# Patient Record
Sex: MALE | Race: WHITE | URBAN - METROPOLITAN AREA
[De-identification: names, ages, dates, MRNs, and addresses within clinical notes are randomized per-mention and may not be internally consistent; named-entity substitution may affect disease eponyms.]

---

## 2018-02-03 ENCOUNTER — INPATIENT (INPATIENT)
Facility: HOSPITAL | Age: 73
LOS: 17 days | Discharge: NURSING, REHAB OR LONG-TERM CARE | End: 2018-02-21
Attending: FAMILY MEDICINE | Admitting: FAMILY MEDICINE
Payer: MEDICARE

## 2018-02-03 VITALS
RESPIRATION RATE: 17 BRPM | HEART RATE: 99 BPM | SYSTOLIC BLOOD PRESSURE: 136 MMHG | WEIGHT: 250 LBS | DIASTOLIC BLOOD PRESSURE: 97 MMHG | HEIGHT: 73 IN | OXYGEN SATURATION: 93 % | TEMPERATURE: 98 F

## 2018-02-03 LAB
ALBUMIN SERPL ELPH-MCNC: 3.2 G/DL — LOW (ref 3.3–5)
ALP SERPL-CCNC: 116 U/L — SIGNIFICANT CHANGE UP (ref 40–120)
ALT FLD-CCNC: 35 U/L — SIGNIFICANT CHANGE UP (ref 12–78)
ANION GAP SERPL CALC-SCNC: 10 MMOL/L — SIGNIFICANT CHANGE UP (ref 5–17)
APTT BLD: 22.7 SEC — LOW (ref 27.5–37.4)
AST SERPL-CCNC: 35 U/L — SIGNIFICANT CHANGE UP (ref 15–37)
BASOPHILS # BLD AUTO: 0.1 K/UL — SIGNIFICANT CHANGE UP (ref 0–0.2)
BASOPHILS NFR BLD AUTO: 0.9 % — SIGNIFICANT CHANGE UP (ref 0–2)
BILIRUB SERPL-MCNC: 0.7 MG/DL — SIGNIFICANT CHANGE UP (ref 0.2–1.2)
BUN SERPL-MCNC: 19 MG/DL — SIGNIFICANT CHANGE UP (ref 7–23)
CALCIUM SERPL-MCNC: 9.3 MG/DL — SIGNIFICANT CHANGE UP (ref 8.5–10.1)
CHLORIDE SERPL-SCNC: 94 MMOL/L — LOW (ref 96–108)
CO2 SERPL-SCNC: 28 MMOL/L — SIGNIFICANT CHANGE UP (ref 22–31)
CREAT SERPL-MCNC: 0.8 MG/DL — SIGNIFICANT CHANGE UP (ref 0.5–1.3)
EOSINOPHIL # BLD AUTO: 0.1 K/UL — SIGNIFICANT CHANGE UP (ref 0–0.5)
EOSINOPHIL NFR BLD AUTO: 0.7 % — SIGNIFICANT CHANGE UP (ref 0–6)
GLUCOSE SERPL-MCNC: 98 MG/DL — SIGNIFICANT CHANGE UP (ref 70–99)
HCT VFR BLD CALC: 42.2 % — SIGNIFICANT CHANGE UP (ref 39–50)
HGB BLD-MCNC: 14.1 G/DL — SIGNIFICANT CHANGE UP (ref 13–17)
INR BLD: 1.14 RATIO — SIGNIFICANT CHANGE UP (ref 0.88–1.16)
LYMPHOCYTES # BLD AUTO: 0.6 K/UL — LOW (ref 1–3.3)
LYMPHOCYTES # BLD AUTO: 4.1 % — LOW (ref 13–44)
MAGNESIUM SERPL-MCNC: 1.9 MG/DL — SIGNIFICANT CHANGE UP (ref 1.6–2.6)
MCHC RBC-ENTMCNC: 31.4 PG — SIGNIFICANT CHANGE UP (ref 27–34)
MCHC RBC-ENTMCNC: 33.4 GM/DL — SIGNIFICANT CHANGE UP (ref 32–36)
MCV RBC AUTO: 93.8 FL — SIGNIFICANT CHANGE UP (ref 80–100)
MONOCYTES # BLD AUTO: 1.2 K/UL — HIGH (ref 0–0.9)
MONOCYTES NFR BLD AUTO: 8.7 % — SIGNIFICANT CHANGE UP (ref 2–14)
NEUTROPHILS # BLD AUTO: 11.6 K/UL — HIGH (ref 1.8–7.4)
NEUTROPHILS NFR BLD AUTO: 85.5 % — HIGH (ref 43–77)
PLATELET # BLD AUTO: 423 K/UL — HIGH (ref 150–400)
POTASSIUM SERPL-MCNC: 4 MMOL/L — SIGNIFICANT CHANGE UP (ref 3.5–5.3)
POTASSIUM SERPL-SCNC: 4 MMOL/L — SIGNIFICANT CHANGE UP (ref 3.5–5.3)
PROT SERPL-MCNC: 7.1 GM/DL — SIGNIFICANT CHANGE UP (ref 6–8.3)
PROTHROM AB SERPL-ACNC: 12.4 SEC — SIGNIFICANT CHANGE UP (ref 9.8–12.7)
RBC # BLD: 4.5 M/UL — SIGNIFICANT CHANGE UP (ref 4.2–5.8)
RBC # FLD: 12.4 % — SIGNIFICANT CHANGE UP (ref 10.3–14.5)
SODIUM SERPL-SCNC: 132 MMOL/L — LOW (ref 135–145)
WBC # BLD: 13.6 K/UL — HIGH (ref 3.8–10.5)
WBC # FLD AUTO: 13.6 K/UL — HIGH (ref 3.8–10.5)

## 2018-02-03 PROCEDURE — 73552 X-RAY EXAM OF FEMUR 2/>: CPT | Mod: 26

## 2018-02-03 PROCEDURE — 73562 X-RAY EXAM OF KNEE 3: CPT | Mod: 26,RT

## 2018-02-03 PROCEDURE — 71045 X-RAY EXAM CHEST 1 VIEW: CPT | Mod: 26

## 2018-02-03 PROCEDURE — 99285 EMERGENCY DEPT VISIT HI MDM: CPT

## 2018-02-03 PROCEDURE — 93010 ELECTROCARDIOGRAM REPORT: CPT

## 2018-02-03 PROCEDURE — 73502 X-RAY EXAM HIP UNI 2-3 VIEWS: CPT | Mod: 26

## 2018-02-03 PROCEDURE — 93970 EXTREMITY STUDY: CPT | Mod: 26

## 2018-02-03 RX ORDER — OXYCODONE HYDROCHLORIDE 5 MG/1
10 TABLET ORAL EVERY 4 HOURS
Qty: 0 | Refills: 0 | Status: DISCONTINUED | OUTPATIENT
Start: 2018-02-03 | End: 2018-02-05

## 2018-02-03 RX ORDER — OXYCODONE HYDROCHLORIDE 5 MG/1
5 TABLET ORAL EVERY 4 HOURS
Qty: 0 | Refills: 0 | Status: DISCONTINUED | OUTPATIENT
Start: 2018-02-03 | End: 2018-02-05

## 2018-02-03 RX ORDER — MORPHINE SULFATE 50 MG/1
4 CAPSULE, EXTENDED RELEASE ORAL ONCE
Qty: 0 | Refills: 0 | Status: DISCONTINUED | OUTPATIENT
Start: 2018-02-03 | End: 2018-02-03

## 2018-02-03 RX ORDER — HEPARIN SODIUM 5000 [USP'U]/ML
5000 INJECTION INTRAVENOUS; SUBCUTANEOUS ONCE
Qty: 0 | Refills: 0 | Status: COMPLETED | OUTPATIENT
Start: 2018-02-03 | End: 2018-02-03

## 2018-02-03 RX ORDER — SODIUM CHLORIDE 9 MG/ML
1000 INJECTION, SOLUTION INTRAVENOUS
Qty: 0 | Refills: 0 | Status: DISCONTINUED | OUTPATIENT
Start: 2018-02-03 | End: 2018-02-05

## 2018-02-03 RX ORDER — HYDROMORPHONE HYDROCHLORIDE 2 MG/ML
0.5 INJECTION INTRAMUSCULAR; INTRAVENOUS; SUBCUTANEOUS
Qty: 0 | Refills: 0 | Status: DISCONTINUED | OUTPATIENT
Start: 2018-02-03 | End: 2018-02-05

## 2018-02-03 RX ADMIN — HEPARIN SODIUM 5000 UNIT(S): 5000 INJECTION INTRAVENOUS; SUBCUTANEOUS at 23:32

## 2018-02-03 RX ADMIN — MORPHINE SULFATE 4 MILLIGRAM(S): 50 CAPSULE, EXTENDED RELEASE ORAL at 23:32

## 2018-02-03 RX ADMIN — MORPHINE SULFATE 4 MILLIGRAM(S): 50 CAPSULE, EXTENDED RELEASE ORAL at 17:20

## 2018-02-03 NOTE — ED PROVIDER NOTE - PMH
CHF (congestive heart failure)    COPD (chronic obstructive pulmonary disease)    Emphysema    Spinal stenosis

## 2018-02-03 NOTE — ED PROVIDER NOTE - MEDICAL DECISION MAKING DETAILS
71 y/o M c/o right hip pain, plan for labs, US, X-ray, pain meds. 73 y/o M c/o right hip pain, plan for labs, US, X-ray, pain meds.    ADmit for right hip fracture.  Orthopedics consulted in ED.  Admit to medicine service.  Will need cardiology clearance.

## 2018-02-03 NOTE — ED ADULT NURSE REASSESSMENT NOTE - NS ED NURSE REASSESS COMMENT FT1
Pt repositioned for comfort, pt given pillows under legs bilaterally and behind head for comfort. Pt updated as to plan of care and verbalized understanding of information at this time. Awaiting MD re-eval and reading of radiology results. Pt has no questions at this time. Will continue to monitor.

## 2018-02-03 NOTE — ED PROVIDER NOTE - OBJECTIVE STATEMENT
73 y/o M w/ pmhx of COPD, emphysema, CHF, spinal stenosis, presents to ED c/o right hip pain starting 3 weeks ago. Pt saw MD who said he had arthritis, but states pain has gotten progressively worse, much worse yesterday and today to the point he couldn't work. Reports poor appetite and decreased BM. Denies fever, chills, N/V/D, or any other acute complaints. Pt currently on home O2 at 4L/min.

## 2018-02-03 NOTE — CONSULT NOTE ADULT - SUBJECTIVE AND OBJECTIVE BOX
72y Male minimal community ambulator with cane presents c/o R hip pain and inability to ambulate. States he saw dr calderon (ortho) a month ago c/o right hip pain, told he had arthritis and was supposed to get an MRI outpt which he was never able to. Since then his condition and pain have deteriorated. He remembers a fall ~10 days ago but was able to bear weight since then but with some increased pain. Came in today as he could not ambulate at all. Denies HS/LOC. Denies numbness/tingling. Denies fever/chills. Denies pain/injury elsewhere. States he has history of lumbar SS with bilateral foot drop and peripheral neuropathy. Outpatient providers dr lala (ortho), dr wright (spine), dr bunn (cards), dr koch (pulm), dr espinal (pcp). He is currently on 4L home O2 for copd.     HEALTH ISSUES - PROBLEM Dx:        MEDICATIONS  (STANDING):  heparin  Injectable 5000 Unit(s) SubCutaneous once  lactated ringers. 1000 milliLiter(s) IV Continuous <Continuous>    Allergies    No Known Allergies    Intolerances                              14.1   13.6  )-----------( 423      ( 03 Feb 2018 17:56 )             42.2     03 Feb 2018 17:56    132    |  94     |  19     ----------------------------<  98     4.0     |  28     |  0.80     Ca    9.3        03 Feb 2018 17:56  Mg     1.9       03 Feb 2018 17:56    TPro  7.1    /  Alb  3.2    /  TBili  0.7    /  DBili  x      /  AST  35     /  ALT  35     /  AlkPhos  116    03 Feb 2018 17:56    PT/INR - ( 03 Feb 2018 17:56 )   PT: 12.4 sec;   INR: 1.14 ratio         PTT - ( 03 Feb 2018 17:56 )  PTT:22.7 sec  Vital Signs Last 24 Hrs  T(C): 36.7 (02-03-18 @ 20:00), Max: 36.7 (02-03-18 @ 20:00)  T(F): 98 (02-03-18 @ 20:00), Max: 98 (02-03-18 @ 20:00)  HR: 88 (02-03-18 @ 20:00) (88 - 99)  BP: 134/74 (02-03-18 @ 20:00) (134/74 - 136/97)  BP(mean): --  RR: 18 (02-03-18 @ 20:00) (17 - 18)  SpO2: 95% (02-03-18 @ 20:00) (93% - 95%)    Imaging: XR demonstates R hip fracture    Physical Exam  Gen: NAD  RLE: skin intact, short/ER leg, unable to SLR R LE, + log roll R LE, +ttp hip/groin, no ttp elsewhere, +ehl/fhl/ta/gs function, SILT L3-S1, no calf ttp, dp/pt pulse intact, compartments soft. on exam no foot drop apparent but states he sometimes trips over foot/toes (likely a functional foot drop)  Secondary survey: benign, nv intact, able to SLR contralateral leg, negative log roll contralateral leg, no bony ttp elsewhere    A/P: 72y Male with R hip fracture  Pain control  NWB RLE, bedrest  FU labs/imaging  Check vitamin D levels  Ca/Vit D supplementation  Outpt osteoporosis workup  Admit to medical team  Medical clearance/optimization for OR  npo except meds after midnight  ivf while npo  hold plavix if possible  subQ heparin x1, hold anticoag past midnight  plan for OR on 2/4 pending medical clearances   Will discuss with attending

## 2018-02-03 NOTE — ED ADULT NURSE REASSESSMENT NOTE - NS ED NURSE REASSESS COMMENT FT1
Pt medicated for pain, pt updated as to plan of care and admission status and process. Pt verbalized understanding of information. Pt remains on bedside monitor with continuous pulse ox and on O2. Pt has no questions at this time, lights dimmed to decrease visual stimuli and promote relaxation. Explained how to use call bell to operate TV, pt verbalized understanding. Awaiting hospitalist evaluation at this time and completion fo admission orders. Will continue to monitor.

## 2018-02-03 NOTE — ED ADULT NURSE REASSESSMENT NOTE - NS ED NURSE REASSESS COMMENT FT1
Ortho consult at pt bedside at this time evaluating pt, EKG completed and copy on chart. Pt to be admitted. Will continue to monitor.

## 2018-02-03 NOTE — ED ADULT NURSE NOTE - OBJECTIVE STATEMENT
pt c/o of right hip pain, bilateral leg and ankle swelling 2plus.pt states this started 3 weeks ago and no trauma occurred.

## 2018-02-04 DIAGNOSIS — S72.009A FRACTURE OF UNSPECIFIED PART OF NECK OF UNSPECIFIED FEMUR, INITIAL ENCOUNTER FOR CLOSED FRACTURE: ICD-10-CM

## 2018-02-04 DIAGNOSIS — I50.9 HEART FAILURE, UNSPECIFIED: ICD-10-CM

## 2018-02-04 DIAGNOSIS — J44.9 CHRONIC OBSTRUCTIVE PULMONARY DISEASE, UNSPECIFIED: ICD-10-CM

## 2018-02-04 DIAGNOSIS — M48.00 SPINAL STENOSIS, SITE UNSPECIFIED: ICD-10-CM

## 2018-02-04 DIAGNOSIS — Z00.00 ENCOUNTER FOR GENERAL ADULT MEDICAL EXAMINATION WITHOUT ABNORMAL FINDINGS: ICD-10-CM

## 2018-02-04 LAB
ANION GAP SERPL CALC-SCNC: 9 MMOL/L — SIGNIFICANT CHANGE UP (ref 5–17)
APTT BLD: 29.7 SEC — SIGNIFICANT CHANGE UP (ref 27.5–37.4)
BLD GP AB SCN SERPL QL: SIGNIFICANT CHANGE UP
BUN SERPL-MCNC: 18 MG/DL — SIGNIFICANT CHANGE UP (ref 7–23)
CALCIUM SERPL-MCNC: 8.8 MG/DL — SIGNIFICANT CHANGE UP (ref 8.5–10.1)
CHLORIDE SERPL-SCNC: 96 MMOL/L — SIGNIFICANT CHANGE UP (ref 96–108)
CO2 SERPL-SCNC: 29 MMOL/L — SIGNIFICANT CHANGE UP (ref 22–31)
CREAT SERPL-MCNC: 0.71 MG/DL — SIGNIFICANT CHANGE UP (ref 0.5–1.3)
GLUCOSE SERPL-MCNC: 99 MG/DL — SIGNIFICANT CHANGE UP (ref 70–99)
HCT VFR BLD CALC: 38.4 % — LOW (ref 39–50)
HGB BLD-MCNC: 12.9 G/DL — LOW (ref 13–17)
INR BLD: 1.19 RATIO — HIGH (ref 0.88–1.16)
MCHC RBC-ENTMCNC: 31.5 PG — SIGNIFICANT CHANGE UP (ref 27–34)
MCHC RBC-ENTMCNC: 33.6 GM/DL — SIGNIFICANT CHANGE UP (ref 32–36)
MCV RBC AUTO: 93.8 FL — SIGNIFICANT CHANGE UP (ref 80–100)
PLATELET # BLD AUTO: 391 K/UL — SIGNIFICANT CHANGE UP (ref 150–400)
POTASSIUM SERPL-MCNC: 3.5 MMOL/L — SIGNIFICANT CHANGE UP (ref 3.5–5.3)
POTASSIUM SERPL-SCNC: 3.5 MMOL/L — SIGNIFICANT CHANGE UP (ref 3.5–5.3)
PROTHROM AB SERPL-ACNC: 12.9 SEC — HIGH (ref 9.8–12.7)
RBC # BLD: 4.09 M/UL — LOW (ref 4.2–5.8)
RBC # FLD: 12.2 % — SIGNIFICANT CHANGE UP (ref 10.3–14.5)
SODIUM SERPL-SCNC: 134 MMOL/L — LOW (ref 135–145)
TYPE + AB SCN PNL BLD: SIGNIFICANT CHANGE UP
WBC # BLD: 12.2 K/UL — HIGH (ref 3.8–10.5)
WBC # FLD AUTO: 12.2 K/UL — HIGH (ref 3.8–10.5)

## 2018-02-04 RX ORDER — METOPROLOL TARTRATE 50 MG
25 TABLET ORAL DAILY
Qty: 0 | Refills: 0 | Status: DISCONTINUED | OUTPATIENT
Start: 2018-02-04 | End: 2018-02-05

## 2018-02-04 RX ORDER — TIOTROPIUM BROMIDE 18 UG/1
1 CAPSULE ORAL; RESPIRATORY (INHALATION) DAILY
Qty: 0 | Refills: 0 | Status: DISCONTINUED | OUTPATIENT
Start: 2018-02-04 | End: 2018-02-05

## 2018-02-04 RX ORDER — ACETAMINOPHEN 500 MG
650 TABLET ORAL EVERY 4 HOURS
Qty: 0 | Refills: 0 | Status: DISCONTINUED | OUTPATIENT
Start: 2018-02-04 | End: 2018-02-05

## 2018-02-04 RX ORDER — FINASTERIDE 5 MG/1
5 TABLET, FILM COATED ORAL DAILY
Qty: 0 | Refills: 0 | Status: DISCONTINUED | OUTPATIENT
Start: 2018-02-04 | End: 2018-02-05

## 2018-02-04 RX ORDER — HEPARIN SODIUM 5000 [USP'U]/ML
5000 INJECTION INTRAVENOUS; SUBCUTANEOUS EVERY 8 HOURS
Qty: 0 | Refills: 0 | Status: DISCONTINUED | OUTPATIENT
Start: 2018-02-04 | End: 2018-02-04

## 2018-02-04 RX ORDER — AMLODIPINE BESYLATE 2.5 MG/1
5 TABLET ORAL DAILY
Qty: 0 | Refills: 0 | Status: DISCONTINUED | OUTPATIENT
Start: 2018-02-04 | End: 2018-02-05

## 2018-02-04 RX ORDER — ISOSORBIDE MONONITRATE 60 MG/1
60 TABLET, EXTENDED RELEASE ORAL DAILY
Qty: 0 | Refills: 0 | Status: DISCONTINUED | OUTPATIENT
Start: 2018-02-04 | End: 2018-02-05

## 2018-02-04 RX ORDER — ONDANSETRON 8 MG/1
4 TABLET, FILM COATED ORAL EVERY 6 HOURS
Qty: 0 | Refills: 0 | Status: DISCONTINUED | OUTPATIENT
Start: 2018-02-04 | End: 2018-02-05

## 2018-02-04 RX ORDER — ATORVASTATIN CALCIUM 80 MG/1
20 TABLET, FILM COATED ORAL AT BEDTIME
Qty: 0 | Refills: 0 | Status: DISCONTINUED | OUTPATIENT
Start: 2018-02-04 | End: 2018-02-05

## 2018-02-04 RX ORDER — BUDESONIDE AND FORMOTEROL FUMARATE DIHYDRATE 160; 4.5 UG/1; UG/1
2 AEROSOL RESPIRATORY (INHALATION)
Qty: 0 | Refills: 0 | Status: DISCONTINUED | OUTPATIENT
Start: 2018-02-04 | End: 2018-02-05

## 2018-02-04 RX ORDER — ALBUTEROL 90 UG/1
2.5 AEROSOL, METERED ORAL EVERY 4 HOURS
Qty: 0 | Refills: 0 | Status: DISCONTINUED | OUTPATIENT
Start: 2018-02-04 | End: 2018-02-05

## 2018-02-04 RX ORDER — SODIUM CHLORIDE 9 MG/ML
1000 INJECTION, SOLUTION INTRAVENOUS
Qty: 0 | Refills: 0 | Status: DISCONTINUED | OUTPATIENT
Start: 2018-02-04 | End: 2018-02-05

## 2018-02-04 RX ORDER — RANOLAZINE 500 MG/1
500 TABLET, FILM COATED, EXTENDED RELEASE ORAL
Qty: 0 | Refills: 0 | Status: DISCONTINUED | OUTPATIENT
Start: 2018-02-04 | End: 2018-02-05

## 2018-02-04 RX ORDER — TAMSULOSIN HYDROCHLORIDE 0.4 MG/1
0.4 CAPSULE ORAL AT BEDTIME
Qty: 0 | Refills: 0 | Status: DISCONTINUED | OUTPATIENT
Start: 2018-02-04 | End: 2018-02-05

## 2018-02-04 RX ORDER — HEPARIN SODIUM 5000 [USP'U]/ML
5000 INJECTION INTRAVENOUS; SUBCUTANEOUS EVERY 8 HOURS
Qty: 0 | Refills: 0 | Status: COMPLETED | OUTPATIENT
Start: 2018-02-04 | End: 2018-02-04

## 2018-02-04 RX ADMIN — SODIUM CHLORIDE 75 MILLILITER(S): 9 INJECTION, SOLUTION INTRAVENOUS at 02:56

## 2018-02-04 RX ADMIN — OXYCODONE HYDROCHLORIDE 10 MILLIGRAM(S): 5 TABLET ORAL at 20:01

## 2018-02-04 RX ADMIN — HYDROMORPHONE HYDROCHLORIDE 0.5 MILLIGRAM(S): 2 INJECTION INTRAMUSCULAR; INTRAVENOUS; SUBCUTANEOUS at 02:56

## 2018-02-04 RX ADMIN — RANOLAZINE 500 MILLIGRAM(S): 500 TABLET, FILM COATED, EXTENDED RELEASE ORAL at 17:14

## 2018-02-04 RX ADMIN — OXYCODONE HYDROCHLORIDE 10 MILLIGRAM(S): 5 TABLET ORAL at 16:14

## 2018-02-04 RX ADMIN — Medication 25 MILLIGRAM(S): at 11:02

## 2018-02-04 RX ADMIN — AMLODIPINE BESYLATE 5 MILLIGRAM(S): 2.5 TABLET ORAL at 11:02

## 2018-02-04 RX ADMIN — OXYCODONE HYDROCHLORIDE 10 MILLIGRAM(S): 5 TABLET ORAL at 15:14

## 2018-02-04 RX ADMIN — OXYCODONE HYDROCHLORIDE 10 MILLIGRAM(S): 5 TABLET ORAL at 19:22

## 2018-02-04 RX ADMIN — ISOSORBIDE MONONITRATE 60 MILLIGRAM(S): 60 TABLET, EXTENDED RELEASE ORAL at 11:02

## 2018-02-04 RX ADMIN — OXYCODONE HYDROCHLORIDE 5 MILLIGRAM(S): 5 TABLET ORAL at 21:58

## 2018-02-04 RX ADMIN — OXYCODONE HYDROCHLORIDE 5 MILLIGRAM(S): 5 TABLET ORAL at 09:18

## 2018-02-04 RX ADMIN — TAMSULOSIN HYDROCHLORIDE 0.4 MILLIGRAM(S): 0.4 CAPSULE ORAL at 21:58

## 2018-02-04 RX ADMIN — FINASTERIDE 5 MILLIGRAM(S): 5 TABLET, FILM COATED ORAL at 11:02

## 2018-02-04 RX ADMIN — OXYCODONE HYDROCHLORIDE 5 MILLIGRAM(S): 5 TABLET ORAL at 22:50

## 2018-02-04 RX ADMIN — OXYCODONE HYDROCHLORIDE 5 MILLIGRAM(S): 5 TABLET ORAL at 08:18

## 2018-02-04 RX ADMIN — ATORVASTATIN CALCIUM 20 MILLIGRAM(S): 80 TABLET, FILM COATED ORAL at 21:58

## 2018-02-04 RX ADMIN — HEPARIN SODIUM 5000 UNIT(S): 5000 INJECTION INTRAVENOUS; SUBCUTANEOUS at 21:58

## 2018-02-04 NOTE — PROGRESS NOTE ADULT - SUBJECTIVE AND OBJECTIVE BOX
Patient is a 72y old  Male who presents with a chief complaint of Limb pain (04 Feb 2018 01:53)      HPI:  73 y/o male with a PMHx chronic CHF, COPD on home O2 4 L, emphysema, and spinal stenosis.   He presents to ED s/p fall.   Fall occurred ~ 10 days ago; patient has been very weak, and suffers from arthritis.   He notes chronic Rt hip pain for ~ 4 weeks which causes ambulatory dysfunction.  He was suppose to follow-up with outpatient ortho for MRI.  Reports associated poor appetite with decreased PO intake. Denies nausea, vomiting, diarrhea, fevers , chills, CP or dypsnea. No recent illnesses, no sick contacts.ED course:Xray imaging reportedly with Rt hip fracture Venous doppler US 2/3 : no evidence of deep venous thrombosis in either leg.  Left Baker's cyst. (04 Feb 2018 01:53).    2/4: At bedside pt comfortable and offers no complaints. He was cleared by cardiology for orthopedic hip surgery. He remains HD stable, afebrile.    Review of system- Rest of the review of system are normal except mentioned in HPI    Vital Signs Last 24 Hrs  T(C): 36.8 (04 Feb 2018 10:05), Max: 36.8 (04 Feb 2018 10:05)  T(F): 98.3 (04 Feb 2018 10:05), Max: 98.3 (04 Feb 2018 10:05)  HR: 89 (04 Feb 2018 10:05) (88 - 99)  BP: 131/76 (04 Feb 2018 10:05) (131/76 - 166/75)  BP(mean): --  RR: 17 (04 Feb 2018 10:05) (17 - 18)  SpO2: 94% (04 Feb 2018 10:05) (93% - 99%)    PHYSICAL EXAM:    Constitutional: NAD, awake and alert, well-developed  HEENT: PERR, EOMI, Normal Hearing, MMM  Neck: Soft and supple  Respiratory: Breath sounds are clear bilaterally, No wheezing, rales or rhonchi  Cardiovascular: S1 and S2, regular rate and rhythm, no Murmurs, gallops or rubs  Gastrointestinal: Bowel Sounds present, soft, nontender, nondistended, no guarding, no rebound  Extremities: No peripheral edema  Neurological: A/O x 3, no focal deficits in my limited exam  Skin: No rashes    MEDICATIONS  (STANDING):  amLODIPine   Tablet 5 milliGRAM(s) Oral daily  atorvastatin 20 milliGRAM(s) Oral at bedtime  buDESOnide  80 MICROgram(s)/formoterol 4.5 MICROgram(s) Inhaler 2 Puff(s) Inhalation two times a day  finasteride 5 milliGRAM(s) Oral daily  heparin  Injectable 5000 Unit(s) SubCutaneous every 8 hours  isosorbide   mononitrate ER Tablet (IMDUR) 60 milliGRAM(s) Oral daily  lactated ringers. 1000 milliLiter(s) (75 mL/Hr) IV Continuous <Continuous>  lactated ringers. 1000 milliLiter(s) (50 mL/Hr) IV Continuous <Continuous>  metoprolol succinate ER 25 milliGRAM(s) Oral daily  ranolazine 500 milliGRAM(s) Oral two times a day  tamsulosin 0.4 milliGRAM(s) Oral at bedtime  tiotropium 18 MICROgram(s) Capsule 1 Capsule(s) Inhalation daily    MEDICATIONS  (PRN):  acetaminophen   Tablet 650 milliGRAM(s) Oral every 4 hours PRN For Temp greater than 38 C (100.4 F)  ALBUTerol    0.083% 2.5 milliGRAM(s) Nebulizer every 4 hours PRN Shortness of Breath and/or Wheezing  HYDROmorphone  Injectable 0.5 milliGRAM(s) IV Push every 3 hours PRN Severe Pain (7 - 10)  ondansetron Injectable 4 milliGRAM(s) IV Push every 6 hours PRN Nausea  oxyCODONE    IR 5 milliGRAM(s) Oral every 4 hours PRN Mild Pain (1 - 3)  oxyCODONE    IR 10 milliGRAM(s) Oral every 4 hours PRN Moderate Pain (4 - 6)                            12.9   12.2  )-----------( 391      ( 04 Feb 2018 05:55 )             38.4     02-04    134<L>  |  96  |  18  ----------------------------<  99  3.5   |  29  |  0.71    Ca    8.8      04 Feb 2018 05:55  Mg     1.9     02-03    TPro  7.1  /  Alb  3.2<L>  /  TBili  0.7  /  DBili  x   /  AST  35  /  ALT  35  /  AlkPhos  116  02-03    CAPILLARY BLOOD GLUCOSE        LIVER FUNCTIONS - ( 03 Feb 2018 17:56 )  Alb: 3.2 g/dL / Pro: 7.1 gm/dL / ALK PHOS: 116 U/L / ALT: 35 U/L / AST: 35 U/L / GGT: x           PT/INR - ( 04 Feb 2018 05:55 )   PT: 12.9 sec;   INR: 1.19 ratio         PTT - ( 04 Feb 2018 05:55 )  PTT:29.7 sec    A/P: 72 year old man with CHF, COPD, chronic respiratory failure s/p fall now with right hip fracture.    Right Hip fracture.    -HD stable, evaluated by cardiology.  Pt to go to OR for repair.  No further diagnostic testing required.  -PT eval  -post anticoagulation  -pain management    Chronic congestive heart failure, unspecified congestive heart failure type: compensated  -hold lasix and lisinopril ameena-op and re-valuate post op.  -c/w toprol, lipitor, imdur, ranexa, norvasc      COPD with chronic respiratory failure: STABLE  -incentive spirometry  -duonebs  -symbicort, hold spiriva  -supplemental O2    Spinal stenosis:  -pain management and PT    BPH  -proscar and flomax    DVT ppx:  -heparin subc    Attending Statement:  40 minutes spent on total encounter; more than 50% of the visit was spent counseling and/or coordinating care by the attending physician.

## 2018-02-04 NOTE — H&P ADULT - HISTORY OF PRESENT ILLNESS
71 y/o M w/ pmhx of COPD, emphysema, CHF, spinal stenosis, presents to ED c/o right hip pain starting 3 weeks ago. Pt saw MD who said he had arthritis, but states pain has gotten progressively worse, much worse yesterday and today to the point he couldn't work. Reports poor appetite and decreased BM. Denies fever, chills, N/V/D, or any other acute complaints. Pt currently on home O2 at 4L/min. 73 y/o male with a PMHx chronic CHF, COPD, emphysema, and spinal stenosis.   He presents to ED s/p fall.   Fall occurred 3-4 weeks ago; patient has been so weak and unable to get OOB.    Onset was starting 3 weeks ago. Pt saw MD who said he had arthritis, but states pain has gotten progressively worse, much worse yesterday and today to the point he couldn't work. Reports poor appetite and decreased BM. Denies fever, chills, N/V/D, or any other acute complaints. Pt currently on home O2 at 4L/min. 71 y/o male with a PMHx chronic CHF, COPD on home O2 4 L, emphysema, and spinal stenosis.   He presents to ED s/p fall.   Fall occurred ~ 10 days ago; patient has been very weak, and suffers from arthritis.   He notes chronic Rt hip pain for ~ 4 weeks which causes ambulatory dysfunction.  He was suppose to follow-up with outpatient ortho for MRI.    Reports associated poor appetite with decreased PO intake.   Denies nausea, vomiting, diarrhea, fevers , chills, CP or dypsnea.   No recent illnesses, no sick contacts.    ED course:  Xray imaging reportedly with Rt hip fracture  Venous doppler US 2/3 : no evidence of deep venous thrombosis in either leg.  Left Baker's cyst.

## 2018-02-04 NOTE — H&P ADULT - ASSESSMENT
73 y/o male with a PMHx chronic CHF, COPD on home O2 4 L, emphysema, and spinal stenosis.   He presents to ED s/p fall.   Fall occurred ~ 10 days ago; patient has been very weak, and suffers from arthritis.   He notes chronic Rt hip pain for ~ 4 weeks which causes ambulatory dysfunction.  He was suppose to follow-up with outpatient ortho for MRI.    Reports associated poor appetite with decreased PO intake.   Denies nausea, vomiting, diarrhea, fevers , chills, CP or dypsnea.   No recent illnesses, no sick contacts.    ED course:  Xray imaging reportedly with Rt hip fracture  Venous doppler US 2/3 : no evidence of deep venous thrombosis in either leg.  Left Baker's cyst.

## 2018-02-04 NOTE — CONSULT NOTE ADULT - ASSESSMENT
Right hip fracture  Needs ORIF   CAD,  JERARDO of LCx 3/13  Chr HFpEF  COPD  HLD  HTN  KENNY  PAD  Spinal stenosis  OA    Suggest:    Cardiac status is stable for surgery  Cautious IVF  Pre op beta blockers  Resume home meds toprol 25 QD, lipitor 20 QHS, imdur 60 QD, Proscar 5 mg, Flomax 0.4 mg, Norvaasc 5 mg QD, lisinopril 20 mg qdRanexa 500 mg BID, Advair, Spiriva, Lyrica, Albuterol. Lasix may be held till after surgery

## 2018-02-04 NOTE — H&P ADULT - NSHPPHYSICALEXAM_GEN_ALL_CORE
Physical Exam:   GENERAL APPEARANCE:  NAD, hemodynamically stable  T(C): 36.7 (02-03-18 @ 20:00), Max: 36.7 (02-03-18 @ 20:00)  HR: 88 (02-03-18 @ 20:00) (88 - 99)  BP: 134/74 (02-03-18 @ 20:00) (134/74 - 136/97)  RR: 18 (02-03-18 @ 20:00) (17 - 18)  SpO2: 95% (02-03-18 @ 20:00) (93% - 95%)

## 2018-02-04 NOTE — CONSULT NOTE ADULT - SUBJECTIVE AND OBJECTIVE BOX
Patient is a 72y old  Male who presents with a chief complaint of Limb pain (04 Feb 2018 01:53)      HPI:  71 y/o male with a PMHx chronic CHF, COPD on home O2 4 L, emphysema, and spinal stenosis.   He presents to ED s/p fall.   Fall occurred ~ 10 days ago; patient has been very weak, and suffers from arthritis.   He notes chronic Rt hip pain for ~ 4 weeks which causes ambulatory dysfunction.  He was suppose to follow-up with outpatient ortho for MRI.    Reports associated poor appetite with decreased PO intake.   Denies nausea, vomiting, diarrhea, fevers , chills, CP or dypsnea.   No recent illnesses, no sick contacts.    ED course:  Xray imaging reportedly with Rt hip fracture  Venous doppler US 2/3 : no evidence of deep venous thrombosis in either leg.  Left Baker's cyst. (04 Feb 2018 01:53)      PAST MEDICAL & SURGICAL HISTORY:  No significant past surgical history      PREVIOUS CARDIAC WORKUP:      Echo:  10/16 Nml EF, mild LVH, diastolic dysfunction, trace MR  Stress Test:  1/17 No ichemia  Cardiac Cath:    ALLERGIES:    No Known Allergies       MEDICATIONS  (STANDING):  lactated ringers. 1000 milliLiter(s) (75 mL/Hr) IV Continuous <Continuous>    MEDICATIONS  (PRN):  acetaminophen   Tablet 650 milliGRAM(s) Oral every 4 hours PRN For Temp greater than 38 C (100.4 F)  ALBUTerol    0.083% 2.5 milliGRAM(s) Nebulizer every 4 hours PRN Shortness of Breath and/or Wheezing  HYDROmorphone  Injectable 0.5 milliGRAM(s) IV Push every 3 hours PRN Severe Pain (7 - 10)  ondansetron Injectable 4 milliGRAM(s) IV Push every 6 hours PRN Nausea  oxyCODONE    IR 5 milliGRAM(s) Oral every 4 hours PRN Mild Pain (1 - 3)  oxyCODONE    IR 10 milliGRAM(s) Oral every 4 hours PRN Moderate Pain (4 - 6)      FAMILY HISTORY:  No pertinent family history in first degree relatives        SOCIAL HISTORY:  Nonsmoker. No ETOH abuse. No illicit drugs.     ROS:     Detailed ten system ROS was performed and was negative except for history as eluded to above.    no fever  no chills  no nausea  no vomiting  no diarrhea  no constipation  no melena  no hematochezia  no chest pain  no palpitations  no sob at rest  no dyspnea on exertion  no cough  no wheezing  no anorexia  no headache  no dizziness  no syncope  no weakness  no myalgia  no dysuria  no polyuria  no hematuria     Vital Signs Last 24 Hrs  T(C): 36.6 (04 Feb 2018 04:31), Max: 36.7 (03 Feb 2018 20:00)  T(F): 97.9 (04 Feb 2018 04:31), Max: 98.1 (04 Feb 2018 02:50)  HR: 98 (04 Feb 2018 04:31) (88 - 99)  BP: 140/66 (04 Feb 2018 05:16) (134/74 - 166/75)  BP(mean): --  RR: 17 (04 Feb 2018 04:31) (17 - 18)  SpO2: 98% (04 Feb 2018 04:31) (93% - 99%)    I&O's Summary    03 Feb 2018 07:01  -  04 Feb 2018 07:00  --------------------------------------------------------  IN: 112.5 mL / OUT: 0 mL / NET: 112.5 mL        PHYSICAL EXAM:    General:                Comfortable, AAO X 3, in no distress. Obese.  HEENT:                  Atraumatic, PERRLA, EOMI, conjunctiva clear.   Neck:                     Supple, no adenopathy, no thyromegaly, no JVD, no bruit.  Back:                     Symmetric, non tender.  Chest:                    Clear, B/L symmetric reduced basal air entry, no tachypnea  Heart:                     S1, S2 normal, no gallop, no murmur.  Abdomen:              Soft, non-tender, bowel sounds active. No palpable masses.  Extremities:           no cyanosis, no edema. Peripheral pulses normal.  Skin:                      Skin color, texture normal. No rashes.  Neurologic:            Grossly nonfocal.       TELEMETRY:  Normal sinus rhythm with no tachy or chris events     ECG:  NSR, no ST T changes of ischemia or infarction.     LABS:                          12.9   12.2  )-----------( 391      ( 04 Feb 2018 05:55 )             38.4     02-04    134<L>  |  96  |  18  ----------------------------<  99  3.5   |  29  |  0.71    Ca    8.8      04 Feb 2018 05:55  Mg     1.9     02-03    TPro  7.1  /  Alb  3.2<L>  /  TBili  0.7  /  DBili  x   /  AST  35  /  ALT  35  /  AlkPhos  116  02-03      PT/INR - ( 04 Feb 2018 05:55 )   PT: 12.9 sec;   INR: 1.19 ratio    PTT - ( 04 Feb 2018 05:55 )  PTT:29.7 sec    RADIOLOGY & ADDITIONAL STUDIES:    Xray Chest 1 View- PORTABLE-Routine (02.03.18 @ 22:24) > Impression:   Obscuration of the left hemidiaphragm may represent small left pleural effusion with or without left basilar atelectasis and/or pneumonia

## 2018-02-04 NOTE — PATIENT PROFILE ADULT. - NS PRO CONTRA FLU 1
patient unsure/unable to assess immunization status yes/patient unsure, pt states he had in 10/2017 on 2/8/18 to h.shante rn

## 2018-02-04 NOTE — H&P ADULT - NSHPLABSRESULTS_GEN_ALL_CORE
14.1   13.6  )-----------( 423      ( 03 Feb 2018 17:56 )             42.2     02-    132<L>  |  94<L>  |  19  ----------------------------<  98  4.0   |  28  |  0.80    Calcium    9.3      03 Feb 2018 17:56  Mg     1.9     02-03    T Protein  7.1  /  Albumin  3.2<L>  /  Total Bili  0.7  /  Direct Bili  x   /  AST  35  /  ALT  35  /  AlkPhos  116  02-03  PT/INR - ( 03 Feb 2018 17:56 )   PT: 12.4 sec;   INR: 1.14 ratio    PTT - ( 03 Feb 2018 17:56 )  PTT:22.7 sec

## 2018-02-05 ENCOUNTER — RESULT REVIEW (OUTPATIENT)
Age: 73
End: 2018-02-05

## 2018-02-05 LAB
ANION GAP SERPL CALC-SCNC: 10 MMOL/L — SIGNIFICANT CHANGE UP (ref 5–17)
ANION GAP SERPL CALC-SCNC: 12 MMOL/L — SIGNIFICANT CHANGE UP (ref 5–17)
APTT BLD: 29 SEC — SIGNIFICANT CHANGE UP (ref 27.5–37.4)
BLD GP AB SCN SERPL QL: SIGNIFICANT CHANGE UP
BUN SERPL-MCNC: 19 MG/DL — SIGNIFICANT CHANGE UP (ref 7–23)
BUN SERPL-MCNC: 22 MG/DL — SIGNIFICANT CHANGE UP (ref 7–23)
CALCIUM SERPL-MCNC: 8.6 MG/DL — SIGNIFICANT CHANGE UP (ref 8.5–10.1)
CALCIUM SERPL-MCNC: 9 MG/DL — SIGNIFICANT CHANGE UP (ref 8.5–10.1)
CHLORIDE SERPL-SCNC: 95 MMOL/L — LOW (ref 96–108)
CHLORIDE SERPL-SCNC: 96 MMOL/L — SIGNIFICANT CHANGE UP (ref 96–108)
CO2 SERPL-SCNC: 26 MMOL/L — SIGNIFICANT CHANGE UP (ref 22–31)
CO2 SERPL-SCNC: 31 MMOL/L — SIGNIFICANT CHANGE UP (ref 22–31)
CREAT SERPL-MCNC: 0.69 MG/DL — SIGNIFICANT CHANGE UP (ref 0.5–1.3)
CREAT SERPL-MCNC: 0.77 MG/DL — SIGNIFICANT CHANGE UP (ref 0.5–1.3)
GLUCOSE SERPL-MCNC: 109 MG/DL — HIGH (ref 70–99)
GLUCOSE SERPL-MCNC: 115 MG/DL — HIGH (ref 70–99)
HCT VFR BLD CALC: 39.1 % — SIGNIFICANT CHANGE UP (ref 39–50)
HCT VFR BLD CALC: 40.1 % — SIGNIFICANT CHANGE UP (ref 39–50)
HGB BLD-MCNC: 12.8 G/DL — LOW (ref 13–17)
HGB BLD-MCNC: 13.3 G/DL — SIGNIFICANT CHANGE UP (ref 13–17)
INR BLD: 1.26 RATIO — HIGH (ref 0.88–1.16)
MCHC RBC-ENTMCNC: 30.8 PG — SIGNIFICANT CHANGE UP (ref 27–34)
MCHC RBC-ENTMCNC: 31.1 PG — SIGNIFICANT CHANGE UP (ref 27–34)
MCHC RBC-ENTMCNC: 32.6 GM/DL — SIGNIFICANT CHANGE UP (ref 32–36)
MCHC RBC-ENTMCNC: 33.2 GM/DL — SIGNIFICANT CHANGE UP (ref 32–36)
MCV RBC AUTO: 93.8 FL — SIGNIFICANT CHANGE UP (ref 80–100)
MCV RBC AUTO: 94.3 FL — SIGNIFICANT CHANGE UP (ref 80–100)
PLATELET # BLD AUTO: 394 K/UL — SIGNIFICANT CHANGE UP (ref 150–400)
PLATELET # BLD AUTO: 399 K/UL — SIGNIFICANT CHANGE UP (ref 150–400)
POTASSIUM SERPL-MCNC: 3.5 MMOL/L — SIGNIFICANT CHANGE UP (ref 3.5–5.3)
POTASSIUM SERPL-MCNC: 4.5 MMOL/L — SIGNIFICANT CHANGE UP (ref 3.5–5.3)
POTASSIUM SERPL-SCNC: 3.5 MMOL/L — SIGNIFICANT CHANGE UP (ref 3.5–5.3)
POTASSIUM SERPL-SCNC: 4.5 MMOL/L — SIGNIFICANT CHANGE UP (ref 3.5–5.3)
PROTHROM AB SERPL-ACNC: 13.7 SEC — HIGH (ref 9.8–12.7)
RBC # BLD: 4.14 M/UL — LOW (ref 4.2–5.8)
RBC # BLD: 4.27 M/UL — SIGNIFICANT CHANGE UP (ref 4.2–5.8)
RBC # FLD: 12.1 % — SIGNIFICANT CHANGE UP (ref 10.3–14.5)
RBC # FLD: 12.4 % — SIGNIFICANT CHANGE UP (ref 10.3–14.5)
SODIUM SERPL-SCNC: 134 MMOL/L — LOW (ref 135–145)
SODIUM SERPL-SCNC: 136 MMOL/L — SIGNIFICANT CHANGE UP (ref 135–145)
TYPE + AB SCN PNL BLD: SIGNIFICANT CHANGE UP
WBC # BLD: 12.5 K/UL — HIGH (ref 3.8–10.5)
WBC # BLD: 15.6 K/UL — HIGH (ref 3.8–10.5)
WBC # FLD AUTO: 12.5 K/UL — HIGH (ref 3.8–10.5)
WBC # FLD AUTO: 15.6 K/UL — HIGH (ref 3.8–10.5)

## 2018-02-05 PROCEDURE — 73501 X-RAY EXAM HIP UNI 1 VIEW: CPT | Mod: 26

## 2018-02-05 PROCEDURE — 72192 CT PELVIS W/O DYE: CPT | Mod: 26

## 2018-02-05 PROCEDURE — 76377 3D RENDER W/INTRP POSTPROCES: CPT | Mod: 26

## 2018-02-05 PROCEDURE — 88311 DECALCIFY TISSUE: CPT | Mod: 26

## 2018-02-05 PROCEDURE — 88305 TISSUE EXAM BY PATHOLOGIST: CPT | Mod: 26

## 2018-02-05 RX ORDER — IPRATROPIUM/ALBUTEROL SULFATE 18-103MCG
3 AEROSOL WITH ADAPTER (GRAM) INHALATION ONCE
Qty: 0 | Refills: 0 | Status: COMPLETED | OUTPATIENT
Start: 2018-02-05 | End: 2018-02-05

## 2018-02-05 RX ORDER — POTASSIUM CHLORIDE 20 MEQ
40 PACKET (EA) ORAL ONCE
Qty: 0 | Refills: 0 | Status: DISCONTINUED | OUTPATIENT
Start: 2018-02-05 | End: 2018-02-05

## 2018-02-05 RX ORDER — CEFAZOLIN SODIUM 1 G
2000 VIAL (EA) INJECTION EVERY 8 HOURS
Qty: 0 | Refills: 0 | Status: DISCONTINUED | OUTPATIENT
Start: 2018-02-05 | End: 2018-02-08

## 2018-02-05 RX ORDER — ENOXAPARIN SODIUM 100 MG/ML
40 INJECTION SUBCUTANEOUS EVERY 24 HOURS
Qty: 0 | Refills: 0 | Status: DISCONTINUED | OUTPATIENT
Start: 2018-02-06 | End: 2018-02-21

## 2018-02-05 RX ORDER — ACETAMINOPHEN 500 MG
650 TABLET ORAL EVERY 6 HOURS
Qty: 0 | Refills: 0 | Status: DISCONTINUED | OUTPATIENT
Start: 2018-02-05 | End: 2018-02-21

## 2018-02-05 RX ORDER — HYDROMORPHONE HYDROCHLORIDE 2 MG/ML
0.5 INJECTION INTRAMUSCULAR; INTRAVENOUS; SUBCUTANEOUS ONCE
Qty: 0 | Refills: 0 | Status: DISCONTINUED | OUTPATIENT
Start: 2018-02-05 | End: 2018-02-05

## 2018-02-05 RX ORDER — SODIUM CHLORIDE 9 MG/ML
1000 INJECTION, SOLUTION INTRAVENOUS
Qty: 0 | Refills: 0 | Status: DISCONTINUED | OUTPATIENT
Start: 2018-02-05 | End: 2018-02-13

## 2018-02-05 RX ORDER — POTASSIUM CHLORIDE 20 MEQ
40 PACKET (EA) ORAL ONCE
Qty: 0 | Refills: 0 | Status: COMPLETED | OUTPATIENT
Start: 2018-02-05 | End: 2018-02-05

## 2018-02-05 RX ORDER — ASCORBIC ACID 60 MG
500 TABLET,CHEWABLE ORAL
Qty: 0 | Refills: 0 | Status: DISCONTINUED | OUTPATIENT
Start: 2018-02-05 | End: 2018-02-21

## 2018-02-05 RX ORDER — DOCUSATE SODIUM 100 MG
100 CAPSULE ORAL THREE TIMES A DAY
Qty: 0 | Refills: 0 | Status: DISCONTINUED | OUTPATIENT
Start: 2018-02-05 | End: 2018-02-21

## 2018-02-05 RX ORDER — SENNA PLUS 8.6 MG/1
2 TABLET ORAL AT BEDTIME
Qty: 0 | Refills: 0 | Status: DISCONTINUED | OUTPATIENT
Start: 2018-02-05 | End: 2018-02-21

## 2018-02-05 RX ORDER — OXYCODONE HYDROCHLORIDE 5 MG/1
5 TABLET ORAL EVERY 4 HOURS
Qty: 0 | Refills: 0 | Status: DISCONTINUED | OUTPATIENT
Start: 2018-02-05 | End: 2018-02-08

## 2018-02-05 RX ORDER — POLYETHYLENE GLYCOL 3350 17 G/17G
17 POWDER, FOR SOLUTION ORAL DAILY
Qty: 0 | Refills: 0 | Status: DISCONTINUED | OUTPATIENT
Start: 2018-02-05 | End: 2018-02-13

## 2018-02-05 RX ORDER — ONDANSETRON 8 MG/1
4 TABLET, FILM COATED ORAL ONCE
Qty: 0 | Refills: 0 | Status: DISCONTINUED | OUTPATIENT
Start: 2018-02-05 | End: 2018-02-06

## 2018-02-05 RX ORDER — OXYCODONE HYDROCHLORIDE 5 MG/1
10 TABLET ORAL EVERY 4 HOURS
Qty: 0 | Refills: 0 | Status: DISCONTINUED | OUTPATIENT
Start: 2018-02-05 | End: 2018-02-07

## 2018-02-05 RX ORDER — ONDANSETRON 8 MG/1
4 TABLET, FILM COATED ORAL EVERY 6 HOURS
Qty: 0 | Refills: 0 | Status: DISCONTINUED | OUTPATIENT
Start: 2018-02-05 | End: 2018-02-21

## 2018-02-05 RX ORDER — PANTOPRAZOLE SODIUM 20 MG/1
40 TABLET, DELAYED RELEASE ORAL DAILY
Qty: 0 | Refills: 0 | Status: DISCONTINUED | OUTPATIENT
Start: 2018-02-05 | End: 2018-02-21

## 2018-02-05 RX ORDER — FOLIC ACID 0.8 MG
1 TABLET ORAL DAILY
Qty: 0 | Refills: 0 | Status: DISCONTINUED | OUTPATIENT
Start: 2018-02-05 | End: 2018-02-21

## 2018-02-05 RX ORDER — HYDROMORPHONE HYDROCHLORIDE 2 MG/ML
0.5 INJECTION INTRAMUSCULAR; INTRAVENOUS; SUBCUTANEOUS EVERY 4 HOURS
Qty: 0 | Refills: 0 | Status: DISCONTINUED | OUTPATIENT
Start: 2018-02-05 | End: 2018-02-08

## 2018-02-05 RX ORDER — PROCHLORPERAZINE MALEATE 5 MG
5 TABLET ORAL EVERY 6 HOURS
Qty: 0 | Refills: 0 | Status: DISCONTINUED | OUTPATIENT
Start: 2018-02-05 | End: 2018-02-21

## 2018-02-05 RX ORDER — SODIUM CHLORIDE 9 MG/ML
1000 INJECTION, SOLUTION INTRAVENOUS
Qty: 0 | Refills: 0 | Status: DISCONTINUED | OUTPATIENT
Start: 2018-02-05 | End: 2018-02-06

## 2018-02-05 RX ORDER — FERROUS SULFATE 325(65) MG
325 TABLET ORAL
Qty: 0 | Refills: 0 | Status: DISCONTINUED | OUTPATIENT
Start: 2018-02-05 | End: 2018-02-07

## 2018-02-05 RX ORDER — FENTANYL CITRATE 50 UG/ML
50 INJECTION INTRAVENOUS
Qty: 0 | Refills: 0 | Status: DISCONTINUED | OUTPATIENT
Start: 2018-02-05 | End: 2018-02-06

## 2018-02-05 RX ORDER — OXYCODONE HYDROCHLORIDE 5 MG/1
5 TABLET ORAL EVERY 4 HOURS
Qty: 0 | Refills: 0 | Status: DISCONTINUED | OUTPATIENT
Start: 2018-02-05 | End: 2018-02-06

## 2018-02-05 RX ORDER — POTASSIUM CHLORIDE 20 MEQ
40 PACKET (EA) ORAL EVERY 4 HOURS
Qty: 0 | Refills: 0 | Status: DISCONTINUED | OUTPATIENT
Start: 2018-02-05 | End: 2018-02-05

## 2018-02-05 RX ORDER — DIPHENHYDRAMINE HCL 50 MG
25 CAPSULE ORAL AT BEDTIME
Qty: 0 | Refills: 0 | Status: DISCONTINUED | OUTPATIENT
Start: 2018-02-05 | End: 2018-02-21

## 2018-02-05 RX ADMIN — OXYCODONE HYDROCHLORIDE 10 MILLIGRAM(S): 5 TABLET ORAL at 05:13

## 2018-02-05 RX ADMIN — TIOTROPIUM BROMIDE 1 CAPSULE(S): 18 CAPSULE ORAL; RESPIRATORY (INHALATION) at 07:56

## 2018-02-05 RX ADMIN — BUDESONIDE AND FORMOTEROL FUMARATE DIHYDRATE 2 PUFF(S): 160; 4.5 AEROSOL RESPIRATORY (INHALATION) at 08:14

## 2018-02-05 RX ADMIN — Medication 40 MILLIEQUIVALENT(S): at 10:59

## 2018-02-05 RX ADMIN — AMLODIPINE BESYLATE 5 MILLIGRAM(S): 2.5 TABLET ORAL at 05:13

## 2018-02-05 RX ADMIN — ISOSORBIDE MONONITRATE 60 MILLIGRAM(S): 60 TABLET, EXTENDED RELEASE ORAL at 10:59

## 2018-02-05 RX ADMIN — SODIUM CHLORIDE 50 MILLILITER(S): 9 INJECTION, SOLUTION INTRAVENOUS at 01:09

## 2018-02-05 RX ADMIN — OXYCODONE HYDROCHLORIDE 10 MILLIGRAM(S): 5 TABLET ORAL at 06:01

## 2018-02-05 RX ADMIN — OXYCODONE HYDROCHLORIDE 10 MILLIGRAM(S): 5 TABLET ORAL at 22:00

## 2018-02-05 RX ADMIN — HYDROMORPHONE HYDROCHLORIDE 0.5 MILLIGRAM(S): 2 INJECTION INTRAMUSCULAR; INTRAVENOUS; SUBCUTANEOUS at 13:18

## 2018-02-05 RX ADMIN — RANOLAZINE 500 MILLIGRAM(S): 500 TABLET, FILM COATED, EXTENDED RELEASE ORAL at 05:13

## 2018-02-05 RX ADMIN — Medication 3 MILLILITER(S): at 19:05

## 2018-02-05 RX ADMIN — FINASTERIDE 5 MILLIGRAM(S): 5 TABLET, FILM COATED ORAL at 10:59

## 2018-02-05 RX ADMIN — OXYCODONE HYDROCHLORIDE 10 MILLIGRAM(S): 5 TABLET ORAL at 21:02

## 2018-02-05 RX ADMIN — OXYCODONE HYDROCHLORIDE 5 MILLIGRAM(S): 5 TABLET ORAL at 11:51

## 2018-02-05 RX ADMIN — FENTANYL CITRATE 50 MICROGRAM(S): 50 INJECTION INTRAVENOUS at 20:01

## 2018-02-05 RX ADMIN — Medication 25 MILLIGRAM(S): at 05:13

## 2018-02-05 RX ADMIN — SODIUM CHLORIDE 75 MILLILITER(S): 9 INJECTION, SOLUTION INTRAVENOUS at 21:04

## 2018-02-05 RX ADMIN — FENTANYL CITRATE 50 MICROGRAM(S): 50 INJECTION INTRAVENOUS at 19:33

## 2018-02-05 RX ADMIN — SODIUM CHLORIDE 100 MILLILITER(S): 9 INJECTION, SOLUTION INTRAVENOUS at 19:33

## 2018-02-05 NOTE — CDI QUERY NOTE - NSCDIOTHERTXTBX_GEN_ALL_CORE_HH
Noted per Laboratory    2/3  Na = 132   2/4  Na  = 134    Can you please clarify if these findings demonstrate a diagnosis.    If so, please document so all diagnoses are reflected in record.  Thank you,

## 2018-02-05 NOTE — BRIEF OPERATIVE NOTE - PROCEDURE
<<-----Click on this checkbox to enter Procedure Hemiarthroplasty of hip  02/05/2018  right hip hemiarthroplasty  Active  KFRISCH

## 2018-02-05 NOTE — PROGRESS NOTE ADULT - SUBJECTIVE AND OBJECTIVE BOX
CHIEF COMPLAINT: RLE pain    Subjective: feels upset that is stay is prolonged and no surgery done as yet; denies CP/palpitations/HA/dizzines/ numbness/tingling/abd pain/n/v/d/f/c    HPI: 71 y/o male with a PMHx chronic CHF, COPD on home O2 4 L, emphysema, and spinal stenosis presented to ED on 2/3/18 s/p fall.   Fall occurred ~ 10 days ago; patient has been very weak, and suffers from arthritis. He notes chronic Rt hip pain for ~ 4 weeks which causes ambulatory dysfunction.  He was suppose to follow-up with outpatient ortho for MRI.  Reports associated poor appetite with decreased PO intake. Denies nausea, vomiting, diarrhea, fevers , chills, CP or dyspnea. No recent illnesses, no sick contacts.     Review of system- Rest of the review of system are normal except mentioned in HPI    Vital Signs Last 24 Hrs  T(C): 36.7 (05 Feb 2018 10:11), Max: 37 (05 Feb 2018 04:58)  T(F): 98.1 (05 Feb 2018 10:11), Max: 98.6 (05 Feb 2018 04:58)  HR: 89 (05 Feb 2018 10:11) (89 - 100)  BP: 127/54 (05 Feb 2018 10:11) (127/54 - 157/61)  BP(mean): --  RR: 17 (05 Feb 2018 10:11) (16 - 18)  SpO2: 93% (05 Feb 2018 10:11) (93% - 94%)    PHYSICAL EXAM:    Constitutional: NAD  HEENT: AT/NC, EOMI, pupils equal, round, non-icteric  Neck: Soft and supple  Respiratory: Breath sounds are clear bilaterally, No wheezing, rales or rhonchi  Cardiovascular: S1 and S2, regular rate and rhythm, no Murmurs, gallops or rubs  Gastrointestinal: Bowel Sounds present, soft, nontender, nondistended, no guarding, no rebound  Extremities: No peripheral edema  Neurological: A/O x 3, no focal deficits  Skin: No rashes    LABS             13.3   12.5  )-----------( 399      ( 05 Feb 2018 05:48 )             40.1     02-05    136  |  95<L>  |  19  ----------------------------<  109<H>  3.5   |  31  |  0.69    Ca    9.0      05 Feb 2018 05:48  Mg     1.9     02-03    TPro  7.1  /  Alb  3.2<L>  /  TBili  0.7  /  DBili  x   /  AST  35  /  ALT  35  /  AlkPhos  116  02-03    LIVER FUNCTIONS - ( 03 Feb 2018 17:56 )  Alb: 3.2 g/dL / Pro: 7.1 gm/dL / ALK PHOS: 116 U/L / ALT: 35 U/L / AST: 35 U/L / GGT: x           PT/INR - ( 05 Feb 2018 05:48 )   PT: 13.7 sec;   INR: 1.26 ratio       PTT - ( 05 Feb 2018 05:48 )  PTT:29.0 sec      MEDICATIONS  (STANDING):  amLODIPine   Tablet 5 milliGRAM(s) Oral daily  atorvastatin 20 milliGRAM(s) Oral at bedtime  buDESOnide  80 MICROgram(s)/formoterol 4.5 MICROgram(s) Inhaler 2 Puff(s) Inhalation two times a day  finasteride 5 milliGRAM(s) Oral daily  isosorbide   mononitrate ER Tablet (IMDUR) 60 milliGRAM(s) Oral daily  lactated ringers. 1000 milliLiter(s) (75 mL/Hr) IV Continuous <Continuous>  lactated ringers. 1000 milliLiter(s) (50 mL/Hr) IV Continuous <Continuous>  metoprolol succinate ER 25 milliGRAM(s) Oral daily  ranolazine 500 milliGRAM(s) Oral two times a day  tamsulosin 0.4 milliGRAM(s) Oral at bedtime  tiotropium 18 MICROgram(s) Capsule 1 Capsule(s) Inhalation daily    MEDICATIONS  (PRN):  acetaminophen   Tablet 650 milliGRAM(s) Oral every 4 hours PRN For Temp greater than 38 C (100.4 F)  ALBUTerol    0.083% 2.5 milliGRAM(s) Nebulizer every 4 hours PRN Shortness of Breath and/or Wheezing  HYDROmorphone  Injectable 0.5 milliGRAM(s) IV Push every 3 hours PRN Severe Pain (7 - 10)  ondansetron Injectable 4 milliGRAM(s) IV Push every 6 hours PRN Nausea  oxyCODONE    IR 5 milliGRAM(s) Oral every 4 hours PRN Mild Pain (1 - 3)  oxyCODONE    IR 10 milliGRAM(s) Oral every 4 hours PRN Moderate Pain (4 - 6)

## 2018-02-05 NOTE — PROGRESS NOTE ADULT - SUBJECTIVE AND OBJECTIVE BOX
Patient seen and examined. Pain controlled. No acute events overnight    HEALTH ISSUES - PROBLEM Dx:  Preventative health care: Preventative health care  Spinal stenosis, unspecified spinal region: Spinal stenosis, unspecified spinal region  Chronic obstructive pulmonary disease, unspecified COPD type: Chronic obstructive pulmonary disease, unspecified COPD type  Chronic congestive heart failure, unspecified congestive heart failure type: Chronic congestive heart failure, unspecified congestive heart failure type  Hip fracture: Hip fracture        MEDICATIONS  (STANDING):  amLODIPine   Tablet 5 milliGRAM(s) Oral daily  atorvastatin 20 milliGRAM(s) Oral at bedtime  buDESOnide  80 MICROgram(s)/formoterol 4.5 MICROgram(s) Inhaler 2 Puff(s) Inhalation two times a day  finasteride 5 milliGRAM(s) Oral daily  isosorbide   mononitrate ER Tablet (IMDUR) 60 milliGRAM(s) Oral daily  lactated ringers. 1000 milliLiter(s) IV Continuous <Continuous>  lactated ringers. 1000 milliLiter(s) IV Continuous <Continuous>  metoprolol succinate ER 25 milliGRAM(s) Oral daily  ranolazine 500 milliGRAM(s) Oral two times a day  tamsulosin 0.4 milliGRAM(s) Oral at bedtime  tiotropium 18 MICROgram(s) Capsule 1 Capsule(s) Inhalation daily    Allergies    No Known Allergies    Intolerances                            12.9   12.2  )-----------( 391      ( 04 Feb 2018 05:55 )             38.4     04 Feb 2018 05:55    134    |  96     |  18     ----------------------------<  99     3.5     |  29     |  0.71     Ca    8.8        04 Feb 2018 05:55  Mg     1.9       03 Feb 2018 17:56    TPro  7.1    /  Alb  3.2    /  TBili  0.7    /  DBili  x      /  AST  35     /  ALT  35     /  AlkPhos  116    03 Feb 2018 17:56    PT/INR - ( 04 Feb 2018 05:55 )   PT: 12.9 sec;   INR: 1.19 ratio         PTT - ( 04 Feb 2018 05:55 )  PTT:29.7 sec  Vital Signs Last 24 Hrs  T(C): 36.7 (02-04-18 @ 17:32), Max: 36.8 (02-04-18 @ 10:05)  T(F): 98.1 (02-04-18 @ 17:32), Max: 98.3 (02-04-18 @ 10:05)  HR: 96 (02-04-18 @ 17:32) (89 - 96)  BP: 142/61 (02-04-18 @ 17:32) (131/76 - 142/61)  BP(mean): --  RR: 17 (02-04-18 @ 17:32) (17 - 17)  SpO2: 94% (02-04-18 @ 17:32) (94% - 94%)    Physical Exam  Gen: NAD  RLE:  skin intact  +ehl/fhl/ta/gs function  L2-S1 silt  Dp/pt pulse intact  No calf ttp  Compartments soft    A/P:  72y Male with R hip fracture  Pain control  DVT ppx held  NPO  Plan for OR today  NWB  IVF while npo  FU labs  Med/cards clearance appreciated

## 2018-02-05 NOTE — PROGRESS NOTE ADULT - ASSESSMENT
A/P: 72 year old man with CHF, COPD, chronic respiratory failure s/p fall now with possible right hip fracture.    Right Hip pain 2/2 possible R hip fracture.    - Xray imaging reviewed  - plan for CT scan for confirmed dx R hip fracture (MR preferred but he is not amendable to MRI, says he only undergo open MRI)  - Venous doppler US 2/3 : no evidence of deep venous thrombosis in either leg.  Left Baker's cyst.  - PT eval / pain management  - NPO for possible surgery if CT confirmed  - f/b ortho: plans per ortho team    Chronic congestive heart failure, unspecified congestive heart failure type: compensated  - hold lasix and lisinopril ameena-op and re-valuate post op.  - con't  toprol, lipitor, imdur, ranexa,     Hypokalemia - K 3.5  - replete K to keep K > 4.0  - monitor lytes    COPD with chronic respiratory failure: STABLE  -incentive spirometry / duonebs  -symbicort, hold spiriva /supplemental O2    Spinal stenosis:  -pain management and PT    BPH  -proscar and flomax    DVT ppx:  -heparin subc A/P: 72 year old man with CHF, COPD, chronic respiratory failure s/p fall now with possible right hip fracture.    Right Hip pain 2/2 possible R hip fracture.    - Xray imaging reviewed  - plan for CT scan for confirmed dx R hip fracture (MR preferred but he is not amendable to MRI, says he only undergo open MRI)  - Venous doppler US 2/3 : no evidence of deep venous thrombosis in either leg.  Left Baker's cyst.  - PT eval / pain management  - NPO for possible surgery if CT confirmed  - f/b ortho: plans per ortho team    Chronic congestive heart failure, unspecified congestive heart failure type: compensated  - hold lasix and lisinopril ameena-op and re-valuate post op.  - con't  toprol, lipitor, imdur, ranexa,     Hyponatremia: Secondary to underlying CHF: RESOLVED  -monitor    Hypokalemia - K 3.5  - replete K to keep K > 4.0  - monitor lytes    COPD with chronic respiratory failure: STABLE  -incentive spirometry / duonebs  -symbicort, hold spiriva /supplemental O2    Spinal stenosis:  -pain management and PT    BPH  -proscar and flomax    DVT ppx:  -heparin subc    Attending Statement:  40 minutes spent on total encounter; more than 50% of the visit was spent counseling and/or coordinating care by the attending physician.  Patient seen and examinid with BLAYNE Xiong. Agree with physical exam and assessment and plan.

## 2018-02-06 ENCOUNTER — TRANSCRIPTION ENCOUNTER (OUTPATIENT)
Age: 73
End: 2018-02-06

## 2018-02-06 LAB
ANION GAP SERPL CALC-SCNC: 8 MMOL/L — SIGNIFICANT CHANGE UP (ref 5–17)
BUN SERPL-MCNC: 19 MG/DL — SIGNIFICANT CHANGE UP (ref 7–23)
CALCIUM SERPL-MCNC: 8.6 MG/DL — SIGNIFICANT CHANGE UP (ref 8.5–10.1)
CHLORIDE SERPL-SCNC: 96 MMOL/L — SIGNIFICANT CHANGE UP (ref 96–108)
CO2 SERPL-SCNC: 30 MMOL/L — SIGNIFICANT CHANGE UP (ref 22–31)
CREAT SERPL-MCNC: 0.67 MG/DL — SIGNIFICANT CHANGE UP (ref 0.5–1.3)
GLUCOSE SERPL-MCNC: 127 MG/DL — HIGH (ref 70–99)
HCT VFR BLD CALC: 35.5 % — LOW (ref 39–50)
HGB BLD-MCNC: 11.8 G/DL — LOW (ref 13–17)
INR BLD: 1.32 RATIO — HIGH (ref 0.88–1.16)
MAGNESIUM SERPL-MCNC: 1.9 MG/DL — SIGNIFICANT CHANGE UP (ref 1.6–2.6)
MCHC RBC-ENTMCNC: 31.4 PG — SIGNIFICANT CHANGE UP (ref 27–34)
MCHC RBC-ENTMCNC: 33.4 GM/DL — SIGNIFICANT CHANGE UP (ref 32–36)
MCV RBC AUTO: 94 FL — SIGNIFICANT CHANGE UP (ref 80–100)
PLATELET # BLD AUTO: 383 K/UL — SIGNIFICANT CHANGE UP (ref 150–400)
POTASSIUM SERPL-MCNC: 4.2 MMOL/L — SIGNIFICANT CHANGE UP (ref 3.5–5.3)
POTASSIUM SERPL-SCNC: 4.2 MMOL/L — SIGNIFICANT CHANGE UP (ref 3.5–5.3)
PROTHROM AB SERPL-ACNC: 14.3 SEC — HIGH (ref 9.8–12.7)
RBC # BLD: 3.77 M/UL — LOW (ref 4.2–5.8)
RBC # FLD: 12.2 % — SIGNIFICANT CHANGE UP (ref 10.3–14.5)
SODIUM SERPL-SCNC: 134 MMOL/L — LOW (ref 135–145)
WBC # BLD: 17.5 K/UL — HIGH (ref 3.8–10.5)
WBC # FLD AUTO: 17.5 K/UL — HIGH (ref 3.8–10.5)

## 2018-02-06 PROCEDURE — 99223 1ST HOSP IP/OBS HIGH 75: CPT

## 2018-02-06 RX ORDER — TAMSULOSIN HYDROCHLORIDE 0.4 MG/1
0.4 CAPSULE ORAL AT BEDTIME
Qty: 0 | Refills: 0 | Status: DISCONTINUED | OUTPATIENT
Start: 2018-02-06 | End: 2018-02-21

## 2018-02-06 RX ORDER — TIOTROPIUM BROMIDE 18 UG/1
1 CAPSULE ORAL; RESPIRATORY (INHALATION) DAILY
Qty: 0 | Refills: 0 | Status: DISCONTINUED | OUTPATIENT
Start: 2018-02-06 | End: 2018-02-21

## 2018-02-06 RX ORDER — FINASTERIDE 5 MG/1
5 TABLET, FILM COATED ORAL DAILY
Qty: 0 | Refills: 0 | Status: DISCONTINUED | OUTPATIENT
Start: 2018-02-06 | End: 2018-02-21

## 2018-02-06 RX ORDER — METOPROLOL TARTRATE 50 MG
25 TABLET ORAL DAILY
Qty: 0 | Refills: 0 | Status: DISCONTINUED | OUTPATIENT
Start: 2018-02-06 | End: 2018-02-21

## 2018-02-06 RX ORDER — AMLODIPINE BESYLATE 2.5 MG/1
5 TABLET ORAL DAILY
Qty: 0 | Refills: 0 | Status: DISCONTINUED | OUTPATIENT
Start: 2018-02-06 | End: 2018-02-21

## 2018-02-06 RX ORDER — LACTULOSE 10 G/15ML
10 SOLUTION ORAL DAILY
Qty: 0 | Refills: 0 | Status: DISCONTINUED | OUTPATIENT
Start: 2018-02-06 | End: 2018-02-07

## 2018-02-06 RX ORDER — ATORVASTATIN CALCIUM 80 MG/1
20 TABLET, FILM COATED ORAL AT BEDTIME
Qty: 0 | Refills: 0 | Status: DISCONTINUED | OUTPATIENT
Start: 2018-02-06 | End: 2018-02-21

## 2018-02-06 RX ORDER — LISINOPRIL 2.5 MG/1
20 TABLET ORAL DAILY
Qty: 0 | Refills: 0 | Status: DISCONTINUED | OUTPATIENT
Start: 2018-02-07 | End: 2018-02-21

## 2018-02-06 RX ORDER — RANOLAZINE 500 MG/1
500 TABLET, FILM COATED, EXTENDED RELEASE ORAL
Qty: 0 | Refills: 0 | Status: DISCONTINUED | OUTPATIENT
Start: 2018-02-06 | End: 2018-02-21

## 2018-02-06 RX ORDER — ISOSORBIDE MONONITRATE 60 MG/1
60 TABLET, EXTENDED RELEASE ORAL DAILY
Qty: 0 | Refills: 0 | Status: DISCONTINUED | OUTPATIENT
Start: 2018-02-06 | End: 2018-02-21

## 2018-02-06 RX ADMIN — PANTOPRAZOLE SODIUM 40 MILLIGRAM(S): 20 TABLET, DELAYED RELEASE ORAL at 11:05

## 2018-02-06 RX ADMIN — ENOXAPARIN SODIUM 40 MILLIGRAM(S): 100 INJECTION SUBCUTANEOUS at 08:03

## 2018-02-06 RX ADMIN — Medication 325 MILLIGRAM(S): at 17:24

## 2018-02-06 RX ADMIN — OXYCODONE HYDROCHLORIDE 10 MILLIGRAM(S): 5 TABLET ORAL at 06:21

## 2018-02-06 RX ADMIN — Medication 325 MILLIGRAM(S): at 11:05

## 2018-02-06 RX ADMIN — ISOSORBIDE MONONITRATE 60 MILLIGRAM(S): 60 TABLET, EXTENDED RELEASE ORAL at 17:24

## 2018-02-06 RX ADMIN — LACTULOSE 10 GRAM(S): 10 SOLUTION ORAL at 14:14

## 2018-02-06 RX ADMIN — FINASTERIDE 5 MILLIGRAM(S): 5 TABLET, FILM COATED ORAL at 17:24

## 2018-02-06 RX ADMIN — Medication 1 MILLIGRAM(S): at 11:03

## 2018-02-06 RX ADMIN — OXYCODONE HYDROCHLORIDE 10 MILLIGRAM(S): 5 TABLET ORAL at 06:56

## 2018-02-06 RX ADMIN — Medication 100 MILLIGRAM(S): at 11:03

## 2018-02-06 RX ADMIN — Medication 500 MILLIGRAM(S): at 06:20

## 2018-02-06 RX ADMIN — HYDROMORPHONE HYDROCHLORIDE 0.5 MILLIGRAM(S): 2 INJECTION INTRAMUSCULAR; INTRAVENOUS; SUBCUTANEOUS at 02:17

## 2018-02-06 RX ADMIN — Medication 500 MILLIGRAM(S): at 22:33

## 2018-02-06 RX ADMIN — Medication 325 MILLIGRAM(S): at 08:03

## 2018-02-06 RX ADMIN — OXYCODONE HYDROCHLORIDE 10 MILLIGRAM(S): 5 TABLET ORAL at 17:30

## 2018-02-06 RX ADMIN — Medication 1 TABLET(S): at 11:03

## 2018-02-06 RX ADMIN — ATORVASTATIN CALCIUM 20 MILLIGRAM(S): 80 TABLET, FILM COATED ORAL at 22:31

## 2018-02-06 RX ADMIN — Medication 25 MILLIGRAM(S): at 14:13

## 2018-02-06 RX ADMIN — Medication 100 MILLIGRAM(S): at 22:32

## 2018-02-06 RX ADMIN — RANOLAZINE 500 MILLIGRAM(S): 500 TABLET, FILM COATED, EXTENDED RELEASE ORAL at 17:23

## 2018-02-06 RX ADMIN — AMLODIPINE BESYLATE 5 MILLIGRAM(S): 2.5 TABLET ORAL at 17:24

## 2018-02-06 RX ADMIN — HYDROMORPHONE HYDROCHLORIDE 0.5 MILLIGRAM(S): 2 INJECTION INTRAMUSCULAR; INTRAVENOUS; SUBCUTANEOUS at 02:50

## 2018-02-06 RX ADMIN — POLYETHYLENE GLYCOL 3350 17 GRAM(S): 17 POWDER, FOR SOLUTION ORAL at 11:05

## 2018-02-06 RX ADMIN — Medication 100 MILLIGRAM(S): at 06:20

## 2018-02-06 RX ADMIN — TAMSULOSIN HYDROCHLORIDE 0.4 MILLIGRAM(S): 0.4 CAPSULE ORAL at 22:31

## 2018-02-06 NOTE — DISCHARGE NOTE ADULT - PLAN OF CARE
Return to baseline ADLs Discharge Instructions  Hip Hemiarthroplasty    1. Diet: Resume previous diet    2. Activity: WBAT. Rolling walker. Posterior Hip Dislocation Precautions. Abduction Pillow while in bed and Chair. Daily Physical Therapy.    3. Call with: fever over 101, wound redness, drainage or open area, calf pain/calf swelling.    4. Wound Care: Remove old and Place new Aquacel bandage to hip wound every 7days. Remove Sutures/Staples Post Op Day #14 so long as wound is healed, no drainage or open area. OK to Shower with Aquacel.  Avoid direct water beating on bandage.     5. DVT PE Prophylaxis:   Lovenonx for 4 weeks OR Coumadin dosed to goal INR 2-3. Stop Lovenox or Heparin when INR>2. See Med Rec.Check INR Daily until levels stable.    6. Labs: Check H&H weekly while on Anticoagulation    7. Follow Up: Orthopedics, 10-14 days in office. Call to schedule. If going home, eRX sent to your pharmacy for . Discharge Instructions  Hip Hemiarthroplasty    1. Diet: Resume previous diet    2. Activity: WBAT. Rolling walker. Posterior Hip Dislocation Precautions. Abduction Pillow while in bed and Chair. Daily Physical Therapy.    3. Call with: fever over 101, wound redness, drainage or open area, calf pain/calf swelling.    4. Wound Care: Majority of staples removed on day 14. On post-operative day 21, remove ANTONY dressing and remove staples. ANTONY dressing can maintain in place until that time. Replacement dressing sent with patient and can be replaced if the negative pressure integrity fails.     5. DVT PE Prophylaxis:   Lovenonx for 4 weeks OR Coumadin dosed to goal INR 2-3. Stop Lovenox or Heparin when INR>2. See Med Rec.Check INR Daily until levels stable.    6. Labs: Check H&H weekly while on Anticoagulation    7. Follow Up: Orthopedics, 10-14 days in office. Call to schedule. If going home, eRX sent to your pharmacy for .

## 2018-02-06 NOTE — DISCHARGE NOTE ADULT - ADDITIONAL INSTRUCTIONS
1. Check CBC in 1 week, if wbc still elevated will need further workup  2. Take lasix X 3 days as prescribed, re-evaluate after 3 days for further lasix.  3. Follow up with ortho as advised  4. Follow up with dr. Viera after discharge from rehab.

## 2018-02-06 NOTE — PHYSICAL THERAPY INITIAL EVALUATION ADULT - PERTINENT HX OF CURRENT PROBLEM, REHAB EVAL
72M presents to ED s/p fall.  Fall occurred ~ 10 days PTA; patient has been very weak, and suffers from arthritis.  Xray with R hip fx.

## 2018-02-06 NOTE — PROGRESS NOTE ADULT - ASSESSMENT
A/P: 72 year old man with CHF, COPD, chronic respiratory failure s/p fall presented with c/o RLE pain found to have right hip fracture.    Right Hip pain 2/2 possible R hip fracture.    - Xray imaging / CT scan with R hip fx  - s/p R hip arthoplasty 2/5/18  - Venous doppler US 2/3 : no evidence of deep venous thrombosis in either leg.  Left Baker's cyst.  - PT  / pain management  - f/b ortho: plans per ortho team    Chronic congestive heart failure, unspecified congestive heart failure type: compensated  - hold lasix and lisinopril ameena-op and re-valuate post op.  - con't  toprol, lipitor, imdur, ranexa,     NSVT   - brief episode of NSVT overnight post -op  - lytes wnl / he's CP free / no palpitations  - restart home dose BB  - con't telemonitor  - appreciate cardiology input    Leukocytois - post -op  - he's afebrile   - no urinary complaints  - con't to cefazolin / monitor  - OOB with PT    Hyponatremia: Secondary to underlying CHF: RESOLVED  -monitor    Hypokalemia - resolved  - replete PRN K to keep K > 4.0  - monitor lytes    COPD with chronic respiratory failure: STABLE  -incentive spirometry / duonebs  -symbicort, hold spiriva /supplemental O2    Spinal stenosis:  -pain management and PT    BPH  -proscar and flomax    Constipation  - no abd pain/n/f  - start lactulose     DVT ppx:  - heparin subccutaneousl    Dispo  - full code  - dc when stable / or cleared by ortho  - SW for post dc needs A/P: 72 year old man with CHF, COPD, chronic respiratory failure s/p fall presented with c/o RLE pain found to have right hip fracture.    Right Hip pain 2/2 possible R hip fracture.    - Xray imaging / CT scan with R hip fx  - s/p R hip arthoplasty 2/5/18  - Venous doppler US 2/3 : no evidence of deep venous thrombosis in either leg.  Left Baker's cyst.  - PT  / pain management  - f/b ortho: plans per ortho team  -lovenox 40mg qd x 4 weeks post op prophylaxis.     Chronic congestive heart failure, unspecified congestive heart failure type: compensated  - hold lasix and lisinopril ameena-op and re-valuate post op.  - con't  toprol, lipitor, imdur, ranexa,     NSVT   - brief episode of NSVT overnight post -op  - lytes wnl / he's CP free / no palpitations  - restart home dose BB  - con't telemonitor  - appreciate cardiology input    Leukocytois - post -op  - he's afebrile   - no urinary complaints  - con't to cefazolin / monitor  - OOB with PT    Hyponatremia: Secondary to underlying CHF: RESOLVED  -monitor    Hypokalemia - resolved  - replete PRN K to keep K > 4.0  - monitor lytes    COPD with chronic respiratory failure: STABLE  -incentive spirometry / duonebs  -symbicort, hold spiriva /supplemental O2    Spinal stenosis:  -pain management and PT    BPH  -proscar and flomax    Constipation  - no abd pain/n/f  - start lactulose     DVT ppx:  - heparin subccutaneousl    Dispo  - full code  - dc when stable / or cleared by ortho  - SW for post dc needs    Attending Statement:  40 minutes spent on total encounter; more than 50% of the visit was spent counseling and/or coordinating care by the attending physician.  Patient seen and examinid with BLAYNE Xiong. Agree with physical exam and assessment and plan.

## 2018-02-06 NOTE — DISCHARGE NOTE ADULT - PATIENT PORTAL LINK FT
You can access the [facility] Patient Portal, offered by [facility], by registering with the following website: [email or web address]

## 2018-02-06 NOTE — PROGRESS NOTE ADULT - ASSESSMENT
72M s/p R hip hemiarthroplasty	  Analgesia  DVT ppx  Incentive spirometry  WBAT  P/T  Discharge planning

## 2018-02-06 NOTE — PROGRESS NOTE ADULT - SUBJECTIVE AND OBJECTIVE BOX
CHIEF COMPLAINT: RLE pain    Subjective: has not had any bowel movement in 1-2 weeks, otherwise feel good, denies CP/palpitations/HA/dizzines/ numbness/tingling/abd pain/n/v/d/f/c    HPI: 71 y/o male with a PMHx chronic CHF, COPD on home O2 4 L, emphysema, and spinal stenosis presented to ED on 2/3/18 s/p fall.   Fall occurred ~ 10 days ago; patient has been very weak, and suffers from arthritis. He notes chronic Rt hip pain for ~ 4 weeks which causes ambulatory dysfunction.  He was suppose to follow-up with outpatient ortho for MRI.  Reports associated poor appetite with decreased PO intake. Denies nausea, vomiting, diarrhea, fevers , chills, CP or dyspnea. No recent illnesses, no sick contacts.     Review of system- Rest of the review of system are normal except mentioned in HPI    Vital Signs Last 24 Hrs  T(C): 37.2 (06 Feb 2018 10:00), Max: 37.3 (05 Feb 2018 18:50)  T(F): 98.9 (06 Feb 2018 10:00), Max: 99.2 (05 Feb 2018 18:50)  HR: 102 (06 Feb 2018 10:00) (89 - 114)  BP: 161/65 (06 Feb 2018 10:00) (123/65 - 170/90)  BP(mean): --  RR: 17 (06 Feb 2018 10:00) (16 - 20)  SpO2: 97% (06 Feb 2018 10:00) (92% - 98%)    PHYSICAL EXAM:  Constitutional: NAD  HEENT: AT/NC, EOMI, pupils equal, round, non-icteric  Neck: Soft and supple  Respiratory: Breath sounds are clear bilaterally, No wheezing, rales or rhonchi  Cardiovascular: S1 and S2, regular rate and rhythm, no Murmurs, gallops or rubs  Gastrointestinal: abd firm and softly distended, +BS x 4, nontender, no guarding, no rebound  Extremities: No peripheral edema  Neurological: A/O x 3, no focal deficits  OTHER: R hip surgical site with steri-strip CDI, no active bleeding/bruise/hematoma    LABS                   11.8   17.5  )-----------( 383      ( 06 Feb 2018 06:02 )             35.5     02-06    134<L>  |  96  |  19  ----------------------------<  127<H>  4.2   |  30  |  0.67    Ca    8.6      06 Feb 2018 06:02  Mg     1.9     02-06    PT/INR - ( 06 Feb 2018 06:02 )   PT: 14.3 sec;   INR: 1.32 ratio       PTT - ( 05 Feb 2018 05:48 )  PTT:29.0 sec      MEDICATIONS   MEDICATIONS  (STANDING):  ascorbic acid 500 milliGRAM(s) Oral two times a day  ceFAZolin   IVPB 2000 milliGRAM(s) IV Intermittent every 8 hours  docusate sodium 100 milliGRAM(s) Oral three times a day  enoxaparin Injectable 40 milliGRAM(s) SubCutaneous every 24 hours  ferrous    sulfate 325 milliGRAM(s) Oral three times a day with meals  folic acid 1 milliGRAM(s) Oral daily  lactated ringers. 1000 milliLiter(s) (100 mL/Hr) IV Continuous <Continuous>  lactated ringers. 1000 milliLiter(s) (75 mL/Hr) IV Continuous <Continuous>  lactulose Syrup 10 Gram(s) Oral daily  multivitamin 1 Tablet(s) Oral daily  pantoprazole    Tablet 40 milliGRAM(s) Oral daily  polyethylene glycol 3350 17 Gram(s) Oral daily    MEDICATIONS  (PRN):  acetaminophen   Tablet 650 milliGRAM(s) Oral every 6 hours PRN For Temp over 38.3 C (100.94 F)  aluminum hydroxide/magnesium hydroxide/simethicone Suspension 30 milliLiter(s) Oral four times a day PRN Indigestion  diphenhydrAMINE   Capsule 25 milliGRAM(s) Oral at bedtime PRN Insomnia  fentaNYL    Injectable 50 MICROGram(s) IV Push every 5 minutes PRN Severe Pain  HYDROmorphone  Injectable 0.5 milliGRAM(s) SubCutaneous every 4 hours PRN Severe Pain  ondansetron Injectable 4 milliGRAM(s) IV Push every 6 hours PRN Nausea and/or Vomiting  ondansetron Injectable 4 milliGRAM(s) IV Push once PRN Nausea and/or Vomiting  oxyCODONE    IR 5 milliGRAM(s) Oral every 4 hours PRN For moderate pain  oxyCODONE    IR 5 milliGRAM(s) Oral every 4 hours PRN Mild Pain  oxyCODONE    IR 10 milliGRAM(s) Oral every 4 hours PRN Moderate Pain  prochlorperazine   Injectable 5 milliGRAM(s) IV Push every 6 hours PRN Nausea and/or Vomiting  senna 2 Tablet(s) Oral at bedtime PRN Constipation

## 2018-02-06 NOTE — DISCHARGE NOTE ADULT - HOSPITAL COURSE
71 y/o male with a PMHx chronic diastolic CHF, COPD on home O2 4 L, HTN, HLD, CAD s/p stent to LCx, spinal stenosis, obesity, lindy, PAD who presented to the ER 10 days after a fall with right hip pain. He had hip pain for about 4 weeks pta. He fell 10 days pta and came to the ER for difficulty ambulating/right hip pain and generalized weakness. He was admitted with right hip fracture. He was seen by cardiology and subsequently underwent right hip hemiarthroplasty. Post op course complicated by ileus. He was unable to tolerate NGT. He was seen by GI, underwent gastrograffin enema and also was administered neostigmine.  Had persistent ileus and required transfer back to icu for neostigmine. Underwent decompressive colonoscopy and rectal tube placement. DIet eventually advanced after ileus has resolved. Pt's wbc count has been slow to improve. ID was consulted. C diff was found to be positive but patient did not have diarrhea therefore it was felt pt was likely colonized and treatment was discontinued. He has been a febrile and clinically improving . he will be discharged to rehab today. Of note the patient is significantly fluid overloaded due to IVF administration, imobility, etc, therefore will be discharged on po lasix X 3 days. This should be re-evaluated after completion. He also has been advised a repeat cbc in 1 week, if wbc is persistent, he will need further w/u    for physical exam please see progress note from 2/20/18    LABS:                        11.4   16.9  )-----------( 546      ( 20 Feb 2018 05:54 )             34.9     02-20    140  |  99  |  13  ----------------------------<  133<H>  3.7   |  37<H>  |  0.63    Ca    8.4<L>      20 Feb 2018 05:54  Phos  2.7     02-20  Mg     1.9     02-20     CT Abdomen and Pelvis No Cont (02.10.18 @ 17:13) >  Stable colonic ileus with no interval change in degree of gaseous   distention. Persistent retention of Gastrografin from an enema performed   2 days prior. Gastrografin has refluxed to the level of the ascending   colon/cecum.    US Duplex Venous Lower Ext Complete, Bilateral (02.03.18 @ 18:45) >  IMPRESSION:  No evidence of deep vein thrombosis in either leg. Left   Baker's cyst.      1. Right hip fracture s/p Right hip hemiarthroplasty  2. Leukocytosis  3. Post op ileus- resolved  4. Chr diastolic CHF   5.HTN  6. h/o CAD/Stent  7. BPH  8. COPD with chronic respiratory failure:    time taken for dc 75 min  plan d/w patient in detail  left message for pcp -dr. sepinal re: dolores.

## 2018-02-06 NOTE — PHYSICAL THERAPY INITIAL EVALUATION ADULT - GENERAL OBSERVATIONS, REHAB EVAL
pt received supine in bed on 3E. pt without any complaints at rest. pt agreeable to participate with PT.  pt very nervous to attempt OOB for fear of pain and fear of falling.

## 2018-02-06 NOTE — CONSULT NOTE ADULT - ASSESSMENT
This is a 72 year old male s/p fall on about Jan 23rd presented to [facility] on 2-3 with chronic pain to hip, decrease appetite.  Pt found to have a fx rt hip, now sp right hip brynn on 2-5-18.  Pt has high thrombosis risks and requires anticoagualtion prophylaxis.  Discussed with pt the use of Lovenox for his anticoagulation med.  Pt is agreeable.    :Lovenox 40mg sq daily for four weeks post procedure  :daily cbc/bmp  :LE Venodynes  : increase mobility as tolerated  :Thanks for consult will f/u

## 2018-02-06 NOTE — PHYSICAL THERAPY INITIAL EVALUATION ADULT - LEVEL OF INDEPENDENCE: SIT/STAND, REHAB EVAL
pt refused to trial standing today despite encouragement and education on role of PT and benefits of mobility, pt with fear of pain and fear of falling, pt states "maybe tomorrow"

## 2018-02-06 NOTE — PROGRESS NOTE ADULT - SUBJECTIVE AND OBJECTIVE BOX
Pt seen & examined. Pain controlled. No acute events overnight  All vital signs stable (as per nursing flowsheet)  Gen: NAD  RLE:  Dressing clean D&I  +sensation L2-S1  +dorsiflexion/plantarflexion of ankle/hallux  +dorsalis pedis pulse  Soft compartments, - calf tenderness

## 2018-02-06 NOTE — CONSULT NOTE ADULT - SUBJECTIVE AND OBJECTIVE BOX
HPI:  71 y/o male with a PMHx chronic CHF, COPD on home O2 4 L, emphysema, and spinal stenosis.   He presents to ED s/p fall.   Fall occurred ~ 10 days ago; patient has been very weak, and suffers from arthritis.   He notes chronic Rt hip pain for ~ 4 weeks which causes ambulatory dysfunction.  He was suppose to follow-up with outpatient ortho for MRI.    Reports associated poor appetite with decreased PO intake.   Denies nausea, vomiting, diarrhea, fevers , chills, CP or dypsnea.   No recent illnesses, no sick contacts.    ED course:  Xray imaging reportedly with Rt hip fracture  Venous doppler US 2/3 : no evidence of deep venous thrombosis in either leg.  Left Baker's cyst. (2018 01:53)      Patient is a 72y old  Male who presents with a chief complaint of Limb pain (2018 08:35)  pt s/p right hip brynn on 18.    Consulted by Dr. Rahul Elkins for VTE prophylaxis, risk stratification, and anticoagulation management.    PAST MEDICAL & SURGICAL HISTORY:  Spinal stenosis  CHF (congestive heart failure)  Emphysema  COPD (chronic obstructive pulmonary disease)  No significant past surgical history    Caprini VTE Risk Score:9    IMPROVE Bleeding Risk Score: 2.5    Falls Risk:   High (x  )  Mod (  )  Low (  )    EBL:  150ml  cr cl;162.2  18 Pt sen at bedside.  Discussed his anticoagulation with Lovenox inj for 4 weeks.  Questions answered will reinforce as needed.  Pt concerned about no having a bm for a few days and  in his appetite for a while. Not sure if he lost wt.     Vital Signs Last 24 Hrs  T(C): 37.2 (2018 10:00), Max: 37.3 (2018 18:50)  T(F): 98.9 (2018 10:00), Max: 99.2 (2018 18:50)  HR: 102 (2018 10:00) (89 - 114)  BP: 161/65 (2018 10:00) (123/65 - 170/90)  BP(mean): --  RR: 17 (2018 10:00) (16 - 20)  SpO2: 97% (2018 10:00) (92% - 98%)  FAMILY HISTORY:  No pertinent family history in first degree relatives    Denies any personal or familial history of clotting or bleeding disorders.    Allergies    No Known Allergies    Intolerances        REVIEW OF SYSTEMS    (  )Fever	     (  )Constipation	(  )SOB				(  )Headache	(  )Dysuria  (  )Chills	     (  )Melena	(  )Dyspnea present on exertion	                    (  )Dizziness                    (  )Polyuria  (  )Nausea	     (  )Hematochezia	(  )Cough			                    (  )Syncope   	(  )Hematuria  (  )Vomiting    (  )Chest Pain	(  )Wheezing			(  )Weakness  (  )Diarrhea     (  )Palpitations	(  )Anorexia			(  )Myalgia    All other review of systems negative: Yes    PHYSICAL EXAM:    Constitutional: Appears Well    Neurological: A& O x 3    Skin: Warm    Respiratory and Thorax: normal effort; Breath sounds: normal; No rales/wheezing/rhonchi  	  Cardiovascular: S1, S2, regular, NMBR	    Gastrointestinal: BS + x 4Q, nontender	    Genitourinary:  Bladder nondistended, nontender    Musculoskeletal:   General Right:   no muscle/joint tenderness,   normal tone, no joint swelling,   ROM: limitedl	    General Left:   no muscle/joint tenderness,   normal tone, no joint swelling,   ROM:full    Hip:  Right: Dressing CDI;   Lower extrems:   Right: no calf tenderness              negative rolando's sign               + pedal pulses    Left:   no calf tenderness              negative rolando's sign               + pedal pulses                          11.8   17.5  )-----------( 383      ( 2018 06:02 )             35.5       02-06    134<L>  |  96  |  19  ----------------------------<  127<H>  4.2   |  30  |  0.67    Ca    8.6      2018 06:02  Mg     1.9     02-06        PT/INR - ( 2018 06:02 )   PT: 14.3 sec;   INR: 1.32 ratio         PTT - ( 2018 05:48 )  PTT:29.0 sec				    MEDICATIONS  (STANDING):  ascorbic acid 500 milliGRAM(s) Oral two times a day  ceFAZolin   IVPB 2000 milliGRAM(s) IV Intermittent every 8 hours  docusate sodium 100 milliGRAM(s) Oral three times a day  enoxaparin Injectable 40 milliGRAM(s) SubCutaneous every 24 hours  ferrous    sulfate 325 milliGRAM(s) Oral three times a day with meals  folic acid 1 milliGRAM(s) Oral daily  lactated ringers. 1000 milliLiter(s) IV Continuous <Continuous>  lactulose Syrup 10 Gram(s) Oral daily  metoprolol succinate ER 25 milliGRAM(s) Oral daily  multivitamin 1 Tablet(s) Oral daily  pantoprazole    Tablet 40 milliGRAM(s) Oral daily  polyethylene glycol 3350 17 Gram(s) Oral daily          DVT Prophylaxis:  LMWH                   (x  )  Heparin SQ           (  )  Coumadin             (  )  Xarelto                  (  )  Eliquis                   (  )  Venodynes           ( x )  Ambulation          ( x )  UFH                       (  )  Contraindicated  (  )

## 2018-02-06 NOTE — DISCHARGE NOTE ADULT - CARE PLAN
Principal Discharge DX:	Hip fracture  Goal:	Return to baseline ADLs  Assessment and plan of treatment:	Discharge Instructions  Hip Hemiarthroplasty    1. Diet: Resume previous diet    2. Activity: WBAT. Rolling walker. Posterior Hip Dislocation Precautions. Abduction Pillow while in bed and Chair. Daily Physical Therapy.    3. Call with: fever over 101, wound redness, drainage or open area, calf pain/calf swelling.    4. Wound Care: Remove old and Place new Aquacel bandage to hip wound every 7days. Remove Sutures/Staples Post Op Day #14 so long as wound is healed, no drainage or open area. OK to Shower with Aquacel.  Avoid direct water beating on bandage.     5. DVT PE Prophylaxis:   Lovenonx for 4 weeks OR Coumadin dosed to goal INR 2-3. Stop Lovenox or Heparin when INR>2. See Med Rec.Check INR Daily until levels stable.    6. Labs: Check H&H weekly while on Anticoagulation    7. Follow Up: Orthopedics, 10-14 days in office. Call to schedule. If going home, eRX sent to your pharmacy for . Principal Discharge DX:	Hip fracture  Goal:	Return to baseline ADLs  Assessment and plan of treatment:	Discharge Instructions  Hip Hemiarthroplasty    1. Diet: Resume previous diet    2. Activity: WBAT. Rolling walker. Posterior Hip Dislocation Precautions. Abduction Pillow while in bed and Chair. Daily Physical Therapy.    3. Call with: fever over 101, wound redness, drainage or open area, calf pain/calf swelling.    4. Wound Care: Majority of staples removed on day 14. On post-operative day 21, remove ANTONY dressing and remove staples. ANTONY dressing can maintain in place until that time. Replacement dressing sent with patient and can be replaced if the negative pressure integrity fails.     5. DVT PE Prophylaxis:   Lovenonx for 4 weeks OR Coumadin dosed to goal INR 2-3. Stop Lovenox or Heparin when INR>2. See Med Rec.Check INR Daily until levels stable.    6. Labs: Check H&H weekly while on Anticoagulation    7. Follow Up: Orthopedics, 10-14 days in office. Call to schedule. If going home, eRX sent to your pharmacy for .

## 2018-02-06 NOTE — DISCHARGE NOTE ADULT - CARE PROVIDER_API CALL
Alex Tang (DO), Orthopaedic Surgery  [address] [place] [address]  Phone: [phone]  Fax: [phone]

## 2018-02-06 NOTE — DISCHARGE NOTE ADULT - MEDICATION SUMMARY - MEDICATIONS TO TAKE
I will START or STAY ON the medications listed below when I get home from the hospital:    Proscar 5 mg oral tablet  -- 1 tab(s) by mouth once a day  -- Indication: For Home med    acetaminophen 325 mg oral tablet  -- 2 tab(s) by mouth every 6 hours, As needed, for mild pain  -- Indication: For Pain    lisinopril 20 mg oral tablet  -- 1 tab(s) by mouth once a day  -- Indication: For Home med    Flomax 0.4 mg oral capsule  -- 1 cap(s) by mouth once a day  -- Indication: For Home med    Imdur 60 mg oral tablet, extended release  -- 1 tab(s) by mouth once a day (in the morning)  -- Indication: For Home med    Ranexa 500 mg oral tablet, extended release  -- 1 tab(s) by mouth 2 times a day  -- Indication: For Home med    enoxaparin  -- 40 milligram(s) subcutaneous once a day. stop after 3/6/18  -- Indication: For DVT px.     Lipitor 20 mg oral tablet  -- 1 tab(s) by mouth once a day  -- Indication: For Home med    Toprol-XL 25 mg oral tablet, extended release  -- 1 tab(s) by mouth once a day  -- Indication: For Home med    Spiriva 18 mcg inhalation capsule  -- 1 cap(s) inhaled once a day  -- Indication: For Home med    Advair Diskus 250 mcg-50 mcg inhalation powder  -- 1 puff(s) inhaled 2 times a day  -- Indication: For Home med    Norvasc 5 mg oral tablet  -- 1 tab(s) by mouth once a day  -- Indication: For Home med    lidocaine 5% topical film  -- Apply on skin to affected area once a day  -- Indication: For Pain    Lasix 20 mg oral tablet  -- 1 tab(s) by mouth once a day x 3 days, then re-evaluate fluid status.   -- Indication: For fluid overload    senna oral tablet  -- 2 tab(s) by mouth once a day (at bedtime)  -- Indication: For COnstipation    docusate sodium 100 mg oral capsule  -- 1 cap(s) by mouth 3 times a day  -- Indication: For COnstipation    potassium chloride 10 mEq oral capsule, extended release  -- 10 milliequivalent(s) by mouth once a day for 3 days with lasix.   -- Indication: For to take with lasix.     pantoprazole 40 mg oral delayed release tablet  -- 1 tab(s) by mouth once a day  -- Indication: For Cintia emed    Multiple Vitamins oral tablet  -- 1 tab(s) by mouth once a day  -- Indication: For Home med    ascorbic acid 500 mg oral tablet  -- 1 tab(s) by mouth 2 times a day  -- Indication: For Supplement

## 2018-02-06 NOTE — PROGRESS NOTE ADULT - SUBJECTIVE AND OBJECTIVE BOX
S/p Hip hemiarthroplasty. Uneventful surgery    NSVT noted on  tele. Asymptomatic. He has been off beta blockers.    PREVIOUS CARDIAC WORKUP:      Echo:  10/16 Nml EF, mild LVH, diastolic dysfunction, trace MR  Stress Test:  1/17 No ichemia    Allergies:   No Known Allergies      MEDICATIONS  (STANDING):  ascorbic acid 500 milliGRAM(s) Oral two times a day  ceFAZolin   IVPB 2000 milliGRAM(s) IV Intermittent every 8 hours  docusate sodium 100 milliGRAM(s) Oral three times a day  enoxaparin Injectable 40 milliGRAM(s) SubCutaneous every 24 hours  ferrous    sulfate 325 milliGRAM(s) Oral three times a day with meals  folic acid 1 milliGRAM(s) Oral daily  lactated ringers. 1000 milliLiter(s) (100 mL/Hr) IV Continuous <Continuous>  lactated ringers. 1000 milliLiter(s) (75 mL/Hr) IV Continuous <Continuous>  multivitamin 1 Tablet(s) Oral daily  pantoprazole    Tablet 40 milliGRAM(s) Oral daily  polyethylene glycol 3350 17 Gram(s) Oral daily    MEDICATIONS  (PRN):  acetaminophen   Tablet 650 milliGRAM(s) Oral every 6 hours PRN For Temp over 38.3 C (100.94 F)  aluminum hydroxide/magnesium hydroxide/simethicone Suspension 30 milliLiter(s) Oral four times a day PRN Indigestion  diphenhydrAMINE   Capsule 25 milliGRAM(s) Oral at bedtime PRN Insomnia  fentaNYL    Injectable 50 MICROGram(s) IV Push every 5 minutes PRN Severe Pain  HYDROmorphone  Injectable 0.5 milliGRAM(s) SubCutaneous every 4 hours PRN Severe Pain  ondansetron Injectable 4 milliGRAM(s) IV Push every 6 hours PRN Nausea and/or Vomiting  ondansetron Injectable 4 milliGRAM(s) IV Push once PRN Nausea and/or Vomiting  oxyCODONE    IR 5 milliGRAM(s) Oral every 4 hours PRN For moderate pain  oxyCODONE    IR 5 milliGRAM(s) Oral every 4 hours PRN Mild Pain  oxyCODONE    IR 10 milliGRAM(s) Oral every 4 hours PRN Moderate Pain  prochlorperazine   Injectable 5 milliGRAM(s) IV Push every 6 hours PRN Nausea and/or Vomiting  senna 2 Tablet(s) Oral at bedtime PRN Constipation      ROS:     Detailed ten system ROS was performed and was negative except for history as eluded to above.    no fever  no chills  no nausea  no vomiting  no diarrhea  no constipation  no melena  no hematochezia  no chest pain  no palpitations  no sob at rest  no dyspnea on exertion  no cough  no wheezing  no anorexia  no headache  no dizziness  no syncope  no weakness  no myalgia  no dysuria  no polyuria  no hematuria       Vital Signs Last 24 Hrs  T(C): 37 (06 Feb 2018 05:00), Max: 37.3 (05 Feb 2018 18:50)  T(F): 98.6 (06 Feb 2018 05:00), Max: 99.2 (05 Feb 2018 18:50)  HR: 98 (06 Feb 2018 05:00) (89 - 114)  BP: 152/64 (06 Feb 2018 05:00) (123/65 - 170/90)  BP(mean): --  RR: 17 (05 Feb 2018 21:00) (16 - 20)  SpO2: 96% (06 Feb 2018 05:00) (92% - 98%)        PHYSICAL EXAM:    General:                Comfortable, AAO X 3, in no distress. Obese  HEENT:                  Atraumatic, PERRLA, EOMI, conjunctiva clear.   Neck:                     Supple, no adenopathy, no thyromegaly, no JVD, no bruit.  Back:                     Symmetric, non tender.  Chest:                    Clear, B/L symmetric air entry, no tachypnea  Heart:                     S1, S2 normal, no gallop, no murmur.  Abdomen:              Soft, non-tender, bowel sounds active. No palpable masses.  Extremities:           no cyanosis, no edema. Peripheral pulses normal.  Skin:                      Skin color, texture normal. No rashes.  Neurologic:            Grossly nonfocal.       INTERPRETATION OF TELEMETRY:  NSVT    ECG:  NSR, no ST T changes of ischemia or infarction.     LABS:                        11.8   17.5  )-----------( 383      ( 06 Feb 2018 06:02 )             35.5     02-06    134<L>  |  96  |  19  ----------------------------<  127<H>  4.2   |  30  |  0.67    Ca    8.6      06 Feb 2018 06:02  Mg     1.9     02-06        PT/INR - ( 06 Feb 2018 06:02 )   PT: 14.3 sec;   INR: 1.32 ratio    PTT - ( 05 Feb 2018 05:48 )  PTT:29.0 sec      RADIOLOGY & ADDITIONAL STUDIES:    Xray Hip w/ Pelvis 1 View, Right (02.05.18 @ 19:07) >  Impression:  Status post  right hip replacement in good anatomic alignment

## 2018-02-06 NOTE — PHYSICAL THERAPY INITIAL EVALUATION ADULT - DISCHARGE DISPOSITION, PT EVAL
rehabilitation facility/Recommend continued skilled PT and YOEL when medically stable for discharge.

## 2018-02-07 LAB
ANION GAP SERPL CALC-SCNC: 6 MMOL/L — SIGNIFICANT CHANGE UP (ref 5–17)
BUN SERPL-MCNC: 17 MG/DL — SIGNIFICANT CHANGE UP (ref 7–23)
CALCIUM SERPL-MCNC: 8.6 MG/DL — SIGNIFICANT CHANGE UP (ref 8.5–10.1)
CHLORIDE SERPL-SCNC: 97 MMOL/L — SIGNIFICANT CHANGE UP (ref 96–108)
CO2 SERPL-SCNC: 30 MMOL/L — SIGNIFICANT CHANGE UP (ref 22–31)
CREAT SERPL-MCNC: 0.56 MG/DL — SIGNIFICANT CHANGE UP (ref 0.5–1.3)
GLUCOSE SERPL-MCNC: 134 MG/DL — HIGH (ref 70–99)
HCT VFR BLD CALC: 35.2 % — LOW (ref 39–50)
HCT VFR BLD CALC: 36 % — LOW (ref 39–50)
HGB BLD-MCNC: 11.6 G/DL — LOW (ref 13–17)
HGB BLD-MCNC: 12 G/DL — LOW (ref 13–17)
INR BLD: 1.43 RATIO — HIGH (ref 0.88–1.16)
MAGNESIUM SERPL-MCNC: 2 MG/DL — SIGNIFICANT CHANGE UP (ref 1.6–2.6)
MCHC RBC-ENTMCNC: 30.8 PG — SIGNIFICANT CHANGE UP (ref 27–34)
MCHC RBC-ENTMCNC: 31.6 PG — SIGNIFICANT CHANGE UP (ref 27–34)
MCHC RBC-ENTMCNC: 32.9 GM/DL — SIGNIFICANT CHANGE UP (ref 32–36)
MCHC RBC-ENTMCNC: 33.5 GM/DL — SIGNIFICANT CHANGE UP (ref 32–36)
MCV RBC AUTO: 93.4 FL — SIGNIFICANT CHANGE UP (ref 80–100)
MCV RBC AUTO: 94.5 FL — SIGNIFICANT CHANGE UP (ref 80–100)
PLATELET # BLD AUTO: 360 K/UL — SIGNIFICANT CHANGE UP (ref 150–400)
PLATELET # BLD AUTO: 436 K/UL — HIGH (ref 150–400)
POTASSIUM SERPL-MCNC: 3.9 MMOL/L — SIGNIFICANT CHANGE UP (ref 3.5–5.3)
POTASSIUM SERPL-SCNC: 3.9 MMOL/L — SIGNIFICANT CHANGE UP (ref 3.5–5.3)
PROTHROM AB SERPL-ACNC: 15.6 SEC — HIGH (ref 9.8–12.7)
RBC # BLD: 3.77 M/UL — LOW (ref 4.2–5.8)
RBC # BLD: 3.81 M/UL — LOW (ref 4.2–5.8)
RBC # FLD: 12.1 % — SIGNIFICANT CHANGE UP (ref 10.3–14.5)
RBC # FLD: 12.5 % — SIGNIFICANT CHANGE UP (ref 10.3–14.5)
SODIUM SERPL-SCNC: 133 MMOL/L — LOW (ref 135–145)
TSH SERPL-MCNC: 0.26 UIU/ML — LOW (ref 0.36–3.74)
WBC # BLD: 17.4 K/UL — HIGH (ref 3.8–10.5)
WBC # BLD: 19.4 K/UL — HIGH (ref 3.8–10.5)
WBC # FLD AUTO: 17.4 K/UL — HIGH (ref 3.8–10.5)
WBC # FLD AUTO: 19.4 K/UL — HIGH (ref 3.8–10.5)

## 2018-02-07 PROCEDURE — 74176 CT ABD & PELVIS W/O CONTRAST: CPT | Mod: 26

## 2018-02-07 PROCEDURE — 74019 RADEX ABDOMEN 2 VIEWS: CPT | Mod: 26

## 2018-02-07 RX ORDER — LACTULOSE 10 G/15ML
10 SOLUTION ORAL ONCE
Qty: 0 | Refills: 0 | Status: COMPLETED | OUTPATIENT
Start: 2018-02-07 | End: 2018-02-07

## 2018-02-07 RX ADMIN — LACTULOSE 10 GRAM(S): 10 SOLUTION ORAL at 11:39

## 2018-02-07 RX ADMIN — ISOSORBIDE MONONITRATE 60 MILLIGRAM(S): 60 TABLET, EXTENDED RELEASE ORAL at 11:39

## 2018-02-07 RX ADMIN — RANOLAZINE 500 MILLIGRAM(S): 500 TABLET, FILM COATED, EXTENDED RELEASE ORAL at 06:35

## 2018-02-07 RX ADMIN — SODIUM CHLORIDE 75 MILLILITER(S): 9 INJECTION, SOLUTION INTRAVENOUS at 06:35

## 2018-02-07 RX ADMIN — POLYETHYLENE GLYCOL 3350 17 GRAM(S): 17 POWDER, FOR SOLUTION ORAL at 11:40

## 2018-02-07 RX ADMIN — OXYCODONE HYDROCHLORIDE 10 MILLIGRAM(S): 5 TABLET ORAL at 10:53

## 2018-02-07 RX ADMIN — Medication 650 MILLIGRAM(S): at 22:21

## 2018-02-07 RX ADMIN — Medication 500 MILLIGRAM(S): at 17:27

## 2018-02-07 RX ADMIN — ATORVASTATIN CALCIUM 20 MILLIGRAM(S): 80 TABLET, FILM COATED ORAL at 22:19

## 2018-02-07 RX ADMIN — Medication 100 MILLIGRAM(S): at 14:13

## 2018-02-07 RX ADMIN — AMLODIPINE BESYLATE 5 MILLIGRAM(S): 2.5 TABLET ORAL at 06:35

## 2018-02-07 RX ADMIN — Medication 325 MILLIGRAM(S): at 08:19

## 2018-02-07 RX ADMIN — OXYCODONE HYDROCHLORIDE 10 MILLIGRAM(S): 5 TABLET ORAL at 10:23

## 2018-02-07 RX ADMIN — TIOTROPIUM BROMIDE 1 CAPSULE(S): 18 CAPSULE ORAL; RESPIRATORY (INHALATION) at 07:29

## 2018-02-07 RX ADMIN — TAMSULOSIN HYDROCHLORIDE 0.4 MILLIGRAM(S): 0.4 CAPSULE ORAL at 22:19

## 2018-02-07 RX ADMIN — Medication 325 MILLIGRAM(S): at 17:27

## 2018-02-07 RX ADMIN — Medication 1 MILLIGRAM(S): at 11:38

## 2018-02-07 RX ADMIN — LACTULOSE 10 GRAM(S): 10 SOLUTION ORAL at 12:01

## 2018-02-07 RX ADMIN — Medication 100 MILLIGRAM(S): at 06:35

## 2018-02-07 RX ADMIN — ENOXAPARIN SODIUM 40 MILLIGRAM(S): 100 INJECTION SUBCUTANEOUS at 10:23

## 2018-02-07 RX ADMIN — Medication 100 MILLIGRAM(S): at 22:19

## 2018-02-07 RX ADMIN — Medication 1 TABLET(S): at 11:40

## 2018-02-07 RX ADMIN — LISINOPRIL 20 MILLIGRAM(S): 2.5 TABLET ORAL at 06:35

## 2018-02-07 RX ADMIN — Medication 25 MILLIGRAM(S): at 06:35

## 2018-02-07 RX ADMIN — FINASTERIDE 5 MILLIGRAM(S): 5 TABLET, FILM COATED ORAL at 11:43

## 2018-02-07 RX ADMIN — RANOLAZINE 500 MILLIGRAM(S): 500 TABLET, FILM COATED, EXTENDED RELEASE ORAL at 17:27

## 2018-02-07 RX ADMIN — Medication 500 MILLIGRAM(S): at 06:35

## 2018-02-07 RX ADMIN — Medication 325 MILLIGRAM(S): at 11:40

## 2018-02-07 RX ADMIN — Medication 25 MILLIGRAM(S): at 22:19

## 2018-02-07 RX ADMIN — PANTOPRAZOLE SODIUM 40 MILLIGRAM(S): 20 TABLET, DELAYED RELEASE ORAL at 11:44

## 2018-02-07 NOTE — CONSULT NOTE ADULT - ASSESSMENT
A/P: 72 year old man with CHF, COPD, chronic respiratory failure s/p fall presented with c/o RLE pain found to have right hip fracture.    Right Hip pain 2/2 R hip fracture: s/p R hip arthoplasty  - Xray imaging / CT scan with R hip fx  - s/p R hip arthoplasty 2/5/18  - Venous doppler US 2/3 : no evidence of deep venous thrombosis in either leg.  Left Baker's cyst.  - PT  / pain management    -lovenox 40mg qd x 4 weeks post op prophylaxis: appreciate anticoag team input     paralytic ileus/constipation w/ leukocytosis:  -marked abdominal distention. Xray consistent with ileus.  Elevated leukocytosis possibly due to surg  did not joanne NGT     NPO with maintenance fluids.  DC analgesics, lactulose, Fe and etc  mobilize and rotate pt  place pt on stom  DW pt and RN  avoid all narcotics  check TSH  -OOB with PT    Chronic congestive heart failure, unspecified congestive heart failure type: compensated    - con't  toprol, lipitor, imdur, ranexa,

## 2018-02-07 NOTE — CONSULT NOTE ADULT - ASSESSMENT
A/P:  Likely [place], post operative paralytic colonic pseudoobstruction  GI on consult  Advise appropriate NPO, IV hydration  GI/DVT prophylaxis  Pain control  Correct electrolytes  Minimize narcotics  Serial abd exams  F/U labs, radiologic studies  Medical management per primary service  Pt will be monitored for signs of evolution/resolution of pathology and surgical intervention as required and warranted  Pt aware of and agrees with all of the above

## 2018-02-07 NOTE — PROGRESS NOTE ADULT - SUBJECTIVE AND OBJECTIVE BOX
Pt S/E at bedside, no acute events overnight, pain controlled, abdominal discomfort with no BM >1week    Vital Signs Last 24 Hrs  T(C): 36.6 (07 Feb 2018 05:48), Max: 37.2 (06 Feb 2018 10:00)  T(F): 97.8 (07 Feb 2018 05:48), Max: 98.9 (06 Feb 2018 10:00)  HR: 117 (07 Feb 2018 05:48) (100 - 117)  BP: 162/85 (07 Feb 2018 05:48) (161/65 - 162/85)  BP(mean): --  RR: 18 (06 Feb 2018 16:49) (17 - 18)  SpO2: 95% (07 Feb 2018 05:48) (95% - 97%)    Gen: NAD, AAOx3    Right Lower Extremity:  Dressing clean dry intact  +EHL/FHL/TA/GS  SILT L3-S1  +DP/PT Pulses  Compartments soft  No calf TTP B/L

## 2018-02-07 NOTE — PROGRESS NOTE ADULT - SUBJECTIVE AND OBJECTIVE BOX
S/p Hip hemiarthroplasty. Uneventful surgery    NSVT noted on  tele. Asymptomatic. He has been off beta blockers.    PREVIOUS CARDIAC WORKUP:      Echo:  10/16 Nml EF, mild LVH, diastolic dysfunction, trace MR  Stress Test:  1/17 No ichemia    Allergies:   No Known Allergies      MEDICATIONS  (STANDING):  amLODIPine   Tablet 5 milliGRAM(s) Oral daily  ascorbic acid 500 milliGRAM(s) Oral two times a day  atorvastatin 20 milliGRAM(s) Oral at bedtime  ceFAZolin   IVPB 2000 milliGRAM(s) IV Intermittent every 8 hours  docusate sodium 100 milliGRAM(s) Oral three times a day  enoxaparin Injectable 40 milliGRAM(s) SubCutaneous every 24 hours  ferrous    sulfate 325 milliGRAM(s) Oral three times a day with meals  finasteride 5 milliGRAM(s) Oral daily  folic acid 1 milliGRAM(s) Oral daily  isosorbide   mononitrate ER Tablet (IMDUR) 60 milliGRAM(s) Oral daily  lactated ringers. 1000 milliLiter(s) (75 mL/Hr) IV Continuous <Continuous>  lactulose Syrup 10 Gram(s) Oral daily  lisinopril 20 milliGRAM(s) Oral daily  metoprolol succinate ER 25 milliGRAM(s) Oral daily  multivitamin 1 Tablet(s) Oral daily  pantoprazole    Tablet 40 milliGRAM(s) Oral daily  polyethylene glycol 3350 17 Gram(s) Oral daily  ranolazine 500 milliGRAM(s) Oral two times a day  tamsulosin 0.4 milliGRAM(s) Oral at bedtime  tiotropium 18 MICROgram(s) Capsule 1 Capsule(s) Inhalation daily    MEDICATIONS  (PRN):  acetaminophen   Tablet 650 milliGRAM(s) Oral every 6 hours PRN For Temp over 38.3 C (100.94 F)  aluminum hydroxide/magnesium hydroxide/simethicone Suspension 30 milliLiter(s) Oral four times a day PRN Indigestion  diphenhydrAMINE   Capsule 25 milliGRAM(s) Oral at bedtime PRN Insomnia  HYDROmorphone  Injectable 0.5 milliGRAM(s) SubCutaneous every 4 hours PRN Severe Pain  ondansetron Injectable 4 milliGRAM(s) IV Push every 6 hours PRN Nausea and/or Vomiting  oxyCODONE    IR 5 milliGRAM(s) Oral every 4 hours PRN Mild Pain  oxyCODONE    IR 10 milliGRAM(s) Oral every 4 hours PRN Moderate Pain  prochlorperazine   Injectable 5 milliGRAM(s) IV Push every 6 hours PRN Nausea and/or Vomiting  senna 2 Tablet(s) Oral at bedtime PRN Constipation      ROS:     Detailed ten system ROS was performed and was negative except for history as eluded to above.    no fever  no chills  no nausea  no vomiting  no diarrhea  no constipation  no melena  no hematochezia  no chest pain  no palpitations  no sob at rest  no dyspnea on exertion  no cough  no wheezing  no anorexia  no headache  no dizziness  no syncope  no weakness  no myalgia  no dysuria  no polyuria  no hematuria       Vital Signs Last 24 Hrs  T(C): 36.6 (07 Feb 2018 05:48), Max: 36.8 (06 Feb 2018 16:49)  T(F): 97.8 (07 Feb 2018 05:48), Max: 98.2 (06 Feb 2018 16:49)  HR: 100 (07 Feb 2018 07:15) (100 - 117)  BP: 162/85 (07 Feb 2018 05:48) (161/66 - 162/85)  BP(mean): --  RR: 18 (06 Feb 2018 16:49) (18 - 18)  SpO2: 95% (07 Feb 2018 05:48) (95% - 96%)    I&O's Summary    06 Feb 2018 07:01  -  07 Feb 2018 07:00  --------------------------------------------------------  IN: 0 mL / OUT: 450 mL / NET: -450 mL        PHYSICAL EXAM:    General:                Comfortable, AAO X 3, in no distress. Obese  HEENT:                  Atraumatic, PERRLA, EOMI, conjunctiva clear.   Neck:                     Supple, no adenopathy, no thyromegaly, no JVD, no bruit.  Back:                     Symmetric, non tender.  Chest:                    Clear, B/L symmetric air entry, no tachypnea  Heart:                     S1, S2 normal, no gallop, no murmur.  Abdomen:              Soft, non-tender, bowel sounds active. No palpable masses.  Extremities:           no cyanosis, no edema. Peripheral pulses normal.  Skin:                      Skin color, texture normal. No rashes.  Neurologic:            Grossly nonfocal.       INTERPRETATION OF TELEMETRY:  NSR, no further NSVT    ECG:  NSR, no ST T changes of ischemia or infarction.     LABS:                        12.0   17.4  )-----------( 360      ( 07 Feb 2018 05:58 )             36.0     02-07    133<L>  |  97  |  17  ----------------------------<  134<H>  3.9   |  30  |  0.56    Ca    8.6      07 Feb 2018 05:58  Mg     2.0     02-07      PT/INR - ( 07 Feb 2018 05:58 )   PT: 15.6 sec;   INR: 1.43 ratio         RADIOLOGY & ADDITIONAL STUDIES:

## 2018-02-07 NOTE — PROGRESS NOTE ADULT - ASSESSMENT
This is a 72 year old male s/p fall on about Jan 23rd presented to [facility] on 2-3 with chronic pain to hip, decrease appetite. Pt found to have a fx rt hip, now sp right hip brynn on 2-5-18.  Pt has high thrombosis risks and requires anticoagualtion prophylaxis. Discussed with pt the use of Lovenox for his anticoagulation med. Pt is agreeable.    : c/w lovenox 40mg SQ daily for four weeks post procedure. Please avoid right lower quadrant abdomen injection  : daily cbc/bmp  : LE Venodynes  : increase mobility as tolerated    Will continue to monitor

## 2018-02-07 NOTE — PROGRESS NOTE ADULT - ASSESSMENT
NSVT - no further recurrence   s/p Right hip surgery  CAD,  JERARDO of LCx 3/13  Chr HFpEF  COPD  HLD  HTN  KENNY  PAD  Spinal stenosis  OA    Suggest:    Continue beta blockers and other home meds.  Ortho treatment and PT  I shall f/u PRN now. Out pt cardiac f/u

## 2018-02-07 NOTE — CONSULT NOTE ADULT - SUBJECTIVE AND OBJECTIVE BOX
CC:Patient is a 72y old  Male who presents with a chief complaint of Limb pain (06 Feb 2018 08:35)      Subjective:  Pt seen and examined at bedside with chaperone. Pt is AAOx3, pt in no acute distress. Pt s/p right hip replacement with post operative development of abd distention. Pt denied c/o fever, chills, chest pain, SOB, abd pain, N/V/D, hemoptysis, hematemesis, hematuria, hematochexia, headache, diplopia, vertigo, dizzyness.     ROS:  abd distention, right hip pain, otherwise negative ROS    PMH: chronic CHF, COPD on home O2 4 L, emphysema, and spinal stenosis  PSH: right hip replacement 2/5/18  NKDA  SH: no reported etoh, tobacco, illicit drug use  FH: non contributory at present    Vital Signs Last 24 Hrs  T(C): 36.7 (07 Feb 2018 16:34), Max: 36.9 (07 Feb 2018 10:15)  T(F): 98 (07 Feb 2018 16:34), Max: 98.5 (07 Feb 2018 10:15)  HR: 104 (07 Feb 2018 16:34) (100 - 118)  BP: 126/61 (07 Feb 2018 16:34) (126/61 - 162/85)  BP(mean): --  RR: 17 (07 Feb 2018 16:34) (17 - 17)  SpO2: 95% (07 Feb 2018 16:34) (95% - 95%)    Labs:                      12.0   17.4  )-----------( 360      ( 07 Feb 2018 05:58 )             36.0     CBC Full  -  ( 07 Feb 2018 05:58 )  WBC Count : 17.4 K/uL  Hemoglobin : 12.0 g/dL  Hematocrit : 36.0 %  Platelet Count - Automated : 360 K/uL  Mean Cell Volume : 94.5 fl  Mean Cell Hemoglobin : 31.6 pg  Mean Cell Hemoglobin Concentration : 33.5 gm/dL  Auto Neutrophil # : x  Auto Lymphocyte # : x  Auto Monocyte # : x  Auto Eosinophil # : x  Auto Basophil # : x  Auto Neutrophil % : x  Auto Lymphocyte % : x  Auto Monocyte % : x  Auto Eosinophil % : x  Auto Basophil % : x    02-07    133<L>  |  97  |  17  ----------------------------<  134<H>  3.9   |  30  |  0.56    Ca    8.6      07 Feb 2018 05:58  Mg     2.0     02-07  PT/INR - ( 07 Feb 2018 05:58 )   PT: 15.6 sec;   INR: 1.43 ratio      Meds:  acetaminophen   Tablet 650 milliGRAM(s) Oral every 6 hours PRN  amLODIPine   Tablet 5 milliGRAM(s) Oral daily  ascorbic acid 500 milliGRAM(s) Oral two times a day  atorvastatin 20 milliGRAM(s) Oral at bedtime  ceFAZolin   IVPB 2000 milliGRAM(s) IV Intermittent every 8 hours  diphenhydrAMINE   Capsule 25 milliGRAM(s) Oral at bedtime PRN  docusate sodium 100 milliGRAM(s) Oral three times a day  enoxaparin Injectable 40 milliGRAM(s) SubCutaneous every 24 hours  finasteride 5 milliGRAM(s) Oral daily  folic acid 1 milliGRAM(s) Oral daily  HYDROmorphone  Injectable 0.5 milliGRAM(s) SubCutaneous every 4 hours PRN  isosorbide   mononitrate ER Tablet (IMDUR) 60 milliGRAM(s) Oral daily  lactated ringers. 1000 milliLiter(s) IV Continuous <Continuous>  lisinopril 20 milliGRAM(s) Oral daily  metoprolol succinate ER 25 milliGRAM(s) Oral daily  multivitamin 1 Tablet(s) Oral daily  ondansetron Injectable 4 milliGRAM(s) IV Push every 6 hours PRN  oxyCODONE    IR 5 milliGRAM(s) Oral every 4 hours PRN  pantoprazole    Tablet 40 milliGRAM(s) Oral daily  polyethylene glycol 3350 17 Gram(s) Oral daily  prochlorperazine   Injectable 5 milliGRAM(s) IV Push every 6 hours PRN  ranolazine 500 milliGRAM(s) Oral two times a day  senna 2 Tablet(s) Oral at bedtime PRN  tamsulosin 0.4 milliGRAM(s) Oral at bedtime  tiotropium 18 MICROgram(s) Capsule 1 Capsule(s) Inhalation daily      Radiology:  < from: Xray Abdomen 2 Views (02.07.18 @ 13:55) >  EXAM:  XR ABDOMEN 2V                            PROCEDURE DATE:  02/07/2018          INTERPRETATION:  AP view of the abdomen.    Clinical history, marked abdominal distention.    Findings: There is significant gaseous distention of the entire colon.   The transverse diameter of the cecum is about 12 cm. The small bowel   loops are not significantly distended.    The patient is status post right hip replacement. Some degenerative   changes in the lumbosacral spine.    Impression: Findings are likely indicative of paralytic ileus involving   the colon for which clinical correlation and follow-up studies are   recommended.                DANNY RUBALCAVA M.D., ATTENDING RADIOLOGIST  This document has been electronically signed. Feb 7 2018  2:20PM    < end of copied text >      Physical exam:  Pt is AAOx3  Pt in no acute distress  CNII-XII grossly intact   HEENT: Normocephalic, atraumatic, JOELLE, EOM wnl  Neck: No crepitus, no ecchymosis, no hematoma, to exam, no JVD, no tracheal deviation  Cardiovascular: S1S2 Present  Respiratory: Respiratory Effort normal; no wheezes, rales or rhonchi to exam, CTAB  ABD: bowel sounds (+), soft, nontender, (+) distended, no rebound, no guarding, no rigidity. No pelvic instability to exam, no skin changes, negative rogel's sign to exam  Musculoskeletal: All digits are warm and well perfused. Limited motor exam secondary to right hip post operative pain. Pt has good capillary refill to digits, no gross calf edema or tenderness to exam.  Rectal: (+) liquid stool in rectal vault  Skin: no jaundice or icteric sclera to exam b/l, no adverse skin changes to exam otherwise

## 2018-02-07 NOTE — PROGRESS NOTE ADULT - SUBJECTIVE AND OBJECTIVE BOX
CHIEF COMPLAINT: RLE pain    Subjective: feels terrible since he hasn't had a bowel movement (has not had any in ~1-2 weeks; denies abd pain movement in 1-2 weeks, otherwise feel good, denies CP/palpitations/HA/dizzines/ numbness/tingling/n/v/d/f/c    HPI: 71 y/o male with a PMHx chronic CHF, COPD on home O2 4 L, emphysema, and spinal stenosis presented to ED on 2/3/18 s/p fall.   Fall occurred ~ 10 days ago; patient has been very weak, and suffers from arthritis. He notes chronic Rt hip pain for ~ 4 weeks which causes ambulatory dysfunction.  He was suppose to follow-up with outpatient ortho for MRI.  Reports associated poor appetite with decreased PO intake. Denies nausea, vomiting, diarrhea, fevers , chills, CP or dyspnea. No recent illnesses, no sick contacts.     Review of system- Rest of the review of system are normal except mentioned in HPI    Vital Signs Last 24 Hrs  T(C): 36.9 (07 Feb 2018 10:15), Max: 36.9 (07 Feb 2018 10:15)  T(F): 98.5 (07 Feb 2018 10:15), Max: 98.5 (07 Feb 2018 10:15)  HR: 118 (07 Feb 2018 10:15) (100 - 118)  BP: 130/68 (07 Feb 2018 10:15) (130/68 - 162/85)  BP(mean): --  RR: 17 (07 Feb 2018 10:15) (17 - 18)  SpO2: 95% (07 Feb 2018 10:15) (95% - 96%)    PHYSICAL EXAM:  Constitutional: NAD  HEENT: AT/NC, EOMI, pupils equal, round, non-icteric  Neck: Soft and supple  Respiratory: Breath sounds are clear bilaterally, No wheezing, rales or rhonchi  Cardiovascular: S1 and S2, regular rate and rhythm, no Murmurs, gallops or rubs  Gastrointestinal: abd firm and markedly distended (more than yesterday 2/6); +BS but slightly sluggish x 4, nontender, no guarding, no rebound  Extremities: No peripheral edema  Neurological: A/O x 3, no focal deficits  OTHER: R hip surgical site with steri-strip CDI, no active bleeding/bruise/hematoma    LABS              12.0   17.4  )-----------( 360      ( 07 Feb 2018 05:58 )             36.0     02-07    133<L>  |  97  |  17  ----------------------------<  134<H>  3.9   |  30  |  0.56    Ca    8.6      07 Feb 2018 05:58  Mg     2.0     02-07    PT/INR - ( 07 Feb 2018 05:58 )   PT: 15.6 sec;   INR: 1.43 ratio         MEDICATIONS   MEDICATIONS  (STANDING):  amLODIPine   Tablet 5 milliGRAM(s) Oral daily  ascorbic acid 500 milliGRAM(s) Oral two times a day  atorvastatin 20 milliGRAM(s) Oral at bedtime  ceFAZolin   IVPB 2000 milliGRAM(s) IV Intermittent every 8 hours  docusate sodium 100 milliGRAM(s) Oral three times a day  enoxaparin Injectable 40 milliGRAM(s) SubCutaneous every 24 hours  ferrous    sulfate 325 milliGRAM(s) Oral three times a day with meals  finasteride 5 milliGRAM(s) Oral daily  folic acid 1 milliGRAM(s) Oral daily  isosorbide   mononitrate ER Tablet (IMDUR) 60 milliGRAM(s) Oral daily  lactated ringers. 1000 milliLiter(s) (75 mL/Hr) IV Continuous <Continuous>  lactulose Syrup 10 Gram(s) Oral once  lactulose Syrup 10 Gram(s) Oral daily  lisinopril 20 milliGRAM(s) Oral daily  metoprolol succinate ER 25 milliGRAM(s) Oral daily  multivitamin 1 Tablet(s) Oral daily  pantoprazole    Tablet 40 milliGRAM(s) Oral daily  polyethylene glycol 3350 17 Gram(s) Oral daily  ranolazine 500 milliGRAM(s) Oral two times a day  tamsulosin 0.4 milliGRAM(s) Oral at bedtime  tiotropium 18 MICROgram(s) Capsule 1 Capsule(s) Inhalation daily    MEDICATIONS  (PRN):  acetaminophen   Tablet 650 milliGRAM(s) Oral every 6 hours PRN For Temp over 38.3 C (100.94 F)  aluminum hydroxide/magnesium hydroxide/simethicone Suspension 30 milliLiter(s) Oral four times a day PRN Indigestion  diphenhydrAMINE   Capsule 25 milliGRAM(s) Oral at bedtime PRN Insomnia  HYDROmorphone  Injectable 0.5 milliGRAM(s) SubCutaneous every 4 hours PRN Severe Pain  ondansetron Injectable 4 milliGRAM(s) IV Push every 6 hours PRN Nausea and/or Vomiting  oxyCODONE    IR 5 milliGRAM(s) Oral every 4 hours PRN Mild Pain  oxyCODONE    IR 10 milliGRAM(s) Oral every 4 hours PRN Moderate Pain  prochlorperazine   Injectable 5 milliGRAM(s) IV Push every 6 hours PRN Nausea and/or Vomiting  senna 2 Tablet(s) Oral at bedtime PRN Constipation CHIEF COMPLAINT: RLE pain    Subjective: feels terrible since he hasn't had a bowel movement (has not had any in ~1-2 weeks; denies abd pain movement in 1-2 weeks, otherwise feel good, denies CP/palpitations/HA/dizzines/ numbness/tingling/n/v/d/f/c    HPI: 71 y/o male with a PMHx chronic CHF, COPD on home O2 4 L, emphysema, and spinal stenosis presented to ED on 2/3/18 s/p fall.   Fall occurred ~ 10 days ago; patient has been very weak, and suffers from arthritis. He notes chronic Rt hip pain for ~ 4 weeks which causes ambulatory dysfunction.  He was suppose to follow-up with outpatient ortho for MRI.  Reports associated poor appetite with decreased PO intake. Denies nausea, vomiting, diarrhea, fevers , chills, CP or dyspnea. No recent illnesses, no sick contacts.     Review of system- Rest of the review of system are normal except mentioned in HPI    Vital Signs Last 24 Hrs  T(C): 36.9 (07 Feb 2018 10:15), Max: 36.9 (07 Feb 2018 10:15)  T(F): 98.5 (07 Feb 2018 10:15), Max: 98.5 (07 Feb 2018 10:15)  HR: 118 (07 Feb 2018 10:15) (100 - 118)  BP: 130/68 (07 Feb 2018 10:15) (130/68 - 162/85)  BP(mean): --  RR: 17 (07 Feb 2018 10:15) (17 - 18)  SpO2: 95% (07 Feb 2018 10:15) (95% - 96%)    PHYSICAL EXAM:  Constitutional: NAD  HEENT: AT/NC, EOMI, pupils equal, round, non-icteric  Neck: Soft and supple  Respiratory: Breath sounds are clear bilaterally, No wheezing, rales or rhonchi  Cardiovascular: S1 and S2, regular rate and rhythm, no Murmurs, gallops or rubs  Gastrointestinal: abd firm and markedly distended (more than yesterday 2/6); +BS but slightly sluggish x 4, nontender, no guarding, no rebound  Extremities: No peripheral edema  Neurological: A/O x 3, no focal deficits  OTHER: R hip surgical site dsg intact but saturated with serous sanguinous drainage, surrounding tissue w/o bruise/hematoma/redness/swelling    LABS              12.0   17.4  )-----------( 360      ( 07 Feb 2018 05:58 )             36.0     02-07    133<L>  |  97  |  17  ----------------------------<  134<H>  3.9   |  30  |  0.56    Ca    8.6      07 Feb 2018 05:58  Mg     2.0     02-07    PT/INR - ( 07 Feb 2018 05:58 )   PT: 15.6 sec;   INR: 1.43 ratio         MEDICATIONS   MEDICATIONS  (STANDING):  amLODIPine   Tablet 5 milliGRAM(s) Oral daily  ascorbic acid 500 milliGRAM(s) Oral two times a day  atorvastatin 20 milliGRAM(s) Oral at bedtime  ceFAZolin   IVPB 2000 milliGRAM(s) IV Intermittent every 8 hours  docusate sodium 100 milliGRAM(s) Oral three times a day  enoxaparin Injectable 40 milliGRAM(s) SubCutaneous every 24 hours  ferrous    sulfate 325 milliGRAM(s) Oral three times a day with meals  finasteride 5 milliGRAM(s) Oral daily  folic acid 1 milliGRAM(s) Oral daily  isosorbide   mononitrate ER Tablet (IMDUR) 60 milliGRAM(s) Oral daily  lactated ringers. 1000 milliLiter(s) (75 mL/Hr) IV Continuous <Continuous>  lactulose Syrup 10 Gram(s) Oral once  lactulose Syrup 10 Gram(s) Oral daily  lisinopril 20 milliGRAM(s) Oral daily  metoprolol succinate ER 25 milliGRAM(s) Oral daily  multivitamin 1 Tablet(s) Oral daily  pantoprazole    Tablet 40 milliGRAM(s) Oral daily  polyethylene glycol 3350 17 Gram(s) Oral daily  ranolazine 500 milliGRAM(s) Oral two times a day  tamsulosin 0.4 milliGRAM(s) Oral at bedtime  tiotropium 18 MICROgram(s) Capsule 1 Capsule(s) Inhalation daily    MEDICATIONS  (PRN):  acetaminophen   Tablet 650 milliGRAM(s) Oral every 6 hours PRN For Temp over 38.3 C (100.94 F)  aluminum hydroxide/magnesium hydroxide/simethicone Suspension 30 milliLiter(s) Oral four times a day PRN Indigestion  diphenhydrAMINE   Capsule 25 milliGRAM(s) Oral at bedtime PRN Insomnia  HYDROmorphone  Injectable 0.5 milliGRAM(s) SubCutaneous every 4 hours PRN Severe Pain  ondansetron Injectable 4 milliGRAM(s) IV Push every 6 hours PRN Nausea and/or Vomiting  oxyCODONE    IR 5 milliGRAM(s) Oral every 4 hours PRN Mild Pain  oxyCODONE    IR 10 milliGRAM(s) Oral every 4 hours PRN Moderate Pain  prochlorperazine   Injectable 5 milliGRAM(s) IV Push every 6 hours PRN Nausea and/or Vomiting  senna 2 Tablet(s) Oral at bedtime PRN Constipation

## 2018-02-07 NOTE — CONSULT NOTE ADULT - SUBJECTIVE AND OBJECTIVE BOX
Patient is a 72y old  Male who presents with a chief complaint of Limb pain (06 Feb 2018 08:35)      HPI:  71 y/o male with a PMHx chronic CHF, COPD on home O2 4 L, emphysema, and spinal stenosis.   He presents to ED s/p fall.   Fall occurred ~ 10 days ago; patient has been very weak, and suffers from arthritis.   He notes chronic Rt hip pain for ~ 4 weeks which causes ambulatory dysfunction.  He was suppose to follow-up with outpatient ortho for MRI.    Reports associated poor appetite with decreased PO intake.   Denies nausea, vomiting, diarrhea, fevers , chills, CP or dypsnea.   No recent illnesses, no sick contacts.    ED course:  Xray imaging reportedly with Rt hip fracture  Venous doppler US 2/3 : no evidence of deep venous thrombosis in either leg.  Left Baker's cyst. (04 Feb 2018 01:53)    pt had fracture multiple days prior to surg and since has had inc distensiona nd mild abd pain in lower abd  neg N V  received laxatives and enema and lactulose  neg flatus      PAST MEDICAL & SURGICAL HISTORY:  Spinal stenosis  CHF (congestive heart failure)  Emphysema  COPD (chronic obstructive pulmonary disease)  No significant past surgical history      MEDICATIONS  (STANDING):  amLODIPine   Tablet 5 milliGRAM(s) Oral daily  ascorbic acid 500 milliGRAM(s) Oral two times a day  atorvastatin 20 milliGRAM(s) Oral at bedtime  ceFAZolin   IVPB 2000 milliGRAM(s) IV Intermittent every 8 hours  docusate sodium 100 milliGRAM(s) Oral three times a day  enoxaparin Injectable 40 milliGRAM(s) SubCutaneous every 24 hours  finasteride 5 milliGRAM(s) Oral daily  folic acid 1 milliGRAM(s) Oral daily  isosorbide   mononitrate ER Tablet (IMDUR) 60 milliGRAM(s) Oral daily  lactated ringers. 1000 milliLiter(s) (75 mL/Hr) IV Continuous <Continuous>  lisinopril 20 milliGRAM(s) Oral daily  metoprolol succinate ER 25 milliGRAM(s) Oral daily  multivitamin 1 Tablet(s) Oral daily  pantoprazole    Tablet 40 milliGRAM(s) Oral daily  polyethylene glycol 3350 17 Gram(s) Oral daily  ranolazine 500 milliGRAM(s) Oral two times a day  tamsulosin 0.4 milliGRAM(s) Oral at bedtime  tiotropium 18 MICROgram(s) Capsule 1 Capsule(s) Inhalation daily    MEDICATIONS  (PRN):  acetaminophen   Tablet 650 milliGRAM(s) Oral every 6 hours PRN For Temp over 38.3 C (100.94 F)  diphenhydrAMINE   Capsule 25 milliGRAM(s) Oral at bedtime PRN Insomnia  HYDROmorphone  Injectable 0.5 milliGRAM(s) SubCutaneous every 4 hours PRN Severe Pain  ondansetron Injectable 4 milliGRAM(s) IV Push every 6 hours PRN Nausea and/or Vomiting  oxyCODONE    IR 5 milliGRAM(s) Oral every 4 hours PRN Mild Pain  prochlorperazine   Injectable 5 milliGRAM(s) IV Push every 6 hours PRN Nausea and/or Vomiting  senna 2 Tablet(s) Oral at bedtime PRN Constipation      Allergies    No Known Allergies    Intolerances        SOCIAL HISTORY:reviewed, lives at home, neg drug use    FAMILY HISTORY:  No pertinent family history in first degree relatives      REVIEW OF SYSTEMS:    CONSTITUTIONAL: No weakness, fevers or chills  EYES/ENT: No visual changes;  No vertigo or throat pain   NECK: No pain or stiffness  RESPIRATORY: No cough, wheezing, hemoptysis; No shortness of breath  CARDIOVASCULAR: No chest pain or palpitations  GENITOURINARY: No dysuria, frequency or hematuria  NEUROLOGICAL: No numbness or weakness  SKIN: No itching, burning, rashes, or lesions   All other review of systems is negative unless indicated above.    Vital Signs Last 24 Hrs  T(C): 36.7 (07 Feb 2018 16:34), Max: 36.9 (07 Feb 2018 10:15)  T(F): 98 (07 Feb 2018 16:34), Max: 98.5 (07 Feb 2018 10:15)  HR: 104 (07 Feb 2018 16:34) (100 - 118)  BP: 126/61 (07 Feb 2018 16:34) (126/61 - 162/85)  BP(mean): --  RR: 17 (07 Feb 2018 16:34) (17 - 17)  SpO2: 95% (07 Feb 2018 16:34) (95% - 95%)    PHYSICAL EXAM:    Constitutional: NAD, well-developed  HEENT: EOMI, throat clear  Neck: No LAD, supple  Respiratory: CTA and P  Cardiovascular: S1 and S2, RRR, no M  Gastrointestinal: BS+, soft, distended and tympanitic with mild lower tenderness, L side, neg HSM,  Extremities: No peripheral edema, neg clubing, cyanosis, s/p surg  Vascular: 2+ peripheral pulses  Neurological: A/O x 3, no focal deficits  Psychiatric: Normal mood, normal affect  Skin: No rashes    LABS:  CBC Full  -  ( 07 Feb 2018 05:58 )  WBC Count : 17.4 K/uL  Hemoglobin : 12.0 g/dL  Hematocrit : 36.0 %  Platelet Count - Automated : 360 K/uL  Mean Cell Volume : 94.5 fl  Mean Cell Hemoglobin : 31.6 pg  Mean Cell Hemoglobin Concentration : 33.5 gm/dL  Auto Neutrophil # : x  Auto Lymphocyte # : x  Auto Monocyte # : x  Auto Eosinophil # : x  Auto Basophil # : x  Auto Neutrophil % : x  Auto Lymphocyte % : x  Auto Monocyte % : x  Auto Eosinophil % : x  Auto Basophil % : x    02-07    133<L>  |  97  |  17  ----------------------------<  134<H>  3.9   |  30  |  0.56    Ca    8.6      07 Feb 2018 05:58  Mg     2.0     02-07      PT/INR - ( 07 Feb 2018 05:58 )   PT: 15.6 sec;   INR: 1.43 ratio                 RADIOLOGY & ADDITIONAL STUDIES:  < from: Xray Abdomen 2 Views (02.07.18 @ 13:55) >  EXAM:  XR ABDOMEN 2V                            PROCEDURE DATE:  02/07/2018          INTERPRETATION:  AP view of the abdomen.    Clinical history, marked abdominal distention.    Findings: There is significant gaseous distention of the entire colon.   The transverse diameter of the cecum is about 12 cm. The small bowel   loops are not significantly distended.    The patient is status post right hip replacement. Some degenerative   changes in the lumbosacral spine.    Impression: Findings are likely indicative of paralytic ileus involving   the colon for which clinical correlation and follow-up studies are   recommended.          < end of copied text >

## 2018-02-07 NOTE — PROGRESS NOTE ADULT - SUBJECTIVE AND OBJECTIVE BOX
< from: CT Abdomen and Pelvis w/ Oral Cont (02.07.18 @ 22:08) >  ******PRELIMINARY REPORT******    ******PRELIMINARY REPORT******          EXAM:  CT ABDOMEN AND PELVIS OC                            PROCEDURE DATE:  02/07/2018    ******PRELIMINARY REPORT******    ******PRELIMINARY REPORT******              INTERPRETATION:  PRELIM:    Diffusely distended colon without evidence of a transition point likely   representative of an ileus or Kaylan syndrome. No evidence of a bowel   obstruction or inflammation.  No evidence of free intraperitoneal air.  Subcutaneous gas and subcutaneous inflammatory change in the right hip   likely postsurgical in nature.          ******PRELIMINARY REPORT******    ******PRELIMINARY REPORT******              ANNA SCOTT M.D., ATTENDING RADIOLOGIST      < end of copied text >

## 2018-02-07 NOTE — PROGRESS NOTE ADULT - ASSESSMENT
A/P: 72 year old man with CHF, COPD, chronic respiratory failure s/p fall presented with c/o RLE pain found to have right hip fracture.    Right Hip pain 2/2 possible R hip fracture.    - Xray imaging / CT scan with R hip fx  - s/p R hip arthoplasty 2/5/18  - Venous doppler US 2/3 : no evidence of deep venous thrombosis in either leg.  Left Baker's cyst.  - PT  / pain management  - R hip dsg grossly saturated: ortho team made aware to re-visit today (said he was seen earlier this morning)  -  plans per ortho team  -lovenox 40mg qd x 4 weeks post op prophylaxis: appreciate anticoag team input     Chronic congestive heart failure, unspecified congestive heart failure type: compensated  - hold lasix and lisinopril ameena-op and re-valuate post op.  - con't  toprol, lipitor, imdur, ranexa,     NSVT   - brief episode of NSVT overnight 2/6 post -op / no further arrhthymias   - lytes wnl / he's CP free / no palpitations  - restart home dose BB  - con't telemonitor  - appreciate cardiology input    Leukocytois - post -op  - he's afebrile   - no urinary complaints / ?fecal impaction is contributing  - con't to cefazolin / monitor  - OOB with PT    Hyponatremia: Secondary to underlying CHF: RESOLVED  -monitor    Hypokalemia - resolved  - replete PRN K to keep K > 4.0  - monitor lytes    COPD with chronic respiratory failure: STABLE  -incentive spirometry / duonebs  -symbicort, hold spiriva /supplemental O2    Spinal stenosis:  -pain management and PT    BPH  -proscar and flomax    Constipation  - still without BM  - give SSE x1, increase lactulose to 30ml daily  - AXR to r/o obstruction    DVT ppx:  - heparin subccutaneousl    Dispo  - full code  - dc when stable   - SW for post dc needs A/P: 72 year old man with CHF, COPD, chronic respiratory failure s/p fall presented with c/o RLE pain found to have right hip fracture.    Right Hip pain 2/2 R hip fracture: s/p R hip arthoplasty  - Xray imaging / CT scan with R hip fx  - s/p R hip arthoplasty 2/5/18  - Venous doppler US 2/3 : no evidence of deep venous thrombosis in either leg.  Left Baker's cyst.  - PT  / pain management  - R hip dsg grossly saturated: ortho team made aware to re-visit today (said he was seen earlier this morning)  -  plans per ortho team  -lovenox 40mg qd x 4 weeks post op prophylaxis: appreciate anticoag team input     paralytic ileus/constipation w/ leukocytosis:  -marked abdominal distention. Xray consistent with ileus.  Elevated leukocytosis likely related.  -will make NPO with maintenance fluids.  -GI evaluation for further management.  -on cefazolin per ortho post-op.    -OOB with PT    Chronic congestive heart failure, unspecified congestive heart failure type: compensated  - hold lasix and lisinopril ameena-op and re-valuate post op.  - con't  toprol, lipitor, imdur, ranexa,     NSVT   - brief episode of NSVT overnight 2/6 post -op / no further arrhthymias   - lytes wnl / he's CP free / no palpitations  - restarted home dose BB  - con't telemonitor  - appreciate cardiology input    Hyponatremia: Secondary to underlying CHF: RESOLVED  -monitor    Hypokalemia - resolved  - replete PRN K to keep K > 4.0  - monitor lytes    COPD with chronic respiratory failure: STABLE  -incentive spirometry / duonebs  -symbicort, hold spiriva /supplemental O2    Spinal stenosis:  -pain management and PT    BPH  -proscar and flomax    DVT ppx:  - heparin subccutaneousl    Dispo  - full code  - dc when stable   - SW for post dc needs    Attending Statement:  40 minutes spent on total encounter; more than 50% of the visit was spent counseling and/or coordinating care by the attending physician.  Patient seen and examinid with NP Tyrese. Agree with physical exam and assessment and plan.

## 2018-02-07 NOTE — PROGRESS NOTE ADULT - SUBJECTIVE AND OBJECTIVE BOX
HPI: 73 y/o male with a PMHx chronic CHF, COPD on home O2 4 L, emphysema, and spinal stenosis. He presents to ED s/p fall. Fall occurred ~ 10 days ago; patient has been very weak, and suffers from arthritis.   He notes chronic Rt hip pain for ~ 4 weeks which causes ambulatory dysfunction.  He was suppose to follow-up with outpatient ortho for MRI.    Reports associated poor appetite with decreased PO intake.   Denies nausea, vomiting, diarrhea, fevers , chills, CP or dypsnea.   No recent illnesses, no sick contacts.    ED course:  Xray imaging reportedly with Rt hip fracture  Venous doppler US 2/3 : no evidence of deep venous thrombosis in either leg.  Left Baker's cyst. (2018 01:53)    Patient is a 72y old  Male who presents with a chief complaint of Limb pain (2018 08:35)  pt s/p right hip brynn on 18.    Consulted by Dr. Rahul Elkins for VTE prophylaxis, risk stratification, and anticoagulation management.    PAST MEDICAL & SURGICAL HISTORY:  Spinal stenosis  CHF (congestive heart failure)  Emphysema  COPD (chronic obstructive pulmonary disease)  No significant past surgical history    Caprini VTE Risk Score:9    IMPROVE Bleeding Risk Score: 2.5    Falls Risk:   High (x  )  Mod (  )  Low (  )    EBL:  150ml  cr cl;162.2  18 Pt sen at bedside.  Discussed his anticoagulation with Lovenox inj for 4 weeks.  Questions answered will reinforce as needed.  Pt concerned about no having a bm for a few days and  in his appetite for a while. Not sure if he lost wt.   18: Pt seen at bedside, OOB to chair. Reports that he continues to be constipated, was able to make "small bowel success," but remains to have distended/firm abdomen. Continues to have 4LNC, and states that he is feeling "oozy," and would like to go back in bed. RN is aware, but waiting for PT. Pt noted to have moderate size bruising in his RLQ abdomen. Advised RN to administer medication to his LLQ.     FAMILY HISTORY:  No pertinent family history in first degree relatives    Denies any personal or familial history of clotting or bleeding disorders.    Allergies    No Known Allergies    Intolerances      REVIEW OF SYSTEMS    (  )Fever	     (  )Constipation	(  )SOB				(  )Headache	(  )Dysuria  (  )Chills	     (  )Melena	(  )Dyspnea present on exertion	                    (  )Dizziness                    (  )Polyuria  (  )Nausea	     (  )Hematochezia	(  )Cough			                    (  )Syncope   	(  )Hematuria  (  )Vomiting    (  )Chest Pain	(  )Wheezing			(  )Weakness  (  )Diarrhea     (  )Palpitations	(  )Anorexia			(  )Myalgia    All other review of systems negative: Yes    PHYSICAL EXAM:    Constitutional: Appears Well    Neurological: A& O x 3    Skin: Warm    Respiratory and Thorax: normal effort; Breath sounds: normal; No rales/wheezing/rhonchi, 4LNC supplemental O2  	  Cardiovascular: S1, S2, regular, NMBR	    Gastrointestinal: BS + x 4Q, nontender, distended and firm, RLQ moderate size bruising	    Genitourinary:  Bladder nondistended, nontender    Musculoskeletal:   General Right:   no muscle/joint tenderness,   normal tone, no joint swelling,   ROM: limited	    General Left:   no muscle/joint tenderness,   normal tone, no joint swelling,   ROM: full    Hip:  Right: Aquacel dressing CDI with some drainage collection noted inside     Lower extrems:   Right: no calf tenderness              negative rolando's sign               + pedal pulses    Left:   no calf tenderness              negative rolando's sign               + pedal pulses    MEDICATIONS  (STANDING):  amLODIPine   Tablet 5 milliGRAM(s) Oral daily  ascorbic acid 500 milliGRAM(s) Oral two times a day  atorvastatin 20 milliGRAM(s) Oral at bedtime  ceFAZolin   IVPB 2000 milliGRAM(s) IV Intermittent every 8 hours  docusate sodium 100 milliGRAM(s) Oral three times a day  enoxaparin Injectable 40 milliGRAM(s) SubCutaneous every 24 hours  ferrous    sulfate 325 milliGRAM(s) Oral three times a day with meals  finasteride 5 milliGRAM(s) Oral daily  folic acid 1 milliGRAM(s) Oral daily  isosorbide   mononitrate ER Tablet (IMDUR) 60 milliGRAM(s) Oral daily  lactated ringers. 1000 milliLiter(s) (75 mL/Hr) IV Continuous <Continuous>  lactulose Syrup 10 Gram(s) Oral daily  lisinopril 20 milliGRAM(s) Oral daily  metoprolol succinate ER 25 milliGRAM(s) Oral daily  multivitamin 1 Tablet(s) Oral daily  pantoprazole    Tablet 40 milliGRAM(s) Oral daily  polyethylene glycol 3350 17 Gram(s) Oral daily  ranolazine 500 milliGRAM(s) Oral two times a day  tamsulosin 0.4 milliGRAM(s) Oral at bedtime  tiotropium 18 MICROgram(s) Capsule 1 Capsule(s) Inhalation daily                          12.0   17.4  )-----------( 360      ( 2018 05:58 )             36.0       02-    133<L>  |  97  |  17  ----------------------------<  134<H>  3.9   |  30  |  0.56    Ca    8.6      2018 05:58  Mg     2.0     02-    PT/INR - ( 2018 05:58 )   PT: 15.6 sec;   INR: 1.43 ratio    PT/INR - ( 2018 06:02 )   PT: 14.3 sec;   INR: 1.32 ratio      PTT - ( 2018 05:48 )  PTT:29.0 sec				    Vital Signs Last 24 Hrs  T(C): 36.9 (18 @ 10:15), Max: 36.9 (18 @ 10:15)  T(F): 98.5 (18 @ 10:15), Max: 98.5 (18 @ 10:15)  HR: 118 (18 @ 10:15) (100 - 118)  BP: 130/68 (18 @ 10:15) (130/68 - 162/85)  BP(mean): --  RR: 17 (18 @ 10:15) (17 - 18)  SpO2: 95% (18 @ 10:15) (95% - 96%)    DVT Prophylaxis:  LMWH                   (x )  Heparin SQ           (  )  Coumadin             (  )  Xarelto                  (  )  Eliquis                   (  )  Venodynes           ( x )  Ambulation          ( x )  UFH                       (  )  Contraindicated  (  )

## 2018-02-08 LAB
ANION GAP SERPL CALC-SCNC: 7 MMOL/L — SIGNIFICANT CHANGE UP (ref 5–17)
ANION GAP SERPL CALC-SCNC: 8 MMOL/L — SIGNIFICANT CHANGE UP (ref 5–17)
BUN SERPL-MCNC: 28 MG/DL — HIGH (ref 7–23)
BUN SERPL-MCNC: 29 MG/DL — HIGH (ref 7–23)
CALCIUM SERPL-MCNC: 8.8 MG/DL — SIGNIFICANT CHANGE UP (ref 8.5–10.1)
CALCIUM SERPL-MCNC: 8.9 MG/DL — SIGNIFICANT CHANGE UP (ref 8.5–10.1)
CHLORIDE SERPL-SCNC: 96 MMOL/L — SIGNIFICANT CHANGE UP (ref 96–108)
CHLORIDE SERPL-SCNC: 97 MMOL/L — SIGNIFICANT CHANGE UP (ref 96–108)
CO2 SERPL-SCNC: 29 MMOL/L — SIGNIFICANT CHANGE UP (ref 22–31)
CO2 SERPL-SCNC: 31 MMOL/L — SIGNIFICANT CHANGE UP (ref 22–31)
CREAT SERPL-MCNC: 0.78 MG/DL — SIGNIFICANT CHANGE UP (ref 0.5–1.3)
CREAT SERPL-MCNC: 0.81 MG/DL — SIGNIFICANT CHANGE UP (ref 0.5–1.3)
GLUCOSE SERPL-MCNC: 137 MG/DL — HIGH (ref 70–99)
GLUCOSE SERPL-MCNC: 145 MG/DL — HIGH (ref 70–99)
HCT VFR BLD CALC: 35.3 % — LOW (ref 39–50)
HCT VFR BLD CALC: 36.5 % — LOW (ref 39–50)
HGB BLD-MCNC: 11.8 G/DL — LOW (ref 13–17)
HGB BLD-MCNC: 12 G/DL — LOW (ref 13–17)
LACTATE SERPL-SCNC: 1.2 MMOL/L — SIGNIFICANT CHANGE UP (ref 0.7–2)
MAGNESIUM SERPL-MCNC: 2.2 MG/DL — SIGNIFICANT CHANGE UP (ref 1.6–2.6)
MCHC RBC-ENTMCNC: 31 PG — SIGNIFICANT CHANGE UP (ref 27–34)
MCHC RBC-ENTMCNC: 31.4 PG — SIGNIFICANT CHANGE UP (ref 27–34)
MCHC RBC-ENTMCNC: 33 GM/DL — SIGNIFICANT CHANGE UP (ref 32–36)
MCHC RBC-ENTMCNC: 33.5 GM/DL — SIGNIFICANT CHANGE UP (ref 32–36)
MCV RBC AUTO: 93.6 FL — SIGNIFICANT CHANGE UP (ref 80–100)
MCV RBC AUTO: 94 FL — SIGNIFICANT CHANGE UP (ref 80–100)
PHOSPHATE SERPL-MCNC: 3.1 MG/DL — SIGNIFICANT CHANGE UP (ref 2.5–4.5)
PLATELET # BLD AUTO: 455 K/UL — HIGH (ref 150–400)
PLATELET # BLD AUTO: 522 K/UL — HIGH (ref 150–400)
POTASSIUM SERPL-MCNC: 3.9 MMOL/L — SIGNIFICANT CHANGE UP (ref 3.5–5.3)
POTASSIUM SERPL-MCNC: 4 MMOL/L — SIGNIFICANT CHANGE UP (ref 3.5–5.3)
POTASSIUM SERPL-SCNC: 3.9 MMOL/L — SIGNIFICANT CHANGE UP (ref 3.5–5.3)
POTASSIUM SERPL-SCNC: 4 MMOL/L — SIGNIFICANT CHANGE UP (ref 3.5–5.3)
RBC # BLD: 3.77 M/UL — LOW (ref 4.2–5.8)
RBC # BLD: 3.89 M/UL — LOW (ref 4.2–5.8)
RBC # FLD: 12.5 % — SIGNIFICANT CHANGE UP (ref 10.3–14.5)
RBC # FLD: 12.5 % — SIGNIFICANT CHANGE UP (ref 10.3–14.5)
SODIUM SERPL-SCNC: 134 MMOL/L — LOW (ref 135–145)
SODIUM SERPL-SCNC: 134 MMOL/L — LOW (ref 135–145)
SURGICAL PATHOLOGY FINAL REPORT - CH: SIGNIFICANT CHANGE UP
WBC # BLD: 20.9 K/UL — HIGH (ref 3.8–10.5)
WBC # BLD: 25.5 K/UL — HIGH (ref 3.8–10.5)
WBC # FLD AUTO: 20.9 K/UL — HIGH (ref 3.8–10.5)
WBC # FLD AUTO: 25.5 K/UL — HIGH (ref 3.8–10.5)

## 2018-02-08 PROCEDURE — 99233 SBSQ HOSP IP/OBS HIGH 50: CPT

## 2018-02-08 PROCEDURE — 74270 X-RAY XM COLON 1CNTRST STD: CPT | Mod: 26

## 2018-02-08 RX ORDER — CIPROFLOXACIN LACTATE 400MG/40ML
400 VIAL (ML) INTRAVENOUS ONCE
Qty: 0 | Refills: 0 | Status: COMPLETED | OUTPATIENT
Start: 2018-02-08 | End: 2018-02-08

## 2018-02-08 RX ORDER — LIDOCAINE 4 G/100G
1 CREAM TOPICAL DAILY
Qty: 0 | Refills: 0 | Status: DISCONTINUED | OUTPATIENT
Start: 2018-02-08 | End: 2018-02-21

## 2018-02-08 RX ORDER — NEOSTIGMINE METHYLSULFATE 1 MG/ML
2 VIAL (ML) INJECTION ONCE
Qty: 0 | Refills: 0 | Status: COMPLETED | OUTPATIENT
Start: 2018-02-08 | End: 2018-02-08

## 2018-02-08 RX ORDER — CIPROFLOXACIN LACTATE 400MG/40ML
VIAL (ML) INTRAVENOUS
Qty: 0 | Refills: 0 | Status: DISCONTINUED | OUTPATIENT
Start: 2018-02-08 | End: 2018-02-15

## 2018-02-08 RX ORDER — CIPROFLOXACIN LACTATE 400MG/40ML
400 VIAL (ML) INTRAVENOUS EVERY 12 HOURS
Qty: 0 | Refills: 0 | Status: DISCONTINUED | OUTPATIENT
Start: 2018-02-09 | End: 2018-02-15

## 2018-02-08 RX ORDER — METRONIDAZOLE 500 MG
500 TABLET ORAL EVERY 8 HOURS
Qty: 0 | Refills: 0 | Status: DISCONTINUED | OUTPATIENT
Start: 2018-02-08 | End: 2018-02-15

## 2018-02-08 RX ADMIN — Medication 100 MILLIGRAM(S): at 13:31

## 2018-02-08 RX ADMIN — ISOSORBIDE MONONITRATE 60 MILLIGRAM(S): 60 TABLET, EXTENDED RELEASE ORAL at 13:31

## 2018-02-08 RX ADMIN — Medication 1 MILLIGRAM(S): at 13:31

## 2018-02-08 RX ADMIN — AMLODIPINE BESYLATE 5 MILLIGRAM(S): 2.5 TABLET ORAL at 05:39

## 2018-02-08 RX ADMIN — Medication 2 MILLIGRAM(S): at 14:18

## 2018-02-08 RX ADMIN — Medication 100 MILLIGRAM(S): at 05:39

## 2018-02-08 RX ADMIN — Medication 200 MILLIGRAM(S): at 20:28

## 2018-02-08 RX ADMIN — PANTOPRAZOLE SODIUM 40 MILLIGRAM(S): 20 TABLET, DELAYED RELEASE ORAL at 13:31

## 2018-02-08 RX ADMIN — ENOXAPARIN SODIUM 40 MILLIGRAM(S): 100 INJECTION SUBCUTANEOUS at 10:06

## 2018-02-08 RX ADMIN — LISINOPRIL 20 MILLIGRAM(S): 2.5 TABLET ORAL at 05:39

## 2018-02-08 RX ADMIN — RANOLAZINE 500 MILLIGRAM(S): 500 TABLET, FILM COATED, EXTENDED RELEASE ORAL at 17:53

## 2018-02-08 RX ADMIN — Medication 100 MILLIGRAM(S): at 23:22

## 2018-02-08 RX ADMIN — RANOLAZINE 500 MILLIGRAM(S): 500 TABLET, FILM COATED, EXTENDED RELEASE ORAL at 05:39

## 2018-02-08 RX ADMIN — Medication 500 MILLIGRAM(S): at 05:39

## 2018-02-08 RX ADMIN — FINASTERIDE 5 MILLIGRAM(S): 5 TABLET, FILM COATED ORAL at 13:31

## 2018-02-08 RX ADMIN — Medication 1 TABLET(S): at 13:31

## 2018-02-08 RX ADMIN — Medication 500 MILLIGRAM(S): at 17:53

## 2018-02-08 RX ADMIN — POLYETHYLENE GLYCOL 3350 17 GRAM(S): 17 POWDER, FOR SOLUTION ORAL at 13:40

## 2018-02-08 RX ADMIN — Medication 25 MILLIGRAM(S): at 05:39

## 2018-02-08 RX ADMIN — LIDOCAINE 1 PATCH: 4 CREAM TOPICAL at 10:13

## 2018-02-08 NOTE — PROGRESS NOTE ADULT - ASSESSMENT
Kaylan Syndrome  NSVT, PAT  s/p Right hip surgery  CAD,  JERARDO of LCx 3/13  Chr HFpEF  COPD  HLD  HTN  KENNY  PAD  Spinal stenosis  OA    Suggest:    Surgery and GI evaluation.   Pt being transferred to ICU.   Continue beta blockers. Change to IV route if PO not acceptable.  May hold Norvasc  IV ACEi if BP is high.

## 2018-02-08 NOTE — PROGRESS NOTE ADULT - SUBJECTIVE AND OBJECTIVE BOX
S/p Hip hemiarthroplasty. Uneventful surgery    Today, abdominal distension and pain. No angina. Tele showed occasional PAT     PREVIOUS CARDIAC WORKUP:      Echo:  10/16 Nml EF, mild LVH, diastolic dysfunction, trace MR  Stress Test:  1/17 No ichemia    Allergies:   No Known Allergies      MEDICATIONS  (STANDING):  amLODIPine   Tablet 5 milliGRAM(s) Oral daily  ascorbic acid 500 milliGRAM(s) Oral two times a day  atorvastatin 20 milliGRAM(s) Oral at bedtime  ceFAZolin   IVPB 2000 milliGRAM(s) IV Intermittent every 8 hours  docusate sodium 100 milliGRAM(s) Oral three times a day  enoxaparin Injectable 40 milliGRAM(s) SubCutaneous every 24 hours  finasteride 5 milliGRAM(s) Oral daily  folic acid 1 milliGRAM(s) Oral daily  isosorbide   mononitrate ER Tablet (IMDUR) 60 milliGRAM(s) Oral daily  lactated ringers. 1000 milliLiter(s) (75 mL/Hr) IV Continuous <Continuous>  lidocaine   Patch 1 Patch Transdermal daily  lisinopril 20 milliGRAM(s) Oral daily  metoprolol succinate ER 25 milliGRAM(s) Oral daily  multivitamin 1 Tablet(s) Oral daily  pantoprazole    Tablet 40 milliGRAM(s) Oral daily  polyethylene glycol 3350 17 Gram(s) Oral daily  ranolazine 500 milliGRAM(s) Oral two times a day  tamsulosin 0.4 milliGRAM(s) Oral at bedtime  tiotropium 18 MICROgram(s) Capsule 1 Capsule(s) Inhalation daily    MEDICATIONS  (PRN):  acetaminophen   Tablet 650 milliGRAM(s) Oral every 6 hours PRN For Temp over 38.3 C (100.94 F)  diphenhydrAMINE   Capsule 25 milliGRAM(s) Oral at bedtime PRN Insomnia  ondansetron Injectable 4 milliGRAM(s) IV Push every 6 hours PRN Nausea and/or Vomiting  prochlorperazine   Injectable 5 milliGRAM(s) IV Push every 6 hours PRN Nausea and/or Vomiting  senna 2 Tablet(s) Oral at bedtime PRN Constipation      ROS:     Detailed ten system ROS was performed and was negative except for history as eluded to above.    no fever  no chills  + nausea  + vomiting  no diarrhea  + constipation  no melena  no hematochezia  + chest pain  no palpitations  no sob at rest  no dyspnea on exertion  no cough  no wheezing  no anorexia  no headache  no dizziness  no syncope  no weakness  no myalgia  no dysuria  no polyuria  no hematuria       Vital Signs Last 24 Hrs  T(C): 36.9 (08 Feb 2018 05:14), Max: 36.9 (08 Feb 2018 05:14)  T(F): 98.4 (08 Feb 2018 05:14), Max: 98.4 (08 Feb 2018 05:14)  HR: 107 (08 Feb 2018 05:14) (104 - 107)  BP: 131/68 (08 Feb 2018 05:14) (126/61 - 131/68)  RR: 17 (07 Feb 2018 16:34) (17 - 17)  SpO2: 94% (08 Feb 2018 05:14) (94% - 95%)      I&O's Summary    07 Feb 2018 07:01  -  08 Feb 2018 07:00  --------------------------------------------------------  IN: 900 mL / OUT: 301 mL / NET: 599 mL        PHYSICAL EXAM:    General:                Comfortable, AAO X 3, in no distress. Obese  HEENT:                  Atraumatic, PERRLA, EOMI, conjunctiva clear.   Neck:                     Supple, no adenopathy, no thyromegaly, no JVD, no bruit.  Back:                     Symmetric, non tender.  Chest:                    Clear, B/L symmetric air entry, no tachypnea  Heart:                     S1, S2 normal, no gallop, no murmur.  Abdomen:              Distended, tender, bowel sounds hyperactive. No palpable masses.  Extremities:           no cyanosis, no edema. Peripheral pulses normal.  Skin:                      Skin color, texture normal. No rashes.  Neurologic:            Grossly nonfocal.       INTERPRETATION OF TELEMETRY:  NSR, occasional PAT    ECG:  NSR, no ST T changes of ischemia or infarction.       LABS:               11.8   20.9  )-----------( 455      ( 08 Feb 2018 05:50 )             35.3     08 Feb 2018 05:50    134    |  97     |  28     ----------------------------<  137    4.0     |  29     |  0.81     Ca    8.8        08 Feb 2018 05:50  Phos  3.1       08 Feb 2018 05:50  Mg     2.2       08 Feb 2018 05:50    PT/INR - ( 07 Feb 2018 05:58 )   PT: 15.6 sec;   INR: 1.43 ratio         RADIOLOGY & ADDITIONAL STUDIES:    CT Abdomen and Pelvis w/ Oral Cont (02.07.18 @ 22:08) >  IMPRESSION:   Colonic ileus without evidence for stricture or obstructing lesion.

## 2018-02-08 NOTE — PROGRESS NOTE ADULT - SUBJECTIVE AND OBJECTIVE BOX
Patient is a 72y old  Male who presents with a chief complaint of Limb pain (06 Feb 2018 08:35)      HPI:  71 y/o male with a PMHx chronic CHF, COPD on home O2 4 L, emphysema, and spinal stenosis.   He presents to ED s/p fall.   Fall occurred ~ 10 days ago; patient has been very weak, and suffers from arthritis.   He notes chronic Rt hip pain for ~ 4 weeks which causes ambulatory dysfunction.  He was suppose to follow-up with outpatient ortho for MRI.    Reports associated poor appetite with decreased PO intake.   Denies nausea, vomiting, diarrhea, fevers , chills, CP or dypsnea.   No recent illnesses, no sick contacts.    ED course:  Xray imaging reportedly with Rt hip fracture  Venous doppler US 2/3 : no evidence of deep venous thrombosis in either leg.  Left Baker's cyst. (04 Feb 2018 01:53)Patient could not tolerate lying on stomach much. He was turned from side to side but no bowel movement. He appears more distended. He has some mild diffuse abdominal discomfort.  He denies any nausea or vomiting. He has not passed any gas.  Denies any chest pain. He does have hip pain. However, he feels that this is reasonably well controlled. Nurses have been rotating him.  I discussed the case with nurse, as well as intensivist. Left message for hospitalist and surgeon.          PAST MEDICAL & SURGICAL HISTORY:  Spinal stenosis  CHF (congestive heart failure)  Emphysema  COPD (chronic obstructive pulmonary disease)  No significant past surgical history      MEDICATIONS  (STANDING):  amLODIPine   Tablet 5 milliGRAM(s) Oral daily  ascorbic acid 500 milliGRAM(s) Oral two times a day  atorvastatin 20 milliGRAM(s) Oral at bedtime  ceFAZolin   IVPB 2000 milliGRAM(s) IV Intermittent every 8 hours  docusate sodium 100 milliGRAM(s) Oral three times a day  enoxaparin Injectable 40 milliGRAM(s) SubCutaneous every 24 hours  finasteride 5 milliGRAM(s) Oral daily  folic acid 1 milliGRAM(s) Oral daily  isosorbide   mononitrate ER Tablet (IMDUR) 60 milliGRAM(s) Oral daily  lactated ringers. 1000 milliLiter(s) (75 mL/Hr) IV Continuous <Continuous>  lidocaine   Patch 1 Patch Transdermal daily  lisinopril 20 milliGRAM(s) Oral daily  metoprolol succinate ER 25 milliGRAM(s) Oral daily  multivitamin 1 Tablet(s) Oral daily  pantoprazole    Tablet 40 milliGRAM(s) Oral daily  polyethylene glycol 3350 17 Gram(s) Oral daily  ranolazine 500 milliGRAM(s) Oral two times a day  tamsulosin 0.4 milliGRAM(s) Oral at bedtime  tiotropium 18 MICROgram(s) Capsule 1 Capsule(s) Inhalation daily    MEDICATIONS  (PRN):  acetaminophen   Tablet 650 milliGRAM(s) Oral every 6 hours PRN For Temp over 38.3 C (100.94 F)  diphenhydrAMINE   Capsule 25 milliGRAM(s) Oral at bedtime PRN Insomnia  ondansetron Injectable 4 milliGRAM(s) IV Push every 6 hours PRN Nausea and/or Vomiting  prochlorperazine   Injectable 5 milliGRAM(s) IV Push every 6 hours PRN Nausea and/or Vomiting  senna 2 Tablet(s) Oral at bedtime PRN Constipation      Allergies    No Known Allergies    Intolerances        SOCIAL HISTORY:NC    FAMILY HISTORY:  No pertinent family history in first degree relatives      REVIEW OF SYSTEMS:    CONSTITUTIONAL: No weakness, fevers or chills  EYES/ENT: No visual changes;  No vertigo or throat pain   NECK: No pain or stiffness  RESPIRATORY: No cough, wheezing, hemoptysis; No shortness of breath  CARDIOVASCULAR: No chest pain or palpitations  GENITOURINARY: No dysuria, frequency or hematuria  NEUROLOGICAL: No numbness or weakness  SKIN: No itching, burning, rashes, or lesions   All other review of systems is negative unless indicated above.    Vital Signs Last 24 Hrs  T(C): 36.9 (08 Feb 2018 13:00), Max: 36.9 (08 Feb 2018 05:14)  T(F): 98.5 (08 Feb 2018 13:00), Max: 98.5 (08 Feb 2018 13:00)  HR: 108 (08 Feb 2018 17:00) (88 - 120)  BP: 85/70 (08 Feb 2018 17:00) (85/70 - 154/65)  BP(mean): 73 (08 Feb 2018 17:00) (67 - 85)  RR: 26 (08 Feb 2018 17:00) (17 - 26)  SpO2: 94% (08 Feb 2018 17:00) (91% - 98%)    PHYSICAL EXAM:    Constitutional: NAD, well-developed  HEENT: EOMI, throat clear  Neck: No LAD, supple  Respiratory: CTA and P  Cardiovascular: S1 and S2, RRR, no M  Gastrointestinal: BS+, inc distension, mild diffuse tenderness, tympanitic    Vascular: 2+ peripheral pulses  Neurological: A/O x 3, no focal deficits  Psychiatric: Normal mood, normal affect  Skin: No rashes    LABS:  CBC Full  -  ( 08 Feb 2018 13:55 )  WBC Count : 25.5 K/uL  Hemoglobin : 12.0 g/dL  Hematocrit : 36.5 %  Platelet Count - Automated : 522 K/uL  Mean Cell Volume : 94.0 fl  Mean Cell Hemoglobin : 31.0 pg  Mean Cell Hemoglobin Concentration : 33.0 gm/dL  Auto Neutrophil # : x  Auto Lymphocyte # : x  Auto Monocyte # : x  Auto Eosinophil # : x  Auto Basophil # : x  Auto Neutrophil % : x  Auto Lymphocyte % : x  Auto Monocyte % : x  Auto Eosinophil % : x  Auto Basophil % : x    02-08    134<L>  |  96  |  29<H>  ----------------------------<  145<H>  3.9   |  31  |  0.78    Ca    8.9      08 Feb 2018 13:55  Phos  3.1     02-08  Mg     2.2     02-08      PT/INR - ( 07 Feb 2018 05:58 )   PT: 15.6 sec;   INR: 1.43 ratio                 RADIOLOGY & ADDITIONAL STUDIES:  < from: CT Abdomen and Pelvis w/ Oral Cont (02.07.18 @ 22:08) >    EXAM:  CT ABDOMEN AND PELVIS OC                            PROCEDURE DATE:  02/07/2018          INTERPRETATION:  CT ABDOMEN AND PELVIS OC    HISTORY:  wendy syndrome r/o obstruction, groin pain, abnormal bowel   movements, abdominal distention, status post hip surgery 3 days ago    Technique: CT of the abdomen and pelvis is performed with oral without   intravenous contrast. Axial images are supplemented with coronal and   sagittal reformations. This study was performed using automatic exposure   control (radiation dose reduction software) to obtain a diagnostic image   quality scan with patient dose as low as reasonably achievable.    Contrast:     Oral contrast only    Comparison: None.    Findings:  LIVER: Normal.  SPLEEN: Normal.  PANCREAS: Normal.  GALLBLADDER/BILIARY TREE: Nondilated. Normal gallbladder.  ADRENALS: Normal.  KIDNEYS: No hydronephrosis or urinary tract calculi.  LYMPHADENOPATHY/RETROPERITONEUM: No adenopathy.  VASCULATURE: Aortoiliac atherosclerosis without aneurysm.  BOWEL: There is diffuse gaseous distention of the colon without evidence   for focal stricture or obstructing lesion. No dilated small bowel loops.   Normal appendix.  PELVIC VISCERA: Unremarkable prostate, seminal vesicles, and urinary   bladder.  PELVIC LYMPH NODES: No pelvic adenopathy.  PERITONEUM/ABDOMINAL WALL: No free air. Trace paracolic gutter free   fluid. Postoperative subcutaneous air overlying the right hip.  SKELETAL: Status post recent right hip arthroplasty. Healing right rib   fractures are noted.  LUNG BASES: Right base dependent atelectasis.    IMPRESSION:     Colonic ileus without evidence for stricture or obstructing lesion.           < end of copied text >

## 2018-02-08 NOTE — PROGRESS NOTE ADULT - ASSESSMENT
72M s/p R hip hemiarthroplasty POD 3  Dressing changed  Analgesia  DVT ppx  Incentive spirometry  WBAT  P/T  Discharge planning

## 2018-02-08 NOTE — PROGRESS NOTE ADULT - ASSESSMENT
Hip fracture–status post repair. Would minimize narcotics.    Progressive colonic ileus not responding to conservative management and is not been able to tolerate NG tube placement or placement on the stomach.  Would perform Gastrografin enema and then consider neostigmine.  This was discussed with intensivist as well as radiologist to arrange as soon as possible.    Continue antibiotics.

## 2018-02-08 NOTE — DIETITIAN INITIAL EVALUATION ADULT. - NS AS NUTRI DX NUTRIENT
Malnutrition Pt meets criteria for severe protein calorie malnutrition in context of acute illness secondary to poor intake </=50% of estimated needs consumed x >5days, and moderate fluid accumulation./Malnutrition

## 2018-02-08 NOTE — PROGRESS NOTE ADULT - SUBJECTIVE AND OBJECTIVE BOX
Patient is a 72y old  Male who presents with a chief complaint of Limb pain (06 Feb 2018 08:35)      HPI:  71 y/o male with a PMHx chronic CHF, COPD on home O2 4 L, emphysema, and spinal stenosis.   He presents to ED s/p fall.   Fall occurred ~ 10 days ago; patient has been very weak, and suffers from arthritis.   He notes chronic Rt hip pain for ~ 4 weeks which causes ambulatory dysfunction.  He was suppose to follow-up with outpatient ortho for MRI.    Reports associated poor appetite with decreased PO intake.   Denies nausea, vomiting, diarrhea, fevers , chills, CP or dypsnea.   No recent illnesses, no sick contacts.  2/8: pt seen and examined, in moderate distress from abdominal distension dn pain, not passing gas, small BM after Enema yesterday,  no nausea o vomiting could not tolerate NGT last night, HD stable, still has leucocytosis. No fever. Tach cardiac.  ROS: All other system review negative other then marked.  Systemic:	[ ] Fever	[ ] Chills	[ ] Night sweats    [ ] Fatigue	[ ] Other  [] Cardiovascular:  [] Pulmonary:  [] Renal/Urologic:  [X] Gastrointestinal: abdominal pain, Distension  [] Metabolic:  [] Neurologic:  [] Hematologic:  [] ENT:  [] Ophthalmologic:  [] Musculoskeletal:      All other system review is negative   .  PAST MEDICAL & SURGICAL HISTORY:  Spinal stenosis  CHF (congestive heart failure)  Emphysema  COPD (chronic obstructive pulmonary disease)  No significant past surgical history    FAMILY HISTORY:  No pertinent family history in first degree relatives    Social HX:   Alcohol: Denied  Smoking: Denied  Drug Use: Denied        Vital Signs Last 24 Hrs  T(C): 36.7 (08 Feb 2018 11:13), Max: 36.9 (08 Feb 2018 05:14)  T(F): 98 (08 Feb 2018 11:13), Max: 98.4 (08 Feb 2018 05:14)  HR: 120 (08 Feb 2018 11:13) (104 - 120)  BP: 154/65 (08 Feb 2018 11:13) (126/61 - 154/65)  BP(mean): --  RR: 18 (08 Feb 2018 11:13) (17 - 18)  SpO2: 91% (08 Feb 2018 11:13) (91% - 95%)    I&O's Summary    07 Feb 2018 07:01  -  08 Feb 2018 07:00  --------------------------------------------------------  IN: 900 mL / OUT: 301 mL / NET: 599 mL        PHYSICAL EXAM:  Constitutional: NAD, SOB, in abdominal discomfort.  Eyes:  WNL  ENMT:  WNL  Neck:  WNL, non tender  Back: Non tender  Respiratory: CTABL  Cardiovascular:  S1+S2+0  Gastrointestinal: Soft, distended, RLQ tenderness.  Genitourinary:  WNL  Extremities: NV intact  Vascular:  Intact  Neurological: No focal neurological deficit,  CN, motor and sensory system grossly intact.  Skin: WNL  Musculoskeletal: WNL  Psychiatric: Grossly WNL        Labs:                          11.8   20.9  )-----------( 455      ( 08 Feb 2018 05:50 )             35.3       02-08    134<L>  |  97  |  28<H>  ----------------------------<  137<H>  4.0   |  29  |  0.81    Ca    8.8      08 Feb 2018 05:50  Phos  3.1     02-08  Mg     2.2     02-08        PT/INR - ( 07 Feb 2018 05:58 )   PT: 15.6 sec;   INR: 1.43 ratio         < from: CT Abdomen and Pelvis w/ Oral Cont (02.07.18 @ 22:08) >    ******PRELIMINARY REPORT******    ******PRELIMINARY REPORT******          EXAM:  CT ABDOMEN AND PELVIS OC                            PROCEDURE DATE:  02/07/2018    ******PRELIMINARY REPORT******    ******PRELIMINARY REPORT******              INTERPRETATION:  PRELIM:    Diffusely distended colon without evidence of a transition point likely   representative of an ileus or [place] syndrome. No evidence of a bowel   obstruction or inflammation.  No evidence of free intraperitoneal air.  Subcutaneous gas and subcutaneous inflammatory change in the right hip   likely postsurgical in nature.          ******PRELIMINARY REPORT******    ******PRELIMINARY REPORT******              ANNA SCOTT M.D., ATTENDING RADIOLOGIST    < end of copied text >

## 2018-02-08 NOTE — DIETITIAN INITIAL EVALUATION ADULT. - OTHER INFO
Nutrition consult for 5 days NPO status. Nutrition assessment due to NPO status x 5 days. Dx: Hip fracture. S/P CT of abdomen and pelvis with results of +Ileus. Pt denies any difficulty chewing or swallowing. No n/v/d. Pt c/o lack of BM (expect with NPO status/Ileus- meds: Lactulose). PTA poor intake as stated by pt ~3-4weeks ~50% of meals consumed. C/O abdominal pain and lack of appetite currently. 4.2% weight change x 2 days possibly due to fluid shifting and inaccurate admission weight. Skin intact with +1,+2,+3 edema documented. Pt meets criteria for severe protein calorie malnutrition in context of acute illness secondary to poor intake </=50% of estimated needs consumed x >5days, and moderate fluid accumulation. Recommendations: 1) initiate alternate route of nutrition due to NPO status x 5 days if diet is not advanced. 2) Continue with IVF hydration. 3) monitor weights 4) RD will follow up prn.

## 2018-02-08 NOTE — CHART NOTE - NSCHARTNOTEFT_GEN_A_CORE
Upon Nutritional Assessment by the Registered Dietitian your patient was determined to meet criteria / has evidence of the following diagnosis/diagnoses:          [ ]  Mild Protein Calorie Malnutrition        [ ]  Moderate Protein Calorie Malnutrition        [x ] Severe Protein Calorie Malnutrition        [ ] Unspecified Protein Calorie Malnutrition        [ ] Underweight / BMI <19        [ ] Morbid Obesity / BMI > 40      Findings as based on:  •  Comprehensive nutrition assessment and consultation  •  Calorie counts (nutrient intake analysis)  •  Food acceptance and intake status from observations by staff  •  Follow up  •  Patient education  •  Intervention secondary to interdisciplinary rounds  •   concerns      Treatment:  1) initiate alternate route of nutrition due to NPO status x 5 days if diet is not advanced.   2) Continue with IVF hydration.   3) monitor weights   4) RD will follow up prn.       The following diet has been recommended:  Advance diet when medically feasible or initiate alternate route of nutrition     PROVIDER Section:     By signing this assessment you are acknowledging and agree with the diagnosis/diagnoses assigned by the Registered Dietitian    Comments:

## 2018-02-08 NOTE — PROGRESS NOTE ADULT - SUBJECTIVE AND OBJECTIVE BOX
HPI: 73 y/o male with a PMHx chronic CHF, COPD on home O2 4 L, emphysema, and spinal stenosis.   He presents to ED s/p fall.   Fall occurred ~ 10 days ago; patient has been very weak, and suffers from arthritis.   He notes chronic Rt hip pain for ~ 4 weeks which causes ambulatory dysfunction.  He was suppose to follow-up with outpatient ortho for MRI.    Reports associated poor appetite with decreased PO intake.   Denies nausea, vomiting, diarrhea, fevers , chills, CP or dypsnea.   No recent illnesses, no sick contacts.    ED course:  Xray imaging reportedly with Rt hip fracture  Venous doppler US 2/3 : no evidence of deep venous thrombosis in either leg.  Left Baker's cyst. (04 Feb 2018 01:53)    2/8/2018 - Patient seen and examined at bedside.  s/p R hip hemiarthroplasty POD 3.  Patient uncomfortable, belly distended. 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  Patient to have Xray Barium Enema, then be transferred to the ICU for further care.     MEDICATIONS  (STANDING):  amLODIPine   Tablet 5 milliGRAM(s) Oral daily  ascorbic acid 500 milliGRAM(s) Oral two times a day  atorvastatin 20 milliGRAM(s) Oral at bedtime  ceFAZolin   IVPB 2000 milliGRAM(s) IV Intermittent every 8 hours  docusate sodium 100 milliGRAM(s) Oral three times a day  enoxaparin Injectable 40 milliGRAM(s) SubCutaneous every 24 hours  finasteride 5 milliGRAM(s) Oral daily  folic acid 1 milliGRAM(s) Oral daily  isosorbide   mononitrate ER Tablet (IMDUR) 60 milliGRAM(s) Oral daily  lactated ringers. 1000 milliLiter(s) (75 mL/Hr) IV Continuous <Continuous>  lidocaine   Patch 1 Patch Transdermal daily  lisinopril 20 milliGRAM(s) Oral daily  metoprolol succinate ER 25 milliGRAM(s) Oral daily  multivitamin 1 Tablet(s) Oral daily  pantoprazole    Tablet 40 milliGRAM(s) Oral daily  polyethylene glycol 3350 17 Gram(s) Oral daily  ranolazine 500 milliGRAM(s) Oral two times a day  tamsulosin 0.4 milliGRAM(s) Oral at bedtime  tiotropium 18 MICROgram(s) Capsule 1 Capsule(s) Inhalation daily    MEDICATIONS  (PRN):  acetaminophen   Tablet 650 milliGRAM(s) Oral every 6 hours PRN For Temp over 38.3 C (100.94 F)  diphenhydrAMINE   Capsule 25 milliGRAM(s) Oral at bedtime PRN Insomnia  ondansetron Injectable 4 milliGRAM(s) IV Push every 6 hours PRN Nausea and/or Vomiting  prochlorperazine   Injectable 5 milliGRAM(s) IV Push every 6 hours PRN Nausea and/or Vomiting  senna 2 Tablet(s) Oral at bedtime PRN Constipation    Vital Signs Last 24 Hrs  T(C): 36.9 (08 Feb 2018 05:14), Max: 36.9 (08 Feb 2018 05:14)  T(F): 98.4 (08 Feb 2018 05:14), Max: 98.4 (08 Feb 2018 05:14)  HR: 107 (08 Feb 2018 05:14) (104 - 107)  BP: 131/68 (08 Feb 2018 05:14) (126/61 - 131/68)  RR: 17 (07 Feb 2018 16:34) (17 - 17)  SpO2: 94% (08 Feb 2018 05:14) (94% - 95%)    GEN: NAD, uncomfortable, resting in bed  HEENT: NC/AT, PERRLA, MMM  CV: S1S2, RRR, no mumur  RESP: good air movement, CTABL, no rales, rhonchi or wheezing  ABD: hypoactive BS, distended, mild generalized tenderness, no guarding  EXT: +2 radial and pedal pulses, no edema, no calve tenderness  SKIN: No Rashes or Ulcers  NEURO:  CN 2-12 Grossly intact, no motor or sensory deficits     LABS:                     11.8   20.9  )-----------( 455      ( 08 Feb 2018 05:50 )             35.3     08 Feb 2018 05:50    134    |  97     |  28     ----------------------------<  137    4.0     |  29     |  0.81     Ca    8.8        08 Feb 2018 05:50  Phos  3.1       08 Feb 2018 05:50  Mg     2.2       08 Feb 2018 05:50    PT/INR - ( 07 Feb 2018 05:58 )   PT: 15.6 sec;   INR: 1.43 ratio

## 2018-02-08 NOTE — PROGRESS NOTE ADULT - ASSESSMENT
This is a 72 year old male s/p fall on about Jan 23rd presented to [facility] on 2-3 with chronic pain to hip, decrease appetite. Pt found to have a fx rt hip, now sp right hip brynn on 2-5-18.  Pt has high thrombosis risks and requires anticoagualtion prophylaxis. Discussed with pt the use of Lovenox for his anticoagulation med. Pt is agreeable.    : c/w lovenox 40mg SQ daily for four weeks post procedure. Please avoid right lower quadrant abdomen injection  : daily cbc/bmp  : LE Venodynes  : increase mobility as tolerated    Will continue to monitor

## 2018-02-08 NOTE — PROGRESS NOTE ADULT - SUBJECTIVE AND OBJECTIVE BOX
HPI: 71 y/o male with a PMHx chronic CHF, COPD on home O2 4 L, emphysema, and spinal stenosis. He presents to ED s/p fall. Fall occurred ~ 10 days ago; patient has been very weak, and suffers from arthritis.   He notes chronic Rt hip pain for ~ 4 weeks which causes ambulatory dysfunction.  He was suppose to follow-up with outpatient ortho for MRI.    Reports associated poor appetite with decreased PO intake.   Denies nausea, vomiting, diarrhea, fevers , chills, CP or dypsnea.   No recent illnesses, no sick contacts.    ED course:  Xray imaging reportedly with Rt hip fracture  Venous doppler US 2/3 : no evidence of deep venous thrombosis in either leg.  Left Baker's cyst. (2018 01:53)    Patient is a 72y old  Male who presents with a chief complaint of Limb pain (2018 08:35)  pt s/p right hip brynn on 18.    Consulted by Dr. Rahul Elkins for VTE prophylaxis, risk stratification, and anticoagulation management.    PAST MEDICAL & SURGICAL HISTORY:  Spinal stenosis  CHF (congestive heart failure)  Emphysema  COPD (chronic obstructive pulmonary disease)  No significant past surgical history    Caprini VTE Risk Score:9    IMPROVE Bleeding Risk Score: 2.5    Falls Risk:   High (x  )  Mod (  )  Low (  )    EBL:  150ml  cr cl;162.2  18 Pt sen at bedside.  Discussed his anticoagulation with Lovenox inj for 4 weeks.  Questions answered will reinforce as needed.  Pt concerned about no having a bm for a few days and  in his appetite for a while. Not sure if he lost wt.   18: Pt seen at bedside, OOB to chair. Reports that he continues to be constipated, was able to make "small bowel success," but remains to have distended/firm abdomen. Continues to have 4LNC, and states that he is feeling "oozy," and would like to go back in bed. RN is aware, but waiting for PT. Pt noted to have moderate size bruising in his RLQ abdomen. Advised RN to administer medication to his LLQ.   18 Pt seen at bedside in IC.  Pt dx with [place] Syndrome.  Had barium enema and received neostigmine iv.  Pt states he feels much better abd smaller and softer. discussed his anticoagulation with heparin sq mo concerns.  FAMILY HISTORY:  No pertinent family history in first degree relatives    Denies any personal or familial history of clotting or bleeding disorders.    Allergies    No Known Allergies    Intolerances      REVIEW OF SYSTEMS    (  )Fever	     (  )Constipation	(  )SOB				(  )Headache	(  )Dysuria  (  )Chills	     (  )Melena	(  )Dyspnea present on exertion	                    (  )Dizziness                    (  )Polyuria  (  )Nausea	     (  )Hematochezia	(  )Cough			                    (  )Syncope   	(  )Hematuria  (  )Vomiting    (  )Chest Pain	(  )Wheezing			(  )Weakness  (  )Diarrhea     (  )Palpitations	(  )Anorexia			(  )Myalgia    All other review of systems negative: Yes    PHYSICAL EXAM:    Constitutional: Appears Well    Neurological: A& O x 3    Skin: Warm    Respiratory and Thorax: normal effort; Breath sounds: normal; No rales/wheezing/rhonchi, 4LNC supplemental O2  	  Cardiovascular: S1, S2, regular, NMBR	    Gastrointestinal: BS + x 4Q, nontender, less distended and softer,  RLQ moderate size bruising	    Genitourinary:  Bladder nondistended, nontender    Musculoskeletal:   General Right:   no muscle/joint tenderness,   normal tone, no joint swelling,   ROM: limited	    General Left:   no muscle/joint tenderness,   normal tone, no joint swelling,   ROM: full    Hip:  Right: Aquacel dressing CDI      Lower extrems:   Right: no calf tenderness              negative rolando's sign               + pedal pulses    Left:   no calf tenderness              negative rolando's sign               + pedal pulses    MEDICATIONS  (STANDING):  amLODIPine   Tablet 5 milliGRAM(s) Oral daily  ascorbic acid 500 milliGRAM(s) Oral two times a day  atorvastatin 20 milliGRAM(s) Oral at bedtime  ceFAZolin   IVPB 2000 milliGRAM(s) IV Intermittent every 8 hours  docusate sodium 100 milliGRAM(s) Oral three times a day  enoxaparin Injectable 40 milliGRAM(s) SubCutaneous every 24 hours  finasteride 5 milliGRAM(s) Oral daily  folic acid 1 milliGRAM(s) Oral daily  isosorbide   mononitrate ER Tablet (IMDUR) 60 milliGRAM(s) Oral daily  lactated ringers. 1000 milliLiter(s) IV Continuous <Continuous>  lidocaine   Patch 1 Patch Transdermal daily  lisinopril 20 milliGRAM(s) Oral daily  metoprolol succinate ER 25 milliGRAM(s) Oral daily  multivitamin 1 Tablet(s) Oral daily  pantoprazole    Tablet 40 milliGRAM(s) Oral daily  polyethylene glycol 3350 17 Gram(s) Oral daily  ranolazine 500 milliGRAM(s) Oral two times a day  tamsulosin 0.4 milliGRAM(s) Oral at bedtime  tiotropium 18 MICROgram(s) Capsule 1 Capsule(s) Inhalation daily                          12.0   17.4  )-----------( 360      ( 2018 05:58 )             36.0       -    133<L>  |  97  |  17  ----------------------------<  134<H>  3.9   |  30  |  0.56    Ca    8.6      2018 05:58  Mg     2.0     -    PT/INR - ( 2018 05:58 )   PT: 15.6 sec;   INR: 1.43 ratio    PT/INR - ( 2018 06:02 )   PT: 14.3 sec;   INR: 1.32 ratio      PTT - ( 2018 05:48 )  PTT:29.0 sec				    Vital Signs Last 24 Hrs  T(C): 36.9 (18 @ 10:15), Max: 36.9 (18 @ 10:15)  T(F): 98.5 (18 @ 10:15), Max: 98.5 (18 @ 10:15)  HR: 118 (18 @ 10:15) (100 - 118)  BP: 130/68 (18 @ 10:15) (130/68 - 162/85)  BP(mean): --  RR: 17 (18 @ 10:15) (17 - 18)  SpO2: 95% (18 @ 10:15) (95% - 96%)    DVT Prophylaxis:  LMWH                   (x )  Heparin SQ           (  )  Coumadin             (  )  Xarelto                  (  )  Eliquis                   (  )  Venodynes           ( x )  Ambulation          ( x )  UFH                       (  )  Contraindicated  (  )

## 2018-02-08 NOTE — PROGRESS NOTE ADULT - ASSESSMENT
71 YO M s/p R hip hemiarthroplasty POD 3 presenting with:    #[place] Syndrome  - Patient failed conservative treatment measures  - To have Xray Barium Enema then transferred to the ICU for Neostigmine administration 73 YO M s/p R hip hemiarthroplasty POD 3 presenting with:    #[place] Syndrome  - Patient failed conservative treatment measures  - To have Xray Barium Enema then transferred to the ICU  After the BE the patient to receive Neostigmine.  Will need to be monitored for bradycardia  PT after the ileus resloves  Continue NCO2

## 2018-02-08 NOTE — PROGRESS NOTE ADULT - ASSESSMENT
72 Y old male with [place] syndrome, S/P  hip orthoplasty , unchanged distension, rt sided tenderness since elast night mild tachy cardia, leukocytosis no fever,    Keep NPO  IV hydration  DVT /GI prophylaxis  Serial abdominal exam  GI follow up , would benefit from endoscopically placed rectal tube.  Trend labs  Replace electrolyte  keep K>4, MG >2  NGT decompression  If not responding to medical treatment and develop signs of necrosis or perforation will need  subtotal collectomy which has high ameena operative morbidity and mortality t, D/W Pt  in detail.  will follow closely, may benefit from SQ neostigmine. 72 Y old male with [place] syndrome, S/P  hip orthoplasty , unchanged distension, rt sided tenderness since elast night mild tachy cardia, leukocytosis no fever,    Keep NPO  IV hydration  DVT /GI prophylaxis  Serial abdominal exam  GI follow up , would benefit from endoscopically placed rectal tube.  Trend labs  Replace electrolyte  keep K>4, MG >2  NGT decompression  If not responding to medical treatment and develop signs of necrosis or perforation will need  subtotal collectomy which has high ameena operative morbidity and mortality t, D/W Pt  in detail.  will follow closely, will benefit from  neostigmine. 72 Y old male with [place] syndrome, S/P  hip orthoplasty , unchanged distension, rt sided tenderness since elast night mild tachy cardia, leukocytosis no fever,    Keep NPO  IV hydration  DVT /GI prophylaxis  Serial abdominal exam  GI follow up , would benefit from endoscopically placed rectal tube.  Trend labs  Replace electrolyte  keep K>4, MG >2  NGT decompression  Avoid narcotics  If not responding to medical treatment and develop signs of necrosis or perforation will need  subtotal collectomy which has high ameena operative morbidity and mortality t, D/W Pt  in detail.  will follow closely, will benefit from  neostigmine, surgical cecostomy an other option if fails endoscopic decompression and neostigmine X2 doses only case of impending  perforation

## 2018-02-08 NOTE — DIETITIAN INITIAL EVALUATION ADULT. - ENERGY NEEDS
Ht.73      "        Wt. 253   #              BMI      33            IBW  171  #               Pt is at  148  %  IBW  .5#

## 2018-02-09 LAB
ANION GAP SERPL CALC-SCNC: 5 MMOL/L — SIGNIFICANT CHANGE UP (ref 5–17)
BUN SERPL-MCNC: 30 MG/DL — HIGH (ref 7–23)
CALCIUM SERPL-MCNC: 8.5 MG/DL — SIGNIFICANT CHANGE UP (ref 8.5–10.1)
CHLORIDE SERPL-SCNC: 98 MMOL/L — SIGNIFICANT CHANGE UP (ref 96–108)
CO2 SERPL-SCNC: 34 MMOL/L — HIGH (ref 22–31)
CREAT SERPL-MCNC: 0.74 MG/DL — SIGNIFICANT CHANGE UP (ref 0.5–1.3)
GLUCOSE SERPL-MCNC: 126 MG/DL — HIGH (ref 70–99)
HCT VFR BLD CALC: 32.4 % — LOW (ref 39–50)
HGB BLD-MCNC: 10.8 G/DL — LOW (ref 13–17)
MAGNESIUM SERPL-MCNC: 2.1 MG/DL — SIGNIFICANT CHANGE UP (ref 1.6–2.6)
MCHC RBC-ENTMCNC: 31.1 PG — SIGNIFICANT CHANGE UP (ref 27–34)
MCHC RBC-ENTMCNC: 33.2 GM/DL — SIGNIFICANT CHANGE UP (ref 32–36)
MCV RBC AUTO: 93.8 FL — SIGNIFICANT CHANGE UP (ref 80–100)
PHOSPHATE SERPL-MCNC: 2.8 MG/DL — SIGNIFICANT CHANGE UP (ref 2.5–4.5)
PLATELET # BLD AUTO: 452 K/UL — HIGH (ref 150–400)
POTASSIUM SERPL-MCNC: 3.5 MMOL/L — SIGNIFICANT CHANGE UP (ref 3.5–5.3)
POTASSIUM SERPL-SCNC: 3.5 MMOL/L — SIGNIFICANT CHANGE UP (ref 3.5–5.3)
RBC # BLD: 3.45 M/UL — LOW (ref 4.2–5.8)
RBC # FLD: 12.5 % — SIGNIFICANT CHANGE UP (ref 10.3–14.5)
SODIUM SERPL-SCNC: 137 MMOL/L — SIGNIFICANT CHANGE UP (ref 135–145)
WBC # BLD: 21.9 K/UL — HIGH (ref 3.8–10.5)
WBC # FLD AUTO: 21.9 K/UL — HIGH (ref 3.8–10.5)

## 2018-02-09 PROCEDURE — 99233 SBSQ HOSP IP/OBS HIGH 50: CPT

## 2018-02-09 RX ORDER — POTASSIUM CHLORIDE 20 MEQ
40 PACKET (EA) ORAL ONCE
Qty: 0 | Refills: 0 | Status: COMPLETED | OUTPATIENT
Start: 2018-02-09 | End: 2018-02-09

## 2018-02-09 RX ADMIN — TAMSULOSIN HYDROCHLORIDE 0.4 MILLIGRAM(S): 0.4 CAPSULE ORAL at 21:29

## 2018-02-09 RX ADMIN — Medication 100 MILLIGRAM(S): at 06:51

## 2018-02-09 RX ADMIN — TIOTROPIUM BROMIDE 1 CAPSULE(S): 18 CAPSULE ORAL; RESPIRATORY (INHALATION) at 08:38

## 2018-02-09 RX ADMIN — RANOLAZINE 500 MILLIGRAM(S): 500 TABLET, FILM COATED, EXTENDED RELEASE ORAL at 17:19

## 2018-02-09 RX ADMIN — Medication 25 MILLIGRAM(S): at 06:51

## 2018-02-09 RX ADMIN — Medication 40 MILLIEQUIVALENT(S): at 10:48

## 2018-02-09 RX ADMIN — Medication 1 TABLET(S): at 12:34

## 2018-02-09 RX ADMIN — FINASTERIDE 5 MILLIGRAM(S): 5 TABLET, FILM COATED ORAL at 12:33

## 2018-02-09 RX ADMIN — ENOXAPARIN SODIUM 40 MILLIGRAM(S): 100 INJECTION SUBCUTANEOUS at 10:47

## 2018-02-09 RX ADMIN — SODIUM CHLORIDE 75 MILLILITER(S): 9 INJECTION, SOLUTION INTRAVENOUS at 17:24

## 2018-02-09 RX ADMIN — Medication 1 MILLIGRAM(S): at 12:33

## 2018-02-09 RX ADMIN — ATORVASTATIN CALCIUM 20 MILLIGRAM(S): 80 TABLET, FILM COATED ORAL at 21:28

## 2018-02-09 RX ADMIN — Medication 100 MILLIGRAM(S): at 14:56

## 2018-02-09 RX ADMIN — Medication 200 MILLIGRAM(S): at 06:56

## 2018-02-09 RX ADMIN — Medication 200 MILLIGRAM(S): at 17:18

## 2018-02-09 RX ADMIN — AMLODIPINE BESYLATE 5 MILLIGRAM(S): 2.5 TABLET ORAL at 06:52

## 2018-02-09 RX ADMIN — RANOLAZINE 500 MILLIGRAM(S): 500 TABLET, FILM COATED, EXTENDED RELEASE ORAL at 06:51

## 2018-02-09 RX ADMIN — Medication 100 MILLIGRAM(S): at 23:34

## 2018-02-09 RX ADMIN — Medication 100 MILLIGRAM(S): at 17:16

## 2018-02-09 RX ADMIN — Medication 500 MILLIGRAM(S): at 06:52

## 2018-02-09 RX ADMIN — ISOSORBIDE MONONITRATE 60 MILLIGRAM(S): 60 TABLET, EXTENDED RELEASE ORAL at 12:34

## 2018-02-09 RX ADMIN — Medication 100 MILLIGRAM(S): at 06:52

## 2018-02-09 RX ADMIN — LISINOPRIL 20 MILLIGRAM(S): 2.5 TABLET ORAL at 06:51

## 2018-02-09 RX ADMIN — POLYETHYLENE GLYCOL 3350 17 GRAM(S): 17 POWDER, FOR SOLUTION ORAL at 12:34

## 2018-02-09 RX ADMIN — PANTOPRAZOLE SODIUM 40 MILLIGRAM(S): 20 TABLET, DELAYED RELEASE ORAL at 12:35

## 2018-02-09 RX ADMIN — Medication 100 MILLIGRAM(S): at 21:28

## 2018-02-09 NOTE — PROGRESS NOTE ADULT - SUBJECTIVE AND OBJECTIVE BOX
HPI: 73 y/o male with a PMHx chronic CHF, COPD on home O2 4 L, emphysema, and spinal stenosis. He presents to ED s/p fall. Fall occurred ~ 10 days ago; patient has been very weak, and suffers from arthritis.   He notes chronic Rt hip pain for ~ 4 weeks which causes ambulatory dysfunction.  He was suppose to follow-up with outpatient ortho for MRI.    Reports associated poor appetite with decreased PO intake.   Denies nausea, vomiting, diarrhea, fevers , chills, CP or dypsnea.   No recent illnesses, no sick contacts.    ED course:  Xray imaging reportedly with Rt hip fracture  Venous doppler US 2/3 : no evidence of deep venous thrombosis in either leg.  Left Baker's cyst. (2018 01:53)    Patient is a 72y old  Male who presents with a chief complaint of Limb pain (2018 08:35)  pt s/p right hip brynn on 18.    Consulted by Dr. Rahul Elkins for VTE prophylaxis, risk stratification, and anticoagulation management.    PAST MEDICAL & SURGICAL HISTORY:  Spinal stenosis  CHF (congestive heart failure)  Emphysema  COPD (chronic obstructive pulmonary disease)  No significant past surgical history    Caprini VTE Risk Score:9    IMPROVE Bleeding Risk Score: 2.5    Falls Risk:   High (x  )  Mod (  )  Low (  )    EBL:  150ml  cr cl: 162.2  18 Pt sen at bedside.  Discussed his anticoagulation with Lovenox inj for 4 weeks.  Questions answered will reinforce as needed.  Pt concerned about no having a bm for a few days and  in his appetite for a while. Not sure if he lost wt.   18: Pt seen at bedside, OOB to chair. Reports that he continues to be constipated, was able to make "small bowel success," but remains to have distended/firm abdomen. Continues to have 4LNC, and states that he is feeling "oozy," and would like to go back in bed. RN is aware, but waiting for PT. Pt noted to have moderate size bruising in his RLQ abdomen. Advised RN to administer medication to his LLQ.   18 Pt seen at bedside in IC.  Pt dx with Kaylan Syndrome.  Had barium enema and received neostigmine iv.  Pt states he feels much better abd smaller and softer. discussed his anticoagulation with heparin sq mo concerns.  18: Pt seen at bedside in ICU. Reports of continuing to feel better, had successful large BM yesterday. He continues to be NPO. Pt is being downgraded to med-surg today.     FAMILY HISTORY:  No pertinent family history in first degree relatives    Denies any personal or familial history of clotting or bleeding disorders.    Allergies    No Known Allergies    Intolerances      REVIEW OF SYSTEMS    (  )Fever	     (  )Constipation	(  )SOB				(  )Headache	(  )Dysuria  (  )Chills	     (  )Melena	(  )Dyspnea present on exertion	                    (  )Dizziness                    (  )Polyuria  (  )Nausea	     (  )Hematochezia	(  )Cough			                    (  )Syncope   	(  )Hematuria  (  )Vomiting    (  )Chest Pain	(  )Wheezing			(  )Weakness  (  )Diarrhea     (  )Palpitations	(  )Anorexia			(  )Myalgia    All other review of systems negative: Yes    PHYSICAL EXAM:    Constitutional: Appears Well    Neurological: A& O x 3    Skin: Warm    Respiratory and Thorax: normal effort; Breath sounds: normal; No rales/wheezing/rhonchi, 4LNC supplemental O2  	  Cardiovascular: S1, S2, regular, NMBR	    Gastrointestinal: BS + x 4Q, nontender, distended and firm,  RLQ moderate size ecchymosis	    Genitourinary:  Bladder nondistended, nontender    Musculoskeletal:   General Right:   no muscle/joint tenderness,   normal tone, no joint swelling,   ROM: limited	    General Left:   no muscle/joint tenderness,   normal tone, no joint swelling,   ROM: full    Hip:  Right: Aquacel dressing CDI      Lower extrems:   Right: no calf tenderness              negative rolando's sign               + pedal pulses    Left:   no calf tenderness              negative rolando's sign               + pedal pulses    MEDICATIONS  (STANDING):  amLODIPine   Tablet 5 milliGRAM(s) Oral daily  ascorbic acid 500 milliGRAM(s) Oral two times a day  atorvastatin 20 milliGRAM(s) Oral at bedtime  ciprofloxacin   IVPB 400 milliGRAM(s) IV Intermittent every 12 hours  ciprofloxacin   IVPB      docusate sodium 100 milliGRAM(s) Oral three times a day  enoxaparin Injectable 40 milliGRAM(s) SubCutaneous every 24 hours  finasteride 5 milliGRAM(s) Oral daily  folic acid 1 milliGRAM(s) Oral daily  isosorbide   mononitrate ER Tablet (IMDUR) 60 milliGRAM(s) Oral daily  lactated ringers. 1000 milliLiter(s) (75 mL/Hr) IV Continuous <Continuous>  lidocaine   Patch 1 Patch Transdermal daily  lisinopril 20 milliGRAM(s) Oral daily  metoprolol succinate ER 25 milliGRAM(s) Oral daily  metroNIDAZOLE  IVPB 500 milliGRAM(s) IV Intermittent every 8 hours  multivitamin 1 Tablet(s) Oral daily  pantoprazole    Tablet 40 milliGRAM(s) Oral daily  polyethylene glycol 3350 17 Gram(s) Oral daily  ranolazine 500 milliGRAM(s) Oral two times a day  tamsulosin 0.4 milliGRAM(s) Oral at bedtime  tiotropium 18 MICROgram(s) Capsule 1 Capsule(s) Inhalation daily                          10.8   21.9  )-----------( 452      ( 2018 04:52 )             32.4           137  |  98  |  30<H>  ----------------------------<  126<H>  3.5   |  34<H>  |  0.74    Ca    8.5      2018 04:52  Phos  2.8       Mg     2.1           Vital Signs Last 24 Hrs  T(C): 36.2 (18 @ 08:00), Max: 36.9 (18 @ 13:00)  T(F): 97.2 (18 @ 08:00), Max: 98.5 (18 @ 13:00)  HR: 100 (18 @ 11:00) (88 - 120)  BP: 113/49 (18 @ 11:00) (85/70 - 141/73)  BP(mean): 61 (18 @ 11:00) (60 - 85)  RR: 22 (18 @ 11:00) (17 - 26)  SpO2: 93% (18 @ 11:00) (88% - 98%)    DVT Prophylaxis:  LMWH                   (x )  Heparin SQ           (  )  Coumadin             (  )  Xarelto                  (  )  Eliquis                   (  )  Venodynes           ( x )  Ambulation          ( x )  UFH                       (  )  Contraindicated  (  )

## 2018-02-09 NOTE — PROGRESS NOTE ADULT - SUBJECTIVE AND OBJECTIVE BOX
73 y/o male with a PMHx chronic CHF, COPD on home O2 4 L, emphysema, and spinal stenosis.   He presents to ED s/p fall.   Fall occurred ~ 10 days ago; patient has been very weak, and suffers from arthritis.   He notes chronic Rt hip pain for ~ 4 weeks which causes ambulatory dysfunction.  He was suppose to follow-up with outpatient ortho for MRI.    Reports associated poor appetite with decreased PO intake.   Denies nausea, vomiting, diarrhea, fevers , chills, CP or dypsnea.   No recent illnesses, no sick contacts.    above d/w Dr Alvarez who initially evaluated pt on tele floor and transferred to ICU following barium enema study.  Notably study demonstrated NO obstruction.     2/9: pt feeling better today, much less distension, remains NPO except meds.  Had multiple large bowel movements yesterday.  No CP, No SOB, No Fever/chills.  ALL other ROS are NEGATIVE - though pt is hungry and thirsty.        PAST MEDICAL & SURGICAL HISTORY:  Spinal stenosis  CHF (congestive heart failure)  Emphysema  COPD (chronic obstructive pulmonary disease)  No significant past surgical history      Allergies    No Known Allergies      ICU Vital Signs Last 24 Hrs  T(C): 36.2 (09 Feb 2018 08:00), Max: 36.9 (08 Feb 2018 13:00)  T(F): 97.2 (09 Feb 2018 08:00), Max: 98.5 (08 Feb 2018 13:00)  HR: 99 (09 Feb 2018 09:00) (88 - 120)  BP: 124/47 (09 Feb 2018 09:00) (85/70 - 154/65)  BP(mean): 66 (09 Feb 2018 09:00) (60 - 85)  ABP: --  ABP(mean): --  RR: 23 (09 Feb 2018 09:00) (17 - 26)  SpO2: 93% (09 Feb 2018 09:00) (88% - 98%)          I&O's Summary    08 Feb 2018 07:01  -  09 Feb 2018 07:00  --------------------------------------------------------  IN: 2100 mL / OUT: 200 mL / NET: 1900 mL    09 Feb 2018 07:01  -  09 Feb 2018 10:53  --------------------------------------------------------  IN: 150 mL / OUT: 0 mL / NET: 150 mL                              10.8   21.9  )-----------( 452      ( 09 Feb 2018 04:52 )             32.4       02-09    137  |  98  |  30<H>  ----------------------------<  126<H>  3.5   |  34<H>  |  0.74    Ca    8.5      09 Feb 2018 04:52  Phos  2.8     02-09  Mg     2.1     02-09      MEDICATIONS  (STANDING):  amLODIPine   Tablet 5 milliGRAM(s) Oral daily  ascorbic acid 500 milliGRAM(s) Oral two times a day  atorvastatin 20 milliGRAM(s) Oral at bedtime  ciprofloxacin   IVPB 400 milliGRAM(s) IV Intermittent every 12 hours  ciprofloxacin   IVPB      docusate sodium 100 milliGRAM(s) Oral three times a day  enoxaparin Injectable 40 milliGRAM(s) SubCutaneous every 24 hours  finasteride 5 milliGRAM(s) Oral daily  folic acid 1 milliGRAM(s) Oral daily  isosorbide   mononitrate ER Tablet (IMDUR) 60 milliGRAM(s) Oral daily  lactated ringers. 1000 milliLiter(s) (75 mL/Hr) IV Continuous <Continuous>  lidocaine   Patch 1 Patch Transdermal daily  lisinopril 20 milliGRAM(s) Oral daily  metoprolol succinate ER 25 milliGRAM(s) Oral daily  metroNIDAZOLE  IVPB 500 milliGRAM(s) IV Intermittent every 8 hours  multivitamin 1 Tablet(s) Oral daily  pantoprazole    Tablet 40 milliGRAM(s) Oral daily  polyethylene glycol 3350 17 Gram(s) Oral daily  ranolazine 500 milliGRAM(s) Oral two times a day  tamsulosin 0.4 milliGRAM(s) Oral at bedtime  tiotropium 18 MICROgram(s) Capsule 1 Capsule(s) Inhalation daily    MEDICATIONS  (PRN):  acetaminophen   Tablet 650 milliGRAM(s) Oral every 6 hours PRN For Temp over 38.3 C (100.94 F)  diphenhydrAMINE   Capsule 25 milliGRAM(s) Oral at bedtime PRN Insomnia  ondansetron Injectable 4 milliGRAM(s) IV Push every 6 hours PRN Nausea and/or Vomiting  prochlorperazine   Injectable 5 milliGRAM(s) IV Push every 6 hours PRN Nausea and/or Vomiting  senna 2 Tablet(s) Oral at bedtime PRN Constipation

## 2018-02-09 NOTE — PROGRESS NOTE ADULT - ASSESSMENT
Hip fracture–status post repair. Would minimize narcotics.    Progressive colonic ileus not responding to conservative management and is not been able to tolerate NG tube placement   Gastrografin enema and  neostigmine.    please rotate pta and place in chair, ambulate as tolerated  place pt on stomach  DW RN and team  avoid narcotics and encourage movement    Continue antibiotics.

## 2018-02-09 NOTE — PROGRESS NOTE ADULT - ASSESSMENT
71 YO M s/p R hip hemiarthroplasty POD 4 who developed Kaylan Syndrome with acute respiratory failure due to severe distension, and risk for colonic perforation given extent of distension.  responded well to barium enema and neostigmine administered 2/8.  Hemodynamics and respiratory function reasonable (pt on home O2)    morbid obesity yet with severe protein calorie malnutrition    Plan at this time is for transfer to med-surg floor, and should also include:  continued ABx  NPO status until further improvement noted  Out of bed/mobilize/PT  GI/DVT prophylaxis as appropriate  d/w Cardiology - no acute issues.  GI f/u appreciated  supportive care 71 YO M s/p R hip hemiarthroplasty POD 4 who developed Kaylan Syndrome with acute respiratory failure due to severe distension, and risk for colonic perforation given extent of distension.  responded well to barium enema and neostigmine administered 2/8.  Hemodynamics and respiratory function reasonable (pt on home O2)    morbid obesity yet with severe protein calorie malnutrition    Plan at this time is for transfer to med-surg floor, and should also include:  continued ABx  NPO status until further improvement noted  Out of bed/mobilize/PT  GI/DVT prophylaxis as appropriate  d/w Cardiology - no acute issues.  GI f/u appreciated  supportive care    case d/w Dr Esteban of the hospitalist service who will assume care for the patient.

## 2018-02-09 NOTE — PROGRESS NOTE ADULT - SUBJECTIVE AND OBJECTIVE BOX
Patient is a 72y old  Male who presents with a chief complaint of Limb pain (06 Feb 2018 08:35)      HPI:  71 y/o male with a PMHx chronic CHF, COPD on home O2 4 L, emphysema, and spinal stenosis.   He presents to ED s/p fall.   Fall occurred ~ 10 days ago; patient has been very weak, and suffers from arthritis.   He notes chronic Rt hip pain for ~ 4 weeks which causes ambulatory dysfunction.  He was suppose to follow-up with outpatient ortho for MRI.    Reports associated poor appetite with decreased PO intake.   Denies nausea, vomiting, diarrhea, fevers , chills, CP or dypsnea.   No recent illnesses, no sick contacts.      pt with 3 BM yesterday after neostig and gastrog enema  neg abd pain  neg flatus since  not moving much on own  neg NV      ED course:  Xray imaging reportedly with Rt hip fracture  Venous doppler US 2/3 : no evidence of deep venous thrombosis in either leg.  Left Baker's cyst. (04 Feb 2018 01:53)      PAST MEDICAL & SURGICAL HISTORY:  Spinal stenosis  CHF (congestive heart failure)  Emphysema  COPD (chronic obstructive pulmonary disease)  No significant past surgical history      MEDICATIONS  (STANDING):  amLODIPine   Tablet 5 milliGRAM(s) Oral daily  ascorbic acid 500 milliGRAM(s) Oral two times a day  atorvastatin 20 milliGRAM(s) Oral at bedtime  ciprofloxacin   IVPB 400 milliGRAM(s) IV Intermittent every 12 hours  ciprofloxacin   IVPB      docusate sodium 100 milliGRAM(s) Oral three times a day  enoxaparin Injectable 40 milliGRAM(s) SubCutaneous every 24 hours  finasteride 5 milliGRAM(s) Oral daily  folic acid 1 milliGRAM(s) Oral daily  isosorbide   mononitrate ER Tablet (IMDUR) 60 milliGRAM(s) Oral daily  lactated ringers. 1000 milliLiter(s) (75 mL/Hr) IV Continuous <Continuous>  lidocaine   Patch 1 Patch Transdermal daily  lisinopril 20 milliGRAM(s) Oral daily  metoprolol succinate ER 25 milliGRAM(s) Oral daily  metroNIDAZOLE  IVPB 500 milliGRAM(s) IV Intermittent every 8 hours  multivitamin 1 Tablet(s) Oral daily  pantoprazole    Tablet 40 milliGRAM(s) Oral daily  polyethylene glycol 3350 17 Gram(s) Oral daily  ranolazine 500 milliGRAM(s) Oral two times a day  tamsulosin 0.4 milliGRAM(s) Oral at bedtime  tiotropium 18 MICROgram(s) Capsule 1 Capsule(s) Inhalation daily    MEDICATIONS  (PRN):  acetaminophen   Tablet 650 milliGRAM(s) Oral every 6 hours PRN For Temp over 38.3 C (100.94 F)  diphenhydrAMINE   Capsule 25 milliGRAM(s) Oral at bedtime PRN Insomnia  ondansetron Injectable 4 milliGRAM(s) IV Push every 6 hours PRN Nausea and/or Vomiting  prochlorperazine   Injectable 5 milliGRAM(s) IV Push every 6 hours PRN Nausea and/or Vomiting  senna 2 Tablet(s) Oral at bedtime PRN Constipation      Allergies    No Known Allergies    Intolerances        SOCIAL HISTORY:NC    FAMILY HISTORY:  No pertinent family history in first degree relatives      REVIEW OF SYSTEMS:    CONSTITUTIONAL: No weakness, fevers or chills  EYES/ENT: No visual changes;  No vertigo or throat pain   NECK: No pain or stiffness  RESPIRATORY: No cough, wheezing, hemoptysis; No shortness of breath  CARDIOVASCULAR: No chest pain or palpitations  GENITOURINARY: No dysuria, frequency or hematuria  NEUROLOGICAL: No numbness or weakness  SKIN: No itching, burning, rashes, or lesions   All other review of systems is negative unless indicated above.    Vital Signs Last 24 Hrs  T(C): 36.3 (09 Feb 2018 04:00), Max: 36.9 (08 Feb 2018 13:00)  T(F): 97.4 (09 Feb 2018 04:00), Max: 98.5 (08 Feb 2018 13:00)  HR: 104 (09 Feb 2018 06:00) (88 - 120)  BP: 120/58 (09 Feb 2018 06:00) (85/70 - 154/65)  BP(mean): 73 (09 Feb 2018 06:00) (60 - 85)  RR: 23 (09 Feb 2018 06:00) (17 - 26)  SpO2: 90% (09 Feb 2018 06:00) (88% - 98%)    PHYSICAL EXAM:    Constitutional: NAD, well-developed  HEENT: EOMI, throat clear  Neck: No LAD, supple  Respiratory: CTA and P  Cardiovascular: S1 and S2, RRR, no M  Gastrointestinal: BS+, soft, NT/distended and much less than yesterday, neg HSM,  Extremities: No peripheral edema, neg clubing, cyanosis  Vascular: 2+ peripheral pulses  Neurological: A/O x 3, no focal deficits  Psychiatric: Normal mood, normal affect  Skin: No rashes    LABS:  CBC Full  -  ( 09 Feb 2018 04:52 )  WBC Count : 21.9 K/uL  Hemoglobin : 10.8 g/dL  Hematocrit : 32.4 %  Platelet Count - Automated : 452 K/uL  Mean Cell Volume : 93.8 fl  Mean Cell Hemoglobin : 31.1 pg  Mean Cell Hemoglobin Concentration : 33.2 gm/dL  Auto Neutrophil # : x  Auto Lymphocyte # : x  Auto Monocyte # : x  Auto Eosinophil # : x  Auto Basophil # : x  Auto Neutrophil % : x  Auto Lymphocyte % : x  Auto Monocyte % : x  Auto Eosinophil % : x  Auto Basophil % : x    02-09    137  |  98  |  30<H>  ----------------------------<  126<H>  3.5   |  34<H>  |  0.74    Ca    8.5      09 Feb 2018 04:52  Phos  2.8     02-09  Mg     2.1     02-09              RADIOLOGY & ADDITIONAL STUDIES:  < from: Xray Barium Enema (02.08.18 @ 12:01) >    EXAM:  BARIUM ENEMA COLON                            PROCEDURE DATE:  02/08/2018          INTERPRETATION:  Clinical information: Likely colonic ileus. The patient   is status post hip fracture repair and is markedly distended.     TECHNIQUE: A water-soluble contrast enema was performed utilizing   Gastrografin, fluoroscopic guidance, fluoroscopic spot films and overhead   radiographs. The rectum was intubated and a retention balloon inflated.    COMPARISON:  CT abdomen/pelvis from earlier on the same day    FINDINGS:  Contrast opacified the rectum, sigmoid colon and descending   colon. There is no evidence of distal large bowel obstruction. Note is   made of marked colonic distention.    IMPRESSION:   No evidence of distal large bowel bowelobstruction.    Markedly dilated colon compatible with ileus.    Findings discussed with Dr. Costello on February 08, 2018.        < end of copied text >

## 2018-02-09 NOTE — PROGRESS NOTE ADULT - ASSESSMENT
This is a 72 year old male s/p fall on about Jan 23rd presented to [facility] on 2/3/18 with chronic pain to hip, decrease appetite. Pt found to have a fx rt hip, now s/p right hip brynn on 2-5-18.  Pt has high thrombosis risks and requires anticoagualtion prophylaxis. Discussed with pt the use of Lovenox for his anticoagulation med. Pt is agreeable. 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 No bowel obstruction, remains to be NPO and possible downgrade to med-surg this afternoon.     : c/w lovenox 40mg SQ daily for four weeks post procedure. Please continue to rotate SQ injection sites (right side continues to be ecchymotic)  : daily cbc/bmp  : monitor H/H (consider transfusion if hgb < 7 or drops 3 units or more from baseline)  : LE Venodynes  : increase mobility as tolerated    Will continue to monitor.

## 2018-02-09 NOTE — PROGRESS NOTE ADULT - ASSESSMENT
Kalyan Syndrome  NSVT, PAT  s/p Right hip surgery  CAD,  JERARDO of LCx 3/13  Chr HFpEF  COPD  HLD  HTN  KENNY  PAD  Spinal stenosis  OA    Suggest:    Continue  current meds.  Continue PO beta blockers. Change to IV route if PO not acceptable.  May hold Norvasc to improve bowel motility  I shall f/u PRN now.

## 2018-02-09 NOTE — PROGRESS NOTE ADULT - ASSESSMENT
A/P: 72M s/p R Hip brynn POD 4  Analgesia  DVT ppx  WBAT, posterior hip dislocation precautions  PT  FU Labs  DC planning

## 2018-02-09 NOTE — PROGRESS NOTE ADULT - SUBJECTIVE AND OBJECTIVE BOX
Pt S/E at bedside, no acute events overnight, pain controlled, constipation improved    Vital Signs Last 24 Hrs  T(C): 36.3 (09 Feb 2018 04:00), Max: 36.9 (08 Feb 2018 13:00)  T(F): 97.4 (09 Feb 2018 04:00), Max: 98.5 (08 Feb 2018 13:00)  HR: 104 (09 Feb 2018 06:00) (88 - 120)  BP: 120/58 (09 Feb 2018 06:00) (85/70 - 154/65)  BP(mean): 73 (09 Feb 2018 06:00) (60 - 85)  RR: 23 (09 Feb 2018 06:00) (17 - 26)  SpO2: 90% (09 Feb 2018 06:00) (88% - 98%)    Gen: NAD, AAOx3    Right Lower Extremity:  Dressing clean dry intact  +EHL/FHL/TA/GS  SILT L3-S1  +DP/PT Pulses  Compartments soft  No calf TTP B/L

## 2018-02-09 NOTE — PROGRESS NOTE ADULT - SUBJECTIVE AND OBJECTIVE BOX
S/p Hip hemiarthroplasty. Uneventful surgery    Today, abdominal distension and pain are better. No angina.     PREVIOUS CARDIAC WORKUP:      Echo:  10/16 Nml EF, mild LVH, diastolic dysfunction, trace MR  Stress Test:  1/17 No ichemia    Allergies:   No Known Allergies      MEDICATIONS  (STANDING):  amLODIPine   Tablet 5 milliGRAM(s) Oral daily  ascorbic acid 500 milliGRAM(s) Oral two times a day  atorvastatin 20 milliGRAM(s) Oral at bedtime  ciprofloxacin   IVPB 400 milliGRAM(s) IV Intermittent every 12 hours  ciprofloxacin   IVPB      docusate sodium 100 milliGRAM(s) Oral three times a day  enoxaparin Injectable 40 milliGRAM(s) SubCutaneous every 24 hours  finasteride 5 milliGRAM(s) Oral daily  folic acid 1 milliGRAM(s) Oral daily  isosorbide   mononitrate ER Tablet (IMDUR) 60 milliGRAM(s) Oral daily  lactated ringers. 1000 milliLiter(s) (75 mL/Hr) IV Continuous <Continuous>  lidocaine   Patch 1 Patch Transdermal daily  lisinopril 20 milliGRAM(s) Oral daily  metoprolol succinate ER 25 milliGRAM(s) Oral daily  metroNIDAZOLE  IVPB 500 milliGRAM(s) IV Intermittent every 8 hours  multivitamin 1 Tablet(s) Oral daily  pantoprazole    Tablet 40 milliGRAM(s) Oral daily  polyethylene glycol 3350 17 Gram(s) Oral daily  ranolazine 500 milliGRAM(s) Oral two times a day  tamsulosin 0.4 milliGRAM(s) Oral at bedtime  tiotropium 18 MICROgram(s) Capsule 1 Capsule(s) Inhalation daily    MEDICATIONS  (PRN):  acetaminophen   Tablet 650 milliGRAM(s) Oral every 6 hours PRN For Temp over 38.3 C (100.94 F)  diphenhydrAMINE   Capsule 25 milliGRAM(s) Oral at bedtime PRN Insomnia  ondansetron Injectable 4 milliGRAM(s) IV Push every 6 hours PRN Nausea and/or Vomiting  prochlorperazine   Injectable 5 milliGRAM(s) IV Push every 6 hours PRN Nausea and/or Vomiting  senna 2 Tablet(s) Oral at bedtime PRN Constipation      ROS:     Detailed ten system ROS was performed and was negative except for history as eluded to above.    no fever  no chills  no nausea  no vomiting  no diarrhea  no constipation  no melena  no hematochezia  no chest pain  no palpitations  no sob at rest  no dyspnea on exertion  no cough  no wheezing  no anorexia  no headache  no dizziness  no syncope  no weakness  no myalgia  no dysuria  no polyuria  no hematuria       Vital Signs Last 24 Hrs  T(C): 36.2 (09 Feb 2018 08:00), Max: 36.9 (08 Feb 2018 13:00)  T(F): 97.2 (09 Feb 2018 08:00), Max: 98.5 (08 Feb 2018 13:00)  HR: 99 (09 Feb 2018 09:00) (88 - 120)  BP: 124/47 (09 Feb 2018 09:00) (85/70 - 154/65)  BP(mean): 66 (09 Feb 2018 09:00) (60 - 85)  RR: 23 (09 Feb 2018 09:00) (17 - 26)  SpO2: 93% (09 Feb 2018 09:00) (88% - 98%)    I&O's Summary    08 Feb 2018 07:01  -  09 Feb 2018 07:00  --------------------------------------------------------  IN: 2100 mL / OUT: 200 mL / NET: 1900 mL    09 Feb 2018 07:01  -  09 Feb 2018 09:39  --------------------------------------------------------  IN: 150 mL / OUT: 0 mL / NET: 150 mL        PHYSICAL EXAM:    General:                Comfortable, AAO X 3, in no distress. Obese.  HEENT:                  Atraumatic, PERRLA, EOMI, conjunctiva clear.   Neck:                     Supple, no adenopathy, no thyromegaly, no JVD, no bruit.  Back:                     Symmetric, non tender.  Chest:                    Clear, B/L symmetric air entry, no tachypnea  Heart:                     S1, S2 normal, no gallop, no murmur.  Abdomen:              Soft, non-tender, bowel sounds active. No palpable masses.  Extremities:           no cyanosis, no edema. Peripheral pulses normal.  Skin:                      Skin color, texture normal. No rashes.  Neurologic:            Grossly nonfocal.       INTERPRETATION OF TELEMETRY:  Normal sinus rhythm with no tachy or chris events     ECG:  NSR, no ST T changes of ischemia or infarction.     LABS:                        10.8   21.9  )-----------( 452      ( 09 Feb 2018 04:52 )             32.4     02-09    137  |  98  |  30<H>  ----------------------------<  126<H>  3.5   |  34<H>  |  0.74    Ca    8.5      09 Feb 2018 04:52  Phos  2.8     02-09  Mg     2.1     02-09      RADIOLOGY & ADDITIONAL STUDIES:    Xray Barium Enema (02.08.18 @ 12:01) >  IMPRESSION:  No evidence of distal large bowel bowel obstruction.Markedly dilated colon compatible with ileus.

## 2018-02-10 LAB
ANION GAP SERPL CALC-SCNC: 8 MMOL/L — SIGNIFICANT CHANGE UP (ref 5–17)
BUN SERPL-MCNC: 34 MG/DL — HIGH (ref 7–23)
CALCIUM SERPL-MCNC: 8.8 MG/DL — SIGNIFICANT CHANGE UP (ref 8.5–10.1)
CHLORIDE SERPL-SCNC: 100 MMOL/L — SIGNIFICANT CHANGE UP (ref 96–108)
CO2 SERPL-SCNC: 29 MMOL/L — SIGNIFICANT CHANGE UP (ref 22–31)
CREAT SERPL-MCNC: 0.68 MG/DL — SIGNIFICANT CHANGE UP (ref 0.5–1.3)
GLUCOSE SERPL-MCNC: 123 MG/DL — HIGH (ref 70–99)
HCT VFR BLD CALC: 34.5 % — LOW (ref 39–50)
HGB BLD-MCNC: 11.4 G/DL — LOW (ref 13–17)
MAGNESIUM SERPL-MCNC: 2.3 MG/DL — SIGNIFICANT CHANGE UP (ref 1.6–2.6)
MCHC RBC-ENTMCNC: 31.2 PG — SIGNIFICANT CHANGE UP (ref 27–34)
MCHC RBC-ENTMCNC: 33 GM/DL — SIGNIFICANT CHANGE UP (ref 32–36)
MCV RBC AUTO: 94.6 FL — SIGNIFICANT CHANGE UP (ref 80–100)
PHOSPHATE SERPL-MCNC: 2.1 MG/DL — LOW (ref 2.5–4.5)
PLATELET # BLD AUTO: 452 K/UL — HIGH (ref 150–400)
POTASSIUM SERPL-MCNC: 3.8 MMOL/L — SIGNIFICANT CHANGE UP (ref 3.5–5.3)
POTASSIUM SERPL-SCNC: 3.8 MMOL/L — SIGNIFICANT CHANGE UP (ref 3.5–5.3)
RBC # BLD: 3.64 M/UL — LOW (ref 4.2–5.8)
RBC # FLD: 12.8 % — SIGNIFICANT CHANGE UP (ref 10.3–14.5)
SODIUM SERPL-SCNC: 137 MMOL/L — SIGNIFICANT CHANGE UP (ref 135–145)
WBC # BLD: 20.4 K/UL — HIGH (ref 3.8–10.5)
WBC # FLD AUTO: 20.4 K/UL — HIGH (ref 3.8–10.5)

## 2018-02-10 PROCEDURE — 74176 CT ABD & PELVIS W/O CONTRAST: CPT | Mod: 26

## 2018-02-10 PROCEDURE — 99233 SBSQ HOSP IP/OBS HIGH 50: CPT

## 2018-02-10 RX ORDER — NEOSTIGMINE METHYLSULFATE 1 MG/ML
2 VIAL (ML) INJECTION ONCE
Qty: 0 | Refills: 0 | Status: COMPLETED | OUTPATIENT
Start: 2018-02-10 | End: 2018-02-11

## 2018-02-10 RX ADMIN — POLYETHYLENE GLYCOL 3350 17 GRAM(S): 17 POWDER, FOR SOLUTION ORAL at 11:13

## 2018-02-10 RX ADMIN — Medication 100 MILLIGRAM(S): at 22:21

## 2018-02-10 RX ADMIN — Medication 25 MILLIGRAM(S): at 06:36

## 2018-02-10 RX ADMIN — Medication 1 MILLIGRAM(S): at 11:14

## 2018-02-10 RX ADMIN — SODIUM CHLORIDE 75 MILLILITER(S): 9 INJECTION, SOLUTION INTRAVENOUS at 17:46

## 2018-02-10 RX ADMIN — Medication 62.5 MILLIMOLE(S): at 22:21

## 2018-02-10 RX ADMIN — AMLODIPINE BESYLATE 5 MILLIGRAM(S): 2.5 TABLET ORAL at 05:17

## 2018-02-10 RX ADMIN — TIOTROPIUM BROMIDE 1 CAPSULE(S): 18 CAPSULE ORAL; RESPIRATORY (INHALATION) at 08:39

## 2018-02-10 RX ADMIN — ATORVASTATIN CALCIUM 20 MILLIGRAM(S): 80 TABLET, FILM COATED ORAL at 22:21

## 2018-02-10 RX ADMIN — RANOLAZINE 500 MILLIGRAM(S): 500 TABLET, FILM COATED, EXTENDED RELEASE ORAL at 05:16

## 2018-02-10 RX ADMIN — Medication 500 MILLIGRAM(S): at 05:16

## 2018-02-10 RX ADMIN — Medication 200 MILLIGRAM(S): at 17:34

## 2018-02-10 RX ADMIN — TAMSULOSIN HYDROCHLORIDE 0.4 MILLIGRAM(S): 0.4 CAPSULE ORAL at 22:21

## 2018-02-10 RX ADMIN — Medication 1 TABLET(S): at 11:12

## 2018-02-10 RX ADMIN — FINASTERIDE 5 MILLIGRAM(S): 5 TABLET, FILM COATED ORAL at 11:13

## 2018-02-10 RX ADMIN — Medication 200 MILLIGRAM(S): at 06:35

## 2018-02-10 RX ADMIN — ENOXAPARIN SODIUM 40 MILLIGRAM(S): 100 INJECTION SUBCUTANEOUS at 11:12

## 2018-02-10 RX ADMIN — Medication 100 MILLIGRAM(S): at 05:16

## 2018-02-10 RX ADMIN — PANTOPRAZOLE SODIUM 40 MILLIGRAM(S): 20 TABLET, DELAYED RELEASE ORAL at 11:12

## 2018-02-10 RX ADMIN — ISOSORBIDE MONONITRATE 60 MILLIGRAM(S): 60 TABLET, EXTENDED RELEASE ORAL at 11:12

## 2018-02-10 RX ADMIN — RANOLAZINE 500 MILLIGRAM(S): 500 TABLET, FILM COATED, EXTENDED RELEASE ORAL at 17:46

## 2018-02-10 RX ADMIN — Medication 100 MILLIGRAM(S): at 14:05

## 2018-02-10 RX ADMIN — LISINOPRIL 20 MILLIGRAM(S): 2.5 TABLET ORAL at 05:17

## 2018-02-10 NOTE — PROVIDER CONTACT NOTE (CHANGE IN STATUS NOTIFICATION) - SITUATION
Patient with increased right sided abdominal pain.  Dr. Paz and Dr. Mcgee aware.  CT Scan of abdomen ordered.
Pt. complained of new onset sharp abdominal pain to RUQ after being turned and positioned in bed.

## 2018-02-10 NOTE — PROVIDER CONTACT NOTE (CHANGE IN STATUS NOTIFICATION) - ACTION/TREATMENT ORDERED:
Dr. Mcgee spoke with Dr. Marks for transfer to CCU for Nystegmine medication administration.  Report given to YELENA Miller in CCU.

## 2018-02-10 NOTE — PROGRESS NOTE ADULT - ASSESSMENT
This is a 72 year old male s/p fall on about Jan 23rd presented to [facility] on 2/3/18 with chronic pain to hip, decrease appetite. Pt found to have a fx rt hip, now s/p right hip brynn on 2-5-18.  Pt has high thrombosis risks and requires anticoagualtion prophylaxis. Discussed with pt the use of Lovenox for his anticoagulation med. Pt is agreeable. 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 No bowel obstruction, remains to be NPO and possible downgrade to med-surg 2-9-18    : c/w lovenox 40mg SQ daily for four weeks post procedure. Please continue to rotate SQ injection sites (right side continues to be ecchymotic)  : daily cbc/bmp  : monitor H/H (consider transfusion if hgb < 7 or drops 3 units or more from baseline)  : LE Venodynes  : increase mobility as tolerated    Will continue to monitor.

## 2018-02-10 NOTE — PROGRESS NOTE ADULT - SUBJECTIVE AND OBJECTIVE BOX
HPI: 71 y/o male with a PMHx chronic CHF, COPD on home O2 4 L, emphysema, and spinal stenosis.   He presents to ED s/p fall.   Fall occurred ~ 10 days ago; patient has been very weak, and suffers from arthritis.   He notes chronic Rt hip pain for ~ 4 weeks which causes ambulatory dysfunction.  He was suppose to follow-up with outpatient ortho for MRI.    Reports associated poor appetite with decreased PO intake.   Denies nausea, vomiting, diarrhea, fevers , chills, CP or dypsnea.   No recent illnesses, no sick contacts.    ED course:  Xray imaging reportedly with Rt hip fracture  Venous doppler US 2/3 : no evidence of deep venous thrombosis in either leg.  Left Baker's cyst. (04 Feb 2018 01:53)    2/10/2018 - Patient seen and examined at bedside.  s/p R hip hemiarthroplasty POD 5.  Patient uncomfortable, belly distended. Has failed conservative treatment for Kaylan Syndrome and will likely require Neostigmine for 2nd dose.  Hemodynamically stable.    MEDICATIONS  (STANDING):  amLODIPine   Tablet 5 milliGRAM(s) Oral daily  ascorbic acid 500 milliGRAM(s) Oral two times a day  atorvastatin 20 milliGRAM(s) Oral at bedtime  ciprofloxacin   IVPB 400 milliGRAM(s) IV Intermittent every 12 hours  ciprofloxacin   IVPB      docusate sodium 100 milliGRAM(s) Oral three times a day  enoxaparin Injectable 40 milliGRAM(s) SubCutaneous every 24 hours  finasteride 5 milliGRAM(s) Oral daily  folic acid 1 milliGRAM(s) Oral daily  isosorbide   mononitrate ER Tablet (IMDUR) 60 milliGRAM(s) Oral daily  lactated ringers. 1000 milliLiter(s) (75 mL/Hr) IV Continuous <Continuous>  lidocaine   Patch 1 Patch Transdermal daily  lisinopril 20 milliGRAM(s) Oral daily  metoprolol succinate ER 25 milliGRAM(s) Oral daily  metroNIDAZOLE  IVPB 500 milliGRAM(s) IV Intermittent every 8 hours  multivitamin 1 Tablet(s) Oral daily  neostigmine Injectable 2 milliGRAM(s) IV Push once  pantoprazole    Tablet 40 milliGRAM(s) Oral daily  polyethylene glycol 3350 17 Gram(s) Oral daily  ranolazine 500 milliGRAM(s) Oral two times a day  sodium phosphate IVPB 15 milliMole(s) IV Intermittent once  tamsulosin 0.4 milliGRAM(s) Oral at bedtime  tiotropium 18 MICROgram(s) Capsule 1 Capsule(s) Inhalation daily    MEDICATIONS  (PRN):  acetaminophen   Tablet 650 milliGRAM(s) Oral every 6 hours PRN For Temp over 38.3 C (100.94 F)  diphenhydrAMINE   Capsule 25 milliGRAM(s) Oral at bedtime PRN Insomnia  ondansetron Injectable 4 milliGRAM(s) IV Push every 6 hours PRN Nausea and/or Vomiting  prochlorperazine   Injectable 5 milliGRAM(s) IV Push every 6 hours PRN Nausea and/or Vomiting  senna 2 Tablet(s) Oral at bedtime PRN Constipation    Vital Signs Last 24 Hrs  T(C): 36.2 (10 Feb 2018 16:28), Max: 37 (10 Feb 2018 05:00)  T(F): 97.2 (10 Feb 2018 16:28), Max: 98.6 (10 Feb 2018 05:00)  HR: 121 (10 Feb 2018 18:41) (89 - 121)  BP: 152/66 (10 Feb 2018 18:41) (114/56 - 164/66)  BP(mean): 91 (10 Feb 2018 14:13) (68 - 91)  RR: 26 (10 Feb 2018 18:41) (16 - 26)  SpO2: 90% (10 Feb 2018 18:41) (90% - 94%)    GEN: NAD, uncomfortable, resting in bed  HEENT: NC/AT, PERRLA, MMM  CV: S1S2, RRR, no mumur  RESP: good air movement, CTABL, no rales, rhonchi or wheezing  ABD: hypoactive BS, distended, mild generalized tenderness, no guarding  EXT: +2 radial and pedal pulses, no edema, no calve tenderness  SKIN: No Rashes or Ulcers  NEURO:  CN 2-12 Grossly intact, no motor or sensory deficits     LABS:                     11.8   20.9  )-----------( 455      ( 08 Feb 2018 05:50 )             35.3     08 Feb 2018 05:50    134    |  97     |  28     ----------------------------<  137    4.0     |  29     |  0.81     Ca    8.8        08 Feb 2018 05:50  Phos  3.1       08 Feb 2018 05:50  Mg     2.2       08 Feb 2018 05:50    PT/INR - ( 07 Feb 2018 05:58 )   PT: 15.6 sec;   INR: 1.43 ratio

## 2018-02-10 NOTE — PROGRESS NOTE ADULT - ASSESSMENT
A/P: 72M s/p R Hip brynn POD 5  Analgesia  DVT ppx  WBAT, posterior dislocation precautions  PT  FU labs  DC planning

## 2018-02-10 NOTE — PROGRESS NOTE ADULT - SUBJECTIVE AND OBJECTIVE BOX
c/c: Right hip pain      HPI: 73 y/o male with a PMHx chronic diastolic CHF, COPD on home O2 4 L, HTN, HLD, CAD s/p stent to LCx, spinal stenosis, obesity, lindy, PAD who presented to the ER 10 days after a fall with right hip pain. He had hip pain for about 4 weeks pta. He fell 10 days pta and came to the ER for difficulty ambulating/right hip pain and generalized weakness. He was admitted with right hip fracture. He was seen by cardiology and subsequently underwent right hip hemiarthroplasty. Post op course complicated by ileus. He was unable to tolerate NGT. He was seen by GI, underwent gastrograffin enema and also was administered neostigmine. He was monitored in icu. He was eventually transferred to .   Hospitalist service asked to resume care.    2/10: pt seen and examined this am. States he had multiple bms with enema, then had a small bm yesterday. Also passing gas. No n/v/abd pain. Wants to eat.     Review of system- All 10 systems reviewed and is as per HPI otherwise negative.     VITALS  T(C): 36.6 (02-10-18 @ 11:08), Max: 37 (02-10-18 @ 05:00)  HR: 98 (02-10-18 @ 11:08) (89 - 98)  BP: 114/56 (02-10-18 @ 11:08) (109/63 - 140/61)  RR: 16 (02-10-18 @ 11:08) (16 - 18)  SpO2: 92% (02-10-18 @ 11:08) (92% - 93%)      PHYSICAL EXAM:    GENERAL: Obese, elderly male, sitting in chair,  Comfortable, no acute distress  HEAD:  Atraumatic, Normocephalic  EYES: EOMI, PERRLA  HEENT: Moist mucous membranes  NECK: Supple, No JVD  NERVOUS SYSTEM:  Alert & Oriented X3, non focal  CHEST/LUNG: Clear to auscultation bilaterally  HEART: s1, s2+, Regular rate and rhythm  ABDOMEN: Soft, distended, non tender, BS hypoactive, but present  GENITOURINARY- Voiding, no palpable bladder  EXTREMITIES:  No clubbing, cyanosis, or edema  MUSCULOSKELETAL- right hip dressing intact.   SKIN-no rash        LABS:                        11.4   20.4  )-----------( 452      ( 10 Feb 2018 06:17 )             34.5     02-10    137  |  100  |  34<H>  ----------------------------<  123<H>  3.8   |  29  |  0.68    Ca    8.8      10 Feb 2018 06:17  Phos  2.1     02-10  Mg     2.3     02-10    CT Abdomen and Pelvis w/ Oral Cont (02.07.18 @ 22:08) >    IMPRESSION:     Colonic ileus without evidence for stricture or obstructing lesion.       MEDS  acetaminophen   Tablet 650 milliGRAM(s) Oral every 6 hours PRN  amLODIPine   Tablet 5 milliGRAM(s) Oral daily  ascorbic acid 500 milliGRAM(s) Oral two times a day  atorvastatin 20 milliGRAM(s) Oral at bedtime  ciprofloxacin   IVPB 400 milliGRAM(s) IV Intermittent every 12 hours  ciprofloxacin   IVPB      diphenhydrAMINE   Capsule 25 milliGRAM(s) Oral at bedtime PRN  docusate sodium 100 milliGRAM(s) Oral three times a day  enoxaparin Injectable 40 milliGRAM(s) SubCutaneous every 24 hours  finasteride 5 milliGRAM(s) Oral daily  folic acid 1 milliGRAM(s) Oral daily  isosorbide   mononitrate ER Tablet (IMDUR) 60 milliGRAM(s) Oral daily  lactated ringers. 1000 milliLiter(s) IV Continuous <Continuous>  lidocaine   Patch 1 Patch Transdermal daily  lisinopril 20 milliGRAM(s) Oral daily  metoprolol succinate ER 25 milliGRAM(s) Oral daily  metroNIDAZOLE  IVPB 500 milliGRAM(s) IV Intermittent every 8 hours  multivitamin 1 Tablet(s) Oral daily  ondansetron Injectable 4 milliGRAM(s) IV Push every 6 hours PRN  pantoprazole    Tablet 40 milliGRAM(s) Oral daily  polyethylene glycol 3350 17 Gram(s) Oral daily  prochlorperazine   Injectable 5 milliGRAM(s) IV Push every 6 hours PRN  ranolazine 500 milliGRAM(s) Oral two times a day  senna 2 Tablet(s) Oral at bedtime PRN  sodium phosphate IVPB 15 milliMole(s) IV Intermittent once  tamsulosin 0.4 milliGRAM(s) Oral at bedtime  tiotropium 18 MICROgram(s) Capsule 1 Capsule(s) Inhalation daily    ASSESSMENT AND PLAN:  72M, pmh as above a/w:    1. Right hip fracture s/p Right hip hemiarthroplasty:  -pod#5  -pain control  -physical therapy  -incentive spirometry    2. Post op ileus:  -s/p gastrograffin enema/neostigmine  -NPO  -IVF  -Antiemetics prn  -leukocytosis noted.  -replete hypophosphatemia    3. Chronic diastolic CHF:  -stable.  -lasix on hold    4. HTN:  -on bb, norvasc, ace-i    5. h/o CAD/Stent:  -on ranexa, imdur, bb, statin  -?antiplatelet    6. BPH:  -on proscar, flomax    7. COPD with chronic respiratory failure:  -stable.  -spiriva  -prn nebs    8. DVT px    D/w Dr. Ribera RN

## 2018-02-10 NOTE — PROGRESS NOTE ADULT - ASSESSMENT
Hip fracture–status post repair. Would minimize narcotics.    Progressive colonic ileus not responded to conservative management and is not been able to tolerate NG tube placement   Gastrografin enema and  neostigmine.  improving but remains very distended with inc WBC  inc activity, DW RN and hospitalis    please rotate pta and place in chair, ambulate as tolerated  place pt on stomach    avoid narcotics and encourage movement    Continue antibiotics.

## 2018-02-10 NOTE — PROGRESS NOTE ADULT - ASSESSMENT
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  Will need to be monitored for bradycardia  - PT after the ileus resloves    Attending addendum:    Pt seen with Dr Russell in CCU  Pt with marked distension  d/w Dr Costello who suggests re-dosing neostigmine as well as NGT to suction    Pt adamantly refusing NGT despite explanation of need and benefit - including risk of colonic perforation requiring surgery +/- morbidity +/- mortality  All questions answered    Neostigmine 2mg IVP x 1 administered by slow IVP over 5 min.    Pt tolerated without issue    Critical care time 30 min excluding teaching, routine updates, procedures.

## 2018-02-10 NOTE — PROGRESS NOTE ADULT - SUBJECTIVE AND OBJECTIVE BOX
HPI: 73 y/o male with a PMHx chronic CHF, COPD on home O2 4 L, emphysema, and spinal stenosis. He presents to ED s/p fall. Fall occurred ~ 10 days ago; patient has been very weak, and suffers from arthritis.   He notes chronic Rt hip pain for ~ 4 weeks which causes ambulatory dysfunction.  He was suppose to follow-up with outpatient ortho for MRI.    Reports associated poor appetite with decreased PO intake.   Denies nausea, vomiting, diarrhea, fevers , chills, CP or dypsnea.   No recent illnesses, no sick contacts.    ED course:  Xray imaging reportedly with Rt hip fracture  Venous doppler US 2/3 : no evidence of deep venous thrombosis in either leg.  Left Baker's cyst. (2018 01:53)    Patient is a 72y old  Male who presents with a chief complaint of Limb pain (2018 08:35)  pt s/p right hip brynn on 18.    Consulted by Dr. Rahul Elkins for VTE prophylaxis, risk stratification, and anticoagulation management.    PAST MEDICAL & SURGICAL HISTORY:  Spinal stenosis  CHF (congestive heart failure)  Emphysema  COPD (chronic obstructive pulmonary disease)  No significant past surgical history    Caprini VTE Risk Score:9    IMPROVE Bleeding Risk Score: 2.5    Falls Risk:   High (x  )  Mod (  )  Low (  )    EBL:  150ml  cr cl: 162.2  18 Pt sen at bedside.  Discussed his anticoagulation with Lovenox inj for 4 weeks.  Questions answered will reinforce as needed.  Pt concerned about no having a bm for a few days and  in his appetite for a while. Not sure if he lost wt.   18: Pt seen at bedside, OOB to chair. Reports that he continues to be constipated, was able to make "small bowel success," but remains to have distended/firm abdomen. Continues to have 4LNC, and states that he is feeling "oozy," and would like to go back in bed. RN is aware, but waiting for PT. Pt noted to have moderate size bruising in his RLQ abdomen. Advised RN to administer medication to his LLQ.   18 Pt seen at bedside in IC.  Pt dx with Kaylan Syndrome.  Had barium enema and received neostigmine iv.  Pt states he feels much better abd smaller and softer. discussed his anticoagulation with heparin sq mo concerns.  18: Pt seen at bedside in ICU. Reports of continuing to feel better, had successful large BM yesterday. He continues to be NPO. Pt is being downgraded to med-surg today.   2-10-18  pt seen on 2n feeling much better discussed his anticoagulation with Lovenox no concerns.  FAMILY HISTORY:  No pertinent family history in first degree relatives    Denies any personal or familial history of clotting or bleeding disorders.    Allergies    No Known Allergies    Intolerances      REVIEW OF SYSTEMS    (  )Fever	     (  )Constipation	(  )SOB				(  )Headache	(  )Dysuria  (  )Chills	     (  )Melena	(  )Dyspnea present on exertion	                    (  )Dizziness                    (  )Polyuria  (  )Nausea	     (  )Hematochezia	(  )Cough			                    (  )Syncope   	(  )Hematuria  (  )Vomiting    (  )Chest Pain	(  )Wheezing			(  )Weakness  (  )Diarrhea     (  )Palpitations	(  )Anorexia			(  )Myalgia    All other review of systems negative: Yes    PHYSICAL EXAM:    Constitutional: Appears Well    Neurological: A& O x 3    Skin: Warm    Respiratory and Thorax: normal effort; Breath sounds: normal; No rales/wheezing/rhonchi, 4LNC supplemental O2  	  Cardiovascular: S1, S2, regular, NMBR	    Gastrointestinal: BS + x 4Q, nontender, distended and firm,  RLQ moderate size ecchymosis	    Genitourinary:  Bladder nondistended, nontender    Musculoskeletal:   General Right:   no muscle/joint tenderness,   normal tone, no joint swelling,   ROM: limited	    General Left:   no muscle/joint tenderness,   normal tone, no joint swelling,   ROM: full    Hip:  Right: Aquacel dressing CDI      Lower extrems:   Right: no calf tenderness              negative rolando's sign               + pedal pulses    Left:   no calf tenderness              negative rolando's sign               + pedal pulses    MEDICATIONS  (STANDING):  amLODIPine   Tablet 5 milliGRAM(s) Oral daily  ascorbic acid 500 milliGRAM(s) Oral two times a day  atorvastatin 20 milliGRAM(s) Oral at bedtime  ciprofloxacin   IVPB 400 milliGRAM(s) IV Intermittent every 12 hours  ciprofloxacin   IVPB      docusate sodium 100 milliGRAM(s) Oral three times a day  enoxaparin Injectable 40 milliGRAM(s) SubCutaneous every 24 hours  finasteride 5 milliGRAM(s) Oral daily  folic acid 1 milliGRAM(s) Oral daily  isosorbide   mononitrate ER Tablet (IMDUR) 60 milliGRAM(s) Oral daily  lactated ringers. 1000 milliLiter(s) IV Continuous <Continuous>  lidocaine   Patch 1 Patch Transdermal daily  lisinopril 20 milliGRAM(s) Oral daily  metoprolol succinate ER 25 milliGRAM(s) Oral daily  metroNIDAZOLE  IVPB 500 milliGRAM(s) IV Intermittent every 8 hours  multivitamin 1 Tablet(s) Oral daily  pantoprazole    Tablet 40 milliGRAM(s) Oral daily  polyethylene glycol 3350 17 Gram(s) Oral daily  ranolazine 500 milliGRAM(s) Oral two times a day  tamsulosin 0.4 milliGRAM(s) Oral at bedtime  tiotropium 18 MICROgram(s) Capsule 1 Capsule(s) Inhalation daily                         11.4   20.4  )-----------( 452      ( 10 Feb 2018 06:17 )             34.5       0210    137  |  100  |  34<H>  ----------------------------<  123<H>  3.8   |  29  |  0.68    Ca    8.8      10 Feb 2018 06:17  Phos  2.1     -10  Mg     2.3     0210                             10.8   21.9  )-----------( 452      ( 2018 04:52 )             32.4           137  |  98  |  30<H>  ----------------------------<  126<H>  3.5   |  34<H>  |  0.74    Ca    8.5      2018 04:52  Phos  2.8       Mg     2.1           Vital Signs Last 24 Hrs  T(C): 36.6 (02-10-18 @ 11:08), Max: 37 (02-10-18 @ 05:00)  T(F): 97.9 (02-10-18 @ 11:08), Max: 98.6 (02-10-18 @ 05:00)  HR: 98 (02-10-18 @ 11:08) (87 - 98)  BP: 114/56 (02-10-18 @ 11:08) (109/63 - 140/61)  BP(mean): 68 (02-10-18 @ 11:08) (67 - 76)  RR: 16 (02-10-18 @ 11:08) (16 - 23)  SpO2: 92% (02-10-18 @ 11:08) (92% - 95%)  DVT Prophylaxis:  LMWH                   (x )  Heparin SQ           (  )  Coumadin             (  )  Xarelto                  (  )  Eliquis                   (  )  Venodynes           ( x )  Ambulation          ( x )  UFH                       (  )  Contraindicated  (  )

## 2018-02-10 NOTE — CHART NOTE - NSCHARTNOTEFT_GEN_A_CORE
called to see pt with inc abd pain after turned over and he felt like his stom developed inc bd pain   pain non R side and crampy, severe      CT reviewed with Dr Reeves and cont colonisc ileus, similar to prior CT      VS noted  R abd tenderness with pos but dec BS    transfer ICU and neostigmine  A/B?R and decompression ptions DW pt  DW intensivist and pt and nursing staff  MOM left for hospitalist, Dr Paz

## 2018-02-10 NOTE — PROGRESS NOTE ADULT - SUBJECTIVE AND OBJECTIVE BOX
Patient is a 72y old  Male who presents with a chief complaint of Limb pain (06 Feb 2018 08:35)      HPI:  73 y/o male with a PMHx chronic CHF, COPD on home O2 4 L, emphysema, and spinal stenosis.   He presents to ED s/p fall.   Fall occurred ~ 10 days ago; patient has been very weak, and suffers from arthritis.   He notes chronic Rt hip pain for ~ 4 weeks which causes ambulatory dysfunction.  He was suppose to follow-up with outpatient ortho for MRI.    Reports associated poor appetite with decreased PO intake.   Denies nausea, vomiting, diarrhea, fevers , chills, CP or dypsnea.   No recent illnesses, no sick contacts.    ED course:  Xray imaging reportedly with Rt hip fracture  Venous doppler US 2/3 : no evidence of deep venous thrombosis in either leg.  Left Baker's cyst. (04 Feb 2018 01:53)      pt comf  moved from bed to chair with help  pos flatus but remains very distended, neg abd pain  neg N V      PAST MEDICAL & SURGICAL HISTORY:  Spinal stenosis  CHF (congestive heart failure)  Emphysema  COPD (chronic obstructive pulmonary disease)  No significant past surgical history      MEDICATIONS  (STANDING):  amLODIPine   Tablet 5 milliGRAM(s) Oral daily  ascorbic acid 500 milliGRAM(s) Oral two times a day  atorvastatin 20 milliGRAM(s) Oral at bedtime  ciprofloxacin   IVPB 400 milliGRAM(s) IV Intermittent every 12 hours  ciprofloxacin   IVPB      docusate sodium 100 milliGRAM(s) Oral three times a day  enoxaparin Injectable 40 milliGRAM(s) SubCutaneous every 24 hours  finasteride 5 milliGRAM(s) Oral daily  folic acid 1 milliGRAM(s) Oral daily  isosorbide   mononitrate ER Tablet (IMDUR) 60 milliGRAM(s) Oral daily  lactated ringers. 1000 milliLiter(s) (75 mL/Hr) IV Continuous <Continuous>  lidocaine   Patch 1 Patch Transdermal daily  lisinopril 20 milliGRAM(s) Oral daily  metoprolol succinate ER 25 milliGRAM(s) Oral daily  metroNIDAZOLE  IVPB 500 milliGRAM(s) IV Intermittent every 8 hours  multivitamin 1 Tablet(s) Oral daily  pantoprazole    Tablet 40 milliGRAM(s) Oral daily  polyethylene glycol 3350 17 Gram(s) Oral daily  ranolazine 500 milliGRAM(s) Oral two times a day  sodium phosphate IVPB 15 milliMole(s) IV Intermittent once  tamsulosin 0.4 milliGRAM(s) Oral at bedtime  tiotropium 18 MICROgram(s) Capsule 1 Capsule(s) Inhalation daily    MEDICATIONS  (PRN):  acetaminophen   Tablet 650 milliGRAM(s) Oral every 6 hours PRN For Temp over 38.3 C (100.94 F)  diphenhydrAMINE   Capsule 25 milliGRAM(s) Oral at bedtime PRN Insomnia  ondansetron Injectable 4 milliGRAM(s) IV Push every 6 hours PRN Nausea and/or Vomiting  prochlorperazine   Injectable 5 milliGRAM(s) IV Push every 6 hours PRN Nausea and/or Vomiting  senna 2 Tablet(s) Oral at bedtime PRN Constipation      Allergies    No Known Allergies    Intolerances        SOCIAL HISTORY:NC    FAMILY HISTORY:  No pertinent family history in first degree relatives      REVIEW OF SYSTEMS:    CONSTITUTIONAL: No weakness, fevers or chills  EYES/ENT: No visual changes;  No vertigo or throat pain   NECK: No pain or stiffness  RESPIRATORY: No cough, wheezing, hemoptysis; No shortness of breath  CARDIOVASCULAR: No chest pain or palpitations  GENITOURINARY: No dysuria, frequency or hematuria  NEUROLOGICAL: No numbness or weakness  SKIN: No itching, burning, rashes, or lesions   All other review of systems is negative unless indicated above.    Vital Signs Last 24 Hrs  T(C): 36.6 (10 Feb 2018 11:08), Max: 37 (10 Feb 2018 05:00)  T(F): 97.9 (10 Feb 2018 11:08), Max: 98.6 (10 Feb 2018 05:00)  HR: 98 (10 Feb 2018 11:08) (89 - 98)  BP: 114/56 (10 Feb 2018 11:08) (109/63 - 140/61)  BP(mean): 68 (10 Feb 2018 11:08) (68 - 68)  RR: 16 (10 Feb 2018 11:08) (16 - 18)  SpO2: 92% (10 Feb 2018 11:08) (92% - 93%)    PHYSICAL EXAM:    Constitutional: NAD, well-developed  HEENT: EOMI, throat clear  Neck: No LAD, supple  Respiratory: CTA and P  Cardiovascular: S1 and S2, RRR, no M  Gastrointestinal: BS+, soft, NT/distended and tympanitic, neg HSM,  Extremities: No peripheral edema, neg clubing, cyanosis  Vascular: 2+ peripheral pulses  Neurological: A/O x 3, no focal deficits  Psychiatric: Normal mood, normal affect  Skin: No rashes    LABS:  CBC Full  -  ( 10 Feb 2018 06:17 )  WBC Count : 20.4 K/uL  Hemoglobin : 11.4 g/dL  Hematocrit : 34.5 %  Platelet Count - Automated : 452 K/uL  Mean Cell Volume : 94.6 fl  Mean Cell Hemoglobin : 31.2 pg  Mean Cell Hemoglobin Concentration : 33.0 gm/dL  Auto Neutrophil # : x  Auto Lymphocyte # : x  Auto Monocyte # : x  Auto Eosinophil # : x  Auto Basophil # : x  Auto Neutrophil % : x  Auto Lymphocyte % : x  Auto Monocyte % : x  Auto Eosinophil % : x  Auto Basophil % : x    02-10    137  |  100  |  34<H>  ----------------------------<  123<H>  3.8   |  29  |  0.68    Ca    8.8      10 Feb 2018 06:17  Phos  2.1     02-10  Mg     2.3     02-10              RADIOLOGY & ADDITIONAL STUDIES:

## 2018-02-11 LAB
ANION GAP SERPL CALC-SCNC: 7 MMOL/L — SIGNIFICANT CHANGE UP (ref 5–17)
BASOPHILS # BLD AUTO: 0.1 K/UL — SIGNIFICANT CHANGE UP (ref 0–0.2)
BASOPHILS NFR BLD AUTO: 0.7 % — SIGNIFICANT CHANGE UP (ref 0–2)
BUN SERPL-MCNC: 32 MG/DL — HIGH (ref 7–23)
CALCIUM SERPL-MCNC: 8.3 MG/DL — LOW (ref 8.5–10.1)
CHLORIDE SERPL-SCNC: 100 MMOL/L — SIGNIFICANT CHANGE UP (ref 96–108)
CO2 SERPL-SCNC: 31 MMOL/L — SIGNIFICANT CHANGE UP (ref 22–31)
CREAT SERPL-MCNC: 0.65 MG/DL — SIGNIFICANT CHANGE UP (ref 0.5–1.3)
EOSINOPHIL # BLD AUTO: 0.1 K/UL — SIGNIFICANT CHANGE UP (ref 0–0.5)
EOSINOPHIL NFR BLD AUTO: 0.4 % — SIGNIFICANT CHANGE UP (ref 0–6)
GLUCOSE SERPL-MCNC: 155 MG/DL — HIGH (ref 70–99)
HCT VFR BLD CALC: 35 % — LOW (ref 39–50)
HGB BLD-MCNC: 11.7 G/DL — LOW (ref 13–17)
LYMPHOCYTES # BLD AUTO: 0.5 K/UL — LOW (ref 1–3.3)
LYMPHOCYTES # BLD AUTO: 3.3 % — LOW (ref 13–44)
MAGNESIUM SERPL-MCNC: 2.2 MG/DL — SIGNIFICANT CHANGE UP (ref 1.6–2.6)
MCHC RBC-ENTMCNC: 31.4 PG — SIGNIFICANT CHANGE UP (ref 27–34)
MCHC RBC-ENTMCNC: 33.4 GM/DL — SIGNIFICANT CHANGE UP (ref 32–36)
MCV RBC AUTO: 94.1 FL — SIGNIFICANT CHANGE UP (ref 80–100)
MONOCYTES # BLD AUTO: 0.8 K/UL — SIGNIFICANT CHANGE UP (ref 0–0.9)
MONOCYTES NFR BLD AUTO: 5.6 % — SIGNIFICANT CHANGE UP (ref 2–14)
NEUTROPHILS # BLD AUTO: 13.3 K/UL — HIGH (ref 1.8–7.4)
NEUTROPHILS NFR BLD AUTO: 90 % — HIGH (ref 43–77)
PHOSPHATE SERPL-MCNC: 3.6 MG/DL — SIGNIFICANT CHANGE UP (ref 2.5–4.5)
PLATELET # BLD AUTO: 514 K/UL — HIGH (ref 150–400)
POTASSIUM SERPL-MCNC: 3.7 MMOL/L — SIGNIFICANT CHANGE UP (ref 3.5–5.3)
POTASSIUM SERPL-SCNC: 3.7 MMOL/L — SIGNIFICANT CHANGE UP (ref 3.5–5.3)
RBC # BLD: 3.71 M/UL — LOW (ref 4.2–5.8)
RBC # FLD: 13 % — SIGNIFICANT CHANGE UP (ref 10.3–14.5)
SODIUM SERPL-SCNC: 138 MMOL/L — SIGNIFICANT CHANGE UP (ref 135–145)
WBC # BLD: 14.8 K/UL — HIGH (ref 3.8–10.5)
WBC # FLD AUTO: 14.8 K/UL — HIGH (ref 3.8–10.5)

## 2018-02-11 RX ORDER — MINERAL OIL
133 OIL (ML) MISCELLANEOUS
Qty: 0 | Refills: 0 | Status: DISCONTINUED | OUTPATIENT
Start: 2018-02-11 | End: 2018-02-13

## 2018-02-11 RX ADMIN — POLYETHYLENE GLYCOL 3350 17 GRAM(S): 17 POWDER, FOR SOLUTION ORAL at 08:21

## 2018-02-11 RX ADMIN — PANTOPRAZOLE SODIUM 40 MILLIGRAM(S): 20 TABLET, DELAYED RELEASE ORAL at 08:22

## 2018-02-11 RX ADMIN — AMLODIPINE BESYLATE 5 MILLIGRAM(S): 2.5 TABLET ORAL at 06:18

## 2018-02-11 RX ADMIN — Medication 100 MILLIGRAM(S): at 06:18

## 2018-02-11 RX ADMIN — Medication 500 MILLIGRAM(S): at 16:49

## 2018-02-11 RX ADMIN — Medication 200 MILLIGRAM(S): at 06:18

## 2018-02-11 RX ADMIN — Medication 1 TABLET(S): at 08:22

## 2018-02-11 RX ADMIN — Medication 100 MILLIGRAM(S): at 21:43

## 2018-02-11 RX ADMIN — ISOSORBIDE MONONITRATE 60 MILLIGRAM(S): 60 TABLET, EXTENDED RELEASE ORAL at 08:22

## 2018-02-11 RX ADMIN — Medication 100 MILLIGRAM(S): at 12:21

## 2018-02-11 RX ADMIN — Medication 2 MILLIGRAM(S): at 08:17

## 2018-02-11 RX ADMIN — Medication 200 MILLIGRAM(S): at 16:49

## 2018-02-11 RX ADMIN — LISINOPRIL 20 MILLIGRAM(S): 2.5 TABLET ORAL at 06:18

## 2018-02-11 RX ADMIN — SODIUM CHLORIDE 75 MILLILITER(S): 9 INJECTION, SOLUTION INTRAVENOUS at 16:49

## 2018-02-11 RX ADMIN — FINASTERIDE 5 MILLIGRAM(S): 5 TABLET, FILM COATED ORAL at 08:23

## 2018-02-11 RX ADMIN — Medication 1 MILLIGRAM(S): at 08:22

## 2018-02-11 RX ADMIN — LIDOCAINE 1 PATCH: 4 CREAM TOPICAL at 08:22

## 2018-02-11 RX ADMIN — ENOXAPARIN SODIUM 40 MILLIGRAM(S): 100 INJECTION SUBCUTANEOUS at 08:23

## 2018-02-11 RX ADMIN — ATORVASTATIN CALCIUM 20 MILLIGRAM(S): 80 TABLET, FILM COATED ORAL at 21:43

## 2018-02-11 RX ADMIN — RANOLAZINE 500 MILLIGRAM(S): 500 TABLET, FILM COATED, EXTENDED RELEASE ORAL at 06:19

## 2018-02-11 RX ADMIN — Medication 25 MILLIGRAM(S): at 06:18

## 2018-02-11 RX ADMIN — Medication 100 MILLIGRAM(S): at 12:17

## 2018-02-11 RX ADMIN — TAMSULOSIN HYDROCHLORIDE 0.4 MILLIGRAM(S): 0.4 CAPSULE ORAL at 21:43

## 2018-02-11 RX ADMIN — RANOLAZINE 500 MILLIGRAM(S): 500 TABLET, FILM COATED, EXTENDED RELEASE ORAL at 17:03

## 2018-02-11 RX ADMIN — Medication 500 MILLIGRAM(S): at 06:18

## 2018-02-11 RX ADMIN — Medication 133 MILLILITER(S): at 12:19

## 2018-02-11 NOTE — PROGRESS NOTE ADULT - ASSESSMENT
73yo with post-op colonic ileus    pt feeling a little better after neostigmine  still with significant distention - will use enemas today  pt still declining NG tube  OOB to chair

## 2018-02-11 NOTE — PROGRESS NOTE ADULT - ASSESSMENT
72y old M:  Acute Colonic Ileus  s/p Rt. Hip Arthoplasty  CHF  COPD on home O2  Spinal Stenosis    Plan:  Cont close Monitoring  serial neuro exams  BP Stable  No Acute Resp issues  NPO  Refusing NGT  s/p Neostigmine  Monitor renal function  Strict I/O's  s/p Rt. Hip Hemiarthoplasty - Appreciate ortho follow up  DVT prophylaxis - Lovenox

## 2018-02-11 NOTE — PROGRESS NOTE ADULT - SUBJECTIVE AND OBJECTIVE BOX
CC: Abdominal discomfort    HPI: 71 y/o M with a PMHx chronic CHF, COPD on home O2 4 L, emphysema, and spinal stenosis.       2/11 - Patient seen and examined. Events noted. Remains with gastric Ileus. Refusing NGT this AM.      PAST MEDICAL & SURGICAL HISTORY:  Spinal stenosis  CHF (congestive heart failure)  Emphysema  COPD (chronic obstructive pulmonary disease)  No significant past surgical history      FAMILY HISTORY:  No pertinent family history in first degree relatives      Social Hx:    Allergies    No Known Allergies    Intolerances        72y        ICU Vital Signs Last 24 Hrs  T(C): 36.8 (11 Feb 2018 08:00), Max: 37.4 (10 Feb 2018 21:14)  T(F): 98.2 (11 Feb 2018 08:00), Max: 99.4 (10 Feb 2018 21:14)  HR: 118 (11 Feb 2018 06:00) (88 - 121)  BP: 135/71 (11 Feb 2018 06:00) (109/41 - 164/66)  BP(mean): 83 (11 Feb 2018 06:00) (54 - 91)  ABP: --  ABP(mean): --  RR: 19 (11 Feb 2018 06:00) (16 - 28)  SpO2: 89% (10 Feb 2018 19:00) (89% - 94%)          I&O's Summary                            11.7   14.8  )-----------( 514      ( 11 Feb 2018 05:46 )             35.0       02-11    138  |  100  |  32<H>  ----------------------------<  155<H>  3.7   |  31  |  0.65    Ca    8.3<L>      11 Feb 2018 05:46  Phos  3.6     02-11  Mg     2.2     02-11        CAPILLARY BLOOD GLUCOSE                                MEDICATIONS  (STANDING):  amLODIPine   Tablet 5 milliGRAM(s) Oral daily  ascorbic acid 500 milliGRAM(s) Oral two times a day  atorvastatin 20 milliGRAM(s) Oral at bedtime  ciprofloxacin   IVPB 400 milliGRAM(s) IV Intermittent every 12 hours  ciprofloxacin   IVPB      docusate sodium 100 milliGRAM(s) Oral three times a day  enoxaparin Injectable 40 milliGRAM(s) SubCutaneous every 24 hours  finasteride 5 milliGRAM(s) Oral daily  folic acid 1 milliGRAM(s) Oral daily  isosorbide   mononitrate ER Tablet (IMDUR) 60 milliGRAM(s) Oral daily  lactated ringers. 1000 milliLiter(s) (75 mL/Hr) IV Continuous <Continuous>  lidocaine   Patch 1 Patch Transdermal daily  lisinopril 20 milliGRAM(s) Oral daily  metoprolol succinate ER 25 milliGRAM(s) Oral daily  metroNIDAZOLE  IVPB 500 milliGRAM(s) IV Intermittent every 8 hours  mineral oil enema 133 milliLiter(s) Rectal two times a day  multivitamin 1 Tablet(s) Oral daily  pantoprazole    Tablet 40 milliGRAM(s) Oral daily  polyethylene glycol 3350 17 Gram(s) Oral daily  ranolazine 500 milliGRAM(s) Oral two times a day  tamsulosin 0.4 milliGRAM(s) Oral at bedtime  tiotropium 18 MICROgram(s) Capsule 1 Capsule(s) Inhalation daily    MEDICATIONS  (PRN):  acetaminophen   Tablet 650 milliGRAM(s) Oral every 6 hours PRN For Temp over 38.3 C (100.94 F)  diphenhydrAMINE   Capsule 25 milliGRAM(s) Oral at bedtime PRN Insomnia  ondansetron Injectable 4 milliGRAM(s) IV Push every 6 hours PRN Nausea and/or Vomiting  prochlorperazine   Injectable 5 milliGRAM(s) IV Push every 6 hours PRN Nausea and/or Vomiting  senna 2 Tablet(s) Oral at bedtime PRN Constipation        Advanced Directives:  Discussed with:    Visit Information:    ** Time is exclusive of billed procedures and/or teaching and/or routine family updates.

## 2018-02-11 NOTE — PROGRESS NOTE ADULT - ASSESSMENT
A/P: 72M s/p R hip brynn POD 6  Dressing changed this AM  Analgesia  DVT ppx  WBAT RLE  PT - posterior hip dislocation precautions  DC planning

## 2018-02-11 NOTE — PROGRESS NOTE ADULT - SUBJECTIVE AND OBJECTIVE BOX
Pt S/E at bedside, transferred to CCU overnight for neostigmine administration for persistent colonic ileus, pain controlled    Vital Signs Last 24 Hrs  T(C): 36.9 (11 Feb 2018 04:28), Max: 37.4 (10 Feb 2018 21:14)  T(F): 98.4 (11 Feb 2018 04:28), Max: 99.4 (10 Feb 2018 21:14)  HR: 118 (11 Feb 2018 06:00) (88 - 121)  BP: 135/71 (11 Feb 2018 06:00) (109/41 - 164/66)  BP(mean): 83 (11 Feb 2018 06:00) (54 - 91)  RR: 19 (11 Feb 2018 06:00) (16 - 28)  SpO2: 89% (10 Feb 2018 19:00) (89% - 94%)    Gen: NAD, AAOx3    Right Lower Extremity:  Incision well appearing, no erythema/drainage  +EHL/FHL/TA/GS  SILT L3-S1  +DP/PT Pulses  Compartments soft  No calf TTP B/L

## 2018-02-11 NOTE — PROGRESS NOTE ADULT - SUBJECTIVE AND OBJECTIVE BOX
Patient is a 72y old  Male who presents with a chief complaint of Limb pain (06 Feb 2018 08:35)      HPI:  pt s/p repeat neostigmine yesterday  he passed significant amount of gas  still quite distended this AM but he states that he definitely feels better  he still declines NG tube    PAST MEDICAL & SURGICAL HISTORY:  Spinal stenosis  CHF (congestive heart failure)  Emphysema  COPD (chronic obstructive pulmonary disease)  No significant past surgical history    MEDICATIONS  (STANDING):  amLODIPine   Tablet 5 milliGRAM(s) Oral daily  ascorbic acid 500 milliGRAM(s) Oral two times a day  atorvastatin 20 milliGRAM(s) Oral at bedtime  ciprofloxacin   IVPB 400 milliGRAM(s) IV Intermittent every 12 hours  ciprofloxacin   IVPB      docusate sodium 100 milliGRAM(s) Oral three times a day  enoxaparin Injectable 40 milliGRAM(s) SubCutaneous every 24 hours  finasteride 5 milliGRAM(s) Oral daily  folic acid 1 milliGRAM(s) Oral daily  isosorbide   mononitrate ER Tablet (IMDUR) 60 milliGRAM(s) Oral daily  lactated ringers. 1000 milliLiter(s) (75 mL/Hr) IV Continuous <Continuous>  lidocaine   Patch 1 Patch Transdermal daily  lisinopril 20 milliGRAM(s) Oral daily  metoprolol succinate ER 25 milliGRAM(s) Oral daily  metroNIDAZOLE  IVPB 500 milliGRAM(s) IV Intermittent every 8 hours  mineral oil enema 133 milliLiter(s) Rectal two times a day  multivitamin 1 Tablet(s) Oral daily  neostigmine Injectable 2 milliGRAM(s) IV Push once  pantoprazole    Tablet 40 milliGRAM(s) Oral daily  polyethylene glycol 3350 17 Gram(s) Oral daily  ranolazine 500 milliGRAM(s) Oral two times a day  tamsulosin 0.4 milliGRAM(s) Oral at bedtime  tiotropium 18 MICROgram(s) Capsule 1 Capsule(s) Inhalation daily    MEDICATIONS  (PRN):  acetaminophen   Tablet 650 milliGRAM(s) Oral every 6 hours PRN For Temp over 38.3 C (100.94 F)  diphenhydrAMINE   Capsule 25 milliGRAM(s) Oral at bedtime PRN Insomnia  ondansetron Injectable 4 milliGRAM(s) IV Push every 6 hours PRN Nausea and/or Vomiting  prochlorperazine   Injectable 5 milliGRAM(s) IV Push every 6 hours PRN Nausea and/or Vomiting  senna 2 Tablet(s) Oral at bedtime PRN Constipation    Allergies  No Known Allergies    REVIEW OF SYSTEMS:    CONSTITUTIONAL: weakness  RESPIRATORY: No cough, wheezing, hemoptysis; No shortness of breath  CARDIOVASCULAR: No chest pain or palpitations  GASTROINTESTINAL: abd distention as above  All other review of systems is negative unless indicated above.    Vital Signs Last 24 Hrs  T(C): 36.9 (11 Feb 2018 04:28), Max: 37.4 (10 Feb 2018 21:14)  T(F): 98.4 (11 Feb 2018 04:28), Max: 99.4 (10 Feb 2018 21:14)  HR: 118 (11 Feb 2018 06:00) (88 - 121)  BP: 135/71 (11 Feb 2018 06:00) (109/41 - 164/66)  BP(mean): 83 (11 Feb 2018 06:00) (54 - 91)  RR: 19 (11 Feb 2018 06:00) (16 - 28)  SpO2: 89% (10 Feb 2018 19:00) (89% - 94%)    PHYSICAL EXAM:    Constitutional: NAD, well-developed  Respiratory: CTA  Cardiovascular: S1 and S2  Gastrointestinal: BS+, distended but not tense, no focal tenderness      LABS:                        11.7   14.8  )-----------( 514      ( 11 Feb 2018 05:46 )             35.0     02-11    138  |  100  |  32<H>  ----------------------------<  155<H>  3.7   |  31  |  0.65    Ca    8.3<L>      11 Feb 2018 05:46  Phos  3.6     02-11  Mg     2.2     02-11            RADIOLOGY & ADDITIONAL STUDIES:

## 2018-02-12 LAB
ALBUMIN SERPL ELPH-MCNC: 1.7 G/DL — LOW (ref 3.3–5)
ALP SERPL-CCNC: 64 U/L — SIGNIFICANT CHANGE UP (ref 40–120)
ALT FLD-CCNC: 30 U/L — SIGNIFICANT CHANGE UP (ref 12–78)
ANION GAP SERPL CALC-SCNC: 7 MMOL/L — SIGNIFICANT CHANGE UP (ref 5–17)
AST SERPL-CCNC: 38 U/L — HIGH (ref 15–37)
BILIRUB SERPL-MCNC: 0.4 MG/DL — SIGNIFICANT CHANGE UP (ref 0.2–1.2)
BUN SERPL-MCNC: 27 MG/DL — HIGH (ref 7–23)
CALCIUM SERPL-MCNC: 8.2 MG/DL — LOW (ref 8.5–10.1)
CHLORIDE SERPL-SCNC: 101 MMOL/L — SIGNIFICANT CHANGE UP (ref 96–108)
CO2 SERPL-SCNC: 30 MMOL/L — SIGNIFICANT CHANGE UP (ref 22–31)
CREAT SERPL-MCNC: 0.5 MG/DL — SIGNIFICANT CHANGE UP (ref 0.5–1.3)
GLUCOSE SERPL-MCNC: 141 MG/DL — HIGH (ref 70–99)
HCT VFR BLD CALC: 33.3 % — LOW (ref 39–50)
HGB BLD-MCNC: 10.8 G/DL — LOW (ref 13–17)
MCHC RBC-ENTMCNC: 30.7 PG — SIGNIFICANT CHANGE UP (ref 27–34)
MCHC RBC-ENTMCNC: 32.5 GM/DL — SIGNIFICANT CHANGE UP (ref 32–36)
MCV RBC AUTO: 94.3 FL — SIGNIFICANT CHANGE UP (ref 80–100)
PLATELET # BLD AUTO: 538 K/UL — HIGH (ref 150–400)
POTASSIUM SERPL-MCNC: 3.8 MMOL/L — SIGNIFICANT CHANGE UP (ref 3.5–5.3)
POTASSIUM SERPL-SCNC: 3.8 MMOL/L — SIGNIFICANT CHANGE UP (ref 3.5–5.3)
PROT SERPL-MCNC: 5 GM/DL — LOW (ref 6–8.3)
RBC # BLD: 3.53 M/UL — LOW (ref 4.2–5.8)
RBC # FLD: 12.6 % — SIGNIFICANT CHANGE UP (ref 10.3–14.5)
SODIUM SERPL-SCNC: 138 MMOL/L — SIGNIFICANT CHANGE UP (ref 135–145)
WBC # BLD: 20.6 K/UL — HIGH (ref 3.8–10.5)
WBC # FLD AUTO: 20.6 K/UL — HIGH (ref 3.8–10.5)

## 2018-02-12 PROCEDURE — 99233 SBSQ HOSP IP/OBS HIGH 50: CPT

## 2018-02-12 RX ADMIN — SODIUM CHLORIDE 75 MILLILITER(S): 9 INJECTION, SOLUTION INTRAVENOUS at 22:29

## 2018-02-12 RX ADMIN — ATORVASTATIN CALCIUM 20 MILLIGRAM(S): 80 TABLET, FILM COATED ORAL at 22:14

## 2018-02-12 RX ADMIN — Medication 100 MILLIGRAM(S): at 14:44

## 2018-02-12 RX ADMIN — AMLODIPINE BESYLATE 5 MILLIGRAM(S): 2.5 TABLET ORAL at 05:19

## 2018-02-12 RX ADMIN — Medication 100 MILLIGRAM(S): at 22:14

## 2018-02-12 RX ADMIN — SODIUM CHLORIDE 75 MILLILITER(S): 9 INJECTION, SOLUTION INTRAVENOUS at 05:20

## 2018-02-12 RX ADMIN — Medication 1 MILLIGRAM(S): at 11:27

## 2018-02-12 RX ADMIN — Medication 100 MILLIGRAM(S): at 22:30

## 2018-02-12 RX ADMIN — Medication 100 MILLIGRAM(S): at 05:19

## 2018-02-12 RX ADMIN — Medication 1 TABLET(S): at 11:27

## 2018-02-12 RX ADMIN — ISOSORBIDE MONONITRATE 60 MILLIGRAM(S): 60 TABLET, EXTENDED RELEASE ORAL at 11:28

## 2018-02-12 RX ADMIN — LIDOCAINE 1 PATCH: 4 CREAM TOPICAL at 11:25

## 2018-02-12 RX ADMIN — Medication 200 MILLIGRAM(S): at 05:19

## 2018-02-12 RX ADMIN — Medication 25 MILLIGRAM(S): at 05:19

## 2018-02-12 RX ADMIN — LISINOPRIL 20 MILLIGRAM(S): 2.5 TABLET ORAL at 05:19

## 2018-02-12 RX ADMIN — RANOLAZINE 500 MILLIGRAM(S): 500 TABLET, FILM COATED, EXTENDED RELEASE ORAL at 18:34

## 2018-02-12 RX ADMIN — Medication 500 MILLIGRAM(S): at 17:31

## 2018-02-12 RX ADMIN — RANOLAZINE 500 MILLIGRAM(S): 500 TABLET, FILM COATED, EXTENDED RELEASE ORAL at 05:21

## 2018-02-12 RX ADMIN — ENOXAPARIN SODIUM 40 MILLIGRAM(S): 100 INJECTION SUBCUTANEOUS at 11:29

## 2018-02-12 RX ADMIN — PANTOPRAZOLE SODIUM 40 MILLIGRAM(S): 20 TABLET, DELAYED RELEASE ORAL at 11:28

## 2018-02-12 RX ADMIN — FINASTERIDE 5 MILLIGRAM(S): 5 TABLET, FILM COATED ORAL at 11:28

## 2018-02-12 RX ADMIN — TIOTROPIUM BROMIDE 1 CAPSULE(S): 18 CAPSULE ORAL; RESPIRATORY (INHALATION) at 09:13

## 2018-02-12 RX ADMIN — TAMSULOSIN HYDROCHLORIDE 0.4 MILLIGRAM(S): 0.4 CAPSULE ORAL at 22:14

## 2018-02-12 RX ADMIN — Medication 133 MILLILITER(S): at 06:20

## 2018-02-12 RX ADMIN — POLYETHYLENE GLYCOL 3350 17 GRAM(S): 17 POWDER, FOR SOLUTION ORAL at 11:27

## 2018-02-12 RX ADMIN — Medication 200 MILLIGRAM(S): at 17:31

## 2018-02-12 RX ADMIN — Medication 500 MILLIGRAM(S): at 05:19

## 2018-02-12 NOTE — PROGRESS NOTE ADULT - ASSESSMENT
72y old M:  Acute Colonic Ileus  s/p Rt. Hip Arthoplasty  CHF  COPD on home O2  Spinal Stenosis    Plan:  Cont close Monitoring  serial neuro exams  BP Stable  No Acute Resp issues  Colonic Ileus - Had BM  NPO  Refusing NGT  s/p Neostigmine  Monitor renal function  Strict I/O's  s/p Rt. Hip Hemiarthoplasty - Appreciate ortho follow up  DVT prophylaxis - Lovenox

## 2018-02-12 NOTE — PROGRESS NOTE ADULT - RS GEN PE MLT RESP DETAILS PC
diminished breath sounds, L/diminished breath sounds, R
airway patent/breath sounds equal/good air movement

## 2018-02-12 NOTE — PROGRESS NOTE ADULT - ASSESSMENT
This is a 72 year old male s/p fall on about Jan 23rd presented to [facility] on 2/3/18 with chronic pain to hip, decrease appetite. Pt found to have a fx rt hip, now s/p right hip brynn on 2-5-18.  Pt has high thrombosis risks and requires anticoagualtion prophylaxis. Discussed with pt the use of Lovenox for his anticoagulation med. Pt is agreeable. 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 No bowel obstruction, remains to be NPO and possible downgrade to med-surg 2-9-18    : c/w lovenox 40mg SQ daily for four weeks post procedure. Please continue to rotate SQ injection sites   : daily cbc/bmp  : monitor H/H (consider transfusion if hgb < 7 or drops 3 units or more from baseline)  : LE Venodynes  : increase mobility as tolerated    Will continue to monitor.

## 2018-02-12 NOTE — PROGRESS NOTE ADULT - SUBJECTIVE AND OBJECTIVE BOX
Pt S/E at bedside, No acute O/N event, pain controlled. No new orthopedic complaints.     ICU Vital Signs Last 24 Hrs  T(C): 37.1 (12 Feb 2018 06:12), Max: 37.1 (12 Feb 2018 06:12)  T(F): 98.7 (12 Feb 2018 06:12), Max: 98.7 (12 Feb 2018 06:12)  HR: 91 (12 Feb 2018 06:00) (78 - 119)  BP: 136/53 (12 Feb 2018 06:00) (98/36 - 136/53)  BP(mean): 74 (12 Feb 2018 06:00) (51 - 77)  RR: 22 (12 Feb 2018 06:00) (18 - 28)  SpO2: 94% (12 Feb 2018 05:00) (89% - 99%)    Gen: NAD, AAOx3    Right Lower Extremity:  Acquacel intact with strikethrough noted  +EHL/FHL/TA/GS  SILT L3-S1  +DP/PT Pulses  Compartments soft  No calf TTP B/L

## 2018-02-12 NOTE — PROGRESS NOTE ADULT - SUBJECTIVE AND OBJECTIVE BOX
CC: Feels better.    HPI: 71 y/o male with a PMHx chronic CHF, COPD on home O2 4 L, emphysema, and spinal stenosis.   He presents to ED s/p fall.     2/12 - Events noted. Had a BM. Feels better.     PAST MEDICAL & SURGICAL HISTORY:  Spinal stenosis  CHF (congestive heart failure)  Emphysema  COPD (chronic obstructive pulmonary disease)  No significant past surgical history      FAMILY HISTORY:  No pertinent family history in first degree relatives      Social Hx:    Allergies    No Known Allergies    Intolerances        72y        ICU Vital Signs Last 24 Hrs  T(C): 37.1 (12 Feb 2018 06:12), Max: 37.1 (12 Feb 2018 06:12)  T(F): 98.7 (12 Feb 2018 06:12), Max: 98.7 (12 Feb 2018 06:12)  HR: 91 (12 Feb 2018 06:00) (78 - 119)  BP: 136/53 (12 Feb 2018 06:00) (98/36 - 136/53)  BP(mean): 74 (12 Feb 2018 06:00) (51 - 77)  ABP: --  ABP(mean): --  RR: 22 (12 Feb 2018 06:00) (18 - 28)  SpO2: 94% (12 Feb 2018 05:00) (89% - 99%)          I&O's Summary    11 Feb 2018 07:01  -  12 Feb 2018 07:00  --------------------------------------------------------  IN: 2525 mL / OUT: 450 mL / NET: 2075 mL                              10.8   20.6  )-----------( 538      ( 12 Feb 2018 06:54 )             33.3       02-12    138  |  101  |  27<H>  ----------------------------<  141<H>  3.8   |  30  |  0.50    Ca    8.2<L>      12 Feb 2018 06:54  Phos  3.6     02-11  Mg     2.2     02-11    TPro  5.0<L>  /  Alb  1.7<L>  /  TBili  0.4  /  DBili  x   /  AST  38<H>  /  ALT  30  /  AlkPhos  64  02-12      CAPILLARY BLOOD GLUCOSE          LIVER FUNCTIONS - ( 12 Feb 2018 06:54 )  Alb: 1.7 g/dL / Pro: 5.0 gm/dL / ALK PHOS: 64 U/L / ALT: 30 U/L / AST: 38 U/L / GGT: x                               MEDICATIONS  (STANDING):  amLODIPine   Tablet 5 milliGRAM(s) Oral daily  ascorbic acid 500 milliGRAM(s) Oral two times a day  atorvastatin 20 milliGRAM(s) Oral at bedtime  ciprofloxacin   IVPB 400 milliGRAM(s) IV Intermittent every 12 hours  ciprofloxacin   IVPB      docusate sodium 100 milliGRAM(s) Oral three times a day  enoxaparin Injectable 40 milliGRAM(s) SubCutaneous every 24 hours  finasteride 5 milliGRAM(s) Oral daily  folic acid 1 milliGRAM(s) Oral daily  isosorbide   mononitrate ER Tablet (IMDUR) 60 milliGRAM(s) Oral daily  lactated ringers. 1000 milliLiter(s) (75 mL/Hr) IV Continuous <Continuous>  lidocaine   Patch 1 Patch Transdermal daily  lisinopril 20 milliGRAM(s) Oral daily  metoprolol succinate ER 25 milliGRAM(s) Oral daily  metroNIDAZOLE  IVPB 500 milliGRAM(s) IV Intermittent every 8 hours  mineral oil enema 133 milliLiter(s) Rectal two times a day  multivitamin 1 Tablet(s) Oral daily  pantoprazole    Tablet 40 milliGRAM(s) Oral daily  polyethylene glycol 3350 17 Gram(s) Oral daily  ranolazine 500 milliGRAM(s) Oral two times a day  tamsulosin 0.4 milliGRAM(s) Oral at bedtime  tiotropium 18 MICROgram(s) Capsule 1 Capsule(s) Inhalation daily    MEDICATIONS  (PRN):  acetaminophen   Tablet 650 milliGRAM(s) Oral every 6 hours PRN For Temp over 38.3 C (100.94 F)  diphenhydrAMINE   Capsule 25 milliGRAM(s) Oral at bedtime PRN Insomnia  ondansetron Injectable 4 milliGRAM(s) IV Push every 6 hours PRN Nausea and/or Vomiting  prochlorperazine   Injectable 5 milliGRAM(s) IV Push every 6 hours PRN Nausea and/or Vomiting  senna 2 Tablet(s) Oral at bedtime PRN Constipation        Advanced Directives:  Discussed with:    Visit Information:    ** Time is exclusive of billed procedures and/or teaching and/or routine family updates.

## 2018-02-12 NOTE — PROGRESS NOTE ADULT - ASSESSMENT
A/P: 72M s/p R hip brynn POD7  Dressing change planed 2/12  Analgesia  DVT ppx  WBAT RLE  PT - posterior hip dislocation precautions  DC planning

## 2018-02-12 NOTE — PROGRESS NOTE ADULT - SUBJECTIVE AND OBJECTIVE BOX
Orthopedics Post-op Check    This is a 72yy/o Male s/p Right Hip Hemiarthroplasty POD 7. Called from covering floor nurse reporting dressing saturation after PT today.  Pt seen at bedside in CCU. Pain is controlled. Pt feeling well.     Vital Signs Last 24 Hrs  T(C): 36.4 (02-12-18 @ 08:33), Max: 37.1 (02-12-18 @ 06:12)  T(F): 97.5 (02-12-18 @ 08:33), Max: 98.7 (02-12-18 @ 06:12)  HR: 82 (02-12-18 @ 15:00) (72 - 93)  BP: 143/60 (02-12-18 @ 15:00) (98/36 - 143/60)  BP(mean): 81 (02-12-18 @ 15:00) (51 - 81)  RR: 21 (02-12-18 @ 15:00) (17 - 28)  SpO2: 95% (02-12-18 @ 15:00) (87% - 96%)                        10.8   20.6  )-----------( 538      ( 12 Feb 2018 06:54 )             33.3     12 Feb 2018 06:54    138    |  101    |  27     ----------------------------<  141    3.8     |  30     |  0.50     Ca    8.2        12 Feb 2018 06:54  Phos  3.6       11 Feb 2018 05:46  Mg     2.2       11 Feb 2018 05:46    TPro  5.0    /  Alb  1.7    /  TBili  0.4    /  DBili  x      /  AST  38     /  ALT  30     /  AlkPhos  64     12 Feb 2018 06:54        Exam:  NAD AAOx3.  Dressing with moderate serous saturation; removed.  Surgical incision is clean, dry and intact with staples in place; no active drainage, no erythema, normothermic.  Abduction pillow and SCDs in place.  Calves are soft and nontender.  Moving all toes, sensation intact.  DP and PT pulses 2+.    A/P:  POD 7 Right Hip Hemiarthroplasty with serous drainage post PT today.  -Dressing changed; new aquacel placed.  -Analgesia  -OOB PT posterior dislocation precautions  -Abduction pillow    Case discussed with Dr. Tang who agrees with plan.

## 2018-02-12 NOTE — PROGRESS NOTE ADULT - SUBJECTIVE AND OBJECTIVE BOX
HPI: 71 y/o male with a PMHx chronic CHF, COPD on home O2 4 L, emphysema, and spinal stenosis. He presents to ED s/p fall. Fall occurred ~ 10 days ago; patient has been very weak, and suffers from arthritis.   He notes chronic Rt hip pain for ~ 4 weeks which causes ambulatory dysfunction.  He was suppose to follow-up with outpatient ortho for MRI.    Reports associated poor appetite with decreased PO intake.   Denies nausea, vomiting, diarrhea, fevers , chills, CP or dypsnea.   No recent illnesses, no sick contacts.    ED course:  Xray imaging reportedly with Rt hip fracture  Venous doppler US 2/3 : no evidence of deep venous thrombosis in either leg.  Left Baker's cyst. (2018 01:53)    Patient is a 72y old  Male who presents with a chief complaint of Limb pain (2018 08:35)  pt s/p right hip brynn on 18.    Consulted by Dr. Rahul Elkins for VTE prophylaxis, risk stratification, and anticoagulation management.    PAST MEDICAL & SURGICAL HISTORY:  Spinal stenosis  CHF (congestive heart failure)  Emphysema  COPD (chronic obstructive pulmonary disease)  No significant past surgical history    Caprini VTE Risk Score:9    IMPROVE Bleeding Risk Score: 2.5    Falls Risk:   High (x  )  Mod (  )  Low (  )    EBL:  150ml  cr cl: 162.2  18 Pt sen at bedside.  Discussed his anticoagulation with Lovenox inj for 4 weeks.  Questions answered will reinforce as needed.  Pt concerned about no having a bm for a few days and  in his appetite for a while. Not sure if he lost wt.   18: Pt seen at bedside, OOB to chair. Reports that he continues to be constipated, was able to make "small bowel success," but remains to have distended/firm abdomen. Continues to have 4LNC, and states that he is feeling "oozy," and would like to go back in bed. RN is aware, but waiting for PT. Pt noted to have moderate size bruising in his RLQ abdomen. Advised RN to administer medication to his LLQ.   18 Pt seen at bedside in IC.  Pt dx with Kaylan Syndrome.  Had barium enema and received neostigmine iv.  Pt states he feels much better abd smaller and softer. discussed his anticoagulation with heparin sq mo concerns.  18: Pt seen at bedside in ICU. Reports of continuing to feel better, had successful large BM yesterday. He continues to be NPO. Pt is being downgraded to med-surg today.   2-10-18  pt seen on 2n feeling much better discussed his anticoagulation with Lovenox no concerns.  18   Pt seen at bedside in CCU S/P second dose of received neostigmine iv.  Pt states she has had a bm and feels better but not totally good yet. discussed his anticoagualtion with lovenox  no concerns.   FAMILY HISTORY:  No pertinent family history in first degree relatives    Denies any personal or familial history of clotting or bleeding disorders.    Allergies    No Known Allergies    Intolerances      REVIEW OF SYSTEMS    (  )Fever	     (  )Constipation	(  )SOB				(  )Headache	(  )Dysuria  (  )Chills	     (  )Melena	(  )Dyspnea present on exertion	                    (  )Dizziness                    (  )Polyuria  (  )Nausea	     (  )Hematochezia	(  )Cough			                    (  )Syncope   	(  )Hematuria  (  )Vomiting    (  )Chest Pain	(  )Wheezing			(  )Weakness  (  )Diarrhea     (  )Palpitations	(  )Anorexia			(  )Myalgia    All other review of systems negative: Yes    PHYSICAL EXAM:    Constitutional: Appears Well    Neurological: A& O x 3    Skin: Warm    Respiratory and Thorax: normal effort; Breath sounds: normal; No rales/wheezing/rhonchi, 4LNC supplemental O2  	  Cardiovascular: S1, S2, regular, NMBR	    Gastrointestinal: BS + x 4Q, nontender, distended and firm,  RLQ moderate size ecchymosis	    Genitourinary:  Bladder nondistended, nontender    Musculoskeletal:   General Right:   no muscle/joint tenderness,   normal tone, no joint swelling,   ROM: limited	    General Left:   no muscle/joint tenderness,   normal tone, no joint swelling,   ROM: full    Hip:  Right: Aquacel dressing CI  serosanguinous fld noted.     Lower extrems:   Right: no calf tenderness              negative rolando's sign               + pedal pulses    Left:   no calf tenderness              negative rolando's sign               + pedal pulses                          10.8   20.6  )-----------( 538      ( 2018 06:54 )             33.3       02-12    138  |  101  |  27<H>  ----------------------------<  141<H>  3.8   |  30  |  0.50    Ca    8.2<L>      2018 06:54  Phos  3.6     02-11  Mg     2.2     02-11    TPro  5.0<L>  /  Alb  1.7<L>  /  TBili  0.4  /  DBili  x   /  AST  38<H>  /  ALT  30  /  AlkPhos  64  02-12                             11.4   20.4  )-----------( 452      ( 10 Feb 2018 06:17 )             34.5       02-10    137  |  100  |  34<H>  ----------------------------<  123<H>  3.8   |  29  |  0.68    Ca    8.8      10 Feb 2018 06:17  Phos  2.1     02-10  Mg     2.3     02-10                             10.8   21.9  )-----------( 452      ( 2018 04:52 )             32.4       MEDICATIONS  (STANDING):  amLODIPine   Tablet 5 milliGRAM(s) Oral daily  ascorbic acid 500 milliGRAM(s) Oral two times a day  atorvastatin 20 milliGRAM(s) Oral at bedtime  ciprofloxacin   IVPB 400 milliGRAM(s) IV Intermittent every 12 hours  ciprofloxacin   IVPB      docusate sodium 100 milliGRAM(s) Oral three times a day  enoxaparin Injectable 40 milliGRAM(s) SubCutaneous every 24 hours  finasteride 5 milliGRAM(s) Oral daily  folic acid 1 milliGRAM(s) Oral daily  isosorbide   mononitrate ER Tablet (IMDUR) 60 milliGRAM(s) Oral daily  lactated ringers. 1000 milliLiter(s) IV Continuous <Continuous>  lidocaine   Patch 1 Patch Transdermal daily  lisinopril 20 milliGRAM(s) Oral daily  metoprolol succinate ER 25 milliGRAM(s) Oral daily  metroNIDAZOLE  IVPB 500 milliGRAM(s) IV Intermittent every 8 hours  mineral oil enema 133 milliLiter(s) Rectal two times a day  multivitamin 1 Tablet(s) Oral daily  pantoprazole    Tablet 40 milliGRAM(s) Oral daily  polyethylene glycol 3350 17 Gram(s) Oral daily  ranolazine 500 milliGRAM(s) Oral two times a day  tamsulosin 0.4 milliGRAM(s) Oral at bedtime  tiotropium 18 MICROgram(s) Capsule 1 Capsule(s) Inhalation daily        Vital Signs Last 24 Hrs  T(C): 36.4 (18 @ 08:33), Max: 37.1 (18 @ 06:12)  T(F): 97.5 (18 @ 08:33), Max: 98.7 (18 @ 06:12)  HR: 87 (18 @ 11:00) (72 - 119)  BP: 124/51 (18 @ 11:00) (98/36 - 136/53)  BP(mean): 70 (18 @ 11:00) (51 - 77)  RR: 24 (18 @ 11:00) (17 - 28)  SpO2: 91% (18 @ 11:00) (89% - 99%)    DVT Prophylaxis:  LMWH                   (x )  Heparin SQ           (  )  Coumadin             (  )  Xarelto                  (  )  Eliquis                   (  )  Venodynes           ( x )  Ambulation          ( x )  UFH                       (  )  Contraindicated  (  )

## 2018-02-12 NOTE — PROGRESS NOTE ADULT - SUBJECTIVE AND OBJECTIVE BOX
Patient is a 72y old  Male who presents with a chief complaint of Limb pain (06 Feb 2018 08:35)      HPI:  71 y/o male with a PMHx chronic CHF, COPD on home O2 4 L, emphysema, and spinal stenosis.   He presents to ED s/p fall.   Fall occurred ~ 10 days ago; patient has been very weak, and suffers from arthritis.   He notes chronic Rt hip pain for ~ 4 weeks which causes ambulatory dysfunction.  He was suppose to follow-up with outpatient ortho for MRI.    Reports associated poor appetite with decreased PO intake.   Denies nausea, vomiting, diarrhea, fevers , chills, CP or dypsnea.   No recent illnesses, no sick contacts.    Patient with bowel movement ×2. He also had bowel movements after neostigmine.  Abdominal pain is improved. He remains distended. Negative chest pain.  He was out of bed to commode with assistance and also has sat in chair per patient.  Case discussed with intensivist and ICU staff regarding patient needs to BE mobilized multiple times don't a day in order to assist with his colonic ileus including turning him from side to side repeatedly      ED course:  Xray imaging reportedly with Rt hip fracture  Venous doppler US 2/3 : no evidence of deep venous thrombosis in either leg.  Left Baker's cyst. (04 Feb 2018 01:53)      PAST MEDICAL & SURGICAL HISTORY:  Spinal stenosis  CHF (congestive heart failure)  Emphysema  COPD (chronic obstructive pulmonary disease)  No significant past surgical history      MEDICATIONS  (STANDING):  amLODIPine   Tablet 5 milliGRAM(s) Oral daily  ascorbic acid 500 milliGRAM(s) Oral two times a day  atorvastatin 20 milliGRAM(s) Oral at bedtime  ciprofloxacin   IVPB 400 milliGRAM(s) IV Intermittent every 12 hours  ciprofloxacin   IVPB      docusate sodium 100 milliGRAM(s) Oral three times a day  enoxaparin Injectable 40 milliGRAM(s) SubCutaneous every 24 hours  finasteride 5 milliGRAM(s) Oral daily  folic acid 1 milliGRAM(s) Oral daily  isosorbide   mononitrate ER Tablet (IMDUR) 60 milliGRAM(s) Oral daily  lactated ringers. 1000 milliLiter(s) (75 mL/Hr) IV Continuous <Continuous>  lidocaine   Patch 1 Patch Transdermal daily  lisinopril 20 milliGRAM(s) Oral daily  metoprolol succinate ER 25 milliGRAM(s) Oral daily  metroNIDAZOLE  IVPB 500 milliGRAM(s) IV Intermittent every 8 hours  mineral oil enema 133 milliLiter(s) Rectal two times a day  multivitamin 1 Tablet(s) Oral daily  pantoprazole    Tablet 40 milliGRAM(s) Oral daily  polyethylene glycol 3350 17 Gram(s) Oral daily  ranolazine 500 milliGRAM(s) Oral two times a day  tamsulosin 0.4 milliGRAM(s) Oral at bedtime  tiotropium 18 MICROgram(s) Capsule 1 Capsule(s) Inhalation daily    MEDICATIONS  (PRN):  acetaminophen   Tablet 650 milliGRAM(s) Oral every 6 hours PRN For Temp over 38.3 C (100.94 F)  diphenhydrAMINE   Capsule 25 milliGRAM(s) Oral at bedtime PRN Insomnia  ondansetron Injectable 4 milliGRAM(s) IV Push every 6 hours PRN Nausea and/or Vomiting  prochlorperazine   Injectable 5 milliGRAM(s) IV Push every 6 hours PRN Nausea and/or Vomiting  senna 2 Tablet(s) Oral at bedtime PRN Constipation      Allergies    No Known Allergies    Intolerances        SOCIAL HISTORY:NC    FAMILY HISTORY:  No pertinent family history in first degree relatives      REVIEW OF SYSTEMS:    CONSTITUTIONAL: No weakness, fevers or chills  EYES/ENT: No visual changes;  No vertigo or throat pain   NECK: No pain or stiffness  RESPIRATORY: No cough, wheezing, hemoptysis; No shortness of breath  CARDIOVASCULAR: No chest pain or palpitations  GENITOURINARY: No dysuria, frequency or hematuria  NEUROLOGICAL: No numbness or weakness  SKIN: No itching, burning, rashes, or lesions   All other review of systems is negative unless indicated above.    Vital Signs Last 24 Hrs  T(C): 36.4 (12 Feb 2018 08:33), Max: 37.1 (12 Feb 2018 06:12)  T(F): 97.5 (12 Feb 2018 08:33), Max: 98.7 (12 Feb 2018 06:12)  HR: 85 (12 Feb 2018 09:26) (72 - 119)  BP: 120/46 (12 Feb 2018 09:00) (98/36 - 136/53)  BP(mean): 62 (12 Feb 2018 09:00) (51 - 77)  RR: 17 (12 Feb 2018 09:00) (17 - 28)  SpO2: 96% (12 Feb 2018 09:26) (89% - 99%)    PHYSICAL EXAM:    Constitutional: NAD, well-developed  HEENT: EOMI, throat clear  Neck: No LAD, supple  Respiratory: CTA and P  Cardiovascular: S1 and S2, RRR, no M  Gastrointestinal: BS+, soft, distended and nontender, neg HSM,  Extremities: No peripheral edema, neg clubing, cyanosis  Vascular: 2+ peripheral pulses  Neurological: A/O x 3, no focal deficits  Psychiatric: Normal mood, normal affect  Skin: No rashes    LABS:  CBC Full  -  ( 12 Feb 2018 06:54 )  WBC Count : 20.6 K/uL  Hemoglobin : 10.8 g/dL  Hematocrit : 33.3 %  Platelet Count - Automated : 538 K/uL  Mean Cell Volume : 94.3 fl  Mean Cell Hemoglobin : 30.7 pg  Mean Cell Hemoglobin Concentration : 32.5 gm/dL  Auto Neutrophil # : x  Auto Lymphocyte # : x  Auto Monocyte # : x  Auto Eosinophil # : x  Auto Basophil # : x  Auto Neutrophil % : x  Auto Lymphocyte % : x  Auto Monocyte % : x  Auto Eosinophil % : x  Auto Basophil % : x    02-12    138  |  101  |  27<H>  ----------------------------<  141<H>  3.8   |  30  |  0.50    Ca    8.2<L>      12 Feb 2018 06:54  Phos  3.6     02-11  Mg     2.2     02-11    TPro  5.0<L>  /  Alb  1.7<L>  /  TBili  0.4  /  DBili  x   /  AST  38<H>  /  ALT  30  /  AlkPhos  64  02-12            RADIOLOGY & ADDITIONAL STUDIES:  < from: CT Abdomen and Pelvis No Cont (02.10.18 @ 17:13) >  EXAM:  CT ABDOMEN AND PELVIS                            PROCEDURE DATE:  02/10/2018          INTERPRETATION:  CT ABDOMEN AND PELVIS    HISTORY: Generalized Abdominal Pain and distention, right lower quadrant   pain    Technique: CT of the abdomen and pelvis is performed without oral or   intravenous contrast. Axial images are supplemented with coronal and   sagittal reformations. This study was performed using automatic exposure   control (radiation dose reduction software) to obtain a diagnostic image   quality scan with patient dose as low as reasonably achievable.    Contrast:     None    Comparison: CT abdomen and pelvis 2/7/2018    Findings:  LIVER: Normal.  SPLEEN: Normal.  PANCREAS: Normal.  GALLBLADDER/BILIARY TREE: Nondilated. Normal gallbladder.  ADRENALS: Normal.  KIDNEYS: No hydronephrosis or urinary tract calculi. Bilateral intrarenal   vascular calcification.  LYMPHADENOPATHY/RETROPERITONEUM: No adenopathy.  VASCULATURE: Aortoiliac atherosclerosis without aneurysm.  BOWEL:No dilated small bowel loops. Normal appendix. Unchanged diffuse   gaseous distention of the colon without interval change. There is   residual Gastrografin throughout the colon from 2/8/2018. Some of the   Gastrografin has refluxed into the ascending colon and cecum. No   stricture or evidence of colonic obstruction.  PELVIC VISCERA: Unremarkable prostate, seminal vesicles, and urinary   bladder.  PELVIC LYMPH NODES: No pelvic adenopathy.  PERITONEUM/ABDOMINAL WALL: Stable trace bilateral paracolic gutter   ascites. No free air.  SKELETAL: No acute bony abnormality. Right hip arthroplasty again noted.   Decreasing right hip subcutaneous air.  LUNG BASES: Bibasilar atelectasis.    IMPRESSION:     Stable colonic ileus with no interval change in degree of gaseous   distention. Persistent retention of Gastrografin from an enema performed   2 days prior. Gastrografin has refluxed to the level of the ascending   colon/cecum.                GRICELDA ROWLAND     < end of copied text >  reviewed by me

## 2018-02-12 NOTE — PROGRESS NOTE ADULT - ASSESSMENT
Hip fracture–status post repair. Would minimize narcotics.    Progressive colonic ileus not responded to conservative management and is not been able to tolerate NG tube placement   Gastrografin enema and  neostigmine times 2  improving but remains very distended with inc WBC  inc activity, DW RN and intensivist and pt    please rotate pta and place in chair, ambulate as tolerated  place pt on stomach    avoid narcotics and encourage movement    Continue antibiotics.

## 2018-02-13 LAB
ALBUMIN SERPL ELPH-MCNC: 1.9 G/DL — LOW (ref 3.3–5)
ALP SERPL-CCNC: 68 U/L — SIGNIFICANT CHANGE UP (ref 40–120)
ALT FLD-CCNC: 29 U/L — SIGNIFICANT CHANGE UP (ref 12–78)
ANION GAP SERPL CALC-SCNC: 5 MMOL/L — SIGNIFICANT CHANGE UP (ref 5–17)
AST SERPL-CCNC: 36 U/L — SIGNIFICANT CHANGE UP (ref 15–37)
BILIRUB SERPL-MCNC: 0.4 MG/DL — SIGNIFICANT CHANGE UP (ref 0.2–1.2)
BUN SERPL-MCNC: 22 MG/DL — SIGNIFICANT CHANGE UP (ref 7–23)
CALCIUM SERPL-MCNC: 8.7 MG/DL — SIGNIFICANT CHANGE UP (ref 8.5–10.1)
CHLORIDE SERPL-SCNC: 99 MMOL/L — SIGNIFICANT CHANGE UP (ref 96–108)
CO2 SERPL-SCNC: 33 MMOL/L — HIGH (ref 22–31)
CREAT SERPL-MCNC: 0.63 MG/DL — SIGNIFICANT CHANGE UP (ref 0.5–1.3)
GLUCOSE SERPL-MCNC: 131 MG/DL — HIGH (ref 70–99)
HCT VFR BLD CALC: 36.7 % — LOW (ref 39–50)
HGB BLD-MCNC: 11.9 G/DL — LOW (ref 13–17)
MCHC RBC-ENTMCNC: 31 PG — SIGNIFICANT CHANGE UP (ref 27–34)
MCHC RBC-ENTMCNC: 32.3 GM/DL — SIGNIFICANT CHANGE UP (ref 32–36)
MCV RBC AUTO: 95.9 FL — SIGNIFICANT CHANGE UP (ref 80–100)
PLATELET # BLD AUTO: 611 K/UL — HIGH (ref 150–400)
POTASSIUM SERPL-MCNC: 3.8 MMOL/L — SIGNIFICANT CHANGE UP (ref 3.5–5.3)
POTASSIUM SERPL-SCNC: 3.8 MMOL/L — SIGNIFICANT CHANGE UP (ref 3.5–5.3)
PROT SERPL-MCNC: 5.8 GM/DL — LOW (ref 6–8.3)
RBC # BLD: 3.83 M/UL — LOW (ref 4.2–5.8)
RBC # FLD: 13.1 % — SIGNIFICANT CHANGE UP (ref 10.3–14.5)
SODIUM SERPL-SCNC: 137 MMOL/L — SIGNIFICANT CHANGE UP (ref 135–145)
WBC # BLD: 23.7 K/UL — HIGH (ref 3.8–10.5)
WBC # FLD AUTO: 23.7 K/UL — HIGH (ref 3.8–10.5)

## 2018-02-13 PROCEDURE — 74019 RADEX ABDOMEN 2 VIEWS: CPT | Mod: 26

## 2018-02-13 PROCEDURE — 99233 SBSQ HOSP IP/OBS HIGH 50: CPT

## 2018-02-13 RX ORDER — SODIUM CHLORIDE 9 MG/ML
1000 INJECTION, SOLUTION INTRAVENOUS
Qty: 0 | Refills: 0 | Status: DISCONTINUED | OUTPATIENT
Start: 2018-02-13 | End: 2018-02-15

## 2018-02-13 RX ADMIN — PANTOPRAZOLE SODIUM 40 MILLIGRAM(S): 20 TABLET, DELAYED RELEASE ORAL at 11:44

## 2018-02-13 RX ADMIN — Medication 100 MILLIGRAM(S): at 05:34

## 2018-02-13 RX ADMIN — Medication 200 MILLIGRAM(S): at 06:29

## 2018-02-13 RX ADMIN — Medication 25 MILLIGRAM(S): at 06:12

## 2018-02-13 RX ADMIN — ENOXAPARIN SODIUM 40 MILLIGRAM(S): 100 INJECTION SUBCUTANEOUS at 11:43

## 2018-02-13 RX ADMIN — Medication 200 MILLIGRAM(S): at 17:26

## 2018-02-13 RX ADMIN — Medication 500 MILLIGRAM(S): at 06:11

## 2018-02-13 RX ADMIN — Medication 1 MILLIGRAM(S): at 11:44

## 2018-02-13 RX ADMIN — ISOSORBIDE MONONITRATE 60 MILLIGRAM(S): 60 TABLET, EXTENDED RELEASE ORAL at 11:44

## 2018-02-13 RX ADMIN — LISINOPRIL 20 MILLIGRAM(S): 2.5 TABLET ORAL at 06:11

## 2018-02-13 RX ADMIN — RANOLAZINE 500 MILLIGRAM(S): 500 TABLET, FILM COATED, EXTENDED RELEASE ORAL at 06:12

## 2018-02-13 RX ADMIN — Medication 100 MILLIGRAM(S): at 22:09

## 2018-02-13 RX ADMIN — TAMSULOSIN HYDROCHLORIDE 0.4 MILLIGRAM(S): 0.4 CAPSULE ORAL at 22:09

## 2018-02-13 RX ADMIN — Medication 1 TABLET(S): at 11:44

## 2018-02-13 RX ADMIN — Medication 100 MILLIGRAM(S): at 21:59

## 2018-02-13 RX ADMIN — ATORVASTATIN CALCIUM 20 MILLIGRAM(S): 80 TABLET, FILM COATED ORAL at 22:09

## 2018-02-13 RX ADMIN — Medication 100 MILLIGRAM(S): at 06:11

## 2018-02-13 RX ADMIN — AMLODIPINE BESYLATE 5 MILLIGRAM(S): 2.5 TABLET ORAL at 06:11

## 2018-02-13 RX ADMIN — Medication 500 MILLIGRAM(S): at 17:26

## 2018-02-13 RX ADMIN — FINASTERIDE 5 MILLIGRAM(S): 5 TABLET, FILM COATED ORAL at 11:44

## 2018-02-13 RX ADMIN — TIOTROPIUM BROMIDE 1 CAPSULE(S): 18 CAPSULE ORAL; RESPIRATORY (INHALATION) at 07:54

## 2018-02-13 RX ADMIN — SODIUM CHLORIDE 100 MILLILITER(S): 9 INJECTION, SOLUTION INTRAVENOUS at 17:24

## 2018-02-13 RX ADMIN — RANOLAZINE 500 MILLIGRAM(S): 500 TABLET, FILM COATED, EXTENDED RELEASE ORAL at 22:10

## 2018-02-13 RX ADMIN — Medication 100 MILLIGRAM(S): at 14:03

## 2018-02-13 NOTE — PROGRESS NOTE ADULT - SUBJECTIVE AND OBJECTIVE BOX
Patient seen and examined. Pain controlled.    Physical exam  VS: Afebrile, vital signs stable  Gen: NAD  Right LE: Dressing clean, dry, and intact. +EHL/FHL/TA/GSC. SILT L3-S1. +Dorsalis pedis pulse, capillary refill brisk. Compartments soft and nontender.      72M s/p right hip hemiarthroplasty POD# 8    WBAT with posterior hip precautions  Pain control  DVT prophylaxis  Abduction while supine/seated  PT  Ortho stable for discharge

## 2018-02-13 NOTE — PROGRESS NOTE ADULT - SUBJECTIVE AND OBJECTIVE BOX
Orthopedics     POD 8 Right Hip Hemiarthroplasty  Pain is controlled. No nausea or vomiting. Has been up OOB with PT.    Vital Signs Last 24 Hrs  T(C): 36.6 (02-13-18 @ 11:00), Max: 36.9 (02-12-18 @ 19:17)  T(F): 97.8 (02-13-18 @ 11:00), Max: 98.5 (02-12-18 @ 19:17)  HR: 96 (02-13-18 @ 11:00) (86 - 98)  BP: 115/60 (02-13-18 @ 11:00) (115/60 - 136/62)  BP(mean): 74 (02-13-18 @ 11:00) (68 - 74)  RR: 17 (02-13-18 @ 11:00) (17 - 22)  SpO2: 95% (02-13-18 @ 11:00) (91% - 95%)                        11.9   23.7  )-----------( 611      ( 13 Feb 2018 07:42 )             36.7     13 Feb 2018 07:42    137    |  99     |  22     ----------------------------<  131    3.8     |  33     |  0.63     Ca    8.7        13 Feb 2018 07:42    TPro  5.8    /  Alb  1.9    /  TBili  0.4    /  DBili  x      /  AST  36     /  ALT  29     /  AlkPhos  68     13 Feb 2018 07:42      Exam:  NAD AAOx3  Incision clean and dry no erythema no drainage  +EHL FHL TA GS  SILT toes 1-5  +DP  Calf Soft NT    A/P:  Stable POD 8 Right Hip Hemiarthroplasty  -Dressing changed to new Aquacel  -Ppx ABX  -DVT PE ppx  -OOB PT posterior dislocation precautions  -DC Planning, ok from ortho perspective when tolerating po

## 2018-02-13 NOTE — PROGRESS NOTE ADULT - SUBJECTIVE AND OBJECTIVE BOX
HPI: 73 y/o male with a PMHx chronic CHF, COPD on home O2 4 L, emphysema, and spinal stenosis. He presents to ED s/p fall. Fall occurred ~ 10 days ago; patient has been very weak, and suffers from arthritis.   He notes chronic Rt hip pain for ~ 4 weeks which causes ambulatory dysfunction.  He was suppose to follow-up with outpatient ortho for MRI.    Reports associated poor appetite with decreased PO intake.   Denies nausea, vomiting, diarrhea, fevers , chills, CP or dypsnea.   No recent illnesses, no sick contacts.    ED course:  Xray imaging reportedly with Rt hip fracture  Venous doppler US 2/3 : no evidence of deep venous thrombosis in either leg.  Left Baker's cyst. (2018 01:53)    Patient is a 72y old  Male who presents with a chief complaint of Limb pain (2018 08:35)  pt s/p right hip brynn on 18.    Consulted by Dr. Rahul Elkins for VTE prophylaxis, risk stratification, and anticoagulation management.    PAST MEDICAL & SURGICAL HISTORY:  Spinal stenosis  CHF (congestive heart failure)  Emphysema  COPD (chronic obstructive pulmonary disease)  No significant past surgical history    Caprini VTE Risk Score:9    IMPROVE Bleeding Risk Score: 2.5    Falls Risk:   High (x  )  Mod (  )  Low (  )    EBL:  150ml  cr cl: 162.2  18 Pt sen at bedside.  Discussed his anticoagulation with Lovenox inj for 4 weeks.  Questions answered will reinforce as needed.  Pt concerned about no having a bm for a few days and  in his appetite for a while. Not sure if he lost wt.   18: Pt seen at bedside, OOB to chair. Reports that he continues to be constipated, was able to make "small bowel success," but remains to have distended/firm abdomen. Continues to have 4LNC, and states that he is feeling "oozy," and would like to go back in bed. RN is aware, but waiting for PT. Pt noted to have moderate size bruising in his RLQ abdomen. Advised RN to administer medication to his LLQ.   18 Pt seen at bedside in IC.  Pt dx with Kaylan Syndrome.  Had barium enema and received neostigmine iv.  Pt states he feels much better abd smaller and softer. discussed his anticoagulation with heparin sq mo concerns.  18: Pt seen at bedside in ICU. Reports of continuing to feel better, had successful large BM yesterday. He continues to be NPO. Pt is being downgraded to med-surg today.   2-10-18  pt seen on 2n feeling much better discussed his anticoagulation with Lovenox no concerns.  18   Pt seen at bedside in CCU S/P second dose of received neostigmine iv.  Pt states she has had a bm and feels better but not totally good yet. discussed his anticoagulation with lovenox  no concerns.   18 Pt seen at bedside on 2N oob in chair.  States he walked today and will walk again.  Discussed his anticoagulation no concerns.   FAMILY HISTORY:  No pertinent family history in first degree relatives    Denies any personal or familial history of clotting or bleeding disorders.    Allergies    No Known Allergies    Intolerances      REVIEW OF SYSTEMS    (  )Fever	     (  )Constipation	(  )SOB				(  )Headache	(  )Dysuria  (  )Chills	     (  )Melena	(  )Dyspnea present on exertion	                    (  )Dizziness                    (  )Polyuria  (  )Nausea	     (  )Hematochezia	(  )Cough			                    (  )Syncope   	(  )Hematuria  (  )Vomiting    (  )Chest Pain	(  )Wheezing			(  )Weakness  (  )Diarrhea     (  )Palpitations	(  )Anorexia			(  )Myalgia    All other review of systems negative: Yes    PHYSICAL EXAM:    Constitutional: Appears Well    Neurological: A& O x 3    Skin: Warm    Respiratory and Thorax: normal effort; Breath sounds: normal; No rales/wheezing/rhonchi, 4LNC supplemental O2  	  Cardiovascular: S1, S2, regular, NMBR	    Gastrointestinal: BS + x 4Q, nontender, distended and firm,  RLQ moderate size ecchymosis	    Genitourinary:  Bladder nondistended, nontender    Musculoskeletal:   General Right:   no muscle/joint tenderness,   normal tone, no joint swelling,   ROM: limited	    General Left:   no muscle/joint tenderness,   normal tone, no joint swelling,   ROM: full    Hip:  Right: Aquacel dressing CI  serosanguinous fld noted.     Lower extrems:   Right: no calf tenderness              negative rolando's sign               + pedal pulses    Left:   no calf tenderness              negative rolando's sign               + pedal pulses                          11.9   23.7  )-----------( 611      ( 2018 07:42 )             36.7       02-13    137  |  99  |  22  ----------------------------<  131<H>  3.8   |  33<H>  |  0.63    Ca    8.7      2018 07:42    TPro  5.8<L>  /  Alb  1.9<L>  /  TBili  0.4  /  DBili  x   /  AST  36  /  ALT  29  /  AlkPhos  68  13                            10.8   20.6  )-----------( 538      ( 2018 06:54 )             33.3       02-12    138  |  101  |  27<H>  ----------------------------<  141<H>  3.8   |  30  |  0.50    Ca    8.2<L>      2018 06:54  Phos  3.6     02-11  Mg     2.2     02-11    TPro  5.0<L>  /  Alb  1.7<L>  /  TBili  0.4  /  DBili  x   /  AST  38<H>  /  ALT  30  /  AlkPhos  64  12                             11.4   20.4  )-----------( 452      ( 10 Feb 2018 06:17 )             34.5       02-10    137  |  100  |  34<H>  ----------------------------<  123<H>  3.8   |  29  |  0.68    Ca    8.8      10 Feb 2018 06:17  Phos  2.1     02-10  Mg     2.3     02-10               MEDICATIONS  (STANDING):  amLODIPine   Tablet 5 milliGRAM(s) Oral daily  ascorbic acid 500 milliGRAM(s) Oral two times a day  atorvastatin 20 milliGRAM(s) Oral at bedtime  ciprofloxacin   IVPB 400 milliGRAM(s) IV Intermittent every 12 hours  ciprofloxacin   IVPB      docusate sodium 100 milliGRAM(s) Oral three times a day  enoxaparin Injectable 40 milliGRAM(s) SubCutaneous every 24 hours  finasteride 5 milliGRAM(s) Oral daily  folic acid 1 milliGRAM(s) Oral daily  isosorbide   mononitrate ER Tablet (IMDUR) 60 milliGRAM(s) Oral daily  lactated ringers. 1000 milliLiter(s) IV Continuous <Continuous>  lidocaine   Patch 1 Patch Transdermal daily  lisinopril 20 milliGRAM(s) Oral daily  metoprolol succinate ER 25 milliGRAM(s) Oral daily  metroNIDAZOLE  IVPB 500 milliGRAM(s) IV Intermittent every 8 hours  multivitamin 1 Tablet(s) Oral daily  pantoprazole    Tablet 40 milliGRAM(s) Oral daily  ranolazine 500 milliGRAM(s) Oral two times a day  tamsulosin 0.4 milliGRAM(s) Oral at bedtime  tiotropium 18 MICROgram(s) Capsule 1 Capsule(s) Inhalation daily  metroNIDAZOLE  IVPB 500 milliGRAM(s) IV Intermittent every 8 hours  mineral oil enema 133 milliLiter(s) Rectal two times a day  multivitamin 1 Tablet(s) Oral daily  pantoprazole    Tablet 40 milliGRAM(s) Oral daily  polyethylene glycol 3350 17 Gram(s) Oral daily  ranolazine 500 milliGRAM(s) Oral two times a day  tamsulosin 0.4 milliGRAM(s) Oral at bedtime  tiotropium 18 MICROgram(s) Capsule 1 Capsule(s) Inhalation daily        Vital Signs Last 24 Hrs  T(C): 36.6 (18 @ 04:18), Max: 36.9 (18 @ 19:17)  T(F): 97.8 (18 @ 04:18), Max: 98.5 (18 @ 19:17)  HR: 98 (18 @ 04:18) (76 - 98)  BP: 136/62 (18 @ 04:18) (124/50 - 143/60)  BP(mean): 68 (18 @ 18:00) (60 - 81)  RR: 19 (18 @ 04:18) (17 - 24)  SpO2: 91% (18 @ 04:18) (91% - 96%)    DVT Prophylaxis:  LMWH                   (x )  Heparin SQ           (  )  Coumadin             (  )  Xarelto                  (  )  Eliquis                   (  )  Venodynes           ( x )  Ambulation          ( x )  UFH                       (  )  Contraindicated  (  )

## 2018-02-13 NOTE — CHART NOTE - NSCHARTNOTEFT_GEN_A_CORE
1) consider starting PPN since pt now NPO x 6 days:         Rec'd: 1800mL total volume                    with:                          100g CHO                          70g amino acids                          54g lipids (0.48g/Kg/day)                          20mmol of sodium phosphate                          31mEq Sodium Acetate                          82mEq of sodium chloride                          30 mEq potassium chloride                          50mEq potassium acetate                          10mEq calcium gluconate                          8mEq magnesium sulfate    2) if PPN started: rec'd check: lipid panel, bilirubin total, and LFT's

## 2018-02-13 NOTE — CONSULT NOTE ADULT - ASSESSMENT
72 year old male with colonic ileus following hip surgery. He just underwent endoscopic decompression with rectal tube placement by GI . Hopefully this will suffice in helping decompress colon adequately. Continue to monitor and replace electrolytes as needed. Remain NPO. Avoid narcotics. Increase activity level and positioning. Colorectal surgery will continue to follow. In severe situations where patient does not improve or perhaps worsens despite maximal non operative management, surgery maybe required but this is usually avoidable and again anticipate improvement with endoscopic decompression.

## 2018-02-13 NOTE — PROGRESS NOTE ADULT - SUBJECTIVE AND OBJECTIVE BOX
CC- s/p RT hip fracture    HPI:  73 y/o male with a PMHx chronic diastolic CHF, COPD on home O2 4 L, HTN, HLD, CAD s/p stent to LCx, spinal stenosis, obesity, lindy, PAD who presented to the ER 10 days after a fall with right hip pain. He had hip pain for about 4 weeks pta. He fell 10 days pta and came to the ER for difficulty ambulating/right hip pain and generalized weakness. He was admitted with right hip fracture. He was seen by cardiology and subsequently underwent right hip hemiarthroplasty. Post op course complicated by ileus. He was unable to tolerate NGT. He was seen by GI, underwent gastrograffin enema and also was administered neostigmine. He was monitored in icu. He was eventually transferred to .   Hospitalist service asked to resume care.    2/13/18- still with persistent ileus. Feels thirsty    Review of system- All 10 systems reviewed and is as per HPI otherwise negative.     Vital Signs Last 24 Hrs  T(C): 36.6 (13 Feb 2018 11:00), Max: 36.9 (12 Feb 2018 19:17)  T(F): 97.8 (13 Feb 2018 11:00), Max: 98.5 (12 Feb 2018 19:17)  HR: 96 (13 Feb 2018 11:00) (82 - 98)  BP: 115/60 (13 Feb 2018 11:00) (115/60 - 143/60)  BP(mean): 74 (13 Feb 2018 11:00) (68 - 81)  RR: 17 (13 Feb 2018 11:00) (17 - 22)  SpO2: 95% (13 Feb 2018 11:00) (91% - 95%)    PHYSICAL EXAM:  GENERAL: Obese, elderly male, sitting in chair,  Comfortable, no acute distress  HEAD:  Atraumatic, Normocephalic  EYES: EOMI, PERRLA  HEENT: Moist mucous membranes  NECK: Supple, No JVD  NERVOUS SYSTEM:  Alert & Oriented X3, non focal  CHEST/LUNG: Clear to auscultation bilaterally  HEART: s1, s2+, Regular rate and rhythm  ABDOMEN: Soft, distended, non tender, BS hypoactive, but present  GENITOURINARY- Voiding, no palpable bladder  EXTREMITIES:  No clubbing, cyanosis, or edema  MUSCULOSKELETAL- right hip dressing dry  SKIN-no rash    LABS:                        11.9   23.7  )-----------( 611      ( 13 Feb 2018 07:42 )             36.7     13 Feb 2018 07:42    137    |  99     |  22     ----------------------------<  131    3.8     |  33     |  0.63     Ca    8.7        13 Feb 2018 07:42    TPro  5.8    /  Alb  1.9    /  TBili  0.4    /  DBili  x      /  AST  36     /  ALT  29     /  AlkPhos  68     13 Feb 2018 07:42    LIVER FUNCTIONS - ( 13 Feb 2018 07:42 )  Alb: 1.9 g/dL / Pro: 5.8 gm/dL / ALK PHOS: 68 U/L / ALT: 29 U/L / AST: 36 U/L / GGT: x           Radiology:  EXAM:  XR ABDOMEN 2V                        PROCEDURE DATE:  02/13/2018    INTERPRETATION:  Abdomen radiographs      CPT 99116  CLINICAL INFORMATION:       Abdominal distention, follow-up.  TECHNIQUE:  Supine views of the abdomen were obtained.    FINDINGS:   Prior study dated 2/7/2018 was available for review.    Degenerative changes are noted in the lumbar spine. There is partial   visualization of a total right hip replacement. Moderate colonic   distention with air is again noted without significant change from prior   examination. There are several mildly dilated air-filled loops of small   bowel noted in the left upper and left lower quadrants. This represents   an interval change from prior examination. No abnormal softtissue     IMPRESSION:  Findings suggestive of colonic ileus without significant   change from prior study dated 2/7/2018. Several mildly dilated air-filled   loops of small bowel are present in the left abdomen, as well which  represents an interval change from prior study dated 2/7/2018. Follow-up   examination is as clinically indicated.    MEDICATIONS  (STANDING):  amLODIPine   Tablet 5 milliGRAM(s) Oral daily  ascorbic acid 500 milliGRAM(s) Oral two times a day  atorvastatin 20 milliGRAM(s) Oral at bedtime  ciprofloxacin   IVPB 400 milliGRAM(s) IV Intermittent every 12 hours  ciprofloxacin   IVPB      dextrose 5% + sodium chloride 0.45%. 1000 milliLiter(s) (100 mL/Hr) IV Continuous <Continuous>  docusate sodium 100 milliGRAM(s) Oral three times a day  enoxaparin Injectable 40 milliGRAM(s) SubCutaneous every 24 hours  finasteride 5 milliGRAM(s) Oral daily  folic acid 1 milliGRAM(s) Oral daily  isosorbide   mononitrate ER Tablet (IMDUR) 60 milliGRAM(s) Oral daily  lidocaine   Patch 1 Patch Transdermal daily  lisinopril 20 milliGRAM(s) Oral daily  metoprolol succinate ER 25 milliGRAM(s) Oral daily  metroNIDAZOLE  IVPB 500 milliGRAM(s) IV Intermittent every 8 hours  multivitamin 1 Tablet(s) Oral daily  pantoprazole    Tablet 40 milliGRAM(s) Oral daily  ranolazine 500 milliGRAM(s) Oral two times a day  tamsulosin 0.4 milliGRAM(s) Oral at bedtime  tiotropium 18 MICROgram(s) Capsule 1 Capsule(s) Inhalation daily    MEDICATIONS  (PRN):  acetaminophen   Tablet 650 milliGRAM(s) Oral every 6 hours PRN For Temp over 38.3 C (100.94 F)  diphenhydrAMINE   Capsule 25 milliGRAM(s) Oral at bedtime PRN Insomnia  ondansetron Injectable 4 milliGRAM(s) IV Push every 6 hours PRN Nausea and/or Vomiting  prochlorperazine   Injectable 5 milliGRAM(s) IV Push every 6 hours PRN Nausea and/or Vomiting  senna 2 Tablet(s) Oral at bedtime PRN Constipation    ASSESSMENT AND PLAN:  #Right hip fracture s/p Right hip hemiarthroplasty:  Ortho f/u appreciated  PT as tolerated  AC by Lovenox  Minimize pain meds    #Post op ileus persistent  s/p gastrograffin enema/neostigmine  GI f/u appreciated, d/w DR Costello- will take patient for decompressive colonoscopy  CRS eval DR Turcios called  Remains NPO  Will keep on IVF    #Chr diastolic CHF- compensated    #HTN- stable    #h/o CAD/Stent:  -on ranexa, imdur, bb, statin  -?antiplatelet    #BPH:  -on proscar, flomax    #COPD with chronic respiratory failure:  -stable.  -spiriva  -prn nebs    #Dispo- for decompressive colonoscopy today

## 2018-02-13 NOTE — PROGRESS NOTE ADULT - ASSESSMENT
This is a 72 year old male s/p fall on about Jan 23rd presented to [facility] on 2/3/18 with chronic pain to hip, decrease appetite. Pt found to have a fx rt hip, now s/p right hip brynn on 2-5-18.  Pt has high thrombosis risks and requires anticoagualtion prophylaxis. Discussed with pt the use of Lovenox for his anticoagulation med. Pt is agreeable. 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 No bowel obstruction, remains to be NPO and possible downgrade to med-surg 2-9-18    : c/w lovenox 40mg SQ daily for four weeks post procedure. Please continue to rotate SQ injection sites   : daily cbc/bmp  : increase mobility as tolerated  Will continue to monitor.

## 2018-02-13 NOTE — CHART NOTE - NSCHARTNOTEFT_GEN_A_CORE
Assessment:   *pt c/w chronic ileus; now NPO x 6 days with severe malnutrition Dx.  Given malnutrition Dx, highly rec'd parenteral nutrition while pt to remain NPO, d/w hospitalist and left message for GI MD.    Factors impacting intake: [ ] none [ ] nausea  [ ] vomiting [ ] diarrhea [ ] constipation  [ ]chewing problems [ ] swallowing issues  [ ] other:     Diet Presciption: Diet, NPO:   Except Medications (02-07-18 @ 14:48)      Pertinent Medications: MEDICATIONS  (STANDING):  amLODIPine   Tablet 5 milliGRAM(s) Oral daily  ascorbic acid 500 milliGRAM(s) Oral two times a day  atorvastatin 20 milliGRAM(s) Oral at bedtime  ciprofloxacin   IVPB 400 milliGRAM(s) IV Intermittent every 12 hours  ciprofloxacin   IVPB      docusate sodium 100 milliGRAM(s) Oral three times a day  enoxaparin Injectable 40 milliGRAM(s) SubCutaneous every 24 hours  finasteride 5 milliGRAM(s) Oral daily  folic acid 1 milliGRAM(s) Oral daily  isosorbide   mononitrate ER Tablet (IMDUR) 60 milliGRAM(s) Oral daily  lactated ringers. 1000 milliLiter(s) (75 mL/Hr) IV Continuous <Continuous>  lidocaine   Patch 1 Patch Transdermal daily  lisinopril 20 milliGRAM(s) Oral daily  metoprolol succinate ER 25 milliGRAM(s) Oral daily  metroNIDAZOLE  IVPB 500 milliGRAM(s) IV Intermittent every 8 hours  multivitamin 1 Tablet(s) Oral daily  pantoprazole    Tablet 40 milliGRAM(s) Oral daily  ranolazine 500 milliGRAM(s) Oral two times a day  tamsulosin 0.4 milliGRAM(s) Oral at bedtime  tiotropium 18 MICROgram(s) Capsule 1 Capsule(s) Inhalation daily    MEDICATIONS  (PRN):  acetaminophen   Tablet 650 milliGRAM(s) Oral every 6 hours PRN For Temp over 38.3 C (100.94 F)  diphenhydrAMINE   Capsule 25 milliGRAM(s) Oral at bedtime PRN Insomnia  ondansetron Injectable 4 milliGRAM(s) IV Push every 6 hours PRN Nausea and/or Vomiting  prochlorperazine   Injectable 5 milliGRAM(s) IV Push every 6 hours PRN Nausea and/or Vomiting  senna 2 Tablet(s) Oral at bedtime PRN Constipation    Pertinent Labs: 02-13 Na137 mmol/L Glu 131 mg/dL<H> K+ 3.8 mmol/L Cr  0.63 mg/dL BUN 22 mg/dL Phos n/a   Alb 1.9 g/dL<L> PAB n/a        CAPILLARY BLOOD GLUCOSE          Skin: ibrahima score =       Estimated Needs:   [ ] no change since previous assessment      Previous Nutrition Diagnosis:       Nutrition Diagnosis is [ ] ongoing  [ ] resolved [ ] not applicable     New Nutrition Diagnosis: [ ] not applicable       Recommendations:      Monitoring and Evaluation:   [x] PO intake [ x ] Tolerance to diet prescription [ x ] weights [ x ] labs[ x ] follow up per protocol  [ ] other: Assessment:   *pt c/w chronic ileus; now NPO x 6 days with severe malnutrition Dx.  Given malnutrition Dx, highly rec'd parenteral nutrition while pt to remain NPO, d/w hospitalist and left message for GI MD.  If PPN is determined approved to be started given current information: rec'd 1800mL total volume with 100g CHO, 70g amino acids, 54g lipids (0.48g/Kg/day).  20mmol of sodium phosphate, 31mEq Sodium Acetate, 82mEq of sodium chloride, 30 mEq potassium chloride, 50mEq potassium acetate, 10mEq calcium gluconate, 8mEq magnesium sulfate.  *rec'd prior to starting PPN: lipid panel check, bilirubin total check, and LFT check.  *new wt on 2/12: 114.6Kg.  1Kg wt gain, possible fluid? pt with mild edema.    Factors impacting intake: [ ] none [ ] nausea  [ ] vomiting [ ] diarrhea [ ] constipation  [ ]chewing problems [ ] swallowing issues  [x] other: ileus    Diet Presciption: Diet, NPO:   Except Medications (02-07-18 @ 14:48)      Pertinent Medications: MEDICATIONS  (STANDING):  amLODIPine   Tablet 5 milliGRAM(s) Oral daily  ascorbic acid 500 milliGRAM(s) Oral two times a day  atorvastatin 20 milliGRAM(s) Oral at bedtime  ciprofloxacin   IVPB 400 milliGRAM(s) IV Intermittent every 12 hours  ciprofloxacin   IVPB      docusate sodium 100 milliGRAM(s) Oral three times a day  enoxaparin Injectable 40 milliGRAM(s) SubCutaneous every 24 hours  finasteride 5 milliGRAM(s) Oral daily  folic acid 1 milliGRAM(s) Oral daily  isosorbide   mononitrate ER Tablet (IMDUR) 60 milliGRAM(s) Oral daily  lactated ringers. 1000 milliLiter(s) (75 mL/Hr) IV Continuous <Continuous>  lidocaine   Patch 1 Patch Transdermal daily  lisinopril 20 milliGRAM(s) Oral daily  metoprolol succinate ER 25 milliGRAM(s) Oral daily  metroNIDAZOLE  IVPB 500 milliGRAM(s) IV Intermittent every 8 hours  multivitamin 1 Tablet(s) Oral daily  pantoprazole    Tablet 40 milliGRAM(s) Oral daily  ranolazine 500 milliGRAM(s) Oral two times a day  tamsulosin 0.4 milliGRAM(s) Oral at bedtime  tiotropium 18 MICROgram(s) Capsule 1 Capsule(s) Inhalation daily    MEDICATIONS  (PRN):  acetaminophen   Tablet 650 milliGRAM(s) Oral every 6 hours PRN For Temp over 38.3 C (100.94 F)  diphenhydrAMINE   Capsule 25 milliGRAM(s) Oral at bedtime PRN Insomnia  ondansetron Injectable 4 milliGRAM(s) IV Push every 6 hours PRN Nausea and/or Vomiting  prochlorperazine   Injectable 5 milliGRAM(s) IV Push every 6 hours PRN Nausea and/or Vomiting  senna 2 Tablet(s) Oral at bedtime PRN Constipation    Pertinent Labs: 02-13 Na137 mmol/L Glu 131 mg/dL<H> K+ 3.8 mmol/L Cr  0.63 mg/dL BUN 22 mg/dL Phos n/a   Alb 1.9 g/dL<L> PAB n/a        CAPILLARY BLOOD GLUCOSE          Skin: ibrahima score = 17      Estimated Needs:   [x] no change since previous assessment  2170-2604Kcal (25-30Kcal/Kg)  87-104g protein (1-1.2g/Kg protein)    Previous Nutrition Diagnosis:   · Nutrition Diagnostic Terminology #1: Nutrient  · Nutrient: Malnutrition; Pt meets criteria for severe protein calorie malnutrition in context of acute illness secondary to poor intake </=50% of estimated needs consumed x >5days, and moderate fluid accumulation.    · Etiology: Ileus  · Signs/Symptoms: +3 Edema, Poor intake, NPO status x 5 days  · Nutrition Intervention: Meals and Snack; Parenteral Nutrition/IV Fluids  · Meals and Snacks: Advance diet when medically feasible  · Parenteral Nutrition/IV Fluids: IV fluids; Initiate PPN if po diet not advanced  · Goal/Expected Outcome: Tolerance of advanced diet >80% or Alternate route of nutrition, No s/s of malnutrition.      Nutrition Diagnosis is [x] ongoing  [ ] resolved [ ] not applicable     New Nutrition Diagnosis: [x] not applicable       Recommendations:  1) consider starting PPN since pt now NPO x 6 days:         Rec'd: 1800mL total volume                    with 100g CHO                          70g amino acids                          54g lipids (0.48g/Kg/day)                          20mmol of sodium phosphate                          31mEq Sodium Acetate                          82mEq of sodium chloride                          30 mEq potassium chloride                          50mEq potassium acetate                          10mEq calcium gluconate                          8mEq magnesium sulfate  2) if PPN started: rec'd check: lipid panel, bilirubin total, and LFT's  3) monitor length NPO, advancement/tolerance nutr  4) monitor lytes closely; given length NPO and malnutrition status, pt high risk for refeeding syndrome  5) weekly wt checks      Monitoring and Evaluation:   [x] PO intake [ x ] Tolerance to diet prescription [ x ] weights [ x ] labs[ x ] follow up per protocol  [ ] other:

## 2018-02-14 LAB
ANION GAP SERPL CALC-SCNC: 6 MMOL/L — SIGNIFICANT CHANGE UP (ref 5–17)
BUN SERPL-MCNC: 20 MG/DL — SIGNIFICANT CHANGE UP (ref 7–23)
CALCIUM SERPL-MCNC: 8.5 MG/DL — SIGNIFICANT CHANGE UP (ref 8.5–10.1)
CHLORIDE SERPL-SCNC: 100 MMOL/L — SIGNIFICANT CHANGE UP (ref 96–108)
CO2 SERPL-SCNC: 31 MMOL/L — SIGNIFICANT CHANGE UP (ref 22–31)
CREAT SERPL-MCNC: 0.59 MG/DL — SIGNIFICANT CHANGE UP (ref 0.5–1.3)
GLUCOSE SERPL-MCNC: 150 MG/DL — HIGH (ref 70–99)
HCT VFR BLD CALC: 37.1 % — LOW (ref 39–50)
HGB BLD-MCNC: 11.8 G/DL — LOW (ref 13–17)
MAGNESIUM SERPL-MCNC: 2.2 MG/DL — SIGNIFICANT CHANGE UP (ref 1.6–2.6)
MCHC RBC-ENTMCNC: 30.5 PG — SIGNIFICANT CHANGE UP (ref 27–34)
MCHC RBC-ENTMCNC: 31.9 GM/DL — LOW (ref 32–36)
MCV RBC AUTO: 95.5 FL — SIGNIFICANT CHANGE UP (ref 80–100)
PHOSPHATE SERPL-MCNC: 2.3 MG/DL — LOW (ref 2.5–4.5)
PLATELET # BLD AUTO: 576 K/UL — HIGH (ref 150–400)
POTASSIUM SERPL-MCNC: 4 MMOL/L — SIGNIFICANT CHANGE UP (ref 3.5–5.3)
POTASSIUM SERPL-SCNC: 4 MMOL/L — SIGNIFICANT CHANGE UP (ref 3.5–5.3)
RBC # BLD: 3.88 M/UL — LOW (ref 4.2–5.8)
RBC # FLD: 13 % — SIGNIFICANT CHANGE UP (ref 10.3–14.5)
SODIUM SERPL-SCNC: 137 MMOL/L — SIGNIFICANT CHANGE UP (ref 135–145)
WBC # BLD: 20.3 K/UL — HIGH (ref 3.8–10.5)
WBC # FLD AUTO: 20.3 K/UL — HIGH (ref 3.8–10.5)

## 2018-02-14 PROCEDURE — 99233 SBSQ HOSP IP/OBS HIGH 50: CPT

## 2018-02-14 RX ADMIN — Medication 100 MILLIGRAM(S): at 20:59

## 2018-02-14 RX ADMIN — FINASTERIDE 5 MILLIGRAM(S): 5 TABLET, FILM COATED ORAL at 12:42

## 2018-02-14 RX ADMIN — Medication 1 MILLIGRAM(S): at 12:42

## 2018-02-14 RX ADMIN — Medication 62.5 MILLIMOLE(S): at 12:41

## 2018-02-14 RX ADMIN — LIDOCAINE 1 PATCH: 4 CREAM TOPICAL at 12:43

## 2018-02-14 RX ADMIN — RANOLAZINE 500 MILLIGRAM(S): 500 TABLET, FILM COATED, EXTENDED RELEASE ORAL at 18:43

## 2018-02-14 RX ADMIN — TAMSULOSIN HYDROCHLORIDE 0.4 MILLIGRAM(S): 0.4 CAPSULE ORAL at 21:41

## 2018-02-14 RX ADMIN — PANTOPRAZOLE SODIUM 40 MILLIGRAM(S): 20 TABLET, DELAYED RELEASE ORAL at 12:41

## 2018-02-14 RX ADMIN — Medication 25 MILLIGRAM(S): at 06:18

## 2018-02-14 RX ADMIN — Medication 100 MILLIGRAM(S): at 06:18

## 2018-02-14 RX ADMIN — Medication 100 MILLIGRAM(S): at 12:42

## 2018-02-14 RX ADMIN — ENOXAPARIN SODIUM 40 MILLIGRAM(S): 100 INJECTION SUBCUTANEOUS at 09:29

## 2018-02-14 RX ADMIN — Medication 62.5 MILLIMOLE(S): at 16:49

## 2018-02-14 RX ADMIN — Medication 500 MILLIGRAM(S): at 18:43

## 2018-02-14 RX ADMIN — Medication 100 MILLIGRAM(S): at 11:09

## 2018-02-14 RX ADMIN — Medication 100 MILLIGRAM(S): at 22:59

## 2018-02-14 RX ADMIN — Medication 200 MILLIGRAM(S): at 21:40

## 2018-02-14 RX ADMIN — Medication 500 MILLIGRAM(S): at 06:18

## 2018-02-14 RX ADMIN — RANOLAZINE 500 MILLIGRAM(S): 500 TABLET, FILM COATED, EXTENDED RELEASE ORAL at 06:18

## 2018-02-14 RX ADMIN — TIOTROPIUM BROMIDE 1 CAPSULE(S): 18 CAPSULE ORAL; RESPIRATORY (INHALATION) at 07:53

## 2018-02-14 RX ADMIN — Medication 200 MILLIGRAM(S): at 05:45

## 2018-02-14 RX ADMIN — AMLODIPINE BESYLATE 5 MILLIGRAM(S): 2.5 TABLET ORAL at 06:18

## 2018-02-14 RX ADMIN — ATORVASTATIN CALCIUM 20 MILLIGRAM(S): 80 TABLET, FILM COATED ORAL at 20:59

## 2018-02-14 RX ADMIN — Medication 1 TABLET(S): at 12:41

## 2018-02-14 RX ADMIN — LISINOPRIL 20 MILLIGRAM(S): 2.5 TABLET ORAL at 06:18

## 2018-02-14 NOTE — PROGRESS NOTE ADULT - SUBJECTIVE AND OBJECTIVE BOX
HPI: 73 y/o male with a PMHx chronic CHF, COPD on home O2 4 L, emphysema, and spinal stenosis. He presents to ED s/p fall. Fall occurred ~ 10 days ago; patient has been very weak, and suffers from arthritis.   He notes chronic Rt hip pain for ~ 4 weeks which causes ambulatory dysfunction.  He was suppose to follow-up with outpatient ortho for MRI.    Reports associated poor appetite with decreased PO intake.   Denies nausea, vomiting, diarrhea, fevers , chills, CP or dypsnea.   No recent illnesses, no sick contacts.    ED course:  Xray imaging reportedly with Rt hip fracture  Venous doppler US 2/3 : no evidence of deep venous thrombosis in either leg.  Left Baker's cyst. (2018 01:53)    Patient is a 72y old  Male who presents with a chief complaint of Limb pain (2018 08:35)  pt s/p right hip brynn on 18.    Consulted by Dr. Rahul Elkins for VTE prophylaxis, risk stratification, and anticoagulation management.    PAST MEDICAL & SURGICAL HISTORY:  Spinal stenosis  CHF (congestive heart failure)  Emphysema  COPD (chronic obstructive pulmonary disease)  No significant past surgical history    Caprini VTE Risk Score:9    IMPROVE Bleeding Risk Score: 2.5    Falls Risk:   High (x  )  Mod (  )  Low (  )    EBL:  150ml  cr cl: 162.2  18 Pt sen at bedside.  Discussed his anticoagulation with Lovenox inj for 4 weeks.  Questions answered will reinforce as needed.  Pt concerned about no having a bm for a few days and  in his appetite for a while. Not sure if he lost wt.   18: Pt seen at bedside, OOB to chair. Reports that he continues to be constipated, was able to make "small bowel success," but remains to have distended/firm abdomen. Continues to have 4LNC, and states that he is feeling "oozy," and would like to go back in bed. RN is aware, but waiting for PT. Pt noted to have moderate size bruising in his RLQ abdomen. Advised RN to administer medication to his LLQ.   18 Pt seen at bedside in IC.  Pt dx with Kaylan Syndrome.  Had barium enema and received neostigmine iv.  Pt states he feels much better abd smaller and softer. discussed his anticoagulation with heparin sq mo concerns.  18: Pt seen at bedside in ICU. Reports of continuing to feel better, had successful large BM yesterday. He continues to be NPO. Pt is being downgraded to med-surg today.   2-10-18  pt seen on 2n feeling much better discussed his anticoagulation with Lovenox no concerns.  18   Pt seen at bedside in CCU S/P second dose of received neostigmine iv.  Pt states she has had a bm and feels better but not totally good yet. discussed his anticoagulation with lovenox  no concerns.   18 Pt seen at bedside on 2N oob in chair.  States he walked today and will walk again.  Discussed his anticoagulation no concerns.   18 Pt seen at bedside.  States he feels so much better after colonoscopy yesterday.  He is having clear liquids.  Discussed his anticoagulation with Lovenox no concerns.   FAMILY HISTORY:  No pertinent family history in first degree relatives    Denies any personal or familial history of clotting or bleeding disorders.    Allergies    No Known Allergies    Intolerances      REVIEW OF SYSTEMS    (  )Fever	     (  )Constipation	(  )SOB				(  )Headache	(  )Dysuria  (  )Chills	     (  )Melena	(  )Dyspnea present on exertion	                    (  )Dizziness                    (  )Polyuria  (  )Nausea	     (  )Hematochezia	(  )Cough			                    (  )Syncope   	(  )Hematuria  (  )Vomiting    (  )Chest Pain	(  )Wheezing			(  )Weakness  (  )Diarrhea     (  )Palpitations	(  )Anorexia			(  )Myalgia    All other review of systems negative: Yes    PHYSICAL EXAM:    Constitutional: Appears Well    Neurological: A& O x 3    Skin: Warm    Respiratory and Thorax: normal effort; Breath sounds: normal; No rales/wheezing/rhonchi, 4LNC supplemental O2  	  Cardiovascular: S1, S2, regular, NMBR	    Gastrointestinal: BS + x 4Q, nontender, soft non dis tended.	    Genitourinary:  Bladder nondistended, nontender    Musculoskeletal:   General Right:   no muscle/joint tenderness,   normal tone, no joint swelling,   ROM: limited	    General Left:   no muscle/joint tenderness,   normal tone, no joint swelling,   ROM: full    Hip:  Right: Aquacel dressing CI  serosanguinous fld noted.     Lower extrems:   Right: no calf tenderness              negative rolando's sign               + pedal pulses    Left:   no calf tenderness              negative rolando's sign               + pedal pulses                            11.8   20.3  )-----------( 576      ( 2018 06:46 )             37.1       02-14    137  |  100  |  20  ----------------------------<  150<H>  4.0   |  31  |  0.59    Ca    8.5      2018 06:46  Phos  2.3     02-14  Mg     2.2     02-14    TPro  5.8<L>  /  Alb  1.9<L>  /  TBili  0.4  /  DBili  x   /  AST  36  /  ALT  29  /  AlkPhos  68                            11.9   23.7  )-----------( 611      ( 2018 07:42 )             36.7       0213    137  |  99  |  22  ----------------------------<  131<H>  3.8   |  33<H>  |  0.63    Ca    8.7      2018 07:42    TPro  5.8<L>  /  Alb  1.9<L>  /  TBili  0.4  /  DBili  x   /  AST  36  /  ALT  29  /  AlkPhos  68  13                            10.8   20.6  )-----------( 538      ( 2018 06:54 )             33.3       02-12    138  |  101  |  27<H>  ----------------------------<  141<H>  3.8   |  30  |  0.50    Ca    8.2<L>      2018 06:54  Phos  3.6     02-11  Mg     2.2     02-11    TPro  5.0<L>  /  Alb  1.7<L>  /  TBili  0.4  /  DBili  x   /  AST  38<H>  /  ALT  30  /  AlkPhos  64  12                             11.4   20.4  )-----------( 452      ( 10 Feb 2018 06:17 )             34.5       02-10    137  |  100  |  34<H>  ----------------------------<  123<H>  3.8   |  29  |  0.68    Ca    8.8      10 Feb 2018 06:17  Phos  2.1     02-10  Mg     2.3     02-10               MEDICATIONS  (STANDING):  amLODIPine   Tablet 5 milliGRAM(s) Oral daily  ascorbic acid 500 milliGRAM(s) Oral two times a day  atorvastatin 20 milliGRAM(s) Oral at bedtime  ciprofloxacin   IVPB 400 milliGRAM(s) IV Intermittent every 12 hours  ciprofloxacin   IVPB      dextrose 5% + sodium chloride 0.45%. 1000 milliLiter(s) IV Continuous <Continuous>  docusate sodium 100 milliGRAM(s) Oral three times a day  enoxaparin Injectable 40 milliGRAM(s) SubCutaneous every 24 hours  finasteride 5 milliGRAM(s) Oral daily  folic acid 1 milliGRAM(s) Oral daily  isosorbide   mononitrate ER Tablet (IMDUR) 60 milliGRAM(s) Oral daily  lidocaine   Patch 1 Patch Transdermal daily  lisinopril 20 milliGRAM(s) Oral daily  metoprolol succinate ER 25 milliGRAM(s) Oral daily  metroNIDAZOLE  IVPB 500 milliGRAM(s) IV Intermittent every 8 hours  multivitamin 1 Tablet(s) Oral daily  pantoprazole    Tablet 40 milliGRAM(s) Oral daily  ranolazine 500 milliGRAM(s) Oral two times a day  sodium phosphate IVPB 15 milliMole(s) IV Intermittent every 4 hours  tamsulosin 0.4 milliGRAM(s) Oral at bedtime  tiotropium 18 MICROgram(s) Capsule 1 Capsule(s) Inhalation daily  pantoprazole    Tablet 40 milliGRAM(s) Oral daily  polyethylene glycol 3350 17 Gram(s) Oral daily  ranolazine 500 milliGRAM(s) Oral two times a day  tamsulosin 0.4 milliGRAM(s) Oral at bedtime  tiotropium 18 MICROgram(s) Capsule 1 Capsule(s) Inhalation daily        Vital Signs Last 24 Hrs  T(C): 36.7 (18 @ 04:08), Max: 36.8 (18 @ 00:08)  T(F): 98.1 (18 @ 04:08), Max: 98.3 (18 @ 00:08)  HR: 95 (02-14-18 @ 07:55) (94 - 100)  BP: 119/69 (18 @ 04:08) (115/60 - 143/57)  BP(mean): 74 (18 @ 11:00) (74 - 74)  RR: 18 (18 @ 04:08) (17 - 18)  SpO2: 93% (18 @ 04:08) (93% - 95%)    DVT Prophylaxis:  LMWH                   (x )  Heparin SQ           (  )  Coumadin             (  )  Xarelto                  (  )  Eliquis                   (  )  Venodynes           ( x )  Ambulation          ( x )  UFH                       (  )  Contraindicated  (  )

## 2018-02-14 NOTE — PROGRESS NOTE ADULT - ASSESSMENT
This is a 72 year old male s/p fall on about Jan 23rd presented to [facility] on 2/3/18 with chronic pain to hip, decrease appetite. Pt found to have a fx rt hip, now s/p right hip brynn on 2-5-18.  Pt has high thrombosis risks and requires anticoagualtion prophylaxis. Discussed with pt the use of Lovenox for his anticoagulation med. Pt is agreeable. 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 No bowel obstruction, remains to be NPO and possible downgrade to med-surg 2-9-18    : c/w lovenox 40mg SQ daily for four weeks post procedure. Please continue to rotate SQ injection sites   : daily cbc/bmp  : increase mobility as tolerated  Will continue to monitor.

## 2018-02-14 NOTE — PROGRESS NOTE ADULT - ASSESSMENT
Hip fracture–status post repair. Would minimize narcotics.    Progressive colonic ileus not responded to conservative management and is not been able to tolerate NG tube placement   Gastrografin enema and  neostigmine times 2  S/P decompression with improvement  WBC dec mildly  clear liquid diet    inc activity, DW RN and intensivist and pt    please rotate pta and place in chair, ambulate as tolerated  place pt on stomach     avoid narcotics and encourage movement    Continue antibiotics.

## 2018-02-14 NOTE — PROGRESS NOTE ADULT - SUBJECTIVE AND OBJECTIVE BOX
Feels well and has had multiple loose stools overnight since colonoscopy yesterday. No abdominal pain. Wants to eat more    Exam:  Vital Signs Last 24 Hrs  T(C): 36.7 (14 Feb 2018 04:08), Max: 36.8 (14 Feb 2018 00:08)  T(F): 98.1 (14 Feb 2018 04:08), Max: 98.3 (14 Feb 2018 00:08)  HR: 95 (14 Feb 2018 07:55) (87 - 100)  BP: 119/69 (14 Feb 2018 04:08) (115/60 - 143/57)  BP(mean): 74 (13 Feb 2018 11:00) (74 - 74)  RR: 18 (14 Feb 2018 04:08) (17 - 18)  SpO2: 93% (14 Feb 2018 04:08) (93% - 95%)    In no distress  Abdomen soft, distended and non tender                          11.8   20.3  )-----------( 576      ( 14 Feb 2018 06:46 )             37.1   02-14    137  |  100  |  20  ----------------------------<  150<H>  4.0   |  31  |  0.59    Ca    8.5      14 Feb 2018 06:46  Phos  2.3     02-14  Mg     2.2     02-14    TPro  5.8<L>  /  Alb  1.9<L>  /  TBili  0.4  /  DBili  x   /  AST  36  /  ALT  29  /  AlkPhos  68  02-13

## 2018-02-14 NOTE — PROGRESS NOTE ADULT - SUBJECTIVE AND OBJECTIVE BOX
Patient is a 72y old  Male who presents with a chief complaint of Limb pain (06 Feb 2018 08:35)      HPI:  71 y/o male with a PMHx chronic CHF, COPD on home O2 4 L, emphysema, and spinal stenosis.   He presents to ED s/p fall.   Fall occurred ~ 10 days ago; patient has been very weak, and suffers from arthritis.   He notes chronic Rt hip pain for ~ 4 weeks which causes ambulatory dysfunction.  He was suppose to follow-up with outpatient ortho for MRI.    Reports associated poor appetite with decreased PO intake.   Denies nausea, vomiting, diarrhea, fevers , chills, CP or dypsnea.   No recent illnesses, no sick contacts.    ED course:  Xray imaging reportedly with Rt hip fracture  Venous doppler US 2/3 : no evidence of deep venous thrombosis in either leg.  Left Baker's cyst. (04 Feb 2018 01:53)      SP colonic decompression and drainage tube in place  had large BM and feels better  pos flatus  neg N V  feels hungry        PAST MEDICAL & SURGICAL HISTORY:  Spinal stenosis  CHF (congestive heart failure)  Emphysema  COPD (chronic obstructive pulmonary disease)  No significant past surgical history      MEDICATIONS  (STANDING):  amLODIPine   Tablet 5 milliGRAM(s) Oral daily  ascorbic acid 500 milliGRAM(s) Oral two times a day  atorvastatin 20 milliGRAM(s) Oral at bedtime  ciprofloxacin   IVPB 400 milliGRAM(s) IV Intermittent every 12 hours  ciprofloxacin   IVPB      dextrose 5% + sodium chloride 0.45%. 1000 milliLiter(s) (100 mL/Hr) IV Continuous <Continuous>  docusate sodium 100 milliGRAM(s) Oral three times a day  enoxaparin Injectable 40 milliGRAM(s) SubCutaneous every 24 hours  finasteride 5 milliGRAM(s) Oral daily  folic acid 1 milliGRAM(s) Oral daily  isosorbide   mononitrate ER Tablet (IMDUR) 60 milliGRAM(s) Oral daily  lidocaine   Patch 1 Patch Transdermal daily  lisinopril 20 milliGRAM(s) Oral daily  metoprolol succinate ER 25 milliGRAM(s) Oral daily  metroNIDAZOLE  IVPB 500 milliGRAM(s) IV Intermittent every 8 hours  multivitamin 1 Tablet(s) Oral daily  pantoprazole    Tablet 40 milliGRAM(s) Oral daily  ranolazine 500 milliGRAM(s) Oral two times a day  tamsulosin 0.4 milliGRAM(s) Oral at bedtime  tiotropium 18 MICROgram(s) Capsule 1 Capsule(s) Inhalation daily    MEDICATIONS  (PRN):  acetaminophen   Tablet 650 milliGRAM(s) Oral every 6 hours PRN For Temp over 38.3 C (100.94 F)  diphenhydrAMINE   Capsule 25 milliGRAM(s) Oral at bedtime PRN Insomnia  ondansetron Injectable 4 milliGRAM(s) IV Push every 6 hours PRN Nausea and/or Vomiting  prochlorperazine   Injectable 5 milliGRAM(s) IV Push every 6 hours PRN Nausea and/or Vomiting  senna 2 Tablet(s) Oral at bedtime PRN Constipation      Allergies    No Known Allergies    Intolerances        SOCIAL HISTORY:NC    FAMILY HISTORY:  No pertinent family history in first degree relatives      REVIEW OF SYSTEMS:    CONSTITUTIONAL: No weakness, fevers or chills  EYES/ENT: No visual changes;  No vertigo or throat pain   NECK: No pain or stiffness  RESPIRATORY: No cough, wheezing, hemoptysis; No shortness of breath  CARDIOVASCULAR: No chest pain or palpitations  GENITOURINARY: No dysuria, frequency or hematuria  NEUROLOGICAL: No numbness or weakness  SKIN: No itching, burning, rashes, or lesions   All other review of systems is negative unless indicated above.    Vital Signs Last 24 Hrs  T(C): 36.7 (14 Feb 2018 04:08), Max: 36.8 (14 Feb 2018 00:08)  T(F): 98.1 (14 Feb 2018 04:08), Max: 98.3 (14 Feb 2018 00:08)  HR: 95 (14 Feb 2018 04:08) (87 - 100)  BP: 119/69 (14 Feb 2018 04:08) (115/60 - 143/57)  BP(mean): 74 (13 Feb 2018 11:00) (74 - 74)  RR: 18 (14 Feb 2018 04:08) (17 - 18)  SpO2: 93% (14 Feb 2018 04:08) (93% - 95%)    PHYSICAL EXAM:    Constitutional: NAD, well-developed  HEENT: EOMI, throat clear  Neck: No LAD, supple  Respiratory: CTA and P  Cardiovascular: S1 and S2, RRR, no M  Gastrointestinal: BS+, soft, NT/ND, neg HSM,  Extremities: No peripheral edema, neg clubing, cyanosis  Vascular: 2+ peripheral pulses  Neurological: A/O x 3, no focal deficits  Psychiatric: Normal mood, normal affect  Skin: No rashes    LABS:  CBC Full  -  ( 14 Feb 2018 06:46 )  WBC Count : 20.3 K/uL  Hemoglobin : 11.8 g/dL  Hematocrit : 37.1 %  Platelet Count - Automated : 576 K/uL  Mean Cell Volume : 95.5 fl  Mean Cell Hemoglobin : 30.5 pg  Mean Cell Hemoglobin Concentration : 31.9 gm/dL  Auto Neutrophil # : x  Auto Lymphocyte # : x  Auto Monocyte # : x  Auto Eosinophil # : x  Auto Basophil # : x  Auto Neutrophil % : x  Auto Lymphocyte % : x  Auto Monocyte % : x  Auto Eosinophil % : x  Auto Basophil % : x    02-14    137  |  100  |  20  ----------------------------<  150<H>  4.0   |  31  |  0.59    Ca    8.5      14 Feb 2018 06:46  Phos  2.3     02-14  Mg     2.2     02-14    TPro  5.8<L>  /  Alb  1.9<L>  /  TBili  0.4  /  DBili  x   /  AST  36  /  ALT  29  /  AlkPhos  68  02-13            RADIOLOGY & ADDITIONAL STUDIES:  < from: Xray Abdomen 2 Views (02.13.18 @ 11:25) >  EXAM:  XR ABDOMEN 2V                            PROCEDURE DATE:  02/13/2018          INTERPRETATION:  Abdomen radiographs      CPT 98534    CLINICAL INFORMATION:       Abdominal distention, follow-up.    TECHNIQUE:  Supine views of the abdomen wereobtained.    FINDINGS:   Prior study dated 2/7/2018 was available for review.    Degenerative changes are noted in the lumbar spine. There is partial   visualization of a total right hip replacement. Moderate colonic   distention with air is again noted without significant change from prior   examination. There are several mildly dilated air-filled loops of small   bowel noted in the left upper and left lower quadrants. This represents   an interval change from prior examination. No abnormal softtissue   calcification is seen.    IMPRESSION:  Findings suggestive of colonic ileus without significant   change from prior study dated 2/7/2018. Several mildly dilated air-filled   loops of small bowel are present in the left abdomen, as well which  represents an interval change from prior study dated 2/7/2018. Follow-up   examination is as clinically indicated.          < end of copied text >

## 2018-02-14 NOTE — PROGRESS NOTE ADULT - ASSESSMENT
72 year old male s/p right hip surgery with colonic ileus. Improved today. Clear liquid diet. Replace phos. Strongly encouraged ambulation and frequent position changes. Patient should ambulate thomas every few hours. Will continue to follow.

## 2018-02-14 NOTE — PROGRESS NOTE ADULT - SUBJECTIVE AND OBJECTIVE BOX
CC- s/p RT hip fracture    HPI:  71 y/o male with a PMHx chronic diastolic CHF, COPD on home O2 4 L, HTN, HLD, CAD s/p stent to LCx, spinal stenosis, obesity, lindy, PAD who presented to the ER 10 days after a fall with right hip pain. He had hip pain for about 4 weeks pta. He fell 10 days pta and came to the ER for difficulty ambulating/right hip pain and generalized weakness. He was admitted with right hip fracture. He was seen by cardiology and subsequently underwent right hip hemiarthroplasty. Post op course complicated by ileus. He was unable to tolerate NGT. He was seen by GI, underwent gastrograffin enema and also was administered neostigmine. He was monitored in icu. He was eventually transferred to .   Hospitalist service asked to resume care.    2/13/18- still with persistent ileus. Feels thirsty  2/14/18- s/p decompressive colonoscopy and rectal tube placement- feels much better. Very happy to be on clears    Review of system- All 10 systems reviewed and is as per HPI otherwise negative.     Vital Signs Last 24 Hrs  T(C): 36.7 (14 Feb 2018 12:03), Max: 36.8 (14 Feb 2018 00:08)  T(F): 98.1 (14 Feb 2018 12:03), Max: 98.3 (14 Feb 2018 00:08)  HR: 91 (14 Feb 2018 12:03) (91 - 100)  BP: 104/61 (14 Feb 2018 12:03) (104/61 - 143/57)  BP(mean): --  RR: 17 (14 Feb 2018 12:03) (17 - 18)  SpO2: 95% (14 Feb 2018 12:03) (93% - 95%)    PHYSICAL EXAM:  GENERAL: Obese, elderly male, sitting in chair,  Comfortable, no acute distress  HEAD:  Atraumatic, Normocephalic  EYES: EOMI, PERRLA  HEENT: Moist mucous membranes  NECK: Supple, No JVD  NERVOUS SYSTEM:  Alert & Oriented X3, non focal  CHEST/LUNG: Clear to auscultation bilaterally  HEART: s1, s2+, Regular rate and rhythm  ABDOMEN: Soft, distended, non tender, +BS  GENITOURINARY- Voiding, no palpable bladder  EXTREMITIES:  No clubbing, cyanosis, or edema  MUSCULOSKELETAL- right hip dressing dry  SKIN-no rash    LABS:                        11.8   20.3  )-----------( 576      ( 14 Feb 2018 06:46 )             37.1     14 Feb 2018 06:46    137    |  100    |  20     ----------------------------<  150    4.0     |  31     |  0.59     Ca    8.5        14 Feb 2018 06:46  Phos  2.3       14 Feb 2018 06:46  Mg     2.2       14 Feb 2018 06:46    TPro  5.8    /  Alb  1.9    /  TBili  0.4    /  DBili  x      /  AST  36     /  ALT  29     /  AlkPhos  68     13 Feb 2018 07:42  LIVER FUNCTIONS - ( 13 Feb 2018 07:42 )  Alb: 1.9 g/dL / Pro: 5.8 gm/dL / ALK PHOS: 68 U/L / ALT: 29 U/L / AST: 36 U/L / GGT: x           Radiology:  EXAM:  XR ABDOMEN 2V                        PROCEDURE DATE:  02/13/2018    INTERPRETATION:  Abdomen radiographs      CPT 19857  CLINICAL INFORMATION:       Abdominal distention, follow-up.  TECHNIQUE:  Supine views of the abdomen were obtained.    FINDINGS:   Prior study dated 2/7/2018 was available for review.    Degenerative changes are noted in the lumbar spine. There is partial   visualization of a total right hip replacement. Moderate colonic   distention with air is again noted without significant change from prior   examination. There are several mildly dilated air-filled loops of small   bowel noted in the left upper and left lower quadrants. This represents   an interval change from prior examination. No abnormal soft tissue     IMPRESSION:  Findings suggestive of colonic ileus without significant   change from prior study dated 2/7/2018. Several mildly dilated air-filled   loops of small bowel are present in the left abdomen, as well which  represents an interval change from prior study dated 2/7/2018. Follow-up   examination is as clinically indicated.    MEDICATIONS  (STANDING):  amLODIPine   Tablet 5 milliGRAM(s) Oral daily  ascorbic acid 500 milliGRAM(s) Oral two times a day  atorvastatin 20 milliGRAM(s) Oral at bedtime  ciprofloxacin   IVPB 400 milliGRAM(s) IV Intermittent every 12 hours  ciprofloxacin   IVPB      dextrose 5% + sodium chloride 0.45%. 1000 milliLiter(s) (100 mL/Hr) IV Continuous <Continuous>  docusate sodium 100 milliGRAM(s) Oral three times a day  enoxaparin Injectable 40 milliGRAM(s) SubCutaneous every 24 hours  finasteride 5 milliGRAM(s) Oral daily  folic acid 1 milliGRAM(s) Oral daily  isosorbide   mononitrate ER Tablet (IMDUR) 60 milliGRAM(s) Oral daily  lidocaine   Patch 1 Patch Transdermal daily  lisinopril 20 milliGRAM(s) Oral daily  metoprolol succinate ER 25 milliGRAM(s) Oral daily  metroNIDAZOLE  IVPB 500 milliGRAM(s) IV Intermittent every 8 hours  multivitamin 1 Tablet(s) Oral daily  pantoprazole    Tablet 40 milliGRAM(s) Oral daily  ranolazine 500 milliGRAM(s) Oral two times a day  tamsulosin 0.4 milliGRAM(s) Oral at bedtime  tiotropium 18 MICROgram(s) Capsule 1 Capsule(s) Inhalation daily    MEDICATIONS  (PRN):  acetaminophen   Tablet 650 milliGRAM(s) Oral every 6 hours PRN For Temp over 38.3 C (100.94 F)  diphenhydrAMINE   Capsule 25 milliGRAM(s) Oral at bedtime PRN Insomnia  ondansetron Injectable 4 milliGRAM(s) IV Push every 6 hours PRN Nausea and/or Vomiting  prochlorperazine   Injectable 5 milliGRAM(s) IV Push every 6 hours PRN Nausea and/or Vomiting  senna 2 Tablet(s) Oral at bedtime PRN Constipation    ASSESSMENT AND PLAN:  #Right hip fracture s/p Right hip hemiarthroplasty:  Ortho f/u appreciated  PT as tolerated  AC by Lovenox  Minimize pain meds    #Post op ileus  S/p decompressive colonoscopy and rectal tube placement on 2/13/18  GI f/u appreciated  Clinically feels better, +BM  Started on clears  Diet as perGI/surgery  Cont IV abx  Monitor leukocytosis    #Chr diastolic CHF- compensated    #HTN- stable    #h/o CAD/Stent:  -on ranexa, imdur, bb, statin  -?antiplatelet    #BPH:  -on proscar, flomax    #COPD with chronic respiratory failure:  -stable.  -spiriva  -prn nebs    #Dispo- PT, started on a diet

## 2018-02-15 LAB
HCT VFR BLD CALC: 34 % — LOW (ref 39–50)
HGB BLD-MCNC: 11.3 G/DL — LOW (ref 13–17)
MCHC RBC-ENTMCNC: 31.6 PG — SIGNIFICANT CHANGE UP (ref 27–34)
MCHC RBC-ENTMCNC: 33.3 GM/DL — SIGNIFICANT CHANGE UP (ref 32–36)
MCV RBC AUTO: 95 FL — SIGNIFICANT CHANGE UP (ref 80–100)
PLATELET # BLD AUTO: 490 K/UL — HIGH (ref 150–400)
RBC # BLD: 3.58 M/UL — LOW (ref 4.2–5.8)
RBC # FLD: 13.1 % — SIGNIFICANT CHANGE UP (ref 10.3–14.5)
WBC # BLD: 19 K/UL — HIGH (ref 3.8–10.5)
WBC # FLD AUTO: 19 K/UL — HIGH (ref 3.8–10.5)

## 2018-02-15 PROCEDURE — 99233 SBSQ HOSP IP/OBS HIGH 50: CPT

## 2018-02-15 RX ADMIN — AMLODIPINE BESYLATE 5 MILLIGRAM(S): 2.5 TABLET ORAL at 05:43

## 2018-02-15 RX ADMIN — FINASTERIDE 5 MILLIGRAM(S): 5 TABLET, FILM COATED ORAL at 11:04

## 2018-02-15 RX ADMIN — TIOTROPIUM BROMIDE 1 CAPSULE(S): 18 CAPSULE ORAL; RESPIRATORY (INHALATION) at 08:34

## 2018-02-15 RX ADMIN — Medication 25 MILLIGRAM(S): at 05:43

## 2018-02-15 RX ADMIN — Medication 100 MILLIGRAM(S): at 05:42

## 2018-02-15 RX ADMIN — Medication 200 MILLIGRAM(S): at 07:00

## 2018-02-15 RX ADMIN — Medication 1 MILLIGRAM(S): at 11:04

## 2018-02-15 RX ADMIN — PANTOPRAZOLE SODIUM 40 MILLIGRAM(S): 20 TABLET, DELAYED RELEASE ORAL at 11:05

## 2018-02-15 RX ADMIN — SODIUM CHLORIDE 100 MILLILITER(S): 9 INJECTION, SOLUTION INTRAVENOUS at 11:07

## 2018-02-15 RX ADMIN — RANOLAZINE 500 MILLIGRAM(S): 500 TABLET, FILM COATED, EXTENDED RELEASE ORAL at 05:43

## 2018-02-15 RX ADMIN — Medication 100 MILLIGRAM(S): at 21:35

## 2018-02-15 RX ADMIN — Medication 100 MILLIGRAM(S): at 05:43

## 2018-02-15 RX ADMIN — LISINOPRIL 20 MILLIGRAM(S): 2.5 TABLET ORAL at 05:43

## 2018-02-15 RX ADMIN — Medication 500 MILLIGRAM(S): at 17:14

## 2018-02-15 RX ADMIN — Medication 1 TABLET(S): at 11:05

## 2018-02-15 RX ADMIN — Medication 500 MILLIGRAM(S): at 05:43

## 2018-02-15 RX ADMIN — ENOXAPARIN SODIUM 40 MILLIGRAM(S): 100 INJECTION SUBCUTANEOUS at 09:50

## 2018-02-15 RX ADMIN — ISOSORBIDE MONONITRATE 60 MILLIGRAM(S): 60 TABLET, EXTENDED RELEASE ORAL at 11:04

## 2018-02-15 RX ADMIN — RANOLAZINE 500 MILLIGRAM(S): 500 TABLET, FILM COATED, EXTENDED RELEASE ORAL at 17:14

## 2018-02-15 RX ADMIN — TAMSULOSIN HYDROCHLORIDE 0.4 MILLIGRAM(S): 0.4 CAPSULE ORAL at 21:35

## 2018-02-15 RX ADMIN — ATORVASTATIN CALCIUM 20 MILLIGRAM(S): 80 TABLET, FILM COATED ORAL at 21:35

## 2018-02-15 NOTE — PROGRESS NOTE ADULT - SUBJECTIVE AND OBJECTIVE BOX
He feels much better, no n/v, passing flatus and loose stools    Vital Signs Last 24 Hrs  T(C): 36.6 (15 Feb 2018 05:38), Max: 36.7 (14 Feb 2018 12:03)  T(F): 97.9 (15 Feb 2018 05:38), Max: 98.1 (14 Feb 2018 12:03)  HR: 90 (15 Feb 2018 05:38) (83 - 92)  BP: 145/67 (15 Feb 2018 05:38) (104/61 - 145/67)  BP(mean): --  RR: 18 (15 Feb 2018 05:38) (17 - 18)  SpO2: 97% (15 Feb 2018 05:38) (95% - 99%)    NAD  abd soft,                           11.8   20.3  )-----------( 576      ( 14 Feb 2018 06:46 )             37.1   02-14    137  |  100  |  20  ----------------------------<  150<H>  4.0   |  31  |  0.59    Ca    8.5      14 Feb 2018 06:46  Phos  2.3     02-14  Mg     2.2     02-14

## 2018-02-15 NOTE — PROGRESS NOTE ADULT - ASSESSMENT
Hip fracture–status post repair. Would minimize narcotics.    Progressive colonic ileus not responded to conservative management and is not been able to tolerate NG tube placement   Gastrografin enema and  neostigmine times 2  S/P decompression with improvement  WBC dec mildly but still elevared  clear liquid diet    inc activity, DW RN and intensivist and pt    please rotate pta and place in chair, ambulate as tolerated  place pt on stomach     avoid narcotics and encourage movement    DC antibiotics.    surg input appreciated

## 2018-02-15 NOTE — PROGRESS NOTE ADULT - SUBJECTIVE AND OBJECTIVE BOX
Patient is a 72y old  Male who presents with a chief complaint of Limb pain (06 Feb 2018 08:35)      HPI:  71 y/o male with a PMHx chronic CHF, COPD on home O2 4 L, emphysema, and spinal stenosis.   He presents to ED s/p fall.   Fall occurred ~ 10 days ago; patient has been very weak, and suffers from arthritis.   He notes chronic Rt hip pain for ~ 4 weeks which causes ambulatory dysfunction.  He was suppose to follow-up with outpatient ortho for MRI.    Reports associated poor appetite with decreased PO intake.   Denies nausea, vomiting, diarrhea, fevers , chills, CP or dypsnea.   No recent illnesses, no sick contacts.    ED course:  Xray imaging reportedly with Rt hip fracture  Venous doppler US 2/3 : no evidence of deep venous thrombosis in either leg.  Left Baker's cyst. (04 Feb 2018 01:53)    Patient with a few bowel movements, small amounts. Denies abdominal pain.  Abdomen is still distended but not much different than yesterday. Soft.  Has been trying to work with physical therapy and I have encouraged him to increase activity as much as possible.      PAST MEDICAL & SURGICAL HISTORY:  Spinal stenosis  CHF (congestive heart failure)  Emphysema  COPD (chronic obstructive pulmonary disease)  No significant past surgical history      MEDICATIONS  (STANDING):  amLODIPine   Tablet 5 milliGRAM(s) Oral daily  ascorbic acid 500 milliGRAM(s) Oral two times a day  atorvastatin 20 milliGRAM(s) Oral at bedtime  dextrose 5% + sodium chloride 0.45%. 1000 milliLiter(s) (100 mL/Hr) IV Continuous <Continuous>  docusate sodium 100 milliGRAM(s) Oral three times a day  enoxaparin Injectable 40 milliGRAM(s) SubCutaneous every 24 hours  finasteride 5 milliGRAM(s) Oral daily  folic acid 1 milliGRAM(s) Oral daily  isosorbide   mononitrate ER Tablet (IMDUR) 60 milliGRAM(s) Oral daily  lidocaine   Patch 1 Patch Transdermal daily  lisinopril 20 milliGRAM(s) Oral daily  metoprolol succinate ER 25 milliGRAM(s) Oral daily  multivitamin 1 Tablet(s) Oral daily  pantoprazole    Tablet 40 milliGRAM(s) Oral daily  ranolazine 500 milliGRAM(s) Oral two times a day  tamsulosin 0.4 milliGRAM(s) Oral at bedtime  tiotropium 18 MICROgram(s) Capsule 1 Capsule(s) Inhalation daily    MEDICATIONS  (PRN):  acetaminophen   Tablet 650 milliGRAM(s) Oral every 6 hours PRN For Temp over 38.3 C (100.94 F)  diphenhydrAMINE   Capsule 25 milliGRAM(s) Oral at bedtime PRN Insomnia  ondansetron Injectable 4 milliGRAM(s) IV Push every 6 hours PRN Nausea and/or Vomiting  prochlorperazine   Injectable 5 milliGRAM(s) IV Push every 6 hours PRN Nausea and/or Vomiting  senna 2 Tablet(s) Oral at bedtime PRN Constipation      Allergies    No Known Allergies    Intolerances        SOCIAL HISTORY:NC    FAMILY HISTORY:  No pertinent family history in first degree relatives      REVIEW OF SYSTEMS:    CONSTITUTIONAL: No weakness, fevers or chills  EYES/ENT: No visual changes;  No vertigo or throat pain   NECK: No pain or stiffness  RESPIRATORY: No cough, wheezing, hemoptysis; No shortness of breath  CARDIOVASCULAR: No chest pain or palpitations  GENITOURINARY: No dysuria, frequency or hematuria  NEUROLOGICAL: No numbness or weakness  SKIN: No itching, burning, rashes, or lesions   All other review of systems is negative unless indicated above.    Vital Signs Last 24 Hrs  T(C): 36.6 (15 Feb 2018 05:38), Max: 36.7 (14 Feb 2018 12:03)  T(F): 97.9 (15 Feb 2018 05:38), Max: 98.1 (14 Feb 2018 12:03)  HR: 90 (15 Feb 2018 05:38) (83 - 92)  BP: 145/67 (15 Feb 2018 05:38) (104/61 - 145/67)  BP(mean): --  RR: 18 (15 Feb 2018 05:38) (17 - 18)  SpO2: 97% (15 Feb 2018 05:38) (95% - 99%)    PHYSICAL EXAM:    Constitutional: NAD, well-developed, obese  HEENT: EOMI, throat clear  Neck: No LAD, supple  Respiratory: CTA and P  Cardiovascular: S1 and S2, RRR, no M  Gastrointestinal: BS+, soft, NT/distended, neg HSM,  Extremities: No peripheral edema, neg clubing, cyanosis  Vascular: 2+ peripheral pulses  Neurological: A/O x 3, no focal deficits  Psychiatric: Normal mood, normal affect  Skin: No rashes    LABS:  CBC Full  -  ( 15 Feb 2018 08:05 )  WBC Count : 19.0 K/uL  Hemoglobin : 11.3 g/dL  Hematocrit : 34.0 %  Platelet Count - Automated : 490 K/uL  Mean Cell Volume : 95.0 fl  Mean Cell Hemoglobin : 31.6 pg  Mean Cell Hemoglobin Concentration : 33.3 gm/dL  Auto Neutrophil # : x  Auto Lymphocyte # : x  Auto Monocyte # : x  Auto Eosinophil # : x  Auto Basophil # : x  Auto Neutrophil % : x  Auto Lymphocyte % : x  Auto Monocyte % : x  Auto Eosinophil % : x  Auto Basophil % : x    02-14    137  |  100  |  20  ----------------------------<  150<H>  4.0   |  31  |  0.59    Ca    8.5      14 Feb 2018 06:46  Phos  2.3     02-14  Mg     2.2     02-14              RADIOLOGY & ADDITIONAL STUDIES:

## 2018-02-15 NOTE — PROGRESS NOTE ADULT - SUBJECTIVE AND OBJECTIVE BOX
Pt S/E at bedside, no acute events overnight, pain controlled. Dressing changed 2/14.    ICU Vital Signs Last 24 Hrs  T(C): 36.6 (15 Feb 2018 05:38), Max: 36.7 (14 Feb 2018 12:03)  T(F): 97.9 (15 Feb 2018 05:38), Max: 98.1 (14 Feb 2018 12:03)  HR: 90 (15 Feb 2018 05:38) (83 - 95)  BP: 145/67 (15 Feb 2018 05:38) (104/61 - 145/67)  RR: 18 (15 Feb 2018 05:38) (17 - 18)  SpO2: 97% (15 Feb 2018 05:38) (95% - 99%)    Gen: NAD, AAOx3    Right Lower Extremity:  Dressing clean dry intact  +EHL/FHL/TA/GS  SILT L3-S1  +DP/PT Pulses  Compartments soft  No calf TTP B/L

## 2018-02-15 NOTE — PROGRESS NOTE ADULT - ASSESSMENT
This is a 72 year old male s/p fall on about Jan 23rd presented to [facility] on 2/3/18 with chronic pain to hip, decrease appetite. Pt found to have a fx rt hip, now s/p right hip brynn on 2-5-18.  Pt has high thrombosis risks and requires anticoagualtion prophylaxis. Discussed with pt the use of Lovenox for his anticoagulation med. Pt is agreeable. 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 No bowel obstruction, remains to be NPO and possible downgrade to med-surg 2-9-18  2-15-18 PT has had  Gastrografin enema and  neostigmine times 2, and colonoscopy with relief  : c/w lovenox 40mg SQ daily for four weeks post procedure. Please continue to rotate SQ injection sites   : daily cbc/bmp  : increase mobility as tolerated  Will continue to monitor.

## 2018-02-15 NOTE — PROGRESS NOTE ADULT - ASSESSMENT
72 year old male s/p right hip surgery with colonic ileus. Much  Improved today.  check wbc count today. would advance diet and remove rectal tube.  Strongly encouraged ambulation and frequent position changes. Patient should ambulate thomas every few hours.

## 2018-02-15 NOTE — PROGRESS NOTE ADULT - ASSESSMENT
A/P: 72M s/p R hemiarthroplasty POD10  Pain Control  DVT ppx  WBAT  PT/OT  Incentive Spirometry  Medical management appreciated  DC planning

## 2018-02-15 NOTE — PROGRESS NOTE ADULT - SUBJECTIVE AND OBJECTIVE BOX
CC- s/p RT hip fracture    HPI:  71 y/o male with a PMHx chronic diastolic CHF, COPD on home O2 4 L, HTN, HLD, CAD s/p stent to LCx, spinal stenosis, obesity, lindy, PAD who presented to the ER 10 days after a fall with right hip pain. He had hip pain for about 4 weeks pta. He fell 10 days pta and came to the ER for difficulty ambulating/right hip pain and generalized weakness. He was admitted with right hip fracture. He was seen by cardiology and subsequently underwent right hip hemiarthroplasty. Post op course complicated by ileus. He was unable to tolerate NGT. He was seen by GI, underwent gastrograffin enema and also was administered neostigmine. He was monitored in icu. He was eventually transferred to .   Hospitalist service asked to resume care.    2/13/18- still with persistent ileus. Feels thirsty  2/14/18- s/p decompressive colonoscopy and rectal tube placement- feels much better. Very happy to be on clears  2/15/18- diet advanced to full liquids, +flatus, +BM. Feels better    Review of system- All 10 systems reviewed and is as per HPI otherwise negative.     Vital Signs Last 24 Hrs  T(C): 36.9 (15 Feb 2018 10:59), Max: 36.9 (15 Feb 2018 10:59)  T(F): 98.5 (15 Feb 2018 10:59), Max: 98.5 (15 Feb 2018 10:59)  HR: 89 (15 Feb 2018 10:59) (83 - 92)  BP: 124/46 (15 Feb 2018 10:59) (115/54 - 145/67)  BP(mean): --  RR: 18 (15 Feb 2018 10:59) (18 - 18)  SpO2: 95% (15 Feb 2018 10:59) (95% - 99%)    PHYSICAL EXAM:  GENERAL: Obese, elderly male, sitting in chair,  Comfortable, no acute distress  HEAD:  Atraumatic, Normocephalic  EYES: EOMI, PERRLA  HEENT: Moist mucous membranes  NECK: Supple, No JVD  NERVOUS SYSTEM:  Alert & Oriented X3, non focal  CHEST/LUNG: Clear to auscultation bilaterally  HEART: s1, s2+, Regular rate and rhythm  ABDOMEN: Soft, distended, non tender, +BS  GENITOURINARY- Voiding, no palpable bladder  EXTREMITIES:  No clubbing, cyanosis, or edema  MUSCULOSKELETAL- right hip dressing dry  SKIN-no rash    LABS:                        11.3   19.0  )-----------( 490      ( 15 Feb 2018 08:05 )             34.0     137    |  100    |  20     ----------------------------<  150    4.0     |  31     |  0.59     Ca    8.5        14 Feb 2018 06:46  Phos  2.3       14 Feb 2018 06:46  Mg     2.2       14 Feb 2018 06:46    Radiology:  EXAM:  XR ABDOMEN 2V                        PROCEDURE DATE:  02/13/2018    INTERPRETATION:  Abdomen radiographs      CPT [id number]  CLINICAL INFORMATION:       Abdominal distention, follow-up.  TECHNIQUE:  Supine views of the abdomen were obtained.    FINDINGS:   Prior study dated 2/7/2018 was available for review.    Degenerative changes are noted in the lumbar spine. There is partial   visualization of a total right hip replacement. Moderate colonic   distention with air is again noted without significant change from prior   examination. There are several mildly dilated air-filled loops of small   bowel noted in the left upper and left lower quadrants. This represents   an interval change from prior examination. No abnormal soft tissue     IMPRESSION:  Findings suggestive of colonic ileus without significant   change from prior study dated 2/7/2018. Several mildly dilated air-filled   loops of small bowel are present in the left abdomen, as well which  represents an interval change from prior study dated 2/7/2018. Follow-up   examination is as clinically indicated.    MEDICATIONS  (STANDING):  amLODIPine   Tablet 5 milliGRAM(s) Oral daily  ascorbic acid 500 milliGRAM(s) Oral two times a day  atorvastatin 20 milliGRAM(s) Oral at bedtime  ciprofloxacin   IVPB 400 milliGRAM(s) IV Intermittent every 12 hours  ciprofloxacin   IVPB      dextrose 5% + sodium chloride 0.45%. 1000 milliLiter(s) (100 mL/Hr) IV Continuous <Continuous>  docusate sodium 100 milliGRAM(s) Oral three times a day  enoxaparin Injectable 40 milliGRAM(s) SubCutaneous every 24 hours  finasteride 5 milliGRAM(s) Oral daily  folic acid 1 milliGRAM(s) Oral daily  isosorbide   mononitrate ER Tablet (IMDUR) 60 milliGRAM(s) Oral daily  lidocaine   Patch 1 Patch Transdermal daily  lisinopril 20 milliGRAM(s) Oral daily  metoprolol succinate ER 25 milliGRAM(s) Oral daily  metroNIDAZOLE  IVPB 500 milliGRAM(s) IV Intermittent every 8 hours  multivitamin 1 Tablet(s) Oral daily  pantoprazole    Tablet 40 milliGRAM(s) Oral daily  ranolazine 500 milliGRAM(s) Oral two times a day  tamsulosin 0.4 milliGRAM(s) Oral at bedtime  tiotropium 18 MICROgram(s) Capsule 1 Capsule(s) Inhalation daily    MEDICATIONS  (PRN):  acetaminophen   Tablet 650 milliGRAM(s) Oral every 6 hours PRN For Temp over 38.3 C (100.94 F)  diphenhydrAMINE   Capsule 25 milliGRAM(s) Oral at bedtime PRN Insomnia  ondansetron Injectable 4 milliGRAM(s) IV Push every 6 hours PRN Nausea and/or Vomiting  prochlorperazine   Injectable 5 milliGRAM(s) IV Push every 6 hours PRN Nausea and/or Vomiting  senna 2 Tablet(s) Oral at bedtime PRN Constipation    ASSESSMENT AND PLAN:  #Right hip fracture s/p Right hip hemiarthroplasty:  Ortho f/u appreciated  PT as tolerated  AC by Lovenox  Minimize pain meds    #Post op ileus- resolving  S/p decompressive colonoscopy and rectal tube placement on 2/13/18  GI f/u appreciated  Rectal tube as per GI/CRS  Clinically feels better, +BM, +flatus  Diet advanced to fulls  Cont IV abx  Monitor leukocytosis    #Chr diastolic CHF- compensated    #HTN- stable    #h/o CAD/Stent:  -on ranexa, imdur, bb, statin  -?antiplatelet    #BPH:  -on proscar, flomax    #COPD with chronic respiratory failure:  -stable.  -spiriva  -prn nebs    #Dispo- PT, advance diet. Likely YOEL beginning on next week

## 2018-02-15 NOTE — PROGRESS NOTE ADULT - SUBJECTIVE AND OBJECTIVE BOX
HPI: 73 y/o male with a PMHx chronic CHF, COPD on home O2 4 L, emphysema, and spinal stenosis. He presents to ED s/p fall. Fall occurred ~ 10 days ago; patient has been very weak, and suffers from arthritis.   He notes chronic Rt hip pain for ~ 4 weeks which causes ambulatory dysfunction.  He was suppose to follow-up with outpatient ortho for MRI.    Reports associated poor appetite with decreased PO intake.   Denies nausea, vomiting, diarrhea, fevers , chills, CP or dypsnea.   No recent illnesses, no sick contacts.    ED course:  Xray imaging reportedly with Rt hip fracture  Venous doppler US 2/3 : no evidence of deep venous thrombosis in either leg.  Left Baker's cyst. (2018 01:53)    Patient is a 72y old  Male who presents with a chief complaint of Limb pain (2018 08:35)  pt s/p right hip brynn on 18.    Consulted by Dr. Rahul Elkins for VTE prophylaxis, risk stratification, and anticoagulation management.    PAST MEDICAL & SURGICAL HISTORY:  Spinal stenosis  CHF (congestive heart failure)  Emphysema  COPD (chronic obstructive pulmonary disease)  No significant past surgical history    Caprini VTE Risk Score:9    IMPROVE Bleeding Risk Score: 2.5    Falls Risk:   High (x  )  Mod (  )  Low (  )    EBL:  150ml  cr cl: 162.2  18 Pt sen at bedside.  Discussed his anticoagulation with Lovenox inj for 4 weeks.  Questions answered will reinforce as needed.  Pt concerned about no having a bm for a few days and  in his appetite for a while. Not sure if he lost wt.   18: Pt seen at bedside, OOB to chair. Reports that he continues to be constipated, was able to make "small bowel success," but remains to have distended/firm abdomen. Continues to have 4LNC, and states that he is feeling "oozy," and would like to go back in bed. RN is aware, but waiting for PT. Pt noted to have moderate size bruising in his RLQ abdomen. Advised RN to administer medication to his LLQ.   18 Pt seen at bedside in IC.  Pt dx with Kaylan Syndrome.  Had barium enema and received neostigmine iv.  Pt states he feels much better abd smaller and softer. discussed his anticoagulation with heparin sq mo concerns.  18: Pt seen at bedside in ICU. Reports of continuing to feel better, had successful large BM yesterday. He continues to be NPO. Pt is being downgraded to med-surg today.   2-10-18  pt seen on 2n feeling much better discussed his anticoagulation with Lovenox no concerns.  18   Pt seen at bedside in CCU S/P second dose of received neostigmine iv.  Pt states she has had a bm and feels better but not totally good yet. discussed his anticoagulation with lovenox  no concerns.   18 Pt seen at bedside on 2N oob in chair.  States he walked today and will walk again.  Discussed his anticoagulation no concerns.   18 Pt seen at bedside.  States he feels so much better after colonoscopy yesterday.  He is having clear liquids.  Discussed his anticoagulation with Lovenox no concerns.   2-15-18 pt seen at bedside on 2n oob in chair states his abdomen is much better.  will cont with Lovenox for anticoagulation  FAMILY HISTORY:  No pertinent family history in first degree relatives    Denies any personal or familial history of clotting or bleeding disorders.    Allergies    No Known Allergies    Intolerances      REVIEW OF SYSTEMS    (  )Fever	     (  )Constipation	(  )SOB				(  )Headache	(  )Dysuria  (  )Chills	     (  )Melena	(  )Dyspnea present on exertion	                    (  )Dizziness                    (  )Polyuria  (  )Nausea	     (  )Hematochezia	(  )Cough			                    (  )Syncope   	(  )Hematuria  (  )Vomiting    (  )Chest Pain	(  )Wheezing			(  )Weakness  (  )Diarrhea     (  )Palpitations	(  )Anorexia			(  )Myalgia    All other review of systems negative: Yes    PHYSICAL EXAM:    Constitutional: Appears Well    Neurological: A& O x 3    Skin: Warm    Respiratory and Thorax: normal effort; Breath sounds: normal; No rales/wheezing/rhonchi, 4LNC supplemental O2  	  Cardiovascular: S1, S2, regular, NMBR	    Gastrointestinal: BS + x 4Q, nontender, soft non dis tended.	    Genitourinary:  Bladder nondistended, nontender    Musculoskeletal:   General Right:   no muscle/joint tenderness,   normal tone, no joint swelling,   ROM: limited	    General Left:   no muscle/joint tenderness,   normal tone, no joint swelling,   ROM: full    Hip:  Right: Aquacel dressing CDI     Lower extrems:   Right: no calf tenderness              negative rolando's sign               + pedal pulses    Left:   no calf tenderness              negative rolando's sign               + pedal pulses                          11.3   19.0  )-----------( 490      ( 15 Feb 2018 08:05 )             34.0       -    137  |  100  |  20  ----------------------------<  150<H>  4.0   |  31  |  0.59    Ca    8.5      2018 06:46  Phos  2.3     02-14  Mg     2.2     -                                11.8   20.3  )-----------( 576      ( 2018 06:46 )             37.1       -    137  |  100  |  20  ----------------------------<  150<H>  4.0   |  31  |  0.59    Ca    8.5      2018 06:46  Phos  2.3     -14  Mg     2.2     14    TPro  5.8<L>  /  Alb  1.9<L>  /  TBili  0.4  /  DBili  x   /  AST  36  /  ALT  29  /  AlkPhos  68                            11.9   23.7  )-----------( 611      ( 2018 07:42 )             36.7       02-13    137  |  99  |  22  ----------------------------<  131<H>  3.8   |  33<H>  |  0.63    Ca    8.7      2018 07:42    TPro  5.8<L>  /  Alb  1.9<L>  /  TBili  0.4  /  DBili  x   /  AST  36  /  ALT  29  /  AlkPhos  68  13                            10.8   20.6  )-----------( 538      ( 2018 06:54 )             33.3       02-12    138  |  101  |  27<H>  ----------------------------<  141<H>  3.8   |  30  |  0.50    Ca    8.2<L>      2018 06:54  Phos  3.6     02-11  Mg     2.2         TPro  5.0<L>  /  Alb  1.7<L>  /  TBili  0.4  /  DBili  x   /  AST  38<H>  /  ALT  30  /  AlkPhos  64                               11.4   20.4  )-----------( 452      ( 10 Feb 2018 06:17 )             34.5       0210    137  |  100  |  34<H>  ----------------------------<  123<H>  3.8   |  29  |  0.68    Ca    8.8      10 Feb 2018 06:17  Phos  2.1     -10  Mg     2.3     02-10                 MEDICATIONS  (STANDING):  amLODIPine   Tablet 5 milliGRAM(s) Oral daily  ascorbic acid 500 milliGRAM(s) Oral two times a day  atorvastatin 20 milliGRAM(s) Oral at bedtime  dextrose 5% + sodium chloride 0.45%. 1000 milliLiter(s) IV Continuous <Continuous>  docusate sodium 100 milliGRAM(s) Oral three times a day  enoxaparin Injectable 40 milliGRAM(s) SubCutaneous every 24 hours  finasteride 5 milliGRAM(s) Oral daily  folic acid 1 milliGRAM(s) Oral daily  isosorbide   mononitrate ER Tablet (IMDUR) 60 milliGRAM(s) Oral daily  lidocaine   Patch 1 Patch Transdermal daily  lisinopril 20 milliGRAM(s) Oral daily  metoprolol succinate ER 25 milliGRAM(s) Oral daily  multivitamin 1 Tablet(s) Oral daily  pantoprazole    Tablet 40 milliGRAM(s) Oral daily  ranolazine 500 milliGRAM(s) Oral two times a day  tamsulosin 0.4 milliGRAM(s) Oral at bedtime  tiotropium 18 MICROgram(s) Capsule 1 Capsule(s) Inhalation daily      Vital Signs Last 24 Hrs  T(C): 36.6 (02-15-18 @ 05:38), Max: 36.7 (18 @ 12:03)  T(F): 97.9 (02-15-18 @ 05:38), Max: 98.1 (18 @ 12:03)  HR: 90 (02-15-18 @ 05:38) (83 - 92)  BP: 145/67 (02-15-18 @ 05:38) (104/61 - 145/67)  BP(mean): --  RR: 18 (02-15-18 @ 05:38) (17 - 18)  SpO2: 97% (02-15-18 @ 05:38) (95% - 99%)    DVT Prophylaxis:  LMWH                   (x )  Heparin SQ           (  )  Coumadin             (  )  Xarelto                  (  )  Eliquis                   (  )  Venodynes           ( x )  Ambulation          ( x )  UFH                       (  )  Contraindicated  (  )

## 2018-02-16 LAB
ANION GAP SERPL CALC-SCNC: 6 MMOL/L — SIGNIFICANT CHANGE UP (ref 5–17)
BUN SERPL-MCNC: 11 MG/DL — SIGNIFICANT CHANGE UP (ref 7–23)
CALCIUM SERPL-MCNC: 8.2 MG/DL — LOW (ref 8.5–10.1)
CHLORIDE SERPL-SCNC: 99 MMOL/L — SIGNIFICANT CHANGE UP (ref 96–108)
CO2 SERPL-SCNC: 32 MMOL/L — HIGH (ref 22–31)
CREAT SERPL-MCNC: 0.49 MG/DL — LOW (ref 0.5–1.3)
GLUCOSE SERPL-MCNC: 123 MG/DL — HIGH (ref 70–99)
HCT VFR BLD CALC: 35 % — LOW (ref 39–50)
HGB BLD-MCNC: 11.5 G/DL — LOW (ref 13–17)
MCHC RBC-ENTMCNC: 31.2 PG — SIGNIFICANT CHANGE UP (ref 27–34)
MCHC RBC-ENTMCNC: 32.9 GM/DL — SIGNIFICANT CHANGE UP (ref 32–36)
MCV RBC AUTO: 94.8 FL — SIGNIFICANT CHANGE UP (ref 80–100)
PLATELET # BLD AUTO: 435 K/UL — HIGH (ref 150–400)
POTASSIUM SERPL-MCNC: 3.7 MMOL/L — SIGNIFICANT CHANGE UP (ref 3.5–5.3)
POTASSIUM SERPL-SCNC: 3.7 MMOL/L — SIGNIFICANT CHANGE UP (ref 3.5–5.3)
RBC # BLD: 3.69 M/UL — LOW (ref 4.2–5.8)
RBC # FLD: 13.2 % — SIGNIFICANT CHANGE UP (ref 10.3–14.5)
SODIUM SERPL-SCNC: 137 MMOL/L — SIGNIFICANT CHANGE UP (ref 135–145)
WBC # BLD: 19.9 K/UL — HIGH (ref 3.8–10.5)
WBC # FLD AUTO: 19.9 K/UL — HIGH (ref 3.8–10.5)

## 2018-02-16 PROCEDURE — 99233 SBSQ HOSP IP/OBS HIGH 50: CPT

## 2018-02-16 RX ADMIN — Medication 1 TABLET(S): at 12:13

## 2018-02-16 RX ADMIN — FINASTERIDE 5 MILLIGRAM(S): 5 TABLET, FILM COATED ORAL at 12:13

## 2018-02-16 RX ADMIN — Medication 25 MILLIGRAM(S): at 05:56

## 2018-02-16 RX ADMIN — Medication 500 MILLIGRAM(S): at 17:09

## 2018-02-16 RX ADMIN — AMLODIPINE BESYLATE 5 MILLIGRAM(S): 2.5 TABLET ORAL at 05:56

## 2018-02-16 RX ADMIN — RANOLAZINE 500 MILLIGRAM(S): 500 TABLET, FILM COATED, EXTENDED RELEASE ORAL at 05:56

## 2018-02-16 RX ADMIN — ENOXAPARIN SODIUM 40 MILLIGRAM(S): 100 INJECTION SUBCUTANEOUS at 10:10

## 2018-02-16 RX ADMIN — LISINOPRIL 20 MILLIGRAM(S): 2.5 TABLET ORAL at 05:56

## 2018-02-16 RX ADMIN — Medication 1 MILLIGRAM(S): at 12:13

## 2018-02-16 RX ADMIN — ATORVASTATIN CALCIUM 20 MILLIGRAM(S): 80 TABLET, FILM COATED ORAL at 22:59

## 2018-02-16 RX ADMIN — PANTOPRAZOLE SODIUM 40 MILLIGRAM(S): 20 TABLET, DELAYED RELEASE ORAL at 12:13

## 2018-02-16 RX ADMIN — TAMSULOSIN HYDROCHLORIDE 0.4 MILLIGRAM(S): 0.4 CAPSULE ORAL at 22:59

## 2018-02-16 RX ADMIN — ISOSORBIDE MONONITRATE 60 MILLIGRAM(S): 60 TABLET, EXTENDED RELEASE ORAL at 12:13

## 2018-02-16 RX ADMIN — Medication 500 MILLIGRAM(S): at 05:56

## 2018-02-16 RX ADMIN — RANOLAZINE 500 MILLIGRAM(S): 500 TABLET, FILM COATED, EXTENDED RELEASE ORAL at 17:09

## 2018-02-16 RX ADMIN — TIOTROPIUM BROMIDE 1 CAPSULE(S): 18 CAPSULE ORAL; RESPIRATORY (INHALATION) at 08:25

## 2018-02-16 RX ADMIN — Medication 100 MILLIGRAM(S): at 05:56

## 2018-02-16 NOTE — CONSULT NOTE ADULT - ASSESSMENT
73 y/o male with a PMHx chronic CHF, COPD on home O2 4 L, emphysema, and spinal stenosis.   He presents to ED s/p fall on 2/3.   Fall occurred ~ 10 days ago; patient has been very weak, and suffers from arthritis. He notes chronic Rt hip pain for ~ 4 weeks which causes ambulatory dysfunction.  He was suppose to follow-up with outpatient ortho for MRI. Here, found to have R femoral neck fracture, eval by ortho and taken to OR on 2/5 for R hip surgery. Post-op course was c/b ileus on 2/7, was given bowel rest/NGT, ileus persisted, was eval by GI/surgery, was taken for colonoscopy and colonic decompression on 2/13, is now improving with no n/v and formed bms with less abd distension and tolerating diet. Has had persistent leukocytosis since admission. No fever, no chills, no diarrhea, no cough, no dysuria, no rashes or other infectious symptoms, surgical site of R hip clean.     1. leukocytosis/post-op ileus/s/p R hip surgery   - wbc ct elevated since admission and worse when ileus diagnosed  - could be reactive to this process   - now clinically improving   - having formed BMs,  hold off on C diff testing unless diarrhea  - monitor off antibiotics for now  - if leukocytosis worsens, consider checking blood cx and repeat imaging  - no other obvious infectious sources  - f/u cbc  - surgical site clean  - surgery/gi following  - monitor temps  - supportive care    2. other issues - care per medicine 71 y/o male with a PMHx chronic CHF, COPD on home O2 4 L, emphysema, and spinal stenosis.   He presents to ED s/p fall on 2/3.   Fall occurred ~ 10 days ago; patient has been very weak, and suffers from arthritis. He notes chronic Rt hip pain for ~ 4 weeks which causes ambulatory dysfunction.  He was suppose to follow-up with outpatient ortho for MRI. Here, found to have R femoral neck fracture, eval by ortho and taken to OR on 2/5 for R hip surgery. Post-op course was c/b ileus on 2/7 which persisted was eval by GI/surgery, was unable to tolerated NGT, was taken for colonoscopy and colonic decompression with neostigmine/enema/rectal tube placement on 2/13. Since then, is now improving with no n/v and formed bms with less abd distension and tolerating diet. Has had persistent leukocytosis since admission. No fever, no chills, no diarrhea, no cough, no dysuria, no rashes or other infectious symptoms, surgical site of R hip clean.     1. leukocytosis/post-op ileus s/p decompression/sp R hip surgery   - wbc ct elevated since admission and worse when ileus diagnosed  - could be reactive to this process   - now clinically improving   - having formed BMs,  hold off on C diff testing unless diarrhea  - monitor off antibiotics for now  - if leukocytosis worsens, consider checking blood cx and repeat imaging  - no other obvious infectious sources  - f/u cbc  - surgical site clean  - surgery/gi following  - monitor temps  - supportive care    2. other issues - care per medicine 71 y/o male with a PMHx chronic CHF, COPD on home O2 4 L, emphysema, and spinal stenosis.   He presents to ED s/p fall on 2/3.   Fall occurred ~ 10 days ago; patient has been very weak, and suffers from arthritis. He notes chronic Rt hip pain for ~ 4 weeks which causes ambulatory dysfunction.  He was suppose to follow-up with outpatient ortho for MRI. Here, found to have R femoral neck fracture, eval by ortho and taken to OR on 2/5 for R hip surgery. Post-op course was c/b ileus on 2/7 which persisted was eval by GI/surgery, was unable to tolerated NGT, was taken for colonoscopy and colonic decompression with neostigmine/enema/rectal tube placement on 2/13. Since then, is now improving with no n/v and formed bms with less abd distension and tolerating diet. Has had persistent leukocytosis since admission. No fever, no chills, no diarrhea, no cough, no dysuria, no rashes or other infectious symptoms, surgical site of R hip clean.     1. leukocytosis/post-op ileus s/p decompression/sp R hip surgery   - wbc ct elevated since admission and worse when ileus diagnosed and up to 23.7 on 2/13   - could be reactive to this process and recent procedure/decompression  - now pt clinically improving   - having formed BMs,  hold off on C diff testing unless diarrhea  - monitor off antibiotics for now  - if leukocytosis worsens, consider checking blood cx and repeat imaging  - no other obvious infectious sources  - f/u cbc  - surgical site clean  - surgery/gi following  - monitor temps  - supportive care    2. other issues - care per medicine

## 2018-02-16 NOTE — PROGRESS NOTE ADULT - ASSESSMENT
This is a 72 year old male s/p fall on about Jan 23rd presented to [facility] on 2/3/18 with chronic pain to hip, decrease appetite. Pt found to have a fx rt hip, now s/p right hip brynn on 2-5-18.  Pt has high thrombosis risks and requires anticoagualtion prophylaxis. Discussed with pt the use of Lovenox for his anticoagulation med. Pt is agreeable. 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 No bowel obstruction, remains to be NPO and possible downgrade to med-surg 2-9-18  2-15-18 PT has had  Gastrografin enema and  neostigmine times 2, and colonoscopy with relief  : c/w lovenox 40mg SQ daily for four weeks post procedure. Please continue to rotate SQ injection sites   : daily cbc/bmp  : increase mobility as tolerated  Will continue to monitor.

## 2018-02-16 NOTE — PROGRESS NOTE ADULT - SUBJECTIVE AND OBJECTIVE BOX
CC- s/p RT hip fracture    HPI:  71 y/o male with a PMHx chronic diastolic CHF, COPD on home O2 4 L, HTN, HLD, CAD s/p stent to LCx, spinal stenosis, obesity, lindy, PAD who presented to the ER 10 days after a fall with right hip pain. He had hip pain for about 4 weeks pta. He fell 10 days pta and came to the ER for difficulty ambulating/right hip pain and generalized weakness. He was admitted with right hip fracture. He was seen by cardiology and subsequently underwent right hip hemiarthroplasty. Post op course complicated by ileus. He was unable to tolerate NGT. He was seen by GI, underwent gastrograffin enema and also was administered neostigmine. He was monitored in icu. He was eventually transferred to .   Hospitalist service asked to resume care.    2/13/18- still with persistent ileus. Feels thirsty  2/14/18- s/p decompressive colonoscopy and rectal tube placement- feels much better. Very happy to be on clears  2/15/18- diet advanced to full liquids, +flatus, +BM. Feels better  2/16/18 no diarrhea, +BM. Feels a lot better    Review of system- All 10 systems reviewed and is as per HPI otherwise negative.     Vital Signs Last 24 Hrs  T(C): 36.4 (16 Feb 2018 12:16), Max: 37.1 (15 Feb 2018 17:29)  T(F): 97.6 (16 Feb 2018 12:16), Max: 98.7 (15 Feb 2018 17:29)  HR: 74 (16 Feb 2018 12:16) (74 - 91)  BP: 101/49 (16 Feb 2018 12:16) (101/49 - 127/55)  BP(mean): 61 (16 Feb 2018 12:16) (61 - 61)  RR: 14 (16 Feb 2018 12:16) (14 - 18)  SpO2: 96% (16 Feb 2018 12:16) (95% - 96%)    PHYSICAL EXAM:  GENERAL: Obese, elderly male, sitting in chair,  Comfortable, no acute distress  HEAD:  Atraumatic, Normocephalic  EYES: EOMI, PERRLA  HEENT: Moist mucous membranes  NECK: Supple, No JVD  NERVOUS SYSTEM:  Alert & Oriented X3, non focal  CHEST/LUNG: Clear to auscultation bilaterally  HEART: s1, s2+, Regular rate and rhythm  ABDOMEN: Soft, distended, non tender, +BS  GENITOURINARY- Voiding, no palpable bladder  EXTREMITIES:  No clubbing, cyanosis, or edema  MUSCULOSKELETAL- right hip dressing dry  SKIN-no rash    LABS:                        11.5   19.9  )-----------( 435      ( 16 Feb 2018 06:34 )             35.0     16 Feb 2018 06:34    137    |  99     |  11     ----------------------------<  123    3.7     |  32     |  0.49   Ca    8.2        16 Feb 2018 06:34    Radiology:  EXAM:  XR ABDOMEN 2V                        PROCEDURE DATE:  02/13/2018    INTERPRETATION:  Abdomen radiographs      CPT 97083  CLINICAL INFORMATION:       Abdominal distention, follow-up.  TECHNIQUE:  Supine views of the abdomen were obtained.    FINDINGS:   Prior study dated 2/7/2018 was available for review.    Degenerative changes are noted in the lumbar spine. There is partial   visualization of a total right hip replacement. Moderate colonic   distention with air is again noted without significant change from prior   examination. There are several mildly dilated air-filled loops of small   bowel noted in the left upper and left lower quadrants. This represents   an interval change from prior examination. No abnormal soft tissue     IMPRESSION:  Findings suggestive of colonic ileus without significant   change from prior study dated 2/7/2018. Several mildly dilated air-filled   loops of small bowel are present in the left abdomen, as well which  represents an interval change from prior study dated 2/7/2018. Follow-up   examination is as clinically indicated.    MEDICATIONS  (STANDING):  amLODIPine   Tablet 5 milliGRAM(s) Oral daily  ascorbic acid 500 milliGRAM(s) Oral two times a day  atorvastatin 20 milliGRAM(s) Oral at bedtime  ciprofloxacin   IVPB 400 milliGRAM(s) IV Intermittent every 12 hours  ciprofloxacin   IVPB      dextrose 5% + sodium chloride 0.45%. 1000 milliLiter(s) (100 mL/Hr) IV Continuous <Continuous>  docusate sodium 100 milliGRAM(s) Oral three times a day  enoxaparin Injectable 40 milliGRAM(s) SubCutaneous every 24 hours  finasteride 5 milliGRAM(s) Oral daily  folic acid 1 milliGRAM(s) Oral daily  isosorbide   mononitrate ER Tablet (IMDUR) 60 milliGRAM(s) Oral daily  lidocaine   Patch 1 Patch Transdermal daily  lisinopril 20 milliGRAM(s) Oral daily  metoprolol succinate ER 25 milliGRAM(s) Oral daily  metroNIDAZOLE  IVPB 500 milliGRAM(s) IV Intermittent every 8 hours  multivitamin 1 Tablet(s) Oral daily  pantoprazole    Tablet 40 milliGRAM(s) Oral daily  ranolazine 500 milliGRAM(s) Oral two times a day  tamsulosin 0.4 milliGRAM(s) Oral at bedtime  tiotropium 18 MICROgram(s) Capsule 1 Capsule(s) Inhalation daily    MEDICATIONS  (PRN):  acetaminophen   Tablet 650 milliGRAM(s) Oral every 6 hours PRN For Temp over 38.3 C (100.94 F)  diphenhydrAMINE   Capsule 25 milliGRAM(s) Oral at bedtime PRN Insomnia  ondansetron Injectable 4 milliGRAM(s) IV Push every 6 hours PRN Nausea and/or Vomiting  prochlorperazine   Injectable 5 milliGRAM(s) IV Push every 6 hours PRN Nausea and/or Vomiting  senna 2 Tablet(s) Oral at bedtime PRN Constipation    ASSESSMENT AND PLAN:  #Right hip fracture s/p Right hip hemiarthroplasty:  Ortho f/u appreciated  PT as tolerated  AC by Lovenox  Minimize pain meds    #Post op ileus- resolving  S/p decompressive colonoscopy and rectal tube placement on 2/13/18  GI f/u appreciated  Rectal tube got dislodged last night- no need for replacement  Clinically feels better, +BM, +flatus  Diet advanced to fulls  Cont IV abx  Monitor leukocytosis- ID eval as leukocytosis not resolving    #Chr diastolic CHF- compensated    #HTN- stable    #h/o CAD/Stent:  -on ranexa, imdur, bb, statin  -?antiplatelet    #BPH:  -on proscar, flomax    #COPD with chronic respiratory failure:  -stable.  -spiriva  -prn nebs    #Dispo- continue PT, ID eval.  Likely YOEL beginning on next week

## 2018-02-16 NOTE — PROGRESS NOTE ADULT - ASSESSMENT
A/P: 72M s/p R hemiarthroplasty POD11  Pain Control  DVT ppx  WBAT  PT/OT  Incentive Spirometry  Medical management appreciated  DC planning

## 2018-02-16 NOTE — CONSULT NOTE ADULT - CONSULT REQUESTED DATE/TIME
16-Feb-2018 12:55
03-Feb-2018
04-Feb-2018 09:29
06-Feb-2018 13:51
07-Feb-2018 17:53
07-Feb-2018 18:55
13-Feb-2018 17:14

## 2018-02-16 NOTE — CHART NOTE - NSCHARTNOTEFT_GEN_A_CORE
Pt's rectal tube has dislodged.  RN performed rectal and there are no lacerations.    Pt seen and examined at bedside. Denies any blood stains in underwear or bloody BM. No weakness or lethargy.    Vital Signs Last 24 Hrs  T(F): 97.7  HR: 85  BP: 118/51  RR: 18  SpO2: 95%     PHYSICAL EXAM:  Constitutional: NAD, sleeping but arousable, well-developed  Cardiovascular: S1 and S2, regular rate and rhythm  Gastrointestinal: Bowel Sounds present, soft, nontender,+distended, no guarding, no rebound      Plan  - f/u am CBC  - Sign out to hospitalist

## 2018-02-16 NOTE — CONSULT NOTE ADULT - CONSULT REASON
r/o infection
Cardiology Consult
abd distension
ileus
right hip fracture
s/p right hip fx, anticoagulation management
Colonic ileus

## 2018-02-16 NOTE — PROGRESS NOTE ADULT - SUBJECTIVE AND OBJECTIVE BOX
Much better today. Continues to pass flatus and BM. No abdominal pain    Exam:  Vital Signs Last 24 Hrs  T(C): 36.4 (16 Feb 2018 05:38), Max: 37.1 (15 Feb 2018 17:29)  T(F): 97.5 (16 Feb 2018 05:38), Max: 98.7 (15 Feb 2018 17:29)  HR: 87 (16 Feb 2018 05:38) (85 - 91)  BP: 127/55 (16 Feb 2018 05:38) (115/58 - 127/55)  RR: 18 (16 Feb 2018 05:38) (18 - 18)  SpO2: 96% (16 Feb 2018 05:38) (95% - 96%)    In no distress  Abdomen soft, distended and non tender                          11.5   19.9  )-----------( 435      ( 16 Feb 2018 06:34 )             35.0   02-16    137  |  99  |  11  ----------------------------<  123<H>  3.7   |  32<H>  |  0.49<L>    Ca    8.2<L>      16 Feb 2018 06:34

## 2018-02-16 NOTE — CONSULT NOTE ADULT - SUBJECTIVE AND OBJECTIVE BOX
Patient is a 72y old  Male who presents with a chief complaint of Limb pain (06 Feb 2018 08:35)      HPI:  71 y/o male with a PMHx chronic CHF, COPD on home O2 4 L, emphysema, and spinal stenosis.   He presents to ED s/p fall.   Fall occurred ~ 10 days ago; patient has been very weak, and suffers from arthritis.   He notes chronic Rt hip pain for ~ 4 weeks which causes ambulatory dysfunction.  He was suppose to follow-up with outpatient ortho for MRI.    Reports associated poor appetite with decreased PO intake.   Denies nausea, vomiting, diarrhea, fevers , chills, CP or dypsnea.   No recent illnesses, no sick contacts.    ED course:  Xray imaging reportedly with Rt hip fracture  Venous doppler US 2/3 : no evidence of deep venous thrombosis in either leg.  Left Baker's cyst. (04 Feb 2018 01:53)      PMH: as above    PSH: as above    Meds: per reconciliation sheet, noted below    MEDICATIONS  (STANDING):  amLODIPine   Tablet 5 milliGRAM(s) Oral daily  ascorbic acid 500 milliGRAM(s) Oral two times a day  atorvastatin 20 milliGRAM(s) Oral at bedtime  docusate sodium 100 milliGRAM(s) Oral three times a day  enoxaparin Injectable 40 milliGRAM(s) SubCutaneous every 24 hours  finasteride 5 milliGRAM(s) Oral daily  folic acid 1 milliGRAM(s) Oral daily  isosorbide   mononitrate ER Tablet (IMDUR) 60 milliGRAM(s) Oral daily  lidocaine   Patch 1 Patch Transdermal daily  lisinopril 20 milliGRAM(s) Oral daily  metoprolol succinate ER 25 milliGRAM(s) Oral daily  multivitamin 1 Tablet(s) Oral daily  pantoprazole    Tablet 40 milliGRAM(s) Oral daily  ranolazine 500 milliGRAM(s) Oral two times a day  tamsulosin 0.4 milliGRAM(s) Oral at bedtime  tiotropium 18 MICROgram(s) Capsule 1 Capsule(s) Inhalation daily      Allergies    No Known Allergies    Intolerances        Social: no smoking, no alcohol, no illegal drugs; no recent travel, no exposure to TB    Family history:  No pertinent family history in first degree relatives      ROS: the patient denies fever, no chills, no HA, no dizziness, no sore throat, no blurry vision, no CP, no palpitations, no SOB, no cough, no abdominal pain, no diarrhea, no N/V, no dysuria, no leg pain, no claudication, no rash, no joint aches, no rectal pain or bleeding, no night sweats    Vital Signs Last 24 Hrs  T(C): 36.4 (16 Feb 2018 12:16), Max: 37.1 (15 Feb 2018 17:29)  T(F): 97.6 (16 Feb 2018 12:16), Max: 98.7 (15 Feb 2018 17:29)  HR: 74 (16 Feb 2018 12:16) (74 - 91)  BP: 101/49 (16 Feb 2018 12:16) (101/49 - 127/55)  BP(mean): 61 (16 Feb 2018 12:16) (61 - 61)  RR: 14 (16 Feb 2018 12:16) (14 - 18)  SpO2: 96% (16 Feb 2018 12:16) (95% - 96%)      PE:  Constitutional: frail looking  HEENT: NC/AT, EOMI, PERRLA  Neck: supple  Back: no tenderness  Respiratory: clear  Cardiovascular: S1S2 regular, no murmurs  Abdomen: soft, not tender, not distended, positive BS  Genitourinary: deferred  Rectal: deferred  Musculoskeletal: no muscle tenderness, no joint swelling or tenderness  Extremities: no pedal edema  Neurological: AxOx3, moving all extremities, no focal deficits  Skin: no rashes    Labs:                        11.5   19.9  )-----------( 435      ( 16 Feb 2018 06:34 )             35.0     02-16    137  |  99  |  11  ----------------------------<  123<H>  3.7   |  32<H>  |  0.49<L>    Ca    8.2<L>      16 Feb 2018 06:34                 Radiology:  < from: CT Abdomen and Pelvis No Cont (02.10.18 @ 17:13) >    EXAM:  CT ABDOMEN AND PELVIS                            PROCEDURE DATE:  02/10/2018          INTERPRETATION:  CT ABDOMEN AND PELVIS    HISTORY: Generalized Abdominal Pain and distention, right lower quadrant   pain    Technique: CT of the abdomen and pelvis is performed without oral or   intravenous contrast. Axial images are supplemented with coronal and   sagittal reformations. This study was performed using automatic exposure   control (radiation dose reduction software) to obtain a diagnostic image   quality scan with patient dose as low as reasonably achievable.    Contrast:     None    Comparison: CT abdomen and pelvis 2/7/2018    Findings:  LIVER: Normal.  SPLEEN: Normal.  PANCREAS: Normal.  GALLBLADDER/BILIARY TREE: Nondilated. Normal gallbladder.  ADRENALS: Normal.  KIDNEYS: No hydronephrosis or urinary tract calculi. Bilateral intrarenal   vascular calcification.  LYMPHADENOPATHY/RETROPERITONEUM: No adenopathy.  VASCULATURE: Aortoiliac atherosclerosis without aneurysm.  BOWEL:No dilated small bowel loops. Normal appendix. Unchanged diffuse   gaseous distention of the colon without interval change. There is   residual Gastrografin throughout the colon from 2/8/2018. Some of the   Gastrografin has refluxed into the ascending colon and cecum. No   stricture or evidence of colonic obstruction.  PELVIC VISCERA: Unremarkable prostate, seminal vesicles, and urinary   bladder.  PELVIC LYMPH NODES: No pelvic adenopathy.  PERITONEUM/ABDOMINAL WALL: Stable trace bilateral paracolic gutter   ascites. No free air.  SKELETAL: No acute bony abnormality. Right hip arthroplasty again noted.   Decreasing right hip subcutaneous air.  LUNG BASES: Bibasilar atelectasis.    IMPRESSION:     Stable colonic ileus with no interval change in degree of gaseous   distention. Persistent retention of Gastrografin from an enema performed   2 days prior. Gastrografin has refluxed to the level of the ascending   colon/cecum.    < end of copied text >    Advanced directives addressed: full resuscitation Patient is a 72y old  Male who presents with a chief complaint of Limb pain (06 Feb 2018 08:35)      HPI:  71 y/o male with a PMHx chronic CHF, COPD on home O2 4 L, emphysema, and spinal stenosis.   He presents to ED s/p fall on 2/3.   Fall occurred ~ 10 days ago; patient has been very weak, and suffers from arthritis. He notes chronic Rt hip pain for ~ 4 weeks which causes ambulatory dysfunction.  He was suppose to follow-up with outpatient ortho for MRI. Here, found to have R femoral neck fracture, eval by ortho and taken to OR on 2/5 for R hip surgery. Post-op course was c/b ileus on 2/7, was given bowel rest/NGT, ileus persisted, was eval by GI/surgery, was taken for colonoscopy and colonic decompression on 2/13, is now improving with no n/v and formed bms with less abd distension and tolerating diet. Has had persistent leukocytosis since admission. No fever, no chills, no diarrhea, no cough, no dysuria, no rashes or other infectious symptoms, surgical site of R hip clean.         PMH: as above    PSH: as above    Meds: per reconciliation sheet, noted below    MEDICATIONS  (STANDING):  amLODIPine   Tablet 5 milliGRAM(s) Oral daily  ascorbic acid 500 milliGRAM(s) Oral two times a day  atorvastatin 20 milliGRAM(s) Oral at bedtime  docusate sodium 100 milliGRAM(s) Oral three times a day  enoxaparin Injectable 40 milliGRAM(s) SubCutaneous every 24 hours  finasteride 5 milliGRAM(s) Oral daily  folic acid 1 milliGRAM(s) Oral daily  isosorbide   mononitrate ER Tablet (IMDUR) 60 milliGRAM(s) Oral daily  lidocaine   Patch 1 Patch Transdermal daily  lisinopril 20 milliGRAM(s) Oral daily  metoprolol succinate ER 25 milliGRAM(s) Oral daily  multivitamin 1 Tablet(s) Oral daily  pantoprazole    Tablet 40 milliGRAM(s) Oral daily  ranolazine 500 milliGRAM(s) Oral two times a day  tamsulosin 0.4 milliGRAM(s) Oral at bedtime  tiotropium 18 MICROgram(s) Capsule 1 Capsule(s) Inhalation daily      Allergies    No Known Allergies    Intolerances        Social: no smoking, no alcohol, no illegal drugs; no recent travel, no exposure to TB    Family history:  No pertinent family history in first degree relatives      ROS: the patient denies fever, no chills, no HA, no dizziness, no sore throat, no blurry vision, no CP, no palpitations, no SOB, no cough, no diarrhea, no N/V, no dysuria,no claudication,  no rectal pain or bleeding, no night sweats    Vital Signs Last 24 Hrs  T(C): 36.4 (16 Feb 2018 12:16), Max: 37.1 (15 Feb 2018 17:29)  T(F): 97.6 (16 Feb 2018 12:16), Max: 98.7 (15 Feb 2018 17:29)  HR: 74 (16 Feb 2018 12:16) (74 - 91)  BP: 101/49 (16 Feb 2018 12:16) (101/49 - 127/55)  BP(mean): 61 (16 Feb 2018 12:16) (61 - 61)  RR: 14 (16 Feb 2018 12:16) (14 - 18)  SpO2: 96% (16 Feb 2018 12:16) (95% - 96%)      PE:  Constitutional: frail looking  HEENT: NC/AT, EOMI, PERRLA  Neck: supple  Back: no tenderness  Respiratory: decreased breath sounds  Cardiovascular: S1S2 regular, no murmurs  Abdomen: soft, not tender, distended  Genitourinary: deferred  Rectal: deferred  Musculoskeletal: no muscle tenderness, no joint swelling or tenderness  Extremities: pedal edema  Neurological: no focal deficits  Skin: no rashes    Labs:                        11.5   19.9  )-----------( 435      ( 16 Feb 2018 06:34 )             35.0     02-16    137  |  99  |  11  ----------------------------<  123<H>  3.7   |  32<H>  |  0.49<L>    Ca    8.2<L>      16 Feb 2018 06:34                 Radiology:  reviewed      < from: CT Abdomen and Pelvis No Cont (02.10.18 @ 17:13) >    EXAM:  CT ABDOMEN AND PELVIS                            PROCEDURE DATE:  02/10/2018          INTERPRETATION:  CT ABDOMEN AND PELVIS    HISTORY: Generalized Abdominal Pain and distention, right lower quadrant   pain    Technique: CT of the abdomen and pelvis is performed without oral or   intravenous contrast. Axial images are supplemented with coronal and   sagittal reformations. This study was performed using automatic exposure   control (radiation dose reduction software) to obtain a diagnostic image   quality scan with patient dose as low as reasonably achievable.    Contrast:     None    Comparison: CT abdomen and pelvis 2/7/2018    Findings:  LIVER: Normal.  SPLEEN: Normal.  PANCREAS: Normal.  GALLBLADDER/BILIARY TREE: Nondilated. Normal gallbladder.  ADRENALS: Normal.  KIDNEYS: No hydronephrosis or urinary tract calculi. Bilateral intrarenal   vascular calcification.  LYMPHADENOPATHY/RETROPERITONEUM: No adenopathy.  VASCULATURE: Aortoiliac atherosclerosis without aneurysm.  BOWEL:No dilated small bowel loops. Normal appendix. Unchanged diffuse   gaseous distention of the colon without interval change. There is   residual Gastrografin throughout the colon from 2/8/2018. Some of the   Gastrografin has refluxed into the ascending colon and cecum. No   stricture or evidence of colonic obstruction.  PELVIC VISCERA: Unremarkable prostate, seminal vesicles, and urinary   bladder.  PELVIC LYMPH NODES: No pelvic adenopathy.  PERITONEUM/ABDOMINAL WALL: Stable trace bilateral paracolic gutter   ascites. No free air.  SKELETAL: No acute bony abnormality. Right hip arthroplasty again noted.   Decreasing right hip subcutaneous air.  LUNG BASES: Bibasilar atelectasis.    IMPRESSION:     Stable colonic ileus with no interval change in degree of gaseous   distention. Persistent retention of Gastrografin from an enema performed   2 days prior. Gastrografin has refluxed to the level of the ascending   colon/cecum.    < end of copied text >    Advanced directives addressed: full resuscitation Patient is a 72y old  Male who presents with a chief complaint of Limb pain (06 Feb 2018 08:35)      HPI:  71 y/o male with a PMHx chronic CHF, COPD on home O2 4 L, emphysema, and spinal stenosis.   He presents to ED s/p fall on 2/3.   Fall occurred ~ 10 days ago; patient has been very weak, and suffers from arthritis. He notes chronic Rt hip pain for ~ 4 weeks which causes ambulatory dysfunction.  He was suppose to follow-up with outpatient ortho for MRI. Here, found to have R femoral neck fracture, eval by ortho and taken to OR on 2/5 for R hip surgery. Post-op course was c/b ileus on 2/7 which persisted was eval by GI/surgery, was unable to tolerated NGT, was taken for colonoscopy and colonic decompression with neostigmine/enema/rectal tube placement on 2/13. Since then, is now improving with no n/v and formed bms with less abd distension and tolerating diet. Has had persistent leukocytosis since admission. No fever, no chills, no diarrhea, no cough, no dysuria, no rashes or other infectious symptoms, surgical site of R hip clean.     rectal tube out  no fevers  tolerating diet  feels better        PMH: as above    PSH: as above    Meds: per reconciliation sheet, noted below    MEDICATIONS  (STANDING):  amLODIPine   Tablet 5 milliGRAM(s) Oral daily  ascorbic acid 500 milliGRAM(s) Oral two times a day  atorvastatin 20 milliGRAM(s) Oral at bedtime  docusate sodium 100 milliGRAM(s) Oral three times a day  enoxaparin Injectable 40 milliGRAM(s) SubCutaneous every 24 hours  finasteride 5 milliGRAM(s) Oral daily  folic acid 1 milliGRAM(s) Oral daily  isosorbide   mononitrate ER Tablet (IMDUR) 60 milliGRAM(s) Oral daily  lidocaine   Patch 1 Patch Transdermal daily  lisinopril 20 milliGRAM(s) Oral daily  metoprolol succinate ER 25 milliGRAM(s) Oral daily  multivitamin 1 Tablet(s) Oral daily  pantoprazole    Tablet 40 milliGRAM(s) Oral daily  ranolazine 500 milliGRAM(s) Oral two times a day  tamsulosin 0.4 milliGRAM(s) Oral at bedtime  tiotropium 18 MICROgram(s) Capsule 1 Capsule(s) Inhalation daily      Allergies    No Known Allergies    Intolerances        Social: no smoking, no alcohol, no illegal drugs; no recent travel, no exposure to TB    Family history:  No pertinent family history in first degree relatives      ROS: the patient denies fever, no chills, no HA, no dizziness, no sore throat, no blurry vision, no CP, no palpitations, no SOB, no cough, no diarrhea, no N/V, no dysuria,no claudication,  no rectal pain or bleeding, no night sweats    Vital Signs Last 24 Hrs  T(C): 36.4 (16 Feb 2018 12:16), Max: 37.1 (15 Feb 2018 17:29)  T(F): 97.6 (16 Feb 2018 12:16), Max: 98.7 (15 Feb 2018 17:29)  HR: 74 (16 Feb 2018 12:16) (74 - 91)  BP: 101/49 (16 Feb 2018 12:16) (101/49 - 127/55)  BP(mean): 61 (16 Feb 2018 12:16) (61 - 61)  RR: 14 (16 Feb 2018 12:16) (14 - 18)  SpO2: 96% (16 Feb 2018 12:16) (95% - 96%)      PE:  Constitutional: frail looking  HEENT: NC/AT, EOMI, PERRLA  Neck: supple  Back: no tenderness  Respiratory: decreased breath sounds  Cardiovascular: S1S2 regular, no murmurs  Abdomen: soft, not tender, distended  Genitourinary: deferred  Rectal: deferred  Musculoskeletal: no muscle tenderness, no joint swelling or tenderness  Extremities: pedal edema  Neurological: no focal deficits  Skin: no rashes    Labs:                        11.5   19.9  )-----------( 435      ( 16 Feb 2018 06:34 )             35.0     02-16    137  |  99  |  11  ----------------------------<  123<H>  3.7   |  32<H>  |  0.49<L>    Ca    8.2<L>      16 Feb 2018 06:34                 Radiology:  reviewed      < from: CT Abdomen and Pelvis No Cont (02.10.18 @ 17:13) >    EXAM:  CT ABDOMEN AND PELVIS                            PROCEDURE DATE:  02/10/2018          INTERPRETATION:  CT ABDOMEN AND PELVIS    HISTORY: Generalized Abdominal Pain and distention, right lower quadrant   pain    Technique: CT of the abdomen and pelvis is performed without oral or   intravenous contrast. Axial images are supplemented with coronal and   sagittal reformations. This study was performed using automatic exposure   control (radiation dose reduction software) to obtain a diagnostic image   quality scan with patient dose as low as reasonably achievable.    Contrast:     None    Comparison: CT abdomen and pelvis 2/7/2018    Findings:  LIVER: Normal.  SPLEEN: Normal.  PANCREAS: Normal.  GALLBLADDER/BILIARY TREE: Nondilated. Normal gallbladder.  ADRENALS: Normal.  KIDNEYS: No hydronephrosis or urinary tract calculi. Bilateral intrarenal   vascular calcification.  LYMPHADENOPATHY/RETROPERITONEUM: No adenopathy.  VASCULATURE: Aortoiliac atherosclerosis without aneurysm.  BOWEL:No dilated small bowel loops. Normal appendix. Unchanged diffuse   gaseous distention of the colon without interval change. There is   residual Gastrografin throughout the colon from 2/8/2018. Some of the   Gastrografin has refluxed into the ascending colon and cecum. No   stricture or evidence of colonic obstruction.  PELVIC VISCERA: Unremarkable prostate, seminal vesicles, and urinary   bladder.  PELVIC LYMPH NODES: No pelvic adenopathy.  PERITONEUM/ABDOMINAL WALL: Stable trace bilateral paracolic gutter   ascites. No free air.  SKELETAL: No acute bony abnormality. Right hip arthroplasty again noted.   Decreasing right hip subcutaneous air.  LUNG BASES: Bibasilar atelectasis.    IMPRESSION:     Stable colonic ileus with no interval change in degree of gaseous   distention. Persistent retention of Gastrografin from an enema performed   2 days prior. Gastrografin has refluxed to the level of the ascending   colon/cecum.    < end of copied text >    Advanced directives addressed: full resuscitation

## 2018-02-16 NOTE — PROGRESS NOTE ADULT - ASSESSMENT
72 year old male s/p right hip surgery with colonic ileus which is resolving. Solid diet. Continue to ambulate and increase activity. Bowel regimen.

## 2018-02-16 NOTE — CONSULT NOTE ADULT - PROVIDER SPECIALTY LIST ADULT
Anticoag Management
Cardiology
Gastroenterology
Orthopedics
Surgery
Colorectal Surgery
Infectious Disease

## 2018-02-16 NOTE — PROGRESS NOTE ADULT - SUBJECTIVE AND OBJECTIVE BOX
Patient is a 72y old  Male who presents with a chief complaint of Limb pain (06 Feb 2018 08:35)      HPI:  71 y/o male with a PMHx chronic CHF, COPD on home O2 4 L, emphysema, and spinal stenosis.   He presents to ED s/p fall.   Fall occurred ~ 10 days ago; patient has been very weak, and suffers from arthritis.   He notes chronic Rt hip pain for ~ 4 weeks which causes ambulatory dysfunction.  He was suppose to follow-up with outpatient ortho for MRI.    Reports associated poor appetite with decreased PO intake.   Denies nausea, vomiting, diarrhea, fevers , chills, CP or dypsnea.   No recent illnesses, no sick contacts.    ED course:  Xray imaging reportedly with Rt hip fracture  Venous doppler US 2/3 : no evidence of deep venous thrombosis in either leg.  Left Baker's cyst. (04 Feb 2018 01:53)    Patient comfortable. Passing stools. Denies abdominal pain. Negative nausea vomiting.  He had a couple of stools yesterday without any blood.  Denies any chest pain suppressant.  Did think that stools were looser.  Patient was sitting at the edge of bed when I saw him. He states that he's been more active and moving around more.        PAST MEDICAL & SURGICAL HISTORY:  Spinal stenosis  CHF (congestive heart failure)  Emphysema  COPD (chronic obstructive pulmonary disease)  No significant past surgical history      MEDICATIONS  (STANDING):  amLODIPine   Tablet 5 milliGRAM(s) Oral daily  ascorbic acid 500 milliGRAM(s) Oral two times a day  atorvastatin 20 milliGRAM(s) Oral at bedtime  docusate sodium 100 milliGRAM(s) Oral three times a day  enoxaparin Injectable 40 milliGRAM(s) SubCutaneous every 24 hours  finasteride 5 milliGRAM(s) Oral daily  folic acid 1 milliGRAM(s) Oral daily  isosorbide   mononitrate ER Tablet (IMDUR) 60 milliGRAM(s) Oral daily  lidocaine   Patch 1 Patch Transdermal daily  lisinopril 20 milliGRAM(s) Oral daily  metoprolol succinate ER 25 milliGRAM(s) Oral daily  multivitamin 1 Tablet(s) Oral daily  pantoprazole    Tablet 40 milliGRAM(s) Oral daily  ranolazine 500 milliGRAM(s) Oral two times a day  tamsulosin 0.4 milliGRAM(s) Oral at bedtime  tiotropium 18 MICROgram(s) Capsule 1 Capsule(s) Inhalation daily    MEDICATIONS  (PRN):  acetaminophen   Tablet 650 milliGRAM(s) Oral every 6 hours PRN For Temp over 38.3 C (100.94 F)  diphenhydrAMINE   Capsule 25 milliGRAM(s) Oral at bedtime PRN Insomnia  ondansetron Injectable 4 milliGRAM(s) IV Push every 6 hours PRN Nausea and/or Vomiting  prochlorperazine   Injectable 5 milliGRAM(s) IV Push every 6 hours PRN Nausea and/or Vomiting  senna 2 Tablet(s) Oral at bedtime PRN Constipation      Allergies    No Known Allergies    Intolerances        SOCIAL HISTORY:NC    FAMILY HISTORY:  No pertinent family history in first degree relatives      REVIEW OF SYSTEMS:    CONSTITUTIONAL: No weakness, fevers or chills  EYES/ENT: No visual changes;  No vertigo or throat pain   NECK: No pain or stiffness  RESPIRATORY: No cough, wheezing, hemoptysis; No shortness of breath  CARDIOVASCULAR: No chest pain or palpitations  GENITOURINARY: No dysuria, frequency or hematuria  NEUROLOGICAL: No numbness or weakness  SKIN: No itching, burning, rashes, or lesions   All other review of systems is negative unless indicated above.    Vital Signs Last 24 Hrs  T(C): 36.4 (16 Feb 2018 05:38), Max: 37.1 (15 Feb 2018 17:29)  T(F): 97.5 (16 Feb 2018 05:38), Max: 98.7 (15 Feb 2018 17:29)  HR: 87 (16 Feb 2018 05:38) (85 - 91)  BP: 127/55 (16 Feb 2018 05:38) (115/58 - 127/55)  BP(mean): --  RR: 18 (16 Feb 2018 05:38) (18 - 18)  SpO2: 96% (16 Feb 2018 05:38) (95% - 96%)    PHYSICAL EXAM:    Constitutional: NAD, well-developed  HEENT: EOMI, throat clear  Neck: No LAD, supple  Respiratory: CTA and P  Cardiovascular: S1 and S2, RRR, no M  Gastrointestinal: BS+, soft, NT/dec distension, neg HSM,  Extremities: No peripheral edema, neg clubing, cyanosis  Vascular: 2+ peripheral pulses  Neurological: A/O x 3, no focal deficits  Psychiatric: Normal mood, normal affect  Skin: No rashes    LABS:  CBC Full  -  ( 16 Feb 2018 06:34 )  WBC Count : 19.9 K/uL  Hemoglobin : 11.5 g/dL  Hematocrit : 35.0 %  Platelet Count - Automated : 435 K/uL  Mean Cell Volume : 94.8 fl  Mean Cell Hemoglobin : 31.2 pg  Mean Cell Hemoglobin Concentration : 32.9 gm/dL  Auto Neutrophil # : x  Auto Lymphocyte # : x  Auto Monocyte # : x  Auto Eosinophil # : x  Auto Basophil # : x  Auto Neutrophil % : x  Auto Lymphocyte % : x  Auto Monocyte % : x  Auto Eosinophil % : x  Auto Basophil % : x    02-16    137  |  99  |  11  ----------------------------<  123<H>  3.7   |  32<H>  |  0.49<L>    Ca    8.2<L>      16 Feb 2018 06:34              RADIOLOGY & ADDITIONAL STUDIES:

## 2018-02-16 NOTE — PROGRESS NOTE ADULT - ASSESSMENT
Hip fracture–status post repair. Would minimize narcotics.    Progressive colonic ileus not responded to conservative management and is not been able to tolerate NG tube placement   Gastrografin enema and  neostigmine times 2  S/P decompression with improvement, tube came out last night  WBC not improving  please send stool for c diff, less likely  consider ID consult to assess for causes of inc WBC    clear liquid diet and will adance    inc activity, DW RN and intensivist and pt    place pt on stomach     avoid narcotics and encourage movement    DC antibiotics.    surg input appreciated

## 2018-02-16 NOTE — PROGRESS NOTE ADULT - SUBJECTIVE AND OBJECTIVE BOX
Pt S/E at bedside, no acute events overnight, pain controlled. Dressing changed 2/14.    ICU Vital Signs Last 24 Hrs  T(C): 36.4 (16 Feb 2018 05:38), Max: 37.1 (15 Feb 2018 17:29)  T(F): 97.5 (16 Feb 2018 05:38), Max: 98.7 (15 Feb 2018 17:29)  HR: 87 (16 Feb 2018 05:38) (85 - 91)  BP: 127/55 (16 Feb 2018 05:38) (115/58 - 127/55)  RR: 18 (16 Feb 2018 05:38) (18 - 18)  SpO2: 96% (16 Feb 2018 05:38) (95% - 96%)    Gen: NAD, AAOx3    Right Lower Extremity:  Dressing clean dry intact  +EHL/FHL/TA/GS  SILT L3-S1  +DP/PT Pulses  Compartments soft  No calf TTP B/L

## 2018-02-16 NOTE — PROGRESS NOTE ADULT - SUBJECTIVE AND OBJECTIVE BOX
HPI: 71 y/o male with a PMHx chronic CHF, COPD on home O2 4 L, emphysema, and spinal stenosis. He presents to ED s/p fall. Fall occurred ~ 10 days ago; patient has been very weak, and suffers from arthritis.   He notes chronic Rt hip pain for ~ 4 weeks which causes ambulatory dysfunction.  He was suppose to follow-up with outpatient ortho for MRI.    Reports associated poor appetite with decreased PO intake.   Denies nausea, vomiting, diarrhea, fevers , chills, CP or dypsnea.   No recent illnesses, no sick contacts.    ED course:  Xray imaging reportedly with Rt hip fracture  Venous doppler US 2/3 : no evidence of deep venous thrombosis in either leg.  Left Baker's cyst. (2018 01:53)    Patient is a 72y old  Male who presents with a chief complaint of Limb pain (2018 08:35)  pt s/p right hip brynn on 18.    Consulted by Dr. Rahul Elkins for VTE prophylaxis, risk stratification, and anticoagulation management.    PAST MEDICAL & SURGICAL HISTORY:  Spinal stenosis  CHF (congestive heart failure)  Emphysema  COPD (chronic obstructive pulmonary disease)  No significant past surgical history    Caprini VTE Risk Score:9    IMPROVE Bleeding Risk Score: 2.5    Falls Risk:   High (x  )  Mod (  )  Low (  )    EBL:  150ml  cr cl: 162.2  18 Pt sen at bedside.  Discussed his anticoagulation with Lovenox inj for 4 weeks.  Questions answered will reinforce as needed.  Pt concerned about no having a bm for a few days and  in his appetite for a while. Not sure if he lost wt.   18: Pt seen at bedside, OOB to chair. Reports that he continues to be constipated, was able to make "small bowel success," but remains to have distended/firm abdomen. Continues to have 4LNC, and states that he is feeling "oozy," and would like to go back in bed. RN is aware, but waiting for PT. Pt noted to have moderate size bruising in his RLQ abdomen. Advised RN to administer medication to his LLQ.   18 Pt seen at bedside in IC.  Pt dx with Kaylan Syndrome.  Had barium enema and received neostigmine iv.  Pt states he feels much better abd smaller and softer. discussed his anticoagulation with heparin sq mo concerns.  18: Pt seen at bedside in ICU. Reports of continuing to feel better, had successful large BM yesterday. He continues to be NPO. Pt is being downgraded to med-surg today.   2-10-18  pt seen on 2n feeling much better discussed his anticoagulation with Lovenox no concerns.  2--18   Pt seen at bedside in CCU S/P second dose of received neostigmine iv.  Pt states she has had a bm and feels better but not totally good yet. discussed his anticoagulation with lovenox  no concerns.   2--18 Pt seen at bedside on 2N oob in chair.  States he walked today and will walk again.  Discussed his anticoagulation no concerns.   2--18 Pt seen at bedside.  States he feels so much better after colonoscopy yesterday.  He is having clear liquids.  Discussed his anticoagulation with Lovenox no concerns.   2-15-18 pt seen at bedside on 2n oob in chair states his abdomen is much better.  will cont with Lovenox for anticoagulation  2--18 pt johnny t bedside oob in chair.  no concerns.  FAMILY HISTORY:  No pertinent family history in first degree relatives    Denies any personal or familial history of clotting or bleeding disorders.    Allergies    No Known Allergies    Intolerances      REVIEW OF SYSTEMS    (  )Fever	     (  )Constipation	(  )SOB				(  )Headache	(  )Dysuria  (  )Chills	     (  )Melena	(  )Dyspnea present on exertion	                    (  )Dizziness                    (  )Polyuria  (  )Nausea	     (  )Hematochezia	(  )Cough			                    (  )Syncope   	(  )Hematuria  (  )Vomiting    (  )Chest Pain	(  )Wheezing			(  )Weakness  (  )Diarrhea     (  )Palpitations	(  )Anorexia			(  )Myalgia    All other review of systems negative: Yes    PHYSICAL EXAM:    Constitutional: Appears Well    Neurological: A& O x 3    Skin: Warm    Respiratory and Thorax: normal effort; Breath sounds: normal; No rales/wheezing/rhonchi, 4LNC supplemental O2  	  Cardiovascular: S1, S2, regular, NMBR	    Gastrointestinal: BS + x 4Q, nontender, soft non dis tended.	    Genitourinary:  Bladder nondistended, nontender    Musculoskeletal:   General Right:   no muscle/joint tenderness,   normal tone, no joint swelling,   ROM: limited	    General Left:   no muscle/joint tenderness,   normal tone, no joint swelling,   ROM: full    Hip:  Right: Aquacel dressing CI, noted serosanguinous fld inside dsg.    Lower extrems:   Right: no calf tenderness              negative rolando's sign               + pedal pulses    Left:   no calf tenderness              negative rolando's sign               + pedal pulses                           11.5   19.9  )-----------( 435      ( 2018 06:34 )             35.0       02-16    137  |  99  |  11  ----------------------------<  123<H>  3.7   |  32<H>  |  0.49<L>    Ca    8.2<L>      2018 06:34                             11.3   19.0  )-----------( 490      ( 15 Feb 2018 08:05 )             34.0       02-14    137  |  100  |  20  ----------------------------<  150<H>  4.0   |  31  |  0.59    Ca    8.5      2018 06:46  Phos  2.3     02-14  Mg     2.2     02-14                                11.8   20.3  )-----------( 576      ( 2018 06:46 )             37.1       02-14    137  |  100  |  20  ----------------------------<  150<H>  4.0   |  31  |  0.59    Ca    8.5      2018 06:46  Phos  2.3     02-14  Mg     2.2     -14    TPro  5.8<L>  /  Alb  1.9<L>  /  TBili  0.4  /  DBili  x   /  AST  36  /  ALT  29  /  AlkPhos  68  02-13                          11.9   23.7  )-----------( 611      ( 2018 07:42 )             36.7       02-13    137  |  99  |  22  ----------------------------<  131<H>  3.8   |  33<H>  |  0.63    Ca    8.7      2018 07:42    TPro  5.8<L>  /  Alb  1.9<L>  /  TBili  0.4  /  DBili  x   /  AST  36  /  ALT  29  /  AlkPhos  68  02-13                            10.8   20.6  )-----------( 538      ( 2018 06:54 )             33.3       02-12    138  |  101  |  27<H>  ----------------------------<  141<H>  3.8   |  30  |  0.50    Ca    8.2<L>      2018 06:54  Phos  3.6     02-11  Mg     2.2     02-11    TPro  5.0<L>  /  Alb  1.7<L>  /  TBili  0.4  /  DBili  x   /  AST  38<H>  /  ALT  30  /  AlkPhos  64  02-12                             11.4   20.4  )-----------( 452      ( 10 Feb 2018 06:17 )             34.5       02-10    137  |  100  |  34<H>  ----------------------------<  123<H>  3.8   |  29  |  0.68    Ca    8.8      10 Feb 2018 06:17  Phos  2.1     02-10  Mg     2.3     02-10                 MEDICATIONS  (STANDING):  amLODIPine   Tablet 5 milliGRAM(s) Oral daily  ascorbic acid 500 milliGRAM(s) Oral two times a day  atorvastatin 20 milliGRAM(s) Oral at bedtime  docusate sodium 100 milliGRAM(s) Oral three times a day  enoxaparin Injectable 40 milliGRAM(s) SubCutaneous every 24 hours  finasteride 5 milliGRAM(s) Oral daily  folic acid 1 milliGRAM(s) Oral daily  isosorbide   mononitrate ER Tablet (IMDUR) 60 milliGRAM(s) Oral daily  lidocaine   Patch 1 Patch Transdermal daily  lisinopril 20 milliGRAM(s) Oral daily  metoprolol succinate ER 25 milliGRAM(s) Oral daily  multivitamin 1 Tablet(s) Oral daily  pantoprazole    Tablet 40 milliGRAM(s) Oral daily  ranolazine 500 milliGRAM(s) Oral two times a day  tamsulosin 0.4 milliGRAM(s) Oral at bedtime  tiotropium 18 MICROgram(s) Capsule 1 Capsule(s) Inhalation daily        Vital Signs Last 24 Hrs  T(C): 36.4 (18 @ 12:16), Max: 37.1 (02-15-18 @ 17:29)  T(F): 97.6 (18 @ 12:16), Max: 98.7 (02-15-18 @ 17:29)  HR: 74 (18 @ 12:16) (74 - 91)  BP: 101/49 (18 @ 12:16) (101/49 - 127/55)  BP(mean): 61 (18 @ 12:16) (61 - 61)  RR: 14 (18 @ 12:16) (14 - 18)  SpO2: 96% (18 @ 12:16) (95% - 96%)    DVT Prophylaxis:  LMWH                   (x )  Heparin SQ           (  )  Coumadin             (  )  Xarelto                  (  )  Eliquis                   (  )  Venodynes           ( x )  Ambulation          ( x )  UFH                       (  )  Contraindicated  (  )

## 2018-02-17 LAB
ANION GAP SERPL CALC-SCNC: 5 MMOL/L — SIGNIFICANT CHANGE UP (ref 5–17)
BUN SERPL-MCNC: 12 MG/DL — SIGNIFICANT CHANGE UP (ref 7–23)
C DIFF BY PCR RESULT: DETECTED
C DIFF TOX GENS STL QL NAA+PROBE: SIGNIFICANT CHANGE UP
CALCIUM SERPL-MCNC: 8 MG/DL — LOW (ref 8.5–10.1)
CHLORIDE SERPL-SCNC: 99 MMOL/L — SIGNIFICANT CHANGE UP (ref 96–108)
CO2 SERPL-SCNC: 35 MMOL/L — HIGH (ref 22–31)
CREAT SERPL-MCNC: 0.51 MG/DL — SIGNIFICANT CHANGE UP (ref 0.5–1.3)
GLUCOSE SERPL-MCNC: 116 MG/DL — HIGH (ref 70–99)
HCT VFR BLD CALC: 34.6 % — LOW (ref 39–50)
HGB BLD-MCNC: 11.3 G/DL — LOW (ref 13–17)
MCHC RBC-ENTMCNC: 31.1 PG — SIGNIFICANT CHANGE UP (ref 27–34)
MCHC RBC-ENTMCNC: 32.7 GM/DL — SIGNIFICANT CHANGE UP (ref 32–36)
MCV RBC AUTO: 95.2 FL — SIGNIFICANT CHANGE UP (ref 80–100)
PLATELET # BLD AUTO: 472 K/UL — HIGH (ref 150–400)
POTASSIUM SERPL-MCNC: 3.7 MMOL/L — SIGNIFICANT CHANGE UP (ref 3.5–5.3)
POTASSIUM SERPL-SCNC: 3.7 MMOL/L — SIGNIFICANT CHANGE UP (ref 3.5–5.3)
RBC # BLD: 3.63 M/UL — LOW (ref 4.2–5.8)
RBC # FLD: 13.1 % — SIGNIFICANT CHANGE UP (ref 10.3–14.5)
SODIUM SERPL-SCNC: 139 MMOL/L — SIGNIFICANT CHANGE UP (ref 135–145)
WBC # BLD: 19.3 K/UL — HIGH (ref 3.8–10.5)
WBC # FLD AUTO: 19.3 K/UL — HIGH (ref 3.8–10.5)

## 2018-02-17 PROCEDURE — 99233 SBSQ HOSP IP/OBS HIGH 50: CPT

## 2018-02-17 RX ORDER — METRONIDAZOLE 500 MG
500 TABLET ORAL EVERY 8 HOURS
Qty: 0 | Refills: 0 | Status: DISCONTINUED | OUTPATIENT
Start: 2018-02-17 | End: 2018-02-19

## 2018-02-17 RX ORDER — METRONIDAZOLE 500 MG
500 TABLET ORAL ONCE
Qty: 0 | Refills: 0 | Status: COMPLETED | OUTPATIENT
Start: 2018-02-17 | End: 2018-02-17

## 2018-02-17 RX ORDER — METRONIDAZOLE 500 MG
TABLET ORAL
Qty: 0 | Refills: 0 | Status: DISCONTINUED | OUTPATIENT
Start: 2018-02-17 | End: 2018-02-19

## 2018-02-17 RX ADMIN — LISINOPRIL 20 MILLIGRAM(S): 2.5 TABLET ORAL at 05:41

## 2018-02-17 RX ADMIN — TAMSULOSIN HYDROCHLORIDE 0.4 MILLIGRAM(S): 0.4 CAPSULE ORAL at 21:30

## 2018-02-17 RX ADMIN — RANOLAZINE 500 MILLIGRAM(S): 500 TABLET, FILM COATED, EXTENDED RELEASE ORAL at 18:11

## 2018-02-17 RX ADMIN — ISOSORBIDE MONONITRATE 60 MILLIGRAM(S): 60 TABLET, EXTENDED RELEASE ORAL at 11:48

## 2018-02-17 RX ADMIN — FINASTERIDE 5 MILLIGRAM(S): 5 TABLET, FILM COATED ORAL at 11:48

## 2018-02-17 RX ADMIN — Medication 100 MILLIGRAM(S): at 16:08

## 2018-02-17 RX ADMIN — RANOLAZINE 500 MILLIGRAM(S): 500 TABLET, FILM COATED, EXTENDED RELEASE ORAL at 05:41

## 2018-02-17 RX ADMIN — Medication 25 MILLIGRAM(S): at 05:41

## 2018-02-17 RX ADMIN — Medication 1 TABLET(S): at 11:48

## 2018-02-17 RX ADMIN — TIOTROPIUM BROMIDE 1 CAPSULE(S): 18 CAPSULE ORAL; RESPIRATORY (INHALATION) at 09:59

## 2018-02-17 RX ADMIN — Medication 500 MILLIGRAM(S): at 18:11

## 2018-02-17 RX ADMIN — ATORVASTATIN CALCIUM 20 MILLIGRAM(S): 80 TABLET, FILM COATED ORAL at 21:29

## 2018-02-17 RX ADMIN — Medication 1 MILLIGRAM(S): at 11:48

## 2018-02-17 RX ADMIN — PANTOPRAZOLE SODIUM 40 MILLIGRAM(S): 20 TABLET, DELAYED RELEASE ORAL at 11:49

## 2018-02-17 RX ADMIN — ENOXAPARIN SODIUM 40 MILLIGRAM(S): 100 INJECTION SUBCUTANEOUS at 11:49

## 2018-02-17 RX ADMIN — Medication 500 MILLIGRAM(S): at 05:41

## 2018-02-17 RX ADMIN — Medication 100 MILLIGRAM(S): at 21:43

## 2018-02-17 NOTE — PROGRESS NOTE ADULT - SUBJECTIVE AND OBJECTIVE BOX
HPI: 71 y/o male with a PMHx chronic CHF, COPD on home O2 4 L, emphysema, and spinal stenosis. He presents to ED s/p fall. Fall occurred ~ 10 days ago; patient has been very weak, and suffers from arthritis.   He notes chronic Rt hip pain for ~ 4 weeks which causes ambulatory dysfunction.  He was suppose to follow-up with outpatient ortho for MRI.    Reports associated poor appetite with decreased PO intake.   Denies nausea, vomiting, diarrhea, fevers , chills, CP or dypsnea.   No recent illnesses, no sick contacts.    ED course:  Xray imaging reportedly with Rt hip fracture  Venous doppler US 2/3 : no evidence of deep venous thrombosis in either leg.  Left Baker's cyst. (2018 01:53)    Patient is a 72y old  Male who presents with a chief complaint of Limb pain (2018 08:35)  pt s/p right hip brynn on 18.    Consulted by Dr. Rahul Elkins for VTE prophylaxis, risk stratification, and anticoagulation management.    PAST MEDICAL & SURGICAL HISTORY:  Spinal stenosis  CHF (congestive heart failure)  Emphysema  COPD (chronic obstructive pulmonary disease)  No significant past surgical history    Caprini VTE Risk Score:9    IMPROVE Bleeding Risk Score: 2.5    Falls Risk:   High (x  )  Mod (  )  Low (  )    EBL:  150ml    cr cl: 162.2    18 Pt sen at bedside.  Discussed his anticoagulation with Lovenox inj for 4 weeks.  Questions answered will reinforce as needed.  Pt concerned about no having a bm for a few days and  in his appetite for a while. Not sure if he lost wt.   18: Pt seen at bedside, OOB to chair. Reports that he continues to be constipated, was able to make "small bowel success," but remains to have distended/firm abdomen. Continues to have 4LNC, and states that he is feeling "oozy," and would like to go back in bed. RN is aware, but waiting for PT. Pt noted to have moderate size bruising in his RLQ abdomen. Advised RN to administer medication to his LLQ.   18 Pt seen at bedside in IC.  Pt dx with Kaylan Syndrome.  Had barium enema and received neostigmine iv.  Pt states he feels much better abd smaller and softer. discussed his anticoagulation with heparin sq mo concerns.  18: Pt seen at bedside in ICU. Reports of continuing to feel better, had successful large BM yesterday. He continues to be NPO. Pt is being downgraded to med-surg today.   2-10-18  pt seen on 2n feeling much better discussed his anticoagulation with Lovenox no concerns.  18   Pt seen at bedside in CCU S/P second dose of received neostigmine iv.  Pt states she has had a bm and feels better but not totally good yet. discussed his anticoagulation with lovenox  no concerns.   18 Pt seen at bedside on 2N oob in chair.  States he walked today and will walk again.  Discussed his anticoagulation no concerns.   18 Pt seen at bedside.  States he feels so much better after colonoscopy yesterday.  He is having clear liquids.  Discussed his anticoagulation with Lovenox no concerns.   2-15-18 pt seen at bedside on 2n oob in chair states his abdomen is much better.  will cont with Lovenox for anticoagulation  18 pt johnny t bedside oob in chair.  no concerns.  18: Pt seen at bedside, OOB to chair. He continues to be on 4LNC, mild labored breathing, but states he just walked with PT. Colorectal surgery states pt to be on solid diet, abdomen softer.     FAMILY HISTORY:  No pertinent family history in first degree relatives    Denies any personal or familial history of clotting or bleeding disorders.    Allergies    No Known Allergies    Intolerances    REVIEW OF SYSTEMS    (  )Fever	     (  )Constipation	(  )SOB				(  )Headache	(  )Dysuria  (  )Chills	     (  )Melena	(  )Dyspnea present on exertion	                    (  )Dizziness                    (  )Polyuria  (  )Nausea	     (  )Hematochezia	(  )Cough			                    (  )Syncope   	(  )Hematuria  (  )Vomiting    (  )Chest Pain	(  )Wheezing			(  )Weakness  (  )Diarrhea     (  )Palpitations	(  )Anorexia			(  )Myalgia    All other review of systems negative: Yes    PHYSICAL EXAM:    Constitutional: Appears Well    Neurological: A& O x 3    Skin: Warm    Respiratory and Thorax: normal effort; Breath sounds: normal; No rales/wheezing/rhonchi, 4LNC supplemental O2  	  Cardiovascular: S1, S2, regular, NMBR	    Gastrointestinal: BS + x 4Q, nontender, soft, mildly distended	    Genitourinary:  Bladder nondistended, nontender    Musculoskeletal:   General Right:   no muscle/joint tenderness,   normal tone, no joint swelling,   ROM: limited	    General Left:   no muscle/joint tenderness,   normal tone, no joint swelling,   ROM: full    Hip:  Right: Staples intact with no oozing, no dressing noted    Lower extrems:   Right: no calf tenderness              negative rolando's sign               + pedal pulses    Left:   no calf tenderness              negative rolando's sign               + pedal pulses                          11.3   19.3  )-----------( 472      ( 2018 06:25 )             34.6       02-17    139  |  99  |  12  ----------------------------<  116<H>  3.7   |  35<H>  |  0.51    Ca    8.0<L>      2018 06:25                         11.5   19.9  )-----------( 435      ( 2018 06:34 )             35.0       02-16    137  |  99  |  11  ----------------------------<  123<H>  3.7   |  32<H>  |  0.49<L>    Ca    8.2<L>      2018 06:34                         11.3   19.0  )-----------( 490      ( 15 Feb 2018 08:05 )             34.0       02-14    137  |  100  |  20  ----------------------------<  150<H>  4.0   |  31  |  0.59    Ca    8.5      2018 06:46  Phos  2.3     02-14  Mg     2.2     02-14                          11.8   20.3  )-----------( 576      ( 2018 06:46 )             37.1       02-14    137  |  100  |  20  ----------------------------<  150<H>  4.0   |  31  |  0.59    Ca    8.5      2018 06:46  Phos  2.3     02-14  Mg     2.2     02-14    TPro  5.8<L>  /  Alb  1.9<L>  /  TBili  0.4  /  DBili  x   /  AST  36  /  ALT  29  /  AlkPhos  68                          11.9   23.7  )-----------( 611      ( 2018 07:42 )             36.7       02-13    137  |  99  |  22  ----------------------------<  131<H>  3.8   |  33<H>  |  0.63    Ca    8.7      2018 07:42    TPro  5.8<L>  /  Alb  1.9<L>  /  TBili  0.4  /  DBili  x   /  AST  36  /  ALT  29  /  AlkPhos  68                              10.8   20.6  )-----------( 538      ( 2018 06:54 )             33.3       02-12    138  |  101  |  27<H>  ----------------------------<  141<H>  3.8   |  30  |  0.50    Ca    8.2<L>      2018 06:54  Phos  3.6     02-11  Mg     2.2     02-11    TPro  5.0<L>  /  Alb  1.7<L>  /  TBili  0.4  /  DBili  x   /  AST  38<H>  /  ALT  30  /  AlkPhos  64  02-12     MEDICATIONS  (STANDING):  amLODIPine   Tablet 5 milliGRAM(s) Oral daily  ascorbic acid 500 milliGRAM(s) Oral two times a day  atorvastatin 20 milliGRAM(s) Oral at bedtime  docusate sodium 100 milliGRAM(s) Oral three times a day  enoxaparin Injectable 40 milliGRAM(s) SubCutaneous every 24 hours  finasteride 5 milliGRAM(s) Oral daily  folic acid 1 milliGRAM(s) Oral daily  isosorbide   mononitrate ER Tablet (IMDUR) 60 milliGRAM(s) Oral daily  lidocaine   Patch 1 Patch Transdermal daily  lisinopril 20 milliGRAM(s) Oral daily  metoprolol succinate ER 25 milliGRAM(s) Oral daily  multivitamin 1 Tablet(s) Oral daily  pantoprazole    Tablet 40 milliGRAM(s) Oral daily  ranolazine 500 milliGRAM(s) Oral two times a day  tamsulosin 0.4 milliGRAM(s) Oral at bedtime  tiotropium 18 MICROgram(s) Capsule 1 Capsule(s) Inhalation daily    Vital Signs Last 24 Hrs  T(C): 36.3 (18 @ 11:16), Max: 36.9 (18 @ 23:16)  T(F): 97.4 (18 @ 11:16), Max: 98.4 (18 @ 23:16)  HR: 86 (18 @ 11:16) (65 - 86)  BP: 103/35 (18 @ 11:16) (103/35 - 146/62)  BP(mean): --  RR: 16 (18 @ 11:16) (14 - 17)  SpO2: 99% (18 @ 11:16) (95% - 99%)    DVT Prophylaxis:  LMWH                   (x )  Heparin SQ           (  )  Coumadin             (  )  Xarelto                  (  )  Eliquis                   (  )  Venodynes           ( x )  Ambulation          ( x )  UFH                       (  )  Contraindicated  (  )

## 2018-02-17 NOTE — PROGRESS NOTE ADULT - ASSESSMENT
This is a 72 year old male s/p fall on about Jan 23rd presented to [facility] on 2/3/18 with chronic pain to hip, decrease appetite. Pt found to have a fx rt hip, now s/p right hip brynn on 2-5-18.  Pt has high thrombosis risks and requires anticoagualtion prophylaxis. Discussed with pt the use of Lovenox for his anticoagulation med. Pt is agreeable. 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 No bowel obstruction. On 2-15-18 pt had gastrografin enema and neostigmine times 2, and colonoscopy with relief. His diet advanced to solid diet.    PLAN:  : c/w lovenox 40mg SQ daily for total four weeks post procedure (2/6/18 - 3/6/18)  : daily cbc/bmp  : increase mobility as tolerated    Will continue to follow

## 2018-02-17 NOTE — PROGRESS NOTE ADULT - SUBJECTIVE AND OBJECTIVE BOX
CC- s/p RT hip fracture    HPI:  71 y/o male with a PMHx chronic diastolic CHF, COPD on home O2 4 L, HTN, HLD, CAD s/p stent to LCx, spinal stenosis, obesity, lindy, PAD who presented to the ER 10 days after a fall with right hip pain. He had hip pain for about 4 weeks pta. He fell 10 days pta and came to the ER for difficulty ambulating/right hip pain and generalized weakness. He was admitted with right hip fracture. He was seen by cardiology and subsequently underwent right hip hemiarthroplasty. Post op course complicated by ileus. He was unable to tolerate NGT. He was seen by GI, underwent gastrograffin enema and also was administered neostigmine. He was monitored in icu. He was eventually transferred to .   Hospitalist service asked to resume care.    2/13/18- still with persistent ileus. Feels thirsty  2/14/18- s/p decompressive colonoscopy and rectal tube placement- feels much better. Very happy to be on clears  2/15/18- diet advanced to full liquids, +flatus, +BM. Feels better  2/16/18 no diarrhea, +BM. Feels a lot better  2/17/18- feeling well, ambulating    Review of system- All 10 systems reviewed and is as per HPI otherwise negative.     Vital Signs Last 24 Hrs  T(C): 36.4 (16 Feb 2018 12:16), Max: 37.1 (15 Feb 2018 17:29)  T(F): 97.6 (16 Feb 2018 12:16), Max: 98.7 (15 Feb 2018 17:29)  HR: 74 (16 Feb 2018 12:16) (74 - 91)  BP: 101/49 (16 Feb 2018 12:16) (101/49 - 127/55)  BP(mean): 61 (16 Feb 2018 12:16) (61 - 61)  RR: 14 (16 Feb 2018 12:16) (14 - 18)  SpO2: 96% (16 Feb 2018 12:16) (95% - 96%)    PHYSICAL EXAM:  GENERAL: Obese, elderly male, sitting in chair,  Comfortable, no acute distress  HEAD:  Atraumatic, Normocephalic  EYES: EOMI, PERRLA  HEENT: Moist mucous membranes  NECK: Supple, No JVD  NERVOUS SYSTEM:  Alert & Oriented X3, non focal  CHEST/LUNG: Clear to auscultation bilaterally  HEART: s1, s2+, Regular rate and rhythm  ABDOMEN: Soft, distended, non tender, +BS  GENITOURINARY- Voiding, no palpable bladder  EXTREMITIES:  No clubbing, cyanosis, or edema  MUSCULOSKELETAL- right hip dressing dry  SKIN-no rash    LABS:                        11.5   19.9  )-----------( 435      ( 16 Feb 2018 06:34 )             35.0     16 Feb 2018 06:34    137    |  99     |  11     ----------------------------<  123    3.7     |  32     |  0.49   Ca    8.2        16 Feb 2018 06:34    Radiology:  EXAM:  XR ABDOMEN 2V                        PROCEDURE DATE:  02/13/2018    INTERPRETATION:  Abdomen radiographs      CPT 70746  CLINICAL INFORMATION:       Abdominal distention, follow-up.  TECHNIQUE:  Supine views of the abdomen were obtained.    FINDINGS:   Prior study dated 2/7/2018 was available for review.    Degenerative changes are noted in the lumbar spine. There is partial   visualization of a total right hip replacement. Moderate colonic   distention with air is again noted without significant change from prior   examination. There are several mildly dilated air-filled loops of small   bowel noted in the left upper and left lower quadrants. This represents   an interval change from prior examination. No abnormal soft tissue     IMPRESSION:  Findings suggestive of colonic ileus without significant   change from prior study dated 2/7/2018. Several mildly dilated air-filled   loops of small bowel are present in the left abdomen, as well which  represents an interval change from prior study dated 2/7/2018. Follow-up   examination is as clinically indicated.    MEDICATIONS  (STANDING):  amLODIPine   Tablet 5 milliGRAM(s) Oral daily  ascorbic acid 500 milliGRAM(s) Oral two times a day  atorvastatin 20 milliGRAM(s) Oral at bedtime  ciprofloxacin   IVPB 400 milliGRAM(s) IV Intermittent every 12 hours  ciprofloxacin   IVPB      dextrose 5% + sodium chloride 0.45%. 1000 milliLiter(s) (100 mL/Hr) IV Continuous <Continuous>  docusate sodium 100 milliGRAM(s) Oral three times a day  enoxaparin Injectable 40 milliGRAM(s) SubCutaneous every 24 hours  finasteride 5 milliGRAM(s) Oral daily  folic acid 1 milliGRAM(s) Oral daily  isosorbide   mononitrate ER Tablet (IMDUR) 60 milliGRAM(s) Oral daily  lidocaine   Patch 1 Patch Transdermal daily  lisinopril 20 milliGRAM(s) Oral daily  metoprolol succinate ER 25 milliGRAM(s) Oral daily  metroNIDAZOLE  IVPB 500 milliGRAM(s) IV Intermittent every 8 hours  multivitamin 1 Tablet(s) Oral daily  pantoprazole    Tablet 40 milliGRAM(s) Oral daily  ranolazine 500 milliGRAM(s) Oral two times a day  tamsulosin 0.4 milliGRAM(s) Oral at bedtime  tiotropium 18 MICROgram(s) Capsule 1 Capsule(s) Inhalation daily    MEDICATIONS  (PRN):  acetaminophen   Tablet 650 milliGRAM(s) Oral every 6 hours PRN For Temp over 38.3 C (100.94 F)  diphenhydrAMINE   Capsule 25 milliGRAM(s) Oral at bedtime PRN Insomnia  ondansetron Injectable 4 milliGRAM(s) IV Push every 6 hours PRN Nausea and/or Vomiting  prochlorperazine   Injectable 5 milliGRAM(s) IV Push every 6 hours PRN Nausea and/or Vomiting  senna 2 Tablet(s) Oral at bedtime PRN Constipation    ASSESSMENT AND PLAN:  #Right hip fracture s/p Right hip hemiarthroplasty, POD #12:  Ortho f/u appreciated  PT as tolerated  AC by Lovenox  Minimize pain meds      #Leukocytosis  -f/u cdiff  -slowly improving  -afebrile  -ID following      #Post op ileus- resolving  -S/p decompressive colonoscopy and rectal tube placement on 2/13/18  -GI f/u appreciated  -Rectal tube got dislodged- no need for replacement  -Clinically feels better, +BM, +flatus  -Diet advanced regular consistency  -Cont IV abx  -Monitor leukocytosis- ID eval as leukocytosis not resolving    #Chr diastolic CHF   -compensated, c/w current meds    #HTN  -stable    #h/o CAD/Stent:  -on ranexa, imdur, bb, statin  -?antiplatelet    #BPH:  -on proscar, flomax    #COPD with chronic respiratory failure:  -stable.  -spiriva  -prn nebs    #DVT Px:  -LMWH      #Dispo- continue PT, ID eval.  Likely YOEL beginning of next week

## 2018-02-17 NOTE — PROGRESS NOTE ADULT - ASSESSMENT
72 year old male s/p right hip surgery with colonic ileus which is resolving. Solid diet. Continue to ambulate and increase activity. Bowel regimen. Colorectal surgery will sign off, please call if any issues arise.

## 2018-02-17 NOTE — PROGRESS NOTE ADULT - ASSESSMENT
A/P: 72M s/p R hemiarthroplasty POD12  Pain Control  DVT ppx  WBAT  PT/OT  Incentive Spirometry  Medical management appreciated  DC planning

## 2018-02-17 NOTE — PROGRESS NOTE ADULT - SUBJECTIVE AND OBJECTIVE BOX
Patient is a 72y old  Male who presents with a chief complaint of Limb pain (06 Feb 2018 08:35)      Subective:  Feels good. +flatus and a BM  Audrey solid food    PAST MEDICAL & SURGICAL HISTORY:  Spinal stenosis  CHF (congestive heart failure)  Emphysema  COPD (chronic obstructive pulmonary disease)  No significant past surgical history      MEDICATIONS  (STANDING):  amLODIPine   Tablet 5 milliGRAM(s) Oral daily  ascorbic acid 500 milliGRAM(s) Oral two times a day  atorvastatin 20 milliGRAM(s) Oral at bedtime  docusate sodium 100 milliGRAM(s) Oral three times a day  enoxaparin Injectable 40 milliGRAM(s) SubCutaneous every 24 hours  finasteride 5 milliGRAM(s) Oral daily  folic acid 1 milliGRAM(s) Oral daily  isosorbide   mononitrate ER Tablet (IMDUR) 60 milliGRAM(s) Oral daily  lidocaine   Patch 1 Patch Transdermal daily  lisinopril 20 milliGRAM(s) Oral daily  metoprolol succinate ER 25 milliGRAM(s) Oral daily  multivitamin 1 Tablet(s) Oral daily  pantoprazole    Tablet 40 milliGRAM(s) Oral daily  ranolazine 500 milliGRAM(s) Oral two times a day  tamsulosin 0.4 milliGRAM(s) Oral at bedtime  tiotropium 18 MICROgram(s) Capsule 1 Capsule(s) Inhalation daily    MEDICATIONS  (PRN):  acetaminophen   Tablet 650 milliGRAM(s) Oral every 6 hours PRN For Temp over 38.3 C (100.94 F)  diphenhydrAMINE   Capsule 25 milliGRAM(s) Oral at bedtime PRN Insomnia  ondansetron Injectable 4 milliGRAM(s) IV Push every 6 hours PRN Nausea and/or Vomiting  prochlorperazine   Injectable 5 milliGRAM(s) IV Push every 6 hours PRN Nausea and/or Vomiting  senna 2 Tablet(s) Oral at bedtime PRN Constipation      REVIEW OF SYSTEMS:    RESPIRATORY: No shortness of breath  CARDIOVASCULAR: No chest pain  All other review of systems is negative unless indicated above.    Vital Signs Last 24 Hrs  T(C): 36.3 (17 Feb 2018 11:16), Max: 36.9 (16 Feb 2018 23:16)  T(F): 97.4 (17 Feb 2018 11:16), Max: 98.4 (16 Feb 2018 23:16)  HR: 86 (17 Feb 2018 11:16) (65 - 86)  BP: 103/35 (17 Feb 2018 11:16) (103/35 - 146/62)  BP(mean): --  RR: 16 (17 Feb 2018 11:16) (14 - 17)  SpO2: 99% (17 Feb 2018 11:16) (95% - 99%)    PHYSICAL EXAM:    Constitutional: NAD, well-developed  Respiratory: CTAB  Cardiovascular: S1 and S2  Gastrointestinal: BS+, soft, NT/ND  Extremities: No peripheral edema  Psychiatric: Normal mood, normal affect    LABS:                        11.3   19.3  )-----------( 472      ( 17 Feb 2018 06:25 )             34.6     02-17    139  |  99  |  12  ----------------------------<  116<H>  3.7   |  35<H>  |  0.51    Ca    8.0<L>      17 Feb 2018 06:25            RADIOLOGY & ADDITIONAL STUDIES:

## 2018-02-17 NOTE — PROGRESS NOTE ADULT - ASSESSMENT
Imp:  Resolution of Ogilvies after colon decompression  Persistent leukocytosis but afebrile    Rec:  Cont POs, ambulation as joanne etc

## 2018-02-17 NOTE — PROGRESS NOTE ADULT - SUBJECTIVE AND OBJECTIVE BOX
Pt S/E at bedside, no acute events overnight, pain controlled.     ICU Vital Signs Last 24 Hrs  T(C): 36.4 (16 Feb 2018 05:38), Max: 37.1 (15 Feb 2018 17:29)  T(F): 97.5 (16 Feb 2018 05:38), Max: 98.7 (15 Feb 2018 17:29)  HR: 87 (16 Feb 2018 05:38) (85 - 91)  BP: 127/55 (16 Feb 2018 05:38) (115/58 - 127/55)  RR: 18 (16 Feb 2018 05:38) (18 - 18)  SpO2: 96% (16 Feb 2018 05:38) (95% - 96%)    Gen: NAD, AAOx3    Right Lower Extremity:  Incision without erythema or drainage  +EHL/FHL/TA/GS  SILT L3-S1  +DP/PT Pulses  Compartments soft  No calf TTP B/L

## 2018-02-17 NOTE — PROGRESS NOTE ADULT - SUBJECTIVE AND OBJECTIVE BOX
Continues to do well and is having bowel function and tolerating diet    Exam:  Vital Signs Last 24 Hrs  T(C): 36.9 (17 Feb 2018 05:39), Max: 36.9 (16 Feb 2018 23:16)  T(F): 98.4 (17 Feb 2018 05:39), Max: 98.4 (16 Feb 2018 23:16)  HR: 65 (17 Feb 2018 05:39) (65 - 85)  BP: 109/53 (17 Feb 2018 05:39) (101/49 - 146/62)  BP(mean): 61 (16 Feb 2018 12:16) (61 - 61)  RR: 14 (17 Feb 2018 05:39) (14 - 17)  SpO2: 98% (17 Feb 2018 05:39) (95% - 99%)    In no distress  Abdomen soft, distended and non tender                          11.3   19.3  )-----------( 472      ( 17 Feb 2018 06:25 )             34.6   02-17    139  |  99  |  12  ----------------------------<  116<H>  3.7   |  35<H>  |  0.51    Ca    8.0<L>      17 Feb 2018 06:25

## 2018-02-17 NOTE — PROGRESS NOTE ADULT - SUBJECTIVE AND OBJECTIVE BOX
A/P: 72M s/p R hemiarthroplasty POD12    Ortho stable for discharge  Will remove staples on pod14 if patient still in hospital at that time  Pain Control  DVT ppx  WBAT  PT/OT  Incentive Spirometry  Medical management appreciated

## 2018-02-18 PROCEDURE — 99233 SBSQ HOSP IP/OBS HIGH 50: CPT

## 2018-02-18 RX ADMIN — PANTOPRAZOLE SODIUM 40 MILLIGRAM(S): 20 TABLET, DELAYED RELEASE ORAL at 10:02

## 2018-02-18 RX ADMIN — TAMSULOSIN HYDROCHLORIDE 0.4 MILLIGRAM(S): 0.4 CAPSULE ORAL at 21:32

## 2018-02-18 RX ADMIN — Medication 1 TABLET(S): at 10:00

## 2018-02-18 RX ADMIN — RANOLAZINE 500 MILLIGRAM(S): 500 TABLET, FILM COATED, EXTENDED RELEASE ORAL at 05:38

## 2018-02-18 RX ADMIN — TIOTROPIUM BROMIDE 1 CAPSULE(S): 18 CAPSULE ORAL; RESPIRATORY (INHALATION) at 08:07

## 2018-02-18 RX ADMIN — FINASTERIDE 5 MILLIGRAM(S): 5 TABLET, FILM COATED ORAL at 10:01

## 2018-02-18 RX ADMIN — ISOSORBIDE MONONITRATE 60 MILLIGRAM(S): 60 TABLET, EXTENDED RELEASE ORAL at 10:00

## 2018-02-18 RX ADMIN — Medication 500 MILLIGRAM(S): at 05:38

## 2018-02-18 RX ADMIN — ATORVASTATIN CALCIUM 20 MILLIGRAM(S): 80 TABLET, FILM COATED ORAL at 21:32

## 2018-02-18 RX ADMIN — ENOXAPARIN SODIUM 40 MILLIGRAM(S): 100 INJECTION SUBCUTANEOUS at 10:00

## 2018-02-18 RX ADMIN — Medication 100 MILLIGRAM(S): at 13:36

## 2018-02-18 RX ADMIN — LISINOPRIL 20 MILLIGRAM(S): 2.5 TABLET ORAL at 05:38

## 2018-02-18 RX ADMIN — Medication 100 MILLIGRAM(S): at 05:37

## 2018-02-18 RX ADMIN — RANOLAZINE 500 MILLIGRAM(S): 500 TABLET, FILM COATED, EXTENDED RELEASE ORAL at 17:22

## 2018-02-18 RX ADMIN — Medication 25 MILLIGRAM(S): at 05:38

## 2018-02-18 RX ADMIN — AMLODIPINE BESYLATE 5 MILLIGRAM(S): 2.5 TABLET ORAL at 05:38

## 2018-02-18 RX ADMIN — Medication 1 MILLIGRAM(S): at 10:08

## 2018-02-18 RX ADMIN — Medication 500 MILLIGRAM(S): at 17:22

## 2018-02-18 NOTE — PROGRESS NOTE ADULT - NSHPATTENDINGPLANDISCUSS_GEN_ALL_CORE
patient, RN
patient, RN
Dr Rahul Tristan
pt
pt, DR Costello, DR Turcios
pt, DR Turcios
pt, DR Turcios
Dr Rahul Tristan
Intensive Care MD
Intensive Care MD

## 2018-02-18 NOTE — PROGRESS NOTE ADULT - SUBJECTIVE AND OBJECTIVE BOX
CC- s/p RT hip fracture    HPI:  73 y/o male with a PMHx chronic diastolic CHF, COPD on home O2 4 L, HTN, HLD, CAD s/p stent to LCx, spinal stenosis, obesity, lindy, PAD who presented to the ER 10 days after a fall with right hip pain. He had hip pain for about 4 weeks pta. He fell 10 days pta and came to the ER for difficulty ambulating/right hip pain and generalized weakness. He was admitted with right hip fracture. He was seen by cardiology and subsequently underwent right hip hemiarthroplasty. Post op course complicated by ileus. He was unable to tolerate NGT. He was seen by GI, underwent gastrograffin enema and also was administered neostigmine. He was monitored in icu. He was eventually transferred to .   Hospitalist service asked to resume care.    2/13/18- still with persistent ileus. Feels thirsty  2/14/18- s/p decompressive colonoscopy and rectal tube placement- feels much better. Very happy to be on clears  2/15/18- diet advanced to full liquids, +flatus, +BM. Feels better  2/16/18 no diarrhea, +BM. Feels a lot better  2/17/18- feeling well, ambulating  2/18/18- no diarrhea or abd pain, feeling well.          REVIEW OF SYSTEMS:    CONSTITUTIONAL: No weakness, fevers or chills  EYES/ENT: No visual changes;  No vertigo or throat pain   NECK: No pain or stiffness  RESPIRATORY: No cough, wheezing, hemoptysis; No shortness of breath  CARDIOVASCULAR: No chest pain or palpitations  GASTROINTESTINAL: No abdominal or epigastric pain. No nausea, vomiting, or hematemesis; No diarrhea or constipation. No melena or hematochezia.  GENITOURINARY: No dysuria, frequency or hematuria  NEUROLOGICAL: No numbness or weakness  SKIN: No itching, burning, rashes, or lesions   All other review of systems is negative unless indicated above    T(C): 36.3 (02-18-18 @ 05:34), Max: 36.3 (02-17-18 @ 18:11)  HR: 92 (02-18-18 @ 05:34) (86 - 92)  BP: 126/89 (02-18-18 @ 05:34) (126/89 - 152/61)  RR: 16 (02-18-18 @ 05:34) (16 - 16)  SpO2: 100% (02-18-18 @ 05:34) (97% - 100%)  Wt(kg): --                              10.5   17.3  )-----------( 465      ( 18 Feb 2018 06:38 )             32.6     18 Feb 2018 06:38    139    |  98     |  13     ----------------------------<  127    3.7     |  39     |  0.53     Ca    7.9        18 Feb 2018 06:38        CAPILLARY BLOOD GLUCOSE      PHYSICAL EXAM:  GENERAL: Obese, elderly male, sitting in chair,  Comfortable, no acute distress  HEAD:  Atraumatic, Normocephalic  EYES: EOMI, PERRLA  HEENT: Moist mucous membranes  NECK: Supple, No JVD  NERVOUS SYSTEM:  Alert & Oriented X3, non focal  CHEST/LUNG: Clear to auscultation bilaterally  HEART: s1, s2+, Regular rate and rhythm  ABDOMEN: Soft, distended, non tender, +BS  GENITOURINARY- Voiding, no palpable bladder  EXTREMITIES:  No clubbing, cyanosis, or edema  MUSCULOSKELETAL- right hip dressing dry  SKIN-no rash      Radiology:  EXAM:  XR ABDOMEN 2V                        PROCEDURE DATE:  02/13/2018    INTERPRETATION:  Abdomen radiographs      CPT 63482  CLINICAL INFORMATION:       Abdominal distention, follow-up.  TECHNIQUE:  Supine views of the abdomen were obtained.    FINDINGS:   Prior study dated 2/7/2018 was available for review.    Degenerative changes are noted in the lumbar spine. There is partial   visualization of a total right hip replacement. Moderate colonic   distention with air is again noted without significant change from prior   examination. There are several mildly dilated air-filled loops of small   bowel noted in the left upper and left lower quadrants. This represents   an interval change from prior examination. No abnormal soft tissue     IMPRESSION:  Findings suggestive of colonic ileus without significant   change from prior study dated 2/7/2018. Several mildly dilated air-filled   loops of small bowel are present in the left abdomen, as well which  represents an interval change from prior study dated 2/7/2018. Follow-up   examination is as clinically indicated.      MEDICATIONS  (STANDING):  amLODIPine   Tablet 5 milliGRAM(s) Oral daily  ascorbic acid 500 milliGRAM(s) Oral two times a day  atorvastatin 20 milliGRAM(s) Oral at bedtime  ciprofloxacin   IVPB 400 milliGRAM(s) IV Intermittent every 12 hours  ciprofloxacin   IVPB      dextrose 5% + sodium chloride 0.45%. 1000 milliLiter(s) (100 mL/Hr) IV Continuous <Continuous>  docusate sodium 100 milliGRAM(s) Oral three times a day  enoxaparin Injectable 40 milliGRAM(s) SubCutaneous every 24 hours  finasteride 5 milliGRAM(s) Oral daily  folic acid 1 milliGRAM(s) Oral daily  isosorbide   mononitrate ER Tablet (IMDUR) 60 milliGRAM(s) Oral daily  lidocaine   Patch 1 Patch Transdermal daily  lisinopril 20 milliGRAM(s) Oral daily  metoprolol succinate ER 25 milliGRAM(s) Oral daily  metroNIDAZOLE  IVPB 500 milliGRAM(s) IV Intermittent every 8 hours  multivitamin 1 Tablet(s) Oral daily  pantoprazole    Tablet 40 milliGRAM(s) Oral daily  ranolazine 500 milliGRAM(s) Oral two times a day  tamsulosin 0.4 milliGRAM(s) Oral at bedtime  tiotropium 18 MICROgram(s) Capsule 1 Capsule(s) Inhalation daily      MEDICATIONS  (PRN):  acetaminophen   Tablet 650 milliGRAM(s) Oral every 6 hours PRN For Temp over 38.3 C (100.94 F)  diphenhydrAMINE   Capsule 25 milliGRAM(s) Oral at bedtime PRN Insomnia  ondansetron Injectable 4 milliGRAM(s) IV Push every 6 hours PRN Nausea and/or Vomiting  prochlorperazine   Injectable 5 milliGRAM(s) IV Push every 6 hours PRN Nausea and/or Vomiting  senna 2 Tablet(s) Oral at bedtime PRN Constipation        ASSESSMENT AND PLAN:  #Right hip fracture s/p Right hip hemiarthroplasty, POD #13:  Ortho f/u appreciated  PT as tolerated  AC w/ Lovenox  Minimize pain meds      #Cdiff colitis  -contact precautions  -IV Flagyl  -CDiff PCR (+)  -afebrile  -ID following      #Post op ileus- resolving  -S/p decompressive colonoscopy and rectal tube placement on 2/13/18  -GI f/u appreciated  -Rectal tube got dislodged- no need for replacement  -Clinically feels better, +BM, +flatus  -Diet advanced regular consistency  -Cont IV abx  -Monitor leukocytosis- ID eval as leukocytosis not resolving      #Chr diastolic CHF   -compensated, c/w current meds      #HTN  -stable      #h/o CAD/Stent:  -on ranexa, imdur, bb, statin      #BPH:  -on proscar, flomax      #COPD with chronic respiratory failure:  -stable.  -spiriva  -prn nebs      #DVT Px:  -LMWH      #Dispo- continue PT, ID eval.  Likely [facility] beginning of next week

## 2018-02-18 NOTE — PROGRESS NOTE ADULT - SUBJECTIVE AND OBJECTIVE BOX
HPI: 73 y/o male with a PMHx chronic CHF, COPD on home O2 4 L, emphysema, and spinal stenosis. He presents to ED s/p fall. Fall occurred ~ 10 days ago; patient has been very weak, and suffers from arthritis.   He notes chronic Rt hip pain for ~ 4 weeks which causes ambulatory dysfunction.  He was suppose to follow-up with outpatient ortho for MRI.    Reports associated poor appetite with decreased PO intake.   Denies nausea, vomiting, diarrhea, fevers , chills, CP or dypsnea.   No recent illnesses, no sick contacts.    ED course:  Xray imaging reportedly with Rt hip fracture  Venous doppler US 2/3 : no evidence of deep venous thrombosis in either leg.  Left Baker's cyst. (2018 01:53)    Patient is a 72y old  Male who presents with a chief complaint of Limb pain (2018 08:35)  pt s/p right hip brynn on 18.    Consulted by Dr. Rahul Elkins for VTE prophylaxis, risk stratification, and anticoagulation management.    PAST MEDICAL & SURGICAL HISTORY:  Spinal stenosis  CHF (congestive heart failure)  Emphysema  COPD (chronic obstructive pulmonary disease)  No significant past surgical history    Caprini VTE Risk Score:9    IMPROVE Bleeding Risk Score: 2.5    Falls Risk:   High (x  )  Mod (  )  Low (  )    EBL:  150ml    cr cl: 162.2    18 Pt sen at bedside.  Discussed his anticoagulation with Lovenox inj for 4 weeks.  Questions answered will reinforce as needed.  Pt concerned about no having a bm for a few days and  in his appetite for a while. Not sure if he lost wt.   18: Pt seen at bedside, OOB to chair. Reports that he continues to be constipated, was able to make "small bowel success," but remains to have distended/firm abdomen. Continues to have 4LNC, and states that he is feeling "oozy," and would like to go back in bed. RN is aware, but waiting for PT. Pt noted to have moderate size bruising in his RLQ abdomen. Advised RN to administer medication to his LLQ.   18 Pt seen at bedside in IC.  Pt dx with Kaylan Syndrome.  Had barium enema and received neostigmine iv.  Pt states he feels much better abd smaller and softer. discussed his anticoagulation with heparin sq mo concerns.  18: Pt seen at bedside in ICU. Reports of continuing to feel better, had successful large BM yesterday. He continues to be NPO. Pt is being downgraded to med-surg today.   2-10-18  pt seen on 2n feeling much better discussed his anticoagulation with Lovenox no concerns.  18   Pt seen at bedside in CCU S/P second dose of received neostigmine iv.  Pt states she has had a bm and feels better but not totally good yet. discussed his anticoagulation with lovenox  no concerns.   18 Pt seen at bedside on 2N oob in chair.  States he walked today and will walk again.  Discussed his anticoagulation no concerns.   18 Pt seen at bedside.  States he feels so much better after colonoscopy yesterday.  He is having clear liquids.  Discussed his anticoagulation with Lovenox no concerns.   2-15-18 pt seen at bedside on 2n oob in chair states his abdomen is much better.  will cont with Lovenox for anticoagulation  18 pt johnny t bedside oob in chair.  no concerns.  18: Pt seen at bedside, OOB to chair. He continues to be on 4LNC, mild labored breathing, but states he just walked with PT. Colorectal surgery states pt to be on solid diet, abdomen softer.   18: Pt seen at bedside, OOB to chair. He continues to be on 4LNC, on contact isolation for positive Cdiff and WBC continues to trend down. No complaints made. Pt upset that he can't go to rehab tomorrow d/t holiday. Planning for [facility].     FAMILY HISTORY:  No pertinent family history in first degree relatives    Denies any personal or familial history of clotting or bleeding disorders.    Allergies    No Known Allergies    Intolerances    REVIEW OF SYSTEMS    (  )Fever	     (  )Constipation	(  )SOB				(  )Headache	(  )Dysuria  (  )Chills	     (  )Melena	(  )Dyspnea present on exertion	                    (  )Dizziness                    (  )Polyuria  (  )Nausea	     (  )Hematochezia	(  )Cough			                    (  )Syncope   	(  )Hematuria  (  )Vomiting    (  )Chest Pain	(  )Wheezing			(  )Weakness  (  )Diarrhea     (  )Palpitations	(  )Anorexia			(  )Myalgia    All other review of systems negative: Yes    PHYSICAL EXAM:    Constitutional: Appears Well    Neurological: A& O x 3    Skin: Warm    Respiratory and Thorax: normal effort; Breath sounds: normal; No rales/wheezing/rhonchi, 4LNC supplemental O2  	  Cardiovascular: S1, S2, regular, NMBR	    Gastrointestinal: BS + x 4Q, nontender, soft, mildly distended	    Genitourinary:  Bladder nondistended, nontender    Musculoskeletal:   General Right:   no muscle/joint tenderness,   normal tone, + swelling,   ROM: limited	    General Left:   no muscle/joint tenderness,   normal tone, no joint swelling,   ROM: full    Hip:  Right: Staples intact with no oozing, no dressing noted    Lower extrems:   Right: no calf tenderness              negative rolando's sign               + pedal pulses, swollen    Left:   no calf tenderness              negative rolando's sign               + pedal pulses, swollen pitting edema 2+                              10.5   17.3  )-----------( 465      ( 2018 06:38 )             32.6       02-18    139  |  98  |  13  ----------------------------<  127<H>  3.7   |  39<H>  |  0.53    Ca    7.9<L>      2018 06:38                    11.3   19.3  )-----------( 472      ( 2018 06:25 )             34.6       02-17    139  |  99  |  12  ----------------------------<  116<H>  3.7   |  35<H>  |  0.51    Ca    8.0<L>      2018 06:25                         11.5   19.9  )-----------( 435      ( 2018 06:34 )             35.0       02-16    137  |  99  |  11  ----------------------------<  123<H>  3.7   |  32<H>  |  0.49<L>    Ca    8.2<L>      2018 06:34                         11.3   19.0  )-----------( 490      ( 15 Feb 2018 08:05 )             34.0       -    137  |  100  |  20  ----------------------------<  150<H>  4.0   |  31  |  0.59    Ca    8.5      2018 06:46  Phos  2.3     02-14  Mg     2.2     -14                          11.8   20.3  )-----------( 576      ( 2018 06:46 )             37.1       -    137  |  100  |  20  ----------------------------<  150<H>  4.0   |  31  |  0.59    Ca    8.5      2018 06:46  Phos  2.3     02-14  Mg     2.2     -14    TPro  5.8<L>  /  Alb  1.9<L>  /  TBili  0.4  /  DBili  x   /  AST  36  /  ALT  29  /  AlkPhos  68  02-13    MEDICATIONS  (STANDING):  amLODIPine   Tablet 5 milliGRAM(s) Oral daily  ascorbic acid 500 milliGRAM(s) Oral two times a day  atorvastatin 20 milliGRAM(s) Oral at bedtime  docusate sodium 100 milliGRAM(s) Oral three times a day  enoxaparin Injectable 40 milliGRAM(s) SubCutaneous every 24 hours  finasteride 5 milliGRAM(s) Oral daily  folic acid 1 milliGRAM(s) Oral daily  isosorbide   mononitrate ER Tablet (IMDUR) 60 milliGRAM(s) Oral daily  lidocaine   Patch 1 Patch Transdermal daily  lisinopril 20 milliGRAM(s) Oral daily  metoprolol succinate ER 25 milliGRAM(s) Oral daily  metroNIDAZOLE  IVPB      metroNIDAZOLE  IVPB 500 milliGRAM(s) IV Intermittent every 8 hours  multivitamin 1 Tablet(s) Oral daily  pantoprazole    Tablet 40 milliGRAM(s) Oral daily  ranolazine 500 milliGRAM(s) Oral two times a day  tamsulosin 0.4 milliGRAM(s) Oral at bedtime  tiotropium 18 MICROgram(s) Capsule 1 Capsule(s) Inhalation daily    MEDICATIONS  (PRN):  acetaminophen   Tablet 650 milliGRAM(s) Oral every 6 hours PRN For Temp over 38.3 C (100.94 F)  diphenhydrAMINE   Capsule 25 milliGRAM(s) Oral at bedtime PRN Insomnia  ondansetron Injectable 4 milliGRAM(s) IV Push every 6 hours PRN Nausea and/or Vomiting  prochlorperazine   Injectable 5 milliGRAM(s) IV Push every 6 hours PRN Nausea and/or Vomiting  senna 2 Tablet(s) Oral at bedtime PRN Constipation    Vital Signs Last 24 Hrs  T(C): 36.3 (18 @ 05:34), Max: 36.3 (18 @ 18:11)  T(F): 97.4 (18 @ 05:34), Max: 97.4 (18 @ 18:11)  HR: 92 (18 @ 05:34) (86 - 92)  BP: 126/89 (18 @ 05:34) (126/89 - 152/61)  BP(mean): --  RR: 16 (18 @ 05:34) (16 - 16)  SpO2: 100% (18 @ 05:34) (97% - 100%)      DVT Prophylaxis:  LMWH                   (x )  Heparin SQ           (  )  Coumadin             (  )  Xarelto                  (  )  Eliquis                   (  )  Venodynes           ( x )  Ambulation          ( x )  UFH                       (  )  Contraindicated  (  )

## 2018-02-18 NOTE — PROGRESS NOTE ADULT - ASSESSMENT
This is a 72 year old male s/p fall on about Jan 23rd presented to [facility] on 2/3/18 with chronic pain to hip, decrease appetite. Pt found to have a fx rt hip, now s/p right hip brynn on 2-5-18.  Pt has high thrombosis risks and requires anticoagualtion prophylaxis. Discussed with pt the use of Lovenox for his anticoagulation med. Pt is agreeable. 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 No bowel obstruction. On 2-15-18 pt had gastrografin enema and neostigmine times 2, and colonoscopy with relief. His diet advanced to solid diet. Continues to be on contact isolation for Cdiff    PLAN:  : c/w lovenox 40mg SQ daily for total four weeks post procedure (2/6/18 - 3/6/18)  : daily cbc/bmp  : increase mobility as tolerated    Will continue to follow. Please call [phone] for this weekend.

## 2018-02-19 PROCEDURE — 99233 SBSQ HOSP IP/OBS HIGH 50: CPT

## 2018-02-19 RX ORDER — VANCOMYCIN HCL 1 G
125 VIAL (EA) INTRAVENOUS EVERY 6 HOURS
Qty: 0 | Refills: 0 | Status: DISCONTINUED | OUTPATIENT
Start: 2018-02-19 | End: 2018-02-19

## 2018-02-19 RX ADMIN — AMLODIPINE BESYLATE 5 MILLIGRAM(S): 2.5 TABLET ORAL at 06:24

## 2018-02-19 RX ADMIN — Medication 500 MILLIGRAM(S): at 17:04

## 2018-02-19 RX ADMIN — LISINOPRIL 20 MILLIGRAM(S): 2.5 TABLET ORAL at 06:25

## 2018-02-19 RX ADMIN — TIOTROPIUM BROMIDE 1 CAPSULE(S): 18 CAPSULE ORAL; RESPIRATORY (INHALATION) at 08:09

## 2018-02-19 RX ADMIN — ATORVASTATIN CALCIUM 20 MILLIGRAM(S): 80 TABLET, FILM COATED ORAL at 21:48

## 2018-02-19 RX ADMIN — RANOLAZINE 500 MILLIGRAM(S): 500 TABLET, FILM COATED, EXTENDED RELEASE ORAL at 06:25

## 2018-02-19 RX ADMIN — Medication 1 TABLET(S): at 11:08

## 2018-02-19 RX ADMIN — PANTOPRAZOLE SODIUM 40 MILLIGRAM(S): 20 TABLET, DELAYED RELEASE ORAL at 11:08

## 2018-02-19 RX ADMIN — Medication 25 MILLIGRAM(S): at 06:25

## 2018-02-19 RX ADMIN — Medication 500 MILLIGRAM(S): at 06:25

## 2018-02-19 RX ADMIN — ISOSORBIDE MONONITRATE 60 MILLIGRAM(S): 60 TABLET, EXTENDED RELEASE ORAL at 11:08

## 2018-02-19 RX ADMIN — Medication 1 MILLIGRAM(S): at 11:08

## 2018-02-19 RX ADMIN — TAMSULOSIN HYDROCHLORIDE 0.4 MILLIGRAM(S): 0.4 CAPSULE ORAL at 21:48

## 2018-02-19 RX ADMIN — ENOXAPARIN SODIUM 40 MILLIGRAM(S): 100 INJECTION SUBCUTANEOUS at 11:03

## 2018-02-19 RX ADMIN — Medication 100 MILLIGRAM(S): at 08:09

## 2018-02-19 RX ADMIN — FINASTERIDE 5 MILLIGRAM(S): 5 TABLET, FILM COATED ORAL at 11:08

## 2018-02-19 NOTE — PROGRESS NOTE ADULT - ASSESSMENT
This is a 72 year old male s/p fall on about Jan 23rd presented to [facility] on 2/3/18 with chronic pain to hip, decrease appetite. Pt found to have a fx rt hip, now s/p right hip brynn on 2-5-18.  Pt has high thrombosis risks and requires anticoagualtion prophylaxis. Discussed with pt the use of Lovenox for his anticoagulation med. Pt is agreeable. 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 No bowel obstruction. On 2-15-18 pt had gastrografin enema and neostigmine times 2, and colonoscopy with relief. His diet advanced to solid diet. Continues to be on contact isolation for Cdiff    PLAN:  : c/w lovenox 40mg SQ daily for total four weeks post procedure (2/6/18 - 3/6/18)  : daily cbc/bmp  : increase mobility as tolerated    Will continue to follow.

## 2018-02-19 NOTE — PROGRESS NOTE ADULT - ASSESSMENT
73 y/o male with a PMHx chronic CHF, COPD on home O2 4 L, emphysema, and spinal stenosis.   He presents to ED s/p fall on 2/3.   Fall occurred ~ 10 days ago; patient has been very weak, and suffers from arthritis. He notes chronic Rt hip pain for ~ 4 weeks which causes ambulatory dysfunction.  He was suppose to follow-up with outpatient ortho for MRI. Here, found to have R femoral neck fracture, eval by ortho and taken to OR on 2/5 for R hip surgery. Post-op course was c/b ileus on 2/7 which persisted was eval by GI/surgery, was unable to tolerated NGT, was taken for colonoscopy and colonic decompression with neostigmine/enema/rectal tube placement on 2/13. Since then, is now improving with no n/v and formed bms with less abd distension and tolerating diet. Has had persistent leukocytosis since admission. No fever, no chills, no diarrhea, no cough, no dysuria, no rashes or other infectious symptoms, surgical site of R hip clean.     1. leukocytosis/post-op ileus s/p decompression/sp R hip surgery/c diff colonization  - wbc ct elevated since admission and worse when ileus diagnosed and up to 23.7 on 2/13   - could be reactive to this process and recent procedure/decompression  - now pt clinically improving and wbc ct trending down  - having formed BMs and had no diarrhea prior   - C diff PCR positive from 2/16, started on IV flagyl  - would dc flagyl and positive testing likely indicative of colonization and not active infection  - monitor off antibiotics for now  - no other obvious infectious sources  - f/u cbc  - surgical site clean  - surgery/gi following  - monitor temps  - supportive care    2. other issues - care per medicine

## 2018-02-19 NOTE — CHART NOTE - NSCHARTNOTEFT_GEN_A_CORE
Assessment:   *pt s/p right hip hemiarthroplasty (POD #14), C.Diff (+), post-op ileus ->now resolving: having BM's and feeling better  *pt's diet advanced to DASH from full liquids; pt has no N/V/D complaints.  is tolerating PO diet well and has good appetite.  consuming % of meals.  Now meeting estimated nutr needs.  *new wt on 2/16 = 130.8Kg. previous wt of 113.4Kg (2/3).  wt gain of ~17.4Kg if accurate.  Pt with (+)2 edema, so possible wt gain is partial fluid.  Unclear if new wt is accurate.  obtain new wt with standing scale as feasible.    Factors impacting intake: none    Diet Presciption: Diet, DASH/TLC:   Sodium & Cholesterol Restricted (02-16-18 @ 08:58)      Pertinent Medications: MEDICATIONS  (STANDING):  amLODIPine   Tablet 5 milliGRAM(s) Oral daily  ascorbic acid 500 milliGRAM(s) Oral two times a day  atorvastatin 20 milliGRAM(s) Oral at bedtime  docusate sodium 100 milliGRAM(s) Oral three times a day  enoxaparin Injectable 40 milliGRAM(s) SubCutaneous every 24 hours  finasteride 5 milliGRAM(s) Oral daily  folic acid 1 milliGRAM(s) Oral daily  isosorbide   mononitrate ER Tablet (IMDUR) 60 milliGRAM(s) Oral daily  lidocaine   Patch 1 Patch Transdermal daily  lisinopril 20 milliGRAM(s) Oral daily  metoprolol succinate ER 25 milliGRAM(s) Oral daily  multivitamin 1 Tablet(s) Oral daily  pantoprazole    Tablet 40 milliGRAM(s) Oral daily  ranolazine 500 milliGRAM(s) Oral two times a day  tamsulosin 0.4 milliGRAM(s) Oral at bedtime  tiotropium 18 MICROgram(s) Capsule 1 Capsule(s) Inhalation daily    MEDICATIONS  (PRN):  acetaminophen   Tablet 650 milliGRAM(s) Oral every 6 hours PRN For Temp over 38.3 C (100.94 F)  diphenhydrAMINE   Capsule 25 milliGRAM(s) Oral at bedtime PRN Insomnia  ondansetron Injectable 4 milliGRAM(s) IV Push every 6 hours PRN Nausea and/or Vomiting  prochlorperazine   Injectable 5 milliGRAM(s) IV Push every 6 hours PRN Nausea and/or Vomiting  senna 2 Tablet(s) Oral at bedtime PRN Constipation    Pertinent Labs: 02-19 Na139 mmol/L Glu 138 mg/dL<H> K+ 4.0 mmol/L Cr  0.61 mg/dL BUN 14 mg/dL Phos n/a   Alb n/a   PAB n/a        CAPILLARY BLOOD GLUCOSE          Skin: ibrahima score = 18      Estimated Needs:   [x] no change since previous assessment  2170-2604Kcal (25-30Kcal/Kg)  87-104g protein (1-1.2g/Kg protein)    Previous Nutrition Diagnosis:   · Nutrition Diagnostic Terminology #1: Nutrient  · Nutrient: Malnutrition; Pt meets criteria for severe protein calorie malnutrition in context of acute illness secondary to poor intake </=50% of estimated needs consumed x >5days, and moderate fluid accumulation.  · Etiology: Ileus  · Signs/Symptoms: +3 Edema, Poor intake, NPO status x 5 days  · Nutrition Intervention: Meals and Snack; Parenteral Nutrition/IV Fluids  · Meals and Snacks: Advance diet when medically feasible  · Parenteral Nutrition/IV Fluids: IV fluids; Initiate PPN if po diet not advanced  · Goal/Expected Outcome: Tolerance of advanced diet >80% or Alternate route of nutrition, No s/s of malnutrition.      Nutrition Diagnosis is [x] ongoing  [ ] resolved [ ] not applicable       New Nutrition Diagnosis: [x] not applicable       Recommendations:  c/w DASH diet  monitor PO intake/tolerance  obtain new wt with standing scale    Monitoring and Evaluation:   [x] PO intake [ x ] Tolerance to diet prescription [ x ] weights [ x ] labs[ x ] follow up per protocol  [ ] other:

## 2018-02-19 NOTE — PROGRESS NOTE ADULT - SUBJECTIVE AND OBJECTIVE BOX
CC- s/p RT hip fracture    HPI:  71 y/o male with a PMHx chronic diastolic CHF, COPD on home O2 4 L, HTN, HLD, CAD s/p stent to LCx, spinal stenosis, obesity, lindy, PAD who presented to the ER 10 days after a fall with right hip pain. He had hip pain for about 4 weeks pta. He fell 10 days pta and came to the ER for difficulty ambulating/right hip pain and generalized weakness. He was admitted with right hip fracture. He was seen by cardiology and subsequently underwent right hip hemiarthroplasty. Post op course complicated by ileus. He was unable to tolerate NGT. He was seen by GI, underwent gastrograffin enema and also was administered neostigmine. He was monitored in icu. He was eventually transferred to .   Hospitalist service asked to resume care. Underwent decompressive colonscopy and rectal tube placement. DIet eventually advanced.     2/19: pt seen and examined this am. Was feeling much better, but depressed. Had formed bm yesterday.     ROS: all 10 systems reviewed and is as above otherwise negative.  Vital Signs Last 24 Hrs  T(C): 36.3 (19 Feb 2018 11:12), Max: 37.2 (18 Feb 2018 17:29)  T(F): 97.4 (19 Feb 2018 11:12), Max: 98.9 (18 Feb 2018 17:29)  HR: 87 (19 Feb 2018 11:12) (87 - 97)  BP: 113/58 (19 Feb 2018 11:12) (104/40 - 116/52)  RR: 18 (19 Feb 2018 11:12) (16 - 18)  SpO2: 98% (19 Feb 2018 11:12) (97% - 99%)    PHYSICAL EXAM:  GENERAL: Obese, elderly male, sitting in chair,  Comfortable, no acute distress  HEAD:  Atraumatic, Normocephalic  EYES: EOMI, PERRLA  HEENT: Moist mucous membranes  NECK: Supple, No JVD  NERVOUS SYSTEM:  Alert & Oriented X3, non focal  CHEST/LUNG: Clear to auscultation bilaterally  HEART: s1, s2+, Regular rate and rhythm  ABDOMEN: Soft, distended, non tender, +BS  GENITOURINARY- Voiding, no palpable bladder  EXTREMITIES:  No clubbing, cyanosis. +RLE edema, dressing over dorsum of foot.  MUSCULOSKELETAL- right hip dressing dry  SKIN-no rash    LABS:                        10.8   17.4  )-----------( 506      ( 19 Feb 2018 07:55 )             33.6     02-19    139  |  99  |  14  ----------------------------<  138<H>  4.0   |  37<H>  |  0.61    Ca    8.3<L>      19 Feb 2018 07:55    Radiology:  EXAM:  XR ABDOMEN 2V                        PROCEDURE DATE:  02/13/2018    INTERPRETATION:  Abdomen radiographs      CPT 88751  CLINICAL INFORMATION:       Abdominal distention, follow-up.  TECHNIQUE:  Supine views of the abdomen were obtained.    FINDINGS:   Prior study dated 2/7/2018 was available for review.    Degenerative changes are noted in the lumbar spine. There is partial   visualization of a total right hip replacement. Moderate colonic   distention with air is again noted without significant change from prior   examination. There are several mildly dilated air-filled loops of small   bowel noted in the left upper and left lower quadrants. This represents   an interval change from prior examination. No abnormal soft tissue     IMPRESSION:  Findings suggestive of colonic ileus without significant   change from prior study dated 2/7/2018. Several mildly dilated air-filled   loops of small bowel are present in the left abdomen, as well which  represents an interval change from prior study dated 2/7/2018. Follow-up   examination is as clinically indicated.    MEDICATIONS  (STANDING):  amLODIPine   Tablet 5 milliGRAM(s) Oral daily  ascorbic acid 500 milliGRAM(s) Oral two times a day  atorvastatin 20 milliGRAM(s) Oral at bedtime  docusate sodium 100 milliGRAM(s) Oral three times a day  enoxaparin Injectable 40 milliGRAM(s) SubCutaneous every 24 hours  finasteride 5 milliGRAM(s) Oral daily  folic acid 1 milliGRAM(s) Oral daily  isosorbide   mononitrate ER Tablet (IMDUR) 60 milliGRAM(s) Oral daily  lidocaine   Patch 1 Patch Transdermal daily  lisinopril 20 milliGRAM(s) Oral daily  metoprolol succinate ER 25 milliGRAM(s) Oral daily  multivitamin 1 Tablet(s) Oral daily  pantoprazole    Tablet 40 milliGRAM(s) Oral daily  ranolazine 500 milliGRAM(s) Oral two times a day  tamsulosin 0.4 milliGRAM(s) Oral at bedtime  tiotropium 18 MICROgram(s) Capsule 1 Capsule(s) Inhalation daily    MEDICATIONS  (PRN):  acetaminophen   Tablet 650 milliGRAM(s) Oral every 6 hours PRN For Temp over 38.3 C (100.94 F)  diphenhydrAMINE   Capsule 25 milliGRAM(s) Oral at bedtime PRN Insomnia  ondansetron Injectable 4 milliGRAM(s) IV Push every 6 hours PRN Nausea and/or Vomiting  prochlorperazine   Injectable 5 milliGRAM(s) IV Push every 6 hours PRN Nausea and/or Vomiting  senna 2 Tablet(s) Oral at bedtime PRN Constipation      ASSESSMENT AND PLAN:  72M, pmh as above a/w:    1. Right hip fracture s/p Right hip hemiarthroplasty, POD #14:  Ortho f/u appreciated  PT as tolerated  AC w/ Lovenox  Minimize pain meds      2. Leukocytosis:  -about the same as yesterday  -doubt pt has c diff given lack of diarrhea  -d/w ID, abx discontinued.  -repeat cbc in am.     3. Post op ileus- resolving  -S/p decompressive colonoscopy and rectal tube placement on 2/13/18  -GI f/u appreciated  -Clinically feels better, +BM, +flatus  -tolerating regular consistency diet.    4. Chr diastolic CHF   -compensated, c/w current meds    5.HTN  -stable    6.h/o CAD/Stent:  -on ranexa, imdur, bb, statin    7. BPH:  -on proscar, flomax    8. COPD with chronic respiratory failure:  -stable.  -spiriva  -prn nebs    9. DVT Px:  -LMWH

## 2018-02-19 NOTE — CHART NOTE - NSCHARTNOTEFT_GEN_A_CORE
Pt seen and examined at bedside  Staples removed without incident  Incision in process of healing  Incision covered w/ steri strips  NVI post-procedure Pt seen and examined at bedside  Staples removed without incident  Incision in process of healing  Incision covered w/ steri strips  NVI post-procedure  Orthopedically stable for DC  Re-consult as needed  Dressing changes PRN

## 2018-02-19 NOTE — PROGRESS NOTE ADULT - SUBJECTIVE AND OBJECTIVE BOX
71 y/o male with a PMHx chronic CHF, COPD on home O2 4 L, emphysema, and spinal stenosis.   He presents to ED s/p fall on 2/3.   Fall occurred ~ 10 days ago; patient has been very weak, and suffers from arthritis. He notes chronic Rt hip pain for ~ 4 weeks which causes ambulatory dysfunction.  He was suppose to follow-up with outpatient ortho for MRI. Here, found to have R femoral neck fracture, eval by ortho and taken to OR on 2/5 for R hip surgery. Post-op course was c/b ileus on 2/7 which persisted was eval by GI/surgery, was unable to tolerated NGT, was taken for colonoscopy and colonic decompression with neostigmine/enema/rectal tube placement on 2/13. Since then, is now improving with no n/v and formed bms with less abd distension and tolerating diet. Has had persistent leukocytosis since admission. No fever, no chills, no diarrhea, no cough, no dysuria, no rashes or other infectious symptoms, surgical site of R hip clean.     no fevers  feels better has LLE edema  has no diarrhea       Date of Service: 02-19-18 @ 09:49    MEDICATIONS  (STANDING):  amLODIPine   Tablet 5 milliGRAM(s) Oral daily  ascorbic acid 500 milliGRAM(s) Oral two times a day  atorvastatin 20 milliGRAM(s) Oral at bedtime  docusate sodium 100 milliGRAM(s) Oral three times a day  enoxaparin Injectable 40 milliGRAM(s) SubCutaneous every 24 hours  finasteride 5 milliGRAM(s) Oral daily  folic acid 1 milliGRAM(s) Oral daily  isosorbide   mononitrate ER Tablet (IMDUR) 60 milliGRAM(s) Oral daily  lidocaine   Patch 1 Patch Transdermal daily  lisinopril 20 milliGRAM(s) Oral daily  metoprolol succinate ER 25 milliGRAM(s) Oral daily  multivitamin 1 Tablet(s) Oral daily  pantoprazole    Tablet 40 milliGRAM(s) Oral daily  ranolazine 500 milliGRAM(s) Oral two times a day  tamsulosin 0.4 milliGRAM(s) Oral at bedtime  tiotropium 18 MICROgram(s) Capsule 1 Capsule(s) Inhalation daily      Vital Signs Last 24 Hrs  T(C): 36.7 (19 Feb 2018 05:30), Max: 37.2 (18 Feb 2018 17:29)  T(F): 98 (19 Feb 2018 05:30), Max: 98.9 (18 Feb 2018 17:29)  HR: 97 (19 Feb 2018 05:30) (95 - 97)  BP: 116/52 (19 Feb 2018 05:30) (104/40 - 116/52)  BP(mean): --  RR: 16 (19 Feb 2018 05:30) (16 - 16)  SpO2: 97% (19 Feb 2018 05:30) (97% - 99%)        PE:  Constitutional: frail looking  HEENT: NC/AT, EOMI, PERRLA  Neck: supple  Back: no tenderness  Respiratory: decreased breath sounds  Cardiovascular: S1S2 regular, no murmurs  Abdomen: soft, not tender, distended  Genitourinary: deferred  Rectal: deferred  Musculoskeletal: no muscle tenderness, no joint swelling or tenderness  Extremities: pedal edema  Neurological: no focal deficits  Skin: no rashes    Labs:                        10.8   17.4  )-----------( 506      ( 19 Feb 2018 07:55 )             33.6     02-19    139  |  99  |  14  ----------------------------<  138<H>  4.0   |  37<H>  |  0.61    Ca    8.3<L>      19 Feb 2018 07:55         C diff PCR positive 2/16    Radiology:  reviewed      < from: CT Abdomen and Pelvis No Cont (02.10.18 @ 17:13) >    EXAM:  CT ABDOMEN AND PELVIS                            PROCEDURE DATE:  02/10/2018          INTERPRETATION:  CT ABDOMEN AND PELVIS    HISTORY: Generalized Abdominal Pain and distention, right lower quadrant   pain    Technique: CT of the abdomen and pelvis is performed without oral or   intravenous contrast. Axial images are supplemented with coronal and   sagittal reformations. This study was performed using automatic exposure   control (radiation dose reduction software) to obtain a diagnostic image   quality scan with patient dose as low as reasonably achievable.    Contrast:     None    Comparison: CT abdomen and pelvis 2/7/2018    Findings:  LIVER: Normal.  SPLEEN: Normal.  PANCREAS: Normal.  GALLBLADDER/BILIARY TREE: Nondilated. Normal gallbladder.  ADRENALS: Normal.  KIDNEYS: No hydronephrosis or urinary tract calculi. Bilateral intrarenal   vascular calcification.  LYMPHADENOPATHY/RETROPERITONEUM: No adenopathy.  VASCULATURE: Aortoiliac atherosclerosis without aneurysm.  BOWEL:No dilated small bowel loops. Normal appendix. Unchanged diffuse   gaseous distention of the colon without interval change. There is   residual Gastrografin throughout the colon from 2/8/2018. Some of the   Gastrografin has refluxed into the ascending colon and cecum. No   stricture or evidence of colonic obstruction.  PELVIC VISCERA: Unremarkable prostate, seminal vesicles, and urinary   bladder.  PELVIC LYMPH NODES: No pelvic adenopathy.  PERITONEUM/ABDOMINAL WALL: Stable trace bilateral paracolic gutter   ascites. No free air.  SKELETAL: No acute bony abnormality. Right hip arthroplasty again noted.   Decreasing right hip subcutaneous air.  LUNG BASES: Bibasilar atelectasis.    IMPRESSION:     Stable colonic ileus with no interval change in degree of gaseous   distention. Persistent retention of Gastrografin from an enema performed   2 days prior. Gastrografin has refluxed to the level of the ascending   colon/cecum.    < end of copied text >    Advanced directives addressed: full resuscitation

## 2018-02-19 NOTE — PROGRESS NOTE ADULT - SUBJECTIVE AND OBJECTIVE BOX
HPI: 71 y/o male with a PMHx chronic CHF, COPD on home O2 4 L, emphysema, and spinal stenosis. He presents to ED s/p fall. Fall occurred ~ 10 days ago; patient has been very weak, and suffers from arthritis.   He notes chronic Rt hip pain for ~ 4 weeks which causes ambulatory dysfunction.  He was suppose to follow-up with outpatient ortho for MRI.    Reports associated poor appetite with decreased PO intake.   Denies nausea, vomiting, diarrhea, fevers , chills, CP or dypsnea.   No recent illnesses, no sick contacts.    ED course:  Xray imaging reportedly with Rt hip fracture  Venous doppler US 2/3 : no evidence of deep venous thrombosis in either leg.  Left Baker's cyst. (2018 01:53)    Patient is a 72y old  Male who presents with a chief complaint of Limb pain (2018 08:35)  pt s/p right hip brynn on 18.    Consulted by Dr. Rahul Elkins for VTE prophylaxis, risk stratification, and anticoagulation management.    PAST MEDICAL & SURGICAL HISTORY:  Spinal stenosis  CHF (congestive heart failure)  Emphysema  COPD (chronic obstructive pulmonary disease)  No significant past surgical history    Caprini VTE Risk Score:9    IMPROVE Bleeding Risk Score: 2.5    Falls Risk:   High (x  )  Mod (  )  Low (  )    EBL:  150ml    cr cl: 162.2    18 Pt sen at bedside.  Discussed his anticoagulation with Lovenox inj for 4 weeks.  Questions answered will reinforce as needed.  Pt concerned about no having a bm for a few days and  in his appetite for a while. Not sure if he lost wt.   18: Pt seen at bedside, OOB to chair. Reports that he continues to be constipated, was able to make "small bowel success," but remains to have distended/firm abdomen. Continues to have 4LNC, and states that he is feeling "oozy," and would like to go back in bed. RN is aware, but waiting for PT. Pt noted to have moderate size bruising in his RLQ abdomen. Advised RN to administer medication to his LLQ.   18 Pt seen at bedside in IC.  Pt dx with Kaylan Syndrome.  Had barium enema and received neostigmine iv.  Pt states he feels much better abd smaller and softer. discussed his anticoagulation with heparin sq mo concerns.  18: Pt seen at bedside in ICU. Reports of continuing to feel better, had successful large BM yesterday. He continues to be NPO. Pt is being downgraded to med-surg today.   2-10-18  pt seen on 2n feeling much better discussed his anticoagulation with Lovenox no concerns.  18   Pt seen at bedside in CCU S/P second dose of received neostigmine iv.  Pt states she has had a bm and feels better but not totally good yet. discussed his anticoagulation with lovenox  no concerns.   18 Pt seen at bedside on 2N oob in chair.  States he walked today and will walk again.  Discussed his anticoagulation no concerns.   18 Pt seen at bedside.  States he feels so much better after colonoscopy yesterday.  He is having clear liquids.  Discussed his anticoagulation with Lovenox no concerns.   2-15-18 pt seen at bedside on 2n oob in chair states his abdomen is much better.  will cont with Lovenox for anticoagulation  18 pt johnny t bedside oob in chair.  no concerns.  18: Pt seen at bedside, OOB to chair. He continues to be on 4LNC, mild labored breathing, but states he just walked with PT. Colorectal surgery states pt to be on solid diet, abdomen softer.   18: Pt seen at bedside, OOB to chair. He continues to be on 4LNC, on contact isolation for positive Cdiff and WBC continues to trend down. No complaints made. Pt upset that he can't go to rehab tomorrow d/t holiday. Planning for [facility].   18 Pt seen at bedside on 2n oob in chair.  Concerned  about swelling o right le.  Discussed his anticoagulation no concerns.     FAMILY HISTORY:  No pertinent family history in first degree relatives    Denies any personal or familial history of clotting or bleeding disorders.    Allergies    No Known Allergies    Intolerances    REVIEW OF SYSTEMS    (  )Fever	     (  )Constipation	(  )SOB				(  )Headache	(  )Dysuria  (  )Chills	     (  )Melena	(  )Dyspnea present on exertion	                    (  )Dizziness                    (  )Polyuria  (  )Nausea	     (  )Hematochezia	(  )Cough			                    (  )Syncope   	(  )Hematuria  (  )Vomiting    (  )Chest Pain	(  )Wheezing			(  )Weakness  (  )Diarrhea     (  )Palpitations	(  )Anorexia			(  )Myalgia    All other review of systems negative: Yes    PHYSICAL EXAM:    Constitutional: Appears Well    Neurological: A& O x 3    Skin: Warm    Respiratory and Thorax: normal effort; Breath sounds: normal; No rales/wheezing/rhonchi, 4LNC supplemental O2  	  Cardiovascular: S1, S2, regular, NMBR	    Gastrointestinal: BS + x 4Q, nontender, soft, mildly distended	    Genitourinary:  Bladder nondistended, nontender    Musculoskeletal:   General Right:   no muscle/joint tenderness,   normal tone, + swelling,   ROM: limited	    General Left:   no muscle/joint tenderness,   normal tone, no joint swelling,   ROM: full    Hip:  Right: Staples intact with no oozing, no dressing noted    Lower extrems:   Right: no calf tenderness              negative rolando's sign               + pedal pulses, swollen    Left:   no calf tenderness              negative rolando's sign               + pedal pulses, swollen pitting edema 2+                           10.8   17.4  )-----------( 506      ( 2018 07:55 )             33.6       -    139  |  99  |  14  ----------------------------<  138<H>  4.0   |  37<H>  |  0.61    Ca    8.3<L>      2018 07:55                                 10.5   17.3  )-----------( 465      ( 2018 06:38 )             32.6       -    139  |  98  |  13  ----------------------------<  127<H>  3.7   |  39<H>  |  0.53    Ca    7.9<L>      2018 06:38                    11.3   19.3  )-----------( 472      ( 2018 06:25 )             34.6       -    139  |  99  |  12  ----------------------------<  116<H>  3.7   |  35<H>  |  0.51    Ca    8.0<L>      2018 06:25                         11.5   19.9  )-----------( 435      ( 2018 06:34 )             35.0       02-16    137  |  99  |  11  ----------------------------<  123<H>  3.7   |  32<H>  |  0.49<L>    Ca    8.2<L>      2018 06:34                         11.3   19.0  )-----------( 490      ( 15 Feb 2018 08:05 )             34.0       02-14    137  |  100  |  20  ----------------------------<  150<H>  4.0   |  31  |  0.59    Ca    8.5      2018 06:46  Phos  2.3     02-14  Mg     2.2     02-14                          11.8   20.3  )-----------( 576      ( 2018 06:46 )             37.1       02-14    137  |  100  |  20  ----------------------------<  150<H>  4.0   |  31  |  0.59    Ca    8.5      2018 06:46  Phos  2.3     02-14  Mg     2.2     -14    TPro  5.8<L>  /  Alb  1.9<L>  /  TBili  0.4  /  DBili  x   /  AST  36  /  ALT  29  /  AlkPhos  68  02-13    MEDICATIONS  (STANDING):  amLODIPine   Tablet 5 milliGRAM(s) Oral daily  ascorbic acid 500 milliGRAM(s) Oral two times a day  atorvastatin 20 milliGRAM(s) Oral at bedtime  docusate sodium 100 milliGRAM(s) Oral three times a day  enoxaparin Injectable 40 milliGRAM(s) SubCutaneous every 24 hours  finasteride 5 milliGRAM(s) Oral daily  folic acid 1 milliGRAM(s) Oral daily  isosorbide   mononitrate ER Tablet (IMDUR) 60 milliGRAM(s) Oral daily  lidocaine   Patch 1 Patch Transdermal daily  lisinopril 20 milliGRAM(s) Oral daily  metoprolol succinate ER 25 milliGRAM(s) Oral daily  multivitamin 1 Tablet(s) Oral daily  pantoprazole    Tablet 40 milliGRAM(s) Oral daily  ranolazine 500 milliGRAM(s) Oral two times a day  tamsulosin 0.4 milliGRAM(s) Oral at bedtime  tiotropium 18 MICROgram(s) Capsule 1 Capsule(s) Inhalation daily    Vital Signs Last 24 Hrs  T(C): 36.3 (18 @ 11:12), Max: 37.2 (18 @ 17:29)  T(F): 97.4 (18 @ 11:12), Max: 98.9 (18 @ 17:29)  HR: 87 (18 @ 11:12) (87 - 97)  BP: 113/58 (18 @ 11:12) (104/40 - 116/52)  BP(mean): --  RR: 18 (18 @ 11:12) (16 - 18)  SpO2: 98% (18 @ 11:12) (97% - 99%)      DVT Prophylaxis:  LMWH                   (x )  Heparin SQ           (  )  Coumadin             (  )  Xarelto                  (  )  Eliquis                   (  )  Venodynes           ( x )  Ambulation          ( x )  UFH                       (  )  Contraindicated  (  )

## 2018-02-20 LAB
ANION GAP SERPL CALC-SCNC: 4 MMOL/L — LOW (ref 5–17)
BASOPHILS # BLD AUTO: 0.1 K/UL — SIGNIFICANT CHANGE UP (ref 0–0.2)
BASOPHILS NFR BLD AUTO: 0.7 % — SIGNIFICANT CHANGE UP (ref 0–2)
BUN SERPL-MCNC: 13 MG/DL — SIGNIFICANT CHANGE UP (ref 7–23)
CALCIUM SERPL-MCNC: 8.4 MG/DL — LOW (ref 8.5–10.1)
CHLORIDE SERPL-SCNC: 99 MMOL/L — SIGNIFICANT CHANGE UP (ref 96–108)
CO2 SERPL-SCNC: 37 MMOL/L — HIGH (ref 22–31)
CREAT SERPL-MCNC: 0.63 MG/DL — SIGNIFICANT CHANGE UP (ref 0.5–1.3)
EOSINOPHIL # BLD AUTO: 0.2 K/UL — SIGNIFICANT CHANGE UP (ref 0–0.5)
EOSINOPHIL NFR BLD AUTO: 0.9 % — SIGNIFICANT CHANGE UP (ref 0–6)
GLUCOSE SERPL-MCNC: 133 MG/DL — HIGH (ref 70–99)
HCT VFR BLD CALC: 34.9 % — LOW (ref 39–50)
HGB BLD-MCNC: 11.4 G/DL — LOW (ref 13–17)
LYMPHOCYTES # BLD AUTO: 1 K/UL — SIGNIFICANT CHANGE UP (ref 1–3.3)
LYMPHOCYTES # BLD AUTO: 6 % — LOW (ref 13–44)
MAGNESIUM SERPL-MCNC: 1.9 MG/DL — SIGNIFICANT CHANGE UP (ref 1.6–2.6)
MCHC RBC-ENTMCNC: 30.8 PG — SIGNIFICANT CHANGE UP (ref 27–34)
MCHC RBC-ENTMCNC: 32.7 GM/DL — SIGNIFICANT CHANGE UP (ref 32–36)
MCV RBC AUTO: 94.4 FL — SIGNIFICANT CHANGE UP (ref 80–100)
MONOCYTES # BLD AUTO: 0.9 K/UL — SIGNIFICANT CHANGE UP (ref 0–0.9)
MONOCYTES NFR BLD AUTO: 5.4 % — SIGNIFICANT CHANGE UP (ref 2–14)
NEUTROPHILS # BLD AUTO: 14.7 K/UL — HIGH (ref 1.8–7.4)
NEUTROPHILS NFR BLD AUTO: 86.9 % — HIGH (ref 43–77)
PHOSPHATE SERPL-MCNC: 2.7 MG/DL — SIGNIFICANT CHANGE UP (ref 2.5–4.5)
PLATELET # BLD AUTO: 546 K/UL — HIGH (ref 150–400)
POTASSIUM SERPL-MCNC: 3.7 MMOL/L — SIGNIFICANT CHANGE UP (ref 3.5–5.3)
POTASSIUM SERPL-SCNC: 3.7 MMOL/L — SIGNIFICANT CHANGE UP (ref 3.5–5.3)
RBC # BLD: 3.7 M/UL — LOW (ref 4.2–5.8)
RBC # FLD: 13.5 % — SIGNIFICANT CHANGE UP (ref 10.3–14.5)
SODIUM SERPL-SCNC: 140 MMOL/L — SIGNIFICANT CHANGE UP (ref 135–145)
WBC # BLD: 16.9 K/UL — HIGH (ref 3.8–10.5)
WBC # FLD AUTO: 16.9 K/UL — HIGH (ref 3.8–10.5)

## 2018-02-20 PROCEDURE — 99233 SBSQ HOSP IP/OBS HIGH 50: CPT

## 2018-02-20 RX ORDER — PANTOPRAZOLE SODIUM 20 MG/1
1 TABLET, DELAYED RELEASE ORAL
Qty: 0 | Refills: 0 | DISCHARGE
Start: 2018-02-20

## 2018-02-20 RX ORDER — ASCORBIC ACID 60 MG
1 TABLET,CHEWABLE ORAL
Qty: 0 | Refills: 0 | DISCHARGE
Start: 2018-02-20

## 2018-02-20 RX ORDER — DOCUSATE SODIUM 100 MG
1 CAPSULE ORAL
Qty: 0 | Refills: 0 | DISCHARGE
Start: 2018-02-20

## 2018-02-20 RX ORDER — FLUTICASONE PROPIONATE AND SALMETEROL 50; 250 UG/1; UG/1
0 POWDER ORAL; RESPIRATORY (INHALATION)
Qty: 0 | Refills: 0 | COMMUNITY

## 2018-02-20 RX ORDER — LIDOCAINE 4 G/100G
1 CREAM TOPICAL
Qty: 0 | Refills: 0 | COMMUNITY
Start: 2018-02-20

## 2018-02-20 RX ORDER — ASCORBIC ACID 60 MG
1 TABLET,CHEWABLE ORAL
Qty: 0 | Refills: 0 | COMMUNITY
Start: 2018-02-20

## 2018-02-20 RX ORDER — ENOXAPARIN SODIUM 100 MG/ML
40 INJECTION SUBCUTANEOUS
Qty: 0 | Refills: 0 | COMMUNITY
Start: 2018-02-20

## 2018-02-20 RX ORDER — SENNA PLUS 8.6 MG/1
2 TABLET ORAL
Qty: 0 | Refills: 0 | DISCHARGE
Start: 2018-02-20

## 2018-02-20 RX ORDER — ACETAMINOPHEN 500 MG
2 TABLET ORAL
Qty: 0 | Refills: 0 | DISCHARGE
Start: 2018-02-20

## 2018-02-20 RX ORDER — ENOXAPARIN SODIUM 100 MG/ML
40 INJECTION SUBCUTANEOUS
Qty: 0 | Refills: 0 | DISCHARGE
Start: 2018-02-20

## 2018-02-20 RX ADMIN — FINASTERIDE 5 MILLIGRAM(S): 5 TABLET, FILM COATED ORAL at 11:28

## 2018-02-20 RX ADMIN — ISOSORBIDE MONONITRATE 60 MILLIGRAM(S): 60 TABLET, EXTENDED RELEASE ORAL at 11:28

## 2018-02-20 RX ADMIN — ENOXAPARIN SODIUM 40 MILLIGRAM(S): 100 INJECTION SUBCUTANEOUS at 11:28

## 2018-02-20 RX ADMIN — AMLODIPINE BESYLATE 5 MILLIGRAM(S): 2.5 TABLET ORAL at 05:43

## 2018-02-20 RX ADMIN — TAMSULOSIN HYDROCHLORIDE 0.4 MILLIGRAM(S): 0.4 CAPSULE ORAL at 21:48

## 2018-02-20 RX ADMIN — ATORVASTATIN CALCIUM 20 MILLIGRAM(S): 80 TABLET, FILM COATED ORAL at 21:48

## 2018-02-20 RX ADMIN — Medication 1 TABLET(S): at 11:28

## 2018-02-20 RX ADMIN — LISINOPRIL 20 MILLIGRAM(S): 2.5 TABLET ORAL at 05:43

## 2018-02-20 RX ADMIN — Medication 1 MILLIGRAM(S): at 11:28

## 2018-02-20 RX ADMIN — Medication 500 MILLIGRAM(S): at 18:22

## 2018-02-20 RX ADMIN — Medication 25 MILLIGRAM(S): at 05:43

## 2018-02-20 RX ADMIN — RANOLAZINE 500 MILLIGRAM(S): 500 TABLET, FILM COATED, EXTENDED RELEASE ORAL at 05:43

## 2018-02-20 RX ADMIN — TIOTROPIUM BROMIDE 1 CAPSULE(S): 18 CAPSULE ORAL; RESPIRATORY (INHALATION) at 09:06

## 2018-02-20 RX ADMIN — PANTOPRAZOLE SODIUM 40 MILLIGRAM(S): 20 TABLET, DELAYED RELEASE ORAL at 11:28

## 2018-02-20 RX ADMIN — RANOLAZINE 500 MILLIGRAM(S): 500 TABLET, FILM COATED, EXTENDED RELEASE ORAL at 18:22

## 2018-02-20 RX ADMIN — Medication 500 MILLIGRAM(S): at 05:43

## 2018-02-20 NOTE — PROGRESS NOTE ADULT - SUBJECTIVE AND OBJECTIVE BOX
Patient seen and examined. Pain controlled.    Physical exam  VS: Afebrile, vital signs stable  Gen: NAD  Left LE: Dressing with serosanguinous drainge. +EHL/FHL/TA/GSC. SILT L3-S1. +Dorsalis pedis, capillary refill brisk. Compartments soft and nontender.      72M s/p left hip hemiarthroplasty POD# 15    WBAT with posterior hip precautions  Pain control  DVT prophylaxis  Abduction while supine/seated  PT  Ortho stable for discharge

## 2018-02-20 NOTE — PROGRESS NOTE ADULT - ASSESSMENT
This is a 72 year old male s/p fall on about Jan 23rd presented to [facility] on 2/3/18 with chronic pain to hip, decrease appetite. Pt found to have a fx rt hip, now s/p right hip brynn on 2-5-18.  Pt has high thrombosis risks and requires anticoagualtion prophylaxis. Discussed with pt the use of Lovenox for his anticoagulation med. Pt is agreeable. 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 No bowel obstruction. On 2-15-18 pt had gastrografin enema and neostigmine times 2, and colonoscopy with relief. His diet advanced to solid diet. not on iso any longer.    PLAN:  : c/w lovenox 40mg SQ daily for total four weeks post procedure (2/6/18 - 3/6/18)  : daily cbc/bmp  : increase mobility as tolerated    Will continue to follow.

## 2018-02-20 NOTE — PROGRESS NOTE ADULT - PROVIDER SPECIALTY LIST ADULT
Anticoag Management
Cardiology
Colorectal Surgery
Critical Care
Gastroenterology
Hospitalist
Infectious Disease
Orthopedics
Surgery
Anticoag Management
Gastroenterology
Hospitalist
Anticoag Management
Colorectal Surgery
Surgery
Hospitalist
Cardiology
Critical Care
Critical Care

## 2018-02-20 NOTE — PROGRESS NOTE ADULT - SUBJECTIVE AND OBJECTIVE BOX
HPI: 73 y/o male with a PMHx chronic CHF, COPD on home O2 4 L, emphysema, and spinal stenosis. He presents to ED s/p fall. Fall occurred ~ 10 days ago; patient has been very weak, and suffers from arthritis.   He notes chronic Rt hip pain for ~ 4 weeks which causes ambulatory dysfunction.  He was suppose to follow-up with outpatient ortho for MRI.    Reports associated poor appetite with decreased PO intake.   Denies nausea, vomiting, diarrhea, fevers , chills, CP or dypsnea.   No recent illnesses, no sick contacts.    ED course:  Xray imaging reportedly with Rt hip fracture  Venous doppler US 2/3 : no evidence of deep venous thrombosis in either leg.  Left Baker's cyst. (2018 01:53)    Patient is a 72y old  Male who presents with a chief complaint of Limb pain (2018 08:35)  pt s/p right hip brynn on 18.    Consulted by Dr. Rahul Elkins for VTE prophylaxis, risk stratification, and anticoagulation management.    PAST MEDICAL & SURGICAL HISTORY:  Spinal stenosis  CHF (congestive heart failure)  Emphysema  COPD (chronic obstructive pulmonary disease)  No significant past surgical history    Caprini VTE Risk Score:9    IMPROVE Bleeding Risk Score: 2.5    Falls Risk:   High (x  )  Mod (  )  Low (  )    EBL:  150ml    cr cl: 162.2    18 Pt sen at bedside.  Discussed his anticoagulation with Lovenox inj for 4 weeks.  Questions answered will reinforce as needed.  Pt concerned about no having a bm for a few days and  in his appetite for a while. Not sure if he lost wt.   18: Pt seen at bedside, OOB to chair. Reports that he continues to be constipated, was able to make "small bowel success," but remains to have distended/firm abdomen. Continues to have 4LNC, and states that he is feeling "oozy," and would like to go back in bed. RN is aware, but waiting for PT. Pt noted to have moderate size bruising in his RLQ abdomen. Advised RN to administer medication to his LLQ.   18 Pt seen at bedside in IC.  Pt dx with Kaylan Syndrome.  Had barium enema and received neostigmine iv.  Pt states he feels much better abd smaller and softer. discussed his anticoagulation with heparin sq mo concerns.  18: Pt seen at bedside in ICU. Reports of continuing to feel better, had successful large BM yesterday. He continues to be NPO. Pt is being downgraded to med-surg today.   2-10-18  pt seen on 2n feeling much better discussed his anticoagulation with Lovenox no concerns.  18   Pt seen at bedside in CCU S/P second dose of received neostigmine iv.  Pt states she has had a bm and feels better but not totally good yet. discussed his anticoagulation with lovenox  no concerns.   18 Pt seen at bedside on 2N oob in chair.  States he walked today and will walk again.  Discussed his anticoagulation no concerns.   18 Pt seen at bedside.  States he feels so much better after colonoscopy yesterday.  He is having clear liquids.  Discussed his anticoagulation with Lovenox no concerns.   2-15-18 pt seen at bedside on 2n oob in chair states his abdomen is much better.  will cont with Lovenox for anticoagulation  18 pt johnny t bedside oob in chair.  no concerns.  18: Pt seen at bedside, OOB to chair. He continues to be on 4LNC, mild labored breathing, but states he just walked with PT. Colorectal surgery states pt to be on solid diet, abdomen softer.   18: Pt seen at bedside, OOB to chair. He continues to be on 4LNC, on contact isolation for positive Cdiff and WBC continues to trend down. No complaints made. Pt upset that he can't go to rehab tomorrow d/t holiday. Planning for [facility].   18 Pt seen at bedside on 2n oob in chair.  Concerned  about swelling o right le.  Discussed his anticoagulation no concerns.   2-10-18 Pt seen at bedside on 2n.  No changes for discharge to rehab today at [facility].  FAMILY HISTORY:  No pertinent family history in first degree relatives    Denies any personal or familial history of clotting or bleeding disorders.    Allergies    No Known Allergies    Intolerances    REVIEW OF SYSTEMS    (  )Fever	     (  )Constipation	(  )SOB				(  )Headache	(  )Dysuria  (  )Chills	     (  )Melena	(  )Dyspnea present on exertion	                    (  )Dizziness                    (  )Polyuria  (  )Nausea	     (  )Hematochezia	(  )Cough			                    (  )Syncope   	(  )Hematuria  (  )Vomiting    (  )Chest Pain	(  )Wheezing			(  )Weakness  (  )Diarrhea     (  )Palpitations	(  )Anorexia			(  )Myalgia    All other review of systems negative: Yes    PHYSICAL EXAM:    Constitutional: Appears Well    Neurological: A& O x 3    Skin: Warm    Respiratory and Thorax: normal effort; Breath sounds: normal; No rales/wheezing/rhonchi, 4LNC supplemental O2  	  Cardiovascular: S1, S2, regular, NMBR	    Gastrointestinal: BS + x 4Q, nontender, soft, mildly distended	    Genitourinary:  Bladder nondistended, nontender    Musculoskeletal:   General Right:   no muscle/joint tenderness,   normal tone, + swelling,   ROM: limited	    General Left:   no muscle/joint tenderness,   normal tone, no joint swelling,   ROM: full    Hip:  Right: Staples intact with no oozing, no dressing noted    Lower extrems:   Right: no calf tenderness              negative rolando's sign               + pedal pulses, swollen    Left:   no calf tenderness              negative rolando's sign               + pedal pulses, swollen pitting edema 2+                        11.4   16.9  )-----------( 546      ( 2018 05:54 )             34.9       02-20    140  |  99  |  13  ----------------------------<  133<H>  3.7   |  37<H>  |  0.63    Ca    8.4<L>      2018 05:54  Phos  2.7     02-20  Mg     1.9     02-20                                 10.8   17.4  )-----------( 506      ( 2018 07:55 )             33.6       02-    139  |  99  |  14  ----------------------------<  138<H>  4.0   |  37<H>  |  0.61    Ca    8.3<L>      2018 07:55                                 10.5   17.3  )-----------( 465      ( 2018 06:38 )             32.6       02-18    139  |  98  |  13  ----------------------------<  127<H>  3.7   |  39<H>  |  0.53    Ca    7.9<L>      2018 06:38                    11.3   19.3  )-----------( 472      ( 2018 06:25 )             34.6       -17    139  |  99  |  12  ----------------------------<  116<H>  3.7   |  35<H>  |  0.51    Ca    8.0<L>      2018 06:25                         11.5   19.9  )-----------( 435      ( 2018 06:34 )             35.0       -    137  |  99  |  11  ----------------------------<  123<H>  3.7   |  32<H>  |  0.49<L>    Ca    8.2<L>      2018 06:34                         11.3   19.0  )-----------( 490      ( 15 Feb 2018 08:05 )             34.0       02-14    137  |  100  |  20  ----------------------------<  150<H>  4.0   |  31  |  0.59    Ca    8.5      2018 06:46  Phos  2.3     02-14  Mg     2.2     02-14                          11.8   20.3  )-----------( 576      ( 2018 06:46 )             37.1       02-14    137  |  100  |  20  ----------------------------<  150<H>  4.0   |  31  |  0.59    Ca    8.5      2018 06:46  Phos  2.3     02-14  Mg     2.2     02-14    TPro  5.8<L>  /  Alb  1.9<L>  /  TBili  0.4  /  DBili  x   /  AST  36  /  ALT  29  /  AlkPhos  68  02-13    MEDICATIONS  (STANDING):  amLODIPine   Tablet 5 milliGRAM(s) Oral daily  ascorbic acid 500 milliGRAM(s) Oral two times a day  atorvastatin 20 milliGRAM(s) Oral at bedtime  docusate sodium 100 milliGRAM(s) Oral three times a day  enoxaparin Injectable 40 milliGRAM(s) SubCutaneous every 24 hours  finasteride 5 milliGRAM(s) Oral daily  folic acid 1 milliGRAM(s) Oral daily  isosorbide   mononitrate ER Tablet (IMDUR) 60 milliGRAM(s) Oral daily  lidocaine   Patch 1 Patch Transdermal daily  lisinopril 20 milliGRAM(s) Oral daily  metoprolol succinate ER 25 milliGRAM(s) Oral daily  multivitamin 1 Tablet(s) Oral daily  pantoprazole    Tablet 40 milliGRAM(s) Oral daily  ranolazine 500 milliGRAM(s) Oral two times a day  tamsulosin 0.4 milliGRAM(s) Oral at bedtime  tiotropium 18 MICROgram(s) Capsule 1 Capsule(s) Inhalation daily      Vital Signs Last 24 Hrs  T(C): 36.3 (18 @ 11:12), Max: 37.2 (18 @ 17:29)  T(F): 97.4 (18 @ 11:12), Max: 98.9 (18 @ 17:29)  HR: 87 (18 @ 11:12) (87 - 97)  BP: 113/58 (18 @ 11:12) (104/40 - 116/52)  BP(mean): --  RR: 18 (18 @ 11:12) (16 - 18)  SpO2: 98% (18 @ 11:12) (97% - 99%)      DVT Prophylaxis:  LMWH                   (x )  Heparin SQ           (  )  Coumadin             (  )  Xarelto                  (  )  Eliquis                   (  )  Venodynes           ( x )  Ambulation          ( x )  UFH                       (  )  Contraindicated  (  )

## 2018-02-20 NOTE — PROGRESS NOTE ADULT - SUBJECTIVE AND OBJECTIVE BOX
CC- s/p RT hip fracture    HPI:  73 y/o male with a PMHx chronic diastolic CHF, COPD on home O2 4 L, HTN, HLD, CAD s/p stent to LCx, spinal stenosis, obesity, lindy, PAD who presented to the ER 10 days after a fall with right hip pain. He had hip pain for about 4 weeks pta. He fell 10 days pta and came to the ER for difficulty ambulating/right hip pain and generalized weakness. He was admitted with right hip fracture. He was seen by cardiology and subsequently underwent right hip hemiarthroplasty. Post op course complicated by ileus. He was unable to tolerate NGT. He was seen by GI, underwent gastrograffin enema and also was administered neostigmine. He was monitored in icu. He was eventually transferred to .   Hospitalist service asked to resume care. Underwent decompressive colonscopy and rectal tube placement. DIet eventually advanced.     2/20: pt seen and examined this am. Feeling well. Wants to go to rehab asap so he can get home. No diarrhea/abd pain/cough/sob. No n/v. No f/c/r.   Still with LE edema, but doesn't want to start lasix until he gets to rehab.     ROS: all 10 systems reviewed and is as above otherwise negative.    Vital Signs Last 24 Hrs  T(C): 36.8 (20 Feb 2018 05:08), Max: 36.8 (20 Feb 2018 05:08)  T(F): 98.2 (20 Feb 2018 05:08), Max: 98.2 (20 Feb 2018 05:08)  HR: 101 (20 Feb 2018 05:08) (91 - 101)  BP: 141/58 (20 Feb 2018 05:08) (121/69 - 141/58)  RR: 16 (20 Feb 2018 05:08) (16 - 16)  SpO2: 95% (20 Feb 2018 05:08) (95% - 98%)    PHYSICAL EXAM:  GENERAL: Obese, elderly male, sitting in chair,  Comfortable, no acute distress  HEAD:  Atraumatic, Normocephalic  EYES: EOMI, PERRLA  HEENT: Moist mucous membranes  NECK: Supple, No JVD  NERVOUS SYSTEM:  Alert & Oriented X3, non focal  CHEST/LUNG: Clear to auscultation bilaterally  HEART: s1, s2+, Regular rate and rhythm  ABDOMEN: Soft, distended, non tender, +BS  GENITOURINARY- Voiding, no palpable bladder  EXTREMITIES:  No clubbing, cyanosis. +B/l LE edema with large blister formation over dorsum of right foot.  MUSCULOSKELETAL- right hip dressing dry  SKIN-no rash  LABS:                        11.4   16.9  )-----------( 546      ( 20 Feb 2018 05:54 )             34.9     02-20    140  |  99  |  13  ----------------------------<  133<H>  3.7   |  37<H>  |  0.63    Ca    8.4<L>      20 Feb 2018 05:54  Phos  2.7     02-20  Mg     1.9     02-20      Radiology:  EXAM:  XR ABDOMEN 2V                        PROCEDURE DATE:  02/13/2018    INTERPRETATION:  Abdomen radiographs      CPT 11290  CLINICAL INFORMATION:       Abdominal distention, follow-up.  TECHNIQUE:  Supine views of the abdomen were obtained.    FINDINGS:   Prior study dated 2/7/2018 was available for review.    Degenerative changes are noted in the lumbar spine. There is partial   visualization of a total right hip replacement. Moderate colonic   distention with air is again noted without significant change from prior   examination. There are several mildly dilated air-filled loops of small   bowel noted in the left upper and left lower quadrants. This represents   an interval change from prior examination. No abnormal soft tissue     IMPRESSION:  Findings suggestive of colonic ileus without significant   change from prior study dated 2/7/2018. Several mildly dilated air-filled   loops of small bowel are present in the left abdomen, as well which  represents an interval change from prior study dated 2/7/2018. Follow-up   examination is as clinically indicated.    MEDICATIONS  (STANDING):  amLODIPine   Tablet 5 milliGRAM(s) Oral daily  ascorbic acid 500 milliGRAM(s) Oral two times a day  atorvastatin 20 milliGRAM(s) Oral at bedtime  docusate sodium 100 milliGRAM(s) Oral three times a day  enoxaparin Injectable 40 milliGRAM(s) SubCutaneous every 24 hours  finasteride 5 milliGRAM(s) Oral daily  folic acid 1 milliGRAM(s) Oral daily  isosorbide   mononitrate ER Tablet (IMDUR) 60 milliGRAM(s) Oral daily  lidocaine   Patch 1 Patch Transdermal daily  lisinopril 20 milliGRAM(s) Oral daily  metoprolol succinate ER 25 milliGRAM(s) Oral daily  multivitamin 1 Tablet(s) Oral daily  pantoprazole    Tablet 40 milliGRAM(s) Oral daily  ranolazine 500 milliGRAM(s) Oral two times a day  tamsulosin 0.4 milliGRAM(s) Oral at bedtime  tiotropium 18 MICROgram(s) Capsule 1 Capsule(s) Inhalation daily    MEDICATIONS  (PRN):  acetaminophen   Tablet 650 milliGRAM(s) Oral every 6 hours PRN For Temp over 38.3 C (100.94 F)  diphenhydrAMINE   Capsule 25 milliGRAM(s) Oral at bedtime PRN Insomnia  ondansetron Injectable 4 milliGRAM(s) IV Push every 6 hours PRN Nausea and/or Vomiting  prochlorperazine   Injectable 5 milliGRAM(s) IV Push every 6 hours PRN Nausea and/or Vomiting  senna 2 Tablet(s) Oral at bedtime PRN Constipation      ASSESSMENT AND PLAN:  72M, pmh as above a/w:    1. Right hip fracture s/p Right hip hemiarthroplasty, POD #15:  Ortho f/u appreciated  PT as tolerated  AC w/ Lovenox        2. Leukocytosis:  -no clinical s/s of infn  -slowly improving  -to follow as outpt, if persistently elevated and no infn, may need heme eval.     3. Post op ileus- resolved  -S/p decompressive colonoscopy and rectal tube placement on 2/13/18  -tolerating regular consistency diet with bms.    4. Chr diastolic CHF   -lasix at rehab X 3 days, then re-evaluate    5.HTN  -stable    6.h/o CAD/Stent:  -on ranexa, imdur, bb, statin    7. BPH:  -on proscar, flomax    8. COPD with chronic respiratory failure:  -stable.  -spiriva  -prn nebs    9. DVT Px:  -LMWH    10. dispo:  dc planning to rehab today.

## 2018-02-21 VITALS — HEART RATE: 87 BPM | DIASTOLIC BLOOD PRESSURE: 43 MMHG | SYSTOLIC BLOOD PRESSURE: 101 MMHG

## 2018-02-21 RX ORDER — FUROSEMIDE 40 MG
40 TABLET ORAL ONCE
Qty: 0 | Refills: 0 | Status: COMPLETED | OUTPATIENT
Start: 2018-02-21 | End: 2018-02-21

## 2018-02-21 RX ORDER — POTASSIUM CHLORIDE 20 MEQ
10 PACKET (EA) ORAL ONCE
Qty: 0 | Refills: 0 | Status: COMPLETED | OUTPATIENT
Start: 2018-02-21 | End: 2018-02-21

## 2018-02-21 RX ADMIN — TIOTROPIUM BROMIDE 1 CAPSULE(S): 18 CAPSULE ORAL; RESPIRATORY (INHALATION) at 07:54

## 2018-02-21 RX ADMIN — Medication 1 MILLIGRAM(S): at 11:07

## 2018-02-21 RX ADMIN — ENOXAPARIN SODIUM 40 MILLIGRAM(S): 100 INJECTION SUBCUTANEOUS at 09:52

## 2018-02-21 RX ADMIN — PANTOPRAZOLE SODIUM 40 MILLIGRAM(S): 20 TABLET, DELAYED RELEASE ORAL at 11:06

## 2018-02-21 RX ADMIN — Medication 10 MILLIEQUIVALENT(S): at 09:52

## 2018-02-21 RX ADMIN — FINASTERIDE 5 MILLIGRAM(S): 5 TABLET, FILM COATED ORAL at 11:07

## 2018-02-21 RX ADMIN — Medication 1 TABLET(S): at 11:07

## 2018-02-21 RX ADMIN — RANOLAZINE 500 MILLIGRAM(S): 500 TABLET, FILM COATED, EXTENDED RELEASE ORAL at 05:00

## 2018-02-21 RX ADMIN — Medication 25 MILLIGRAM(S): at 05:00

## 2018-02-26 ENCOUNTER — INPATIENT (INPATIENT)
Facility: HOSPITAL | Age: 73
LOS: 7 days | Discharge: HOME | End: 2018-03-06
Attending: HOSPITALIST | Admitting: HOSPITALIST
Payer: MEDICARE

## 2018-02-26 VITALS
HEART RATE: 91 BPM | WEIGHT: 250 LBS | RESPIRATION RATE: 16 BRPM | OXYGEN SATURATION: 100 % | TEMPERATURE: 98 F | DIASTOLIC BLOOD PRESSURE: 65 MMHG | SYSTOLIC BLOOD PRESSURE: 126 MMHG

## 2018-02-26 LAB
ANION GAP SERPL CALC-SCNC: 3 MMOL/L — LOW (ref 5–17)
APTT BLD: 30 SEC — SIGNIFICANT CHANGE UP (ref 27.5–37.4)
BASOPHILS # BLD AUTO: 0.2 K/UL — SIGNIFICANT CHANGE UP (ref 0–0.2)
BASOPHILS NFR BLD AUTO: 1.8 % — SIGNIFICANT CHANGE UP (ref 0–2)
BLD GP AB SCN SERPL QL: SIGNIFICANT CHANGE UP
BUN SERPL-MCNC: 11 MG/DL — SIGNIFICANT CHANGE UP (ref 7–23)
CALCIUM SERPL-MCNC: 8.4 MG/DL — LOW (ref 8.5–10.1)
CHLORIDE SERPL-SCNC: 104 MMOL/L — SIGNIFICANT CHANGE UP (ref 96–108)
CO2 SERPL-SCNC: 34 MMOL/L — HIGH (ref 22–31)
CREAT SERPL-MCNC: 0.6 MG/DL — SIGNIFICANT CHANGE UP (ref 0.5–1.3)
EOSINOPHIL # BLD AUTO: 0.3 K/UL — SIGNIFICANT CHANGE UP (ref 0–0.5)
EOSINOPHIL NFR BLD AUTO: 2.8 % — SIGNIFICANT CHANGE UP (ref 0–6)
GLUCOSE SERPL-MCNC: 124 MG/DL — HIGH (ref 70–99)
HCT VFR BLD CALC: 32.2 % — LOW (ref 39–50)
HGB BLD-MCNC: 10.5 G/DL — LOW (ref 13–17)
INR BLD: 1.01 RATIO — SIGNIFICANT CHANGE UP (ref 0.88–1.16)
LACTATE SERPL-SCNC: 0.9 MMOL/L — SIGNIFICANT CHANGE UP (ref 0.7–2)
LYMPHOCYTES # BLD AUTO: 0.8 K/UL — LOW (ref 1–3.3)
LYMPHOCYTES # BLD AUTO: 6.3 % — LOW (ref 13–44)
MCHC RBC-ENTMCNC: 30.7 PG — SIGNIFICANT CHANGE UP (ref 27–34)
MCHC RBC-ENTMCNC: 32.7 GM/DL — SIGNIFICANT CHANGE UP (ref 32–36)
MCV RBC AUTO: 93.8 FL — SIGNIFICANT CHANGE UP (ref 80–100)
MONOCYTES # BLD AUTO: 0.8 K/UL — SIGNIFICANT CHANGE UP (ref 0–0.9)
MONOCYTES NFR BLD AUTO: 6.6 % — SIGNIFICANT CHANGE UP (ref 2–14)
NEUTROPHILS # BLD AUTO: 9.9 K/UL — HIGH (ref 1.8–7.4)
NEUTROPHILS NFR BLD AUTO: 82.4 % — HIGH (ref 43–77)
PLATELET # BLD AUTO: 334 K/UL — SIGNIFICANT CHANGE UP (ref 150–400)
POTASSIUM SERPL-MCNC: 4.2 MMOL/L — SIGNIFICANT CHANGE UP (ref 3.5–5.3)
POTASSIUM SERPL-SCNC: 4.2 MMOL/L — SIGNIFICANT CHANGE UP (ref 3.5–5.3)
PROTHROM AB SERPL-ACNC: 10.9 SEC — SIGNIFICANT CHANGE UP (ref 9.8–12.7)
RBC # BLD: 3.44 M/UL — LOW (ref 4.2–5.8)
RBC # FLD: 14.1 % — SIGNIFICANT CHANGE UP (ref 10.3–14.5)
SODIUM SERPL-SCNC: 141 MMOL/L — SIGNIFICANT CHANGE UP (ref 135–145)
TROPONIN I SERPL-MCNC: <0.015 NG/ML — SIGNIFICANT CHANGE UP (ref 0.01–0.04)
TYPE + AB SCN PNL BLD: SIGNIFICANT CHANGE UP
WBC # BLD: 12 K/UL — HIGH (ref 3.8–10.5)
WBC # FLD AUTO: 12 K/UL — HIGH (ref 3.8–10.5)

## 2018-02-26 PROCEDURE — 71045 X-RAY EXAM CHEST 1 VIEW: CPT | Mod: 26

## 2018-02-26 PROCEDURE — 93010 ELECTROCARDIOGRAM REPORT: CPT

## 2018-02-26 PROCEDURE — 99285 EMERGENCY DEPT VISIT HI MDM: CPT

## 2018-02-26 PROCEDURE — 73502 X-RAY EXAM HIP UNI 2-3 VIEWS: CPT | Mod: 26

## 2018-02-26 RX ORDER — RANOLAZINE 500 MG/1
500 TABLET, FILM COATED, EXTENDED RELEASE ORAL
Qty: 0 | Refills: 0 | Status: DISCONTINUED | OUTPATIENT
Start: 2018-02-26 | End: 2018-02-28

## 2018-02-26 RX ORDER — POTASSIUM CHLORIDE 20 MEQ
10 PACKET (EA) ORAL
Qty: 0 | Refills: 0 | COMMUNITY

## 2018-02-26 RX ORDER — LANOLIN ALCOHOL/MO/W.PET/CERES
3 CREAM (GRAM) TOPICAL AT BEDTIME
Qty: 0 | Refills: 0 | Status: DISCONTINUED | OUTPATIENT
Start: 2018-02-26 | End: 2018-02-28

## 2018-02-26 RX ORDER — BUDESONIDE AND FORMOTEROL FUMARATE DIHYDRATE 160; 4.5 UG/1; UG/1
2 AEROSOL RESPIRATORY (INHALATION)
Qty: 0 | Refills: 0 | Status: DISCONTINUED | OUTPATIENT
Start: 2018-02-26 | End: 2018-02-28

## 2018-02-26 RX ORDER — ACETAMINOPHEN 500 MG
650 TABLET ORAL EVERY 6 HOURS
Qty: 0 | Refills: 0 | Status: DISCONTINUED | OUTPATIENT
Start: 2018-02-26 | End: 2018-02-28

## 2018-02-26 RX ORDER — FINASTERIDE 5 MG/1
5 TABLET, FILM COATED ORAL DAILY
Qty: 0 | Refills: 0 | Status: DISCONTINUED | OUTPATIENT
Start: 2018-02-26 | End: 2018-02-28

## 2018-02-26 RX ORDER — AMLODIPINE BESYLATE 2.5 MG/1
5 TABLET ORAL DAILY
Qty: 0 | Refills: 0 | Status: DISCONTINUED | OUTPATIENT
Start: 2018-02-26 | End: 2018-02-28

## 2018-02-26 RX ORDER — ISOSORBIDE MONONITRATE 60 MG/1
60 TABLET, EXTENDED RELEASE ORAL DAILY
Qty: 0 | Refills: 0 | Status: DISCONTINUED | OUTPATIENT
Start: 2018-02-26 | End: 2018-02-28

## 2018-02-26 RX ORDER — ENOXAPARIN SODIUM 100 MG/ML
40 INJECTION SUBCUTANEOUS DAILY
Qty: 0 | Refills: 0 | Status: DISCONTINUED | OUTPATIENT
Start: 2018-02-26 | End: 2018-02-26

## 2018-02-26 RX ORDER — ATORVASTATIN CALCIUM 80 MG/1
20 TABLET, FILM COATED ORAL AT BEDTIME
Qty: 0 | Refills: 0 | Status: DISCONTINUED | OUTPATIENT
Start: 2018-02-26 | End: 2018-02-28

## 2018-02-26 RX ORDER — ALPRAZOLAM 0.25 MG
0.5 TABLET ORAL ONCE
Qty: 0 | Refills: 0 | Status: DISCONTINUED | OUTPATIENT
Start: 2018-02-26 | End: 2018-02-27

## 2018-02-26 RX ORDER — TIOTROPIUM BROMIDE 18 UG/1
1 CAPSULE ORAL; RESPIRATORY (INHALATION) DAILY
Qty: 0 | Refills: 0 | Status: DISCONTINUED | OUTPATIENT
Start: 2018-02-26 | End: 2018-02-28

## 2018-02-26 RX ORDER — HEPARIN SODIUM 5000 [USP'U]/ML
5000 INJECTION INTRAVENOUS; SUBCUTANEOUS EVERY 8 HOURS
Qty: 0 | Refills: 0 | Status: COMPLETED | OUTPATIENT
Start: 2018-02-26 | End: 2018-02-27

## 2018-02-26 RX ORDER — FUROSEMIDE 40 MG
20 TABLET ORAL DAILY
Qty: 0 | Refills: 0 | Status: DISCONTINUED | OUTPATIENT
Start: 2018-02-26 | End: 2018-02-28

## 2018-02-26 RX ORDER — TAMSULOSIN HYDROCHLORIDE 0.4 MG/1
0.4 CAPSULE ORAL AT BEDTIME
Qty: 0 | Refills: 0 | Status: DISCONTINUED | OUTPATIENT
Start: 2018-02-26 | End: 2018-02-28

## 2018-02-26 RX ORDER — LISINOPRIL 2.5 MG/1
20 TABLET ORAL DAILY
Qty: 0 | Refills: 0 | Status: DISCONTINUED | OUTPATIENT
Start: 2018-02-26 | End: 2018-02-28

## 2018-02-26 RX ORDER — SODIUM CHLORIDE 9 MG/ML
3 INJECTION INTRAMUSCULAR; INTRAVENOUS; SUBCUTANEOUS EVERY 8 HOURS
Qty: 0 | Refills: 0 | Status: DISCONTINUED | OUTPATIENT
Start: 2018-02-26 | End: 2018-02-28

## 2018-02-26 RX ORDER — PANTOPRAZOLE SODIUM 20 MG/1
40 TABLET, DELAYED RELEASE ORAL DAILY
Qty: 0 | Refills: 0 | Status: DISCONTINUED | OUTPATIENT
Start: 2018-02-26 | End: 2018-02-28

## 2018-02-26 RX ORDER — ONDANSETRON 8 MG/1
4 TABLET, FILM COATED ORAL EVERY 6 HOURS
Qty: 0 | Refills: 0 | Status: DISCONTINUED | OUTPATIENT
Start: 2018-02-26 | End: 2018-02-28

## 2018-02-26 RX ORDER — METOPROLOL TARTRATE 50 MG
25 TABLET ORAL DAILY
Qty: 0 | Refills: 0 | Status: DISCONTINUED | OUTPATIENT
Start: 2018-02-26 | End: 2018-02-28

## 2018-02-26 RX ADMIN — RANOLAZINE 500 MILLIGRAM(S): 500 TABLET, FILM COATED, EXTENDED RELEASE ORAL at 23:18

## 2018-02-26 RX ADMIN — Medication 3 MILLIGRAM(S): at 23:47

## 2018-02-26 RX ADMIN — ATORVASTATIN CALCIUM 20 MILLIGRAM(S): 80 TABLET, FILM COATED ORAL at 23:20

## 2018-02-26 RX ADMIN — HEPARIN SODIUM 5000 UNIT(S): 5000 INJECTION INTRAVENOUS; SUBCUTANEOUS at 23:18

## 2018-02-26 RX ADMIN — SODIUM CHLORIDE 3 MILLILITER(S): 9 INJECTION INTRAMUSCULAR; INTRAVENOUS; SUBCUTANEOUS at 23:57

## 2018-02-26 NOTE — H&P ADULT - HISTORY OF PRESENT ILLNESS
72 year old male with past medical history of Diastolic Heart Failure, Oxygen-Dependant COPD on 4L Home Oxygen, HTN, HLD, CAD s/p stent in LCx, Spinal Stenosis, Obesity, KENNY, and PAD sent in from [facility] for continued drainage from surgical site. Patient was recently admitted for fall now s/p right Hip Hemiarthroplasty on 02/05/18, discharged to [facility] on Lovenox for DVT Prophyalaxis for 4 weeks. Previous hospital course notable for Post-operative Ileus and persistent drainage from surgical site. Drainage was noticed by Nursing staff at the facility who contacted the orthopedist and was referred to [facility] for further evaluation. No Fever, chills, or night sweats. 72 year old male with past medical history of Diastolic Heart Failure, Oxygen-Dependant COPD on 4L Home Oxygen, HTN, HLD, CAD s/p stent in LCx, Spinal Stenosis, Obesity, KENNY, and PAD sent in from [facility] for continued drainage from surgical site. Patient was recently admitted for fall now s/p right Hip Hemiarthroplasty on 02/05/18, discharged to [facility] on Lovenox for DVT Prophyalaxis for 4 weeks. Previous hospital course notable for Post-operative Ileus, NSVT while off BB, and persistent drainage from surgical site. Drainage was noticed by Nursing staff at the facility who contacted the orthopedist and was referred to [facility] for further evaluation. No Fever, chills, or night sweats.

## 2018-02-26 NOTE — ED ADULT NURSE REASSESSMENT NOTE - NS ED NURSE REASSESS COMMENT FT1
Report taken at the change of shift at bedside. Pt awake alert and oriented x4 resting comfortably in bed with no acute distress noted. Vitals stable. Plan of care updated to pt and family at bedside. Denies cp,sob,ha,dz,n/v/d/fever/chills or urinary sx. Will cont to monitor for safety and comfort.

## 2018-02-26 NOTE — H&P ADULT - ASSESSMENT
72 year old male with past medical history of Diastolic Heart Failure, Oxygen-Dependant COPD on 4L Home Oxygen, HTN, HLD, CAD s/p stent in LCx, Spinal Stenosis, Obesity, KENNY, and PAD sent in from [facility] for continued drainage from surgical site.    # Right Hip pain 2/2 R hip fracture now s/p right hip hemiarthroplasty with persistent wound drainage  - Orthopedic Consult Appreciated - Tentatively scheduled for right hip irrigation and debridement with possible exchange of right hip prosthesis head on 2/28/18  - Hold off Antibiotics until OR cultures obtained  - Continue Lovenox for DVT prophylaxis until 03/06/18 as per previous AC team note  - RCRI = 6.6%  - Moderate-High Risk patient for Intermediate-risk surgery  - Cardiology consult for cardiac clearance    #CAD s/p stent in LCx  - Stable  - Continue Toprol, Ranexa, Lipitor    #Diastolic Heart Failure  - Stable  - 2dECHO  - Continue Toprol and Lasix    #COPD on 4L Home oxygen  - No Evidence of acute exacerbation  - Nebs prn  - Continue Spiriva and Adavir    #HTN  - BP Stable  - Continue Norvasc, Lisinopril, Toprol, and Lasix    #HLD  - Continue Statin    #BPH  - Stable  - Continue Flomax and Proscar 72 year old male with past medical history of Diastolic Heart Failure, Oxygen-Dependant COPD on 4L Home Oxygen, HTN, HLD, CAD s/p stent in LCx, Spinal Stenosis, Obesity, KENNY, and PAD sent in from [facility] for continued drainage from surgical site.    # Right Hip pain 2/2 R hip fracture now s/p right hip hemiarthroplasty with persistent wound drainage  - Orthopedic Consult Appreciated - Tentatively scheduled for right hip irrigation and debridement with possible exchange of right hip prosthesis head on 2/28/18  - As per Ortho, hold off Antibiotics until OR cultures obtained  - Start Subq Hep for DVT prophylaxis until 03/06/18 as per previous AC team note  - RCRI = 6.6%  - High Risk patient for Intermediate-risk surgery  - At high risk for pulmonary complications due to underlying severe COPD  - Cardiology consult for cardiac clearance    #CAD s/p stent in LCx  - Stable  - Continue Toprol, Ranexa, Lipitor    #Diastolic Heart Failure  - Stable  - 2dECHO  - Continue Toprol and Lasix    #COPD on 4L Home oxygen  - No Evidence of acute exacerbation  - Nebs prn  - Continue Spiriva and Adavir    #HTN  - BP Stable  - Continue Norvasc, Lisinopril, Toprol, and Lasix    #HLD  - Continue Statin    #BPH  - Stable  - Continue Flomax and Proscar    DVT Prophylaxis: SubQ Hep

## 2018-02-26 NOTE — CONSULT NOTE ADULT - SUBJECTIVE AND OBJECTIVE BOX
Orthopedics Consult Note:    This is a 72y Male with multiple PMHx who presents to the ED today sent from [facility] with persistent right hip drainage from surgical site s/p right hip hemiarthroplasty POD #21. Pt seen at bedside in the ED reporting he has been doing well in Rehab, no significant pain, improved ambulation, however reports continuous drainage from surgical site. Pt with post-op course complicated by Eldred Syndrome/Paralytic Ileus and and persistent serous drainage from surgical site post-op with some central wound dehiscence on staple removal; wound was subsequently restapled in dehisced area. Prior to discharge a negative pressure ANTONY dressing. Pt reports the drainage was contained with no drainage from surgical site but vac collecting fluid still. Pt reports the ANTONY dressing was removed at [facility] and dry dressing placed with continuous saturation. Pt denies any fever or chills. Pt denies any gross pus. Pt denies any numbness or tingling. Pt is on Lovenox for DVT/PE ppx at [facility] facility.      Past Medical & Surgical History:  No pertinent family history in first degree relatives  MEWS Score  Spinal stenosis  CHF (congestive heart failure)  Emphysema  COPD (chronic obstructive pulmonary disease)  Spinal stenosis  CHF (congestive heart failure)  Emphysema  COPD (chronic obstructive pulmonary disease)  No significant past surgical history  RIGHT LEG DRAINAGE  5    Allergies:  No Known Allergies    Vital Signs:  T(C): 36.9 (02-26-18 @ 16:24), Max: 36.9 (02-26-18 @ 16:24)  HR: 91 (02-26-18 @ 16:24) (91 - 91)  BP: 126/65 (02-26-18 @ 16:24) (126/65 - 126/65)  RR: 16 (02-26-18 @ 16:24) (16 - 16)  SpO2: 100% (02-26-18 @ 16:24) (100% - 100%)    Labs:  Pending    X-rays of the pelvis and right hip pending.    PE right hip:  +BLE with diffuse pitting edema thoughout B/L LEs, ABD dressing over right hip with large serosanguinous saturation; removed. Incision site is clean with staples in place, no erythema, normothermic, surrounding tissue is boggy and edematous and moderate serosanquinous drainage from central wound when area expressed. No TTP. Unable to SLR RLE 2' weakness. Good passive hip ROM with no pain. No pain with axial loading or log roll. Moving all toes, sensation intact. DP and PT pulses palpable but weak 2' edema.       Assessment:  72y Male s/p right hip hemiarthroplasty POD #21 with persistent wound drainage.     Plan:  -Pt advised that surgical intervention with right hip irrigation and debridement with possible exchange of right hip prosthesis head and/or liner is indicated. Surgical procedure discussed in detail with patient including all risks, benefits and alternatives; pt expresses understanding and consents for surgery.   Admit to medical service for medical optimization and clearance.  -Will plan for OR Wednesday 2/28/18.  -HOLD ANTIBIOTICS PENDING OR CULTURES.  -f/u labs/imaging.  -f/u medical clearance  -PT for WBAT RLE with posterior hip precautions.  -Pain control.  -Dressing changed; new DSD placed with 4x4 and ABDs.  -NPO and hold chemical anticoagulation after midnight tomorrow for OR planned Wednesday 2/28/18.       Case discussed with Dr. Tang who agrees with plan.

## 2018-02-26 NOTE — H&P ADULT - NSHPPHYSICALEXAM_GEN_ALL_CORE
T(C): 36.7 (02-26-18 @ 21:16), Max: 37.3 (02-26-18 @ 19:43)  HR: 100 (02-26-18 @ 21:16) (85 - 100)  BP: 132/62 (02-26-18 @ 21:16) (126/65 - 132/62)  RR: 17 (02-26-18 @ 21:16) (16 - 17)  SpO2: 98% (02-26-18 @ 21:16) (98% - 100%)  Wt(kg): --    PHYSICAL EXAM:  GENERAL: NAD, well-groomed, well-developed  HEAD:  NC/AT  EYES: EOMI, PERRL, no scleral icterus  HEENT: Moist mucous membranes  NECK: Supple, No JVD  CNS:  Alert & Oriented X3,  LUNG: Mild Expiratory Wheezes  HEART: RRR; No murmurs, rubs, or gallops  ABDOMEN: +BS, ST/ND/NT, Obese, Soft  EXTREMITIES: 1+ Bilateral Lower Pitting Edema  MUSCULOSKELTAL- Dressing covering surgical site mildly saturated with serous drainage. Staples in place with mild erythema and swelling.

## 2018-02-26 NOTE — CONSULT NOTE ADULT - CONSULT REASON
Right hip drainage from surgical site s/p right hip hemiarthroplasty POD #21    Consult called ~1700  Pt evauated in ED ~1715

## 2018-02-26 NOTE — H&P ADULT - PMH
CHF (congestive heart failure)    COPD (chronic obstructive pulmonary disease)    Emphysema    Hyperlipidemia    Hypertension, unspecified type    KENNY (obstructive sleep apnea)    PVD (peripheral vascular disease)    Spinal stenosis

## 2018-02-26 NOTE — H&P ADULT - NSHPREVIEWOFSYSTEMS_GEN_ALL_CORE
REVIEW OF SYSTEMS:  CONSTITUTIONAL: No weakness, fevers or chills  EYES/ENT: No visual changes;  No vertigo or throat pain   NECK: No pain or stiffness  RESPIRATORY: No cough, wheezing, hemoptysis; No shortness of breath  CARDIOVASCULAR: No chest pain or palpitations  GASTROINTESTINAL: No abdominal or epigastric pain. No nausea, vomiting, or hematemesis; No diarrhea or constipation. No melena or hematochezia.  GENITOURINARY: No dysuria, frequency or hematuria  NEUROLOGICAL: No numbness or weakness  SKIN: No itching, burning, rashes, or lesions, +Drainage from surgical site  All other review of systems is negative unless indicated above.

## 2018-02-26 NOTE — H&P ADULT - ATTENDING COMMENTS
Patient seen and examined after initial evaluation above by family medicine resident. Case discussed and reviewed in detaill. Please note my plan below    73 yo M with PMH of diastolic CHF, COPD (on home O2), HTN, dyslipidemia, CAD s/p PCI, spinal stenosis, obesity, KENNY, PAD s/p stent, p/w drainage from recent R hip hemiarthroplasty site.     *Sepsis 2/2 Hip wound infection s/p hemiarthroplasty  -Ortho consult specifically requesting that antibiotics be held until cultures are collected during surgery on 2/28/18  -Given that patient has significant cardiac history had episode of non-sustained v-tach during previous hospitalization, will consult cardio to evaluate patient and risk stratify prior to surgery  -Patient also has a h/o chronic hypoxic respiratory failure on home O2, has a higher risk for acute respiratory failure and prolonged intubation  -Patient will need to be NPO and heparin SubQ held after midnight, prior to surgery on 2/28/18  -Will need to hold diuretics while patient is NPO for surgery    *Anemia  -Trend H/H during hospitalization and especially after surgery  -Transfuse PRN as per transfusion guidelines     *H/o diastolic CHF / COPD / HTN / dyslipidemia / CAD / spinal stenosis / obesity / KENNY / PAD  -C/w home meds + Outpatient management     *DVT ppx  -Heparin SubQ Patient seen and examined after initial evaluation above by family medicine resident. Case discussed and reviewed in detail. Please note my plan below    71 yo M with PMH of diastolic CHF, COPD (on home O2), HTN, dyslipidemia, CAD s/p PCI, spinal stenosis, obesity, KENNY, PAD s/p stent, p/w drainage from recent R hip hemiarthroplasty site.     *Sepsis 2/2 Hip wound infection s/p hemiarthroplasty  -Ortho consult specifically requesting that antibiotics be held until cultures are collected during surgery on 2/28/18  -IVF  -Given that patient has significant cardiac history had episode of non-sustained v-tach during previous hospitalization, will consult cardio to evaluate patient and risk stratify prior to surgery  -Patient also has a h/o chronic hypoxic respiratory failure on home O2, has a higher risk for acute respiratory failure and prolonged intubation  -Patient will need to be NPO and heparin SubQ held after midnight, prior to surgery on 2/28/18  -Will need to hold diuretics while patient is NPO for surgery    *Anemia  -Trend H/H during hospitalization and especially after surgery  -Transfuse PRN as per transfusion guidelines     *H/o diastolic CHF / COPD / HTN / dyslipidemia / CAD / spinal stenosis / obesity / KENNY / PAD  -C/w home meds + Outpatient management     *DVT ppx  -Heparin SubQ

## 2018-02-26 NOTE — ED PROVIDER NOTE - PROGRESS NOTE DETAILS
Efrain ALVARADO: Dr. Mathew spoke with ortho residents who will come by to see pt. State to hold off on blood work until after they evaluate. Ortho came to see pt and state they want to have pt admitted to medicine for optimization and clearance and they will do a wash out of the joint given the constant drainage. They do not wish abx to be given to pt.

## 2018-02-26 NOTE — ED PROVIDER NOTE - OBJECTIVE STATEMENT
71 y/o male with a PMHx of CHF, COPD, emphysema, spinal stenosis, s/p hip surgery presents to the ED regarding drainage from incision site from a recent right hip surgery. Pt had a right hip surgery due to edema at [facility] a few weeks ago and has been at [facility] for recovery. Today, the head nurse noticed drainage and edema and spoke to ortho, Dr. Dykes who advised pt to come to ED so he can see the pt. +RLE edema. No hip pain. No fever or any other acute complaints at this time.

## 2018-02-26 NOTE — ED ADULT NURSE NOTE - CHPI ED SYMPTOMS NEG
no abrasion/no back pain/no bruising/no weakness/no stiffness/no difficulty bearing weight/no deformity/no numbness/no tingling/no fever

## 2018-02-26 NOTE — ED PROVIDER NOTE - NS_ ATTENDINGSCRIBEDETAILS _ED_A_ED_FT
I, Isaiah Mathew MD,  performed the initial face to face bedside interview with this patient regarding history of present illness, review of symptoms and relevant past medical, social and family history.  I completed an independent physical examination.    The history, relevant review of systems, past medical and surgical history, medical decision making, and physical examination was documented by the scribe in my presence and I attest to the accuracy of the documentation.

## 2018-02-26 NOTE — ED ADULT NURSE REASSESSMENT NOTE - NS ED NURSE REASSESS COMMENT FT1
Dr. Villaseñor and Dr. Esquivel at bedside evaluating the pt. pt resting comfortably in bed with no acute distress noted. Plan of care updated to pt. Vitals stable. Will cont to monitor for safety and comfort.

## 2018-02-27 LAB
ALBUMIN SERPL ELPH-MCNC: 2.3 G/DL — LOW (ref 3.3–5)
ALP SERPL-CCNC: 133 U/L — HIGH (ref 40–120)
ALT FLD-CCNC: 35 U/L — SIGNIFICANT CHANGE UP (ref 12–78)
ANION GAP SERPL CALC-SCNC: 5 MMOL/L — SIGNIFICANT CHANGE UP (ref 5–17)
ANION GAP SERPL CALC-SCNC: 5 MMOL/L — SIGNIFICANT CHANGE UP (ref 5–17)
APPEARANCE UR: CLEAR — SIGNIFICANT CHANGE UP
APTT BLD: 28.3 SEC — SIGNIFICANT CHANGE UP (ref 27.5–37.4)
AST SERPL-CCNC: 18 U/L — SIGNIFICANT CHANGE UP (ref 15–37)
BASOPHILS # BLD AUTO: 0.1 K/UL — SIGNIFICANT CHANGE UP (ref 0–0.2)
BASOPHILS NFR BLD AUTO: 1.1 % — SIGNIFICANT CHANGE UP (ref 0–2)
BILIRUB SERPL-MCNC: 0.4 MG/DL — SIGNIFICANT CHANGE UP (ref 0.2–1.2)
BILIRUB UR-MCNC: NEGATIVE — SIGNIFICANT CHANGE UP
BUN SERPL-MCNC: 9 MG/DL — SIGNIFICANT CHANGE UP (ref 7–23)
BUN SERPL-MCNC: 9 MG/DL — SIGNIFICANT CHANGE UP (ref 7–23)
CALCIUM SERPL-MCNC: 8.2 MG/DL — LOW (ref 8.5–10.1)
CALCIUM SERPL-MCNC: 8.3 MG/DL — LOW (ref 8.5–10.1)
CHLORIDE SERPL-SCNC: 103 MMOL/L — SIGNIFICANT CHANGE UP (ref 96–108)
CHLORIDE SERPL-SCNC: 105 MMOL/L — SIGNIFICANT CHANGE UP (ref 96–108)
CO2 SERPL-SCNC: 31 MMOL/L — SIGNIFICANT CHANGE UP (ref 22–31)
CO2 SERPL-SCNC: 33 MMOL/L — HIGH (ref 22–31)
COLOR SPEC: YELLOW — SIGNIFICANT CHANGE UP
CREAT SERPL-MCNC: 0.56 MG/DL — SIGNIFICANT CHANGE UP (ref 0.5–1.3)
CREAT SERPL-MCNC: 0.57 MG/DL — SIGNIFICANT CHANGE UP (ref 0.5–1.3)
DIFF PNL FLD: NEGATIVE — SIGNIFICANT CHANGE UP
EOSINOPHIL # BLD AUTO: 0.3 K/UL — SIGNIFICANT CHANGE UP (ref 0–0.5)
EOSINOPHIL NFR BLD AUTO: 2.3 % — SIGNIFICANT CHANGE UP (ref 0–6)
GLUCOSE SERPL-MCNC: 117 MG/DL — HIGH (ref 70–99)
GLUCOSE SERPL-MCNC: 136 MG/DL — HIGH (ref 70–99)
GLUCOSE UR QL: NEGATIVE MG/DL — SIGNIFICANT CHANGE UP
HCT VFR BLD CALC: 30.4 % — LOW (ref 39–50)
HCT VFR BLD CALC: 30.6 % — LOW (ref 39–50)
HGB BLD-MCNC: 10 G/DL — LOW (ref 13–17)
HGB BLD-MCNC: 10 G/DL — LOW (ref 13–17)
INR BLD: 1.05 RATIO — SIGNIFICANT CHANGE UP (ref 0.88–1.16)
KETONES UR-MCNC: NEGATIVE — SIGNIFICANT CHANGE UP
LEUKOCYTE ESTERASE UR-ACNC: NEGATIVE — SIGNIFICANT CHANGE UP
LYMPHOCYTES # BLD AUTO: 0.7 K/UL — LOW (ref 1–3.3)
LYMPHOCYTES # BLD AUTO: 5.6 % — LOW (ref 13–44)
MAGNESIUM SERPL-MCNC: 1.9 MG/DL — SIGNIFICANT CHANGE UP (ref 1.6–2.6)
MCHC RBC-ENTMCNC: 30.9 PG — SIGNIFICANT CHANGE UP (ref 27–34)
MCHC RBC-ENTMCNC: 31.3 PG — SIGNIFICANT CHANGE UP (ref 27–34)
MCHC RBC-ENTMCNC: 32.6 GM/DL — SIGNIFICANT CHANGE UP (ref 32–36)
MCHC RBC-ENTMCNC: 32.9 GM/DL — SIGNIFICANT CHANGE UP (ref 32–36)
MCV RBC AUTO: 94.8 FL — SIGNIFICANT CHANGE UP (ref 80–100)
MCV RBC AUTO: 95.2 FL — SIGNIFICANT CHANGE UP (ref 80–100)
MONOCYTES # BLD AUTO: 1 K/UL — HIGH (ref 0–0.9)
MONOCYTES NFR BLD AUTO: 8.2 % — SIGNIFICANT CHANGE UP (ref 2–14)
NEUTROPHILS # BLD AUTO: 10 K/UL — HIGH (ref 1.8–7.4)
NEUTROPHILS NFR BLD AUTO: 82.8 % — HIGH (ref 43–77)
NITRITE UR-MCNC: NEGATIVE — SIGNIFICANT CHANGE UP
PH UR: 6.5 — SIGNIFICANT CHANGE UP (ref 5–8)
PHOSPHATE SERPL-MCNC: 2.8 MG/DL — SIGNIFICANT CHANGE UP (ref 2.5–4.5)
PLATELET # BLD AUTO: 338 K/UL — SIGNIFICANT CHANGE UP (ref 150–400)
PLATELET # BLD AUTO: 339 K/UL — SIGNIFICANT CHANGE UP (ref 150–400)
POTASSIUM SERPL-MCNC: 4.2 MMOL/L — SIGNIFICANT CHANGE UP (ref 3.5–5.3)
POTASSIUM SERPL-MCNC: 4.2 MMOL/L — SIGNIFICANT CHANGE UP (ref 3.5–5.3)
POTASSIUM SERPL-SCNC: 4.2 MMOL/L — SIGNIFICANT CHANGE UP (ref 3.5–5.3)
POTASSIUM SERPL-SCNC: 4.2 MMOL/L — SIGNIFICANT CHANGE UP (ref 3.5–5.3)
PROT SERPL-MCNC: 5.7 GM/DL — LOW (ref 6–8.3)
PROT UR-MCNC: NEGATIVE MG/DL — SIGNIFICANT CHANGE UP
PROTHROM AB SERPL-ACNC: 11.3 SEC — SIGNIFICANT CHANGE UP (ref 9.8–12.7)
RBC # BLD: 3.19 M/UL — LOW (ref 4.2–5.8)
RBC # BLD: 3.23 M/UL — LOW (ref 4.2–5.8)
RBC # FLD: 14.1 % — SIGNIFICANT CHANGE UP (ref 10.3–14.5)
RBC # FLD: 14.3 % — SIGNIFICANT CHANGE UP (ref 10.3–14.5)
SODIUM SERPL-SCNC: 141 MMOL/L — SIGNIFICANT CHANGE UP (ref 135–145)
SODIUM SERPL-SCNC: 141 MMOL/L — SIGNIFICANT CHANGE UP (ref 135–145)
SP GR SPEC: 1 — LOW (ref 1.01–1.02)
UROBILINOGEN FLD QL: NEGATIVE MG/DL — SIGNIFICANT CHANGE UP
WBC # BLD: 12.1 K/UL — HIGH (ref 3.8–10.5)
WBC # BLD: 12.5 K/UL — HIGH (ref 3.8–10.5)
WBC # FLD AUTO: 12.1 K/UL — HIGH (ref 3.8–10.5)
WBC # FLD AUTO: 12.5 K/UL — HIGH (ref 3.8–10.5)

## 2018-02-27 PROCEDURE — 71250 CT THORAX DX C-: CPT | Mod: 26

## 2018-02-27 PROCEDURE — 93306 TTE W/DOPPLER COMPLETE: CPT | Mod: 26

## 2018-02-27 RX ORDER — SODIUM CHLORIDE 9 MG/ML
1000 INJECTION INTRAMUSCULAR; INTRAVENOUS; SUBCUTANEOUS
Qty: 0 | Refills: 0 | Status: DISCONTINUED | OUTPATIENT
Start: 2018-02-27 | End: 2018-02-27

## 2018-02-27 RX ORDER — SODIUM CHLORIDE 9 MG/ML
1000 INJECTION, SOLUTION INTRAVENOUS
Qty: 0 | Refills: 0 | Status: DISCONTINUED | OUTPATIENT
Start: 2018-02-27 | End: 2018-02-28

## 2018-02-27 RX ORDER — ALPRAZOLAM 0.25 MG
0.5 TABLET ORAL THREE TIMES A DAY
Qty: 0 | Refills: 0 | Status: DISCONTINUED | OUTPATIENT
Start: 2018-02-27 | End: 2018-02-28

## 2018-02-27 RX ADMIN — ISOSORBIDE MONONITRATE 60 MILLIGRAM(S): 60 TABLET, EXTENDED RELEASE ORAL at 12:31

## 2018-02-27 RX ADMIN — Medication 0.5 MILLIGRAM(S): at 18:32

## 2018-02-27 RX ADMIN — TAMSULOSIN HYDROCHLORIDE 0.4 MILLIGRAM(S): 0.4 CAPSULE ORAL at 05:43

## 2018-02-27 RX ADMIN — Medication 3 MILLIGRAM(S): at 21:22

## 2018-02-27 RX ADMIN — SODIUM CHLORIDE 75 MILLILITER(S): 9 INJECTION INTRAMUSCULAR; INTRAVENOUS; SUBCUTANEOUS at 09:46

## 2018-02-27 RX ADMIN — SODIUM CHLORIDE 3 MILLILITER(S): 9 INJECTION INTRAMUSCULAR; INTRAVENOUS; SUBCUTANEOUS at 05:45

## 2018-02-27 RX ADMIN — AMLODIPINE BESYLATE 5 MILLIGRAM(S): 2.5 TABLET ORAL at 12:31

## 2018-02-27 RX ADMIN — RANOLAZINE 500 MILLIGRAM(S): 500 TABLET, FILM COATED, EXTENDED RELEASE ORAL at 05:42

## 2018-02-27 RX ADMIN — HEPARIN SODIUM 5000 UNIT(S): 5000 INJECTION INTRAVENOUS; SUBCUTANEOUS at 21:22

## 2018-02-27 RX ADMIN — ATORVASTATIN CALCIUM 20 MILLIGRAM(S): 80 TABLET, FILM COATED ORAL at 21:22

## 2018-02-27 RX ADMIN — Medication 600 MILLIGRAM(S): at 18:54

## 2018-02-27 RX ADMIN — PANTOPRAZOLE SODIUM 40 MILLIGRAM(S): 20 TABLET, DELAYED RELEASE ORAL at 12:31

## 2018-02-27 RX ADMIN — Medication 100 MILLIGRAM(S): at 00:01

## 2018-02-27 RX ADMIN — SODIUM CHLORIDE 3 MILLILITER(S): 9 INJECTION INTRAMUSCULAR; INTRAVENOUS; SUBCUTANEOUS at 11:41

## 2018-02-27 RX ADMIN — Medication 25 MILLIGRAM(S): at 05:43

## 2018-02-27 RX ADMIN — Medication 100 MILLIGRAM(S): at 12:31

## 2018-02-27 RX ADMIN — LISINOPRIL 20 MILLIGRAM(S): 2.5 TABLET ORAL at 05:43

## 2018-02-27 RX ADMIN — SODIUM CHLORIDE 3 MILLILITER(S): 9 INJECTION INTRAMUSCULAR; INTRAVENOUS; SUBCUTANEOUS at 20:51

## 2018-02-27 RX ADMIN — TAMSULOSIN HYDROCHLORIDE 0.4 MILLIGRAM(S): 0.4 CAPSULE ORAL at 21:22

## 2018-02-27 RX ADMIN — Medication 20 MILLIGRAM(S): at 05:43

## 2018-02-27 RX ADMIN — Medication 0.5 MILLIGRAM(S): at 00:01

## 2018-02-27 RX ADMIN — FINASTERIDE 5 MILLIGRAM(S): 5 TABLET, FILM COATED ORAL at 12:31

## 2018-02-27 RX ADMIN — HEPARIN SODIUM 5000 UNIT(S): 5000 INJECTION INTRAVENOUS; SUBCUTANEOUS at 12:30

## 2018-02-27 RX ADMIN — RANOLAZINE 500 MILLIGRAM(S): 500 TABLET, FILM COATED, EXTENDED RELEASE ORAL at 16:52

## 2018-02-27 RX ADMIN — HEPARIN SODIUM 5000 UNIT(S): 5000 INJECTION INTRAVENOUS; SUBCUTANEOUS at 05:42

## 2018-02-27 NOTE — PROGRESS NOTE ADULT - SUBJECTIVE AND OBJECTIVE BOX
72 year old male with past medical history of Diastolic Heart Failure, Oxygen-Dependant COPD on 4L Home Oxygen, HTN, HLD, CAD s/p stent in LCx, Spinal Stenosis, Obesity, KENNY, and PAD sent in from [facility] for continued drainage from surgical site. Patient was recently admitted for fall now s/p right Hip Hemiarthroplasty on 02/05/18, discharged to [facility] on Lovenox for DVT prophylaxis for 4 weeks. Previous hospital course notable for Post-operative Ileus, NSVT while off BB, and persistent drainage from surgical site. Drainage was noticed by nursing staff at the facility who contacted the orthopedist and was referred to [facility] for further evaluation.  For OR in am; feels anxious        Vital Signs Last 24 Hrs  T(C): 36.7 (27 Feb 2018 11:34), Max: 37.3 (26 Feb 2018 19:43)  T(F): 98 (27 Feb 2018 11:34), Max: 99.1 (26 Feb 2018 19:43)  HR: 80 (27 Feb 2018 11:34) (80 - 100)  BP: 135/67 (27 Feb 2018 11:34) (101/81 - 147/58)  BP(mean): 85 (26 Feb 2018 21:16) (85 - 85)  RR: 18 (27 Feb 2018 11:34) (16 - 18)  SpO2: 99% (27 Feb 2018 11:34) (93% - 100%)            General:                Comfortable, AAO X 3, in no distress. Obese.  HEENT:                  Atraumatic, PERRLA, EOMI, conjunctiva clear.   Neck:                     Supple, no adenopathy, no thyromegaly, no JVD, no bruit.  Back:                     Symmetric, non tender.  Chest:                    Clear, B/L symmetric air entry, no tachypnea  Heart:                     S1, S2 normal, no gallop, no murmur.  Abdomen:              Soft, non-tender, bowel sounds active. No palpable masses.  Extremities:           Right Lower Extremity:  Dressing clean dry intact following recent AM dressing change  Skin:                      Skin color, texture normal. No rashes.  Neurologic:            Grossly nonfocal.        TELEMETRY:  Normal sinus rhythm with no tachy or chris events     ECG:  NSR, no ST T changes of ischemia or infarction.     LABS:                          10.0   12.5  )-----------( 339      ( 27 Feb 2018 07:33 )             30.4     02-27    141  |  103  |  9   ----------------------------<  136<H>  4.2   |  33<H>  |  0.56    Ca    8.3<L>      27 Feb 2018 07:33  Phos  2.8     02-27  Mg     1.9     02-27    TPro  5.7<L>  /  Alb  2.3<L>  /  TBili  0.4  /  DBili  x   /  AST  18  /  ALT  35  /  AlkPhos  133<H>  02-27               Xray Chest 1 View AP/PA. (02.26.18 @ 18:31) > IMPRESSION: Interval development of mild infiltration and atelectatic changes in the left lower lobe and associated small left pleural effusion.      ASSESSMENT:    Preop evaluation for Incision and lavage of the right hip surgery site - chronic draining site.  NSVT, PAT - currently stable.  CAD,  JERARDO of LCx 3/13  Chr HFpEF  COPD  HTN  KENNY  PAD  Spinal stenosis  OA    - CHR HYPOXIC REP FAILURE C/W O2  - LOW RISK FOR PERIOP COMPLICATIONS  - REQUIRES O2 4 L 72 year old male with past medical history of Diastolic Heart Failure, Oxygen-Dependant COPD on 4L Home Oxygen, HTN, HLD, CAD s/p stent in LCx, Spinal Stenosis, Obesity, KENNY, and PAD sent in from [facility] for continued drainage from surgical site. Patient was recently admitted for fall now s/p right Hip Hemiarthroplasty on 02/05/18, discharged to [facility] on Lovenox for DVT prophylaxis for 4 weeks. Previous hospital course notable for Post-operative Ileus, NSVT while off BB, and persistent drainage from surgical site. Drainage was noticed by nursing staff at the facility who contacted the orthopedist and was referred to [facility] for further evaluation.  For OR in am; feels anxious    2/28: feels better; CT chest revealed no PNA    Vital Signs Last 24 Hrs  T(C): 36.9 (28 Feb 2018 05:18), Max: 37.3 (27 Feb 2018 21:00)  T(F): 98.5 (28 Feb 2018 05:18), Max: 99.2 (27 Feb 2018 21:00)  HR: 108 (28 Feb 2018 05:18) (57 - 113)  BP: 123/57 (28 Feb 2018 05:18) (123/57 - 142/85)  BP(mean): --  RR: 19 (28 Feb 2018 05:18) (18 - 20)  SpO2: 92% (28 Feb 2018 05:18) (90% - 99%)          General:                Comfortable, AAO X 3, in no distress. Obese.  HEENT:                  Atraumatic, PERRLA, EOMI, conjunctiva clear.   Neck:                     Supple, no adenopathy, no thyromegaly, no JVD, no bruit.  Back:                     Symmetric, non tender.  Chest:                    Clear, B/L symmetric air entry, no tachypnea  Heart:                     S1, S2 normal, no gallop, no murmur.  Abdomen:              Soft, non-tender, bowel sounds active. No palpable masses.  Extremities:           Right Lower Extremity:  Dressing clean dry intact following recent AM dressing change  Skin:                      Skin color, texture normal. No rashes.  Neurologic:            Grossly nonfocal.        TELEMETRY:  Normal sinus rhythm with no tachy or chris events     ECG:  NSR, no ST T changes of ischemia or infarction.     LABS:                          10.0   12.5  )-----------( 339      ( 27 Feb 2018 07:33 )             30.4     02-27    141  |  103  |  9   ----------------------------<  136<H>  4.2   |  33<H>  |  0.56    Ca    8.3<L>      27 Feb 2018 07:33  Phos  2.8     02-27  Mg     1.9     02-27    TPro  5.7<L>  /  Alb  2.3<L>  /  TBili  0.4  /  DBili  x   /  AST  18  /  ALT  35  /  AlkPhos  133<H>  02-27          ASSESSMENT:    Preop evaluation for Incision and lavage of the right hip surgery site - chronic draining site.  NSVT, PAT - currently stable.  CAD,  JERARDO of LCx 3/13  Chr HFpEF  COPD  HTN  KENNY  PAD  Spinal stenosis  OA  - CHR HYPOXIC REP FAILURE C/W O2      Plan:  - OR today- low risk for periop complications  - ID consult re: abx regimen after washout  - no evidence on PNA  - steroids rx by pulmonary

## 2018-02-27 NOTE — CONSULT NOTE ADULT - ASSESSMENT
Preop evaluation for Incision and lavage of the right hip surgery site - chronic draining site.  NSVT, PAT - currently stable.  CAD,  JERARDO of LCx 3/13  Chr HFpEF  COPD  HLD  HTN  KENNY  PAD  Spinal stenosis  OA    Suggest:    Continue  current meds.  Continue PO beta blockers.   Today's evaluation reveals a stable cardiovascular status. There is no cardiac contraindication for the proposed procedure. Overall, pt is at a low to moderate cardiac risk for the proposed procedure. Laboratory data and chest X-ray are acceptable for the procedure. All pre procedure medications may continue as prescribed. Periprocedure beta blockers may be used as needed for cardiac stabilization if heart rate and hemodynamics permit. I suggest periprocedure and post op DVT prophylaxis with antiembolic stockings, SC heparin / low molecular weight heparin. Post op GI prophylaxis. Cardiac risks of surgery/procedure were discussed in detail with patient/family.  Procedure, risks, alternatives, benefits and complications of the proposed surgery will be discussed by the operating MD and the risks of anesthesia will be discussed by the anesthesiologist.   Pt with mild cough, left lower lobe changes on the CXR. No fever. Doubt PNA clinically, will continue to follow with you.

## 2018-02-27 NOTE — CONSULT NOTE ADULT - SUBJECTIVE AND OBJECTIVE BOX
Preop for ortho wound drainage and lavage.    HPI:  72 year old male with past medical history of Diastolic Heart Failure, Oxygen-Dependant COPD on 4L Home Oxygen, HTN, HLD, CAD s/p stent in LCx, Spinal Stenosis, Obesity, KENNY, and PAD sent in from [facility] for continued drainage from surgical site. Patient was recently admitted for fall now s/p right Hip Hemiarthroplasty on 18, discharged to [facility] on Lovenox for DVT prophylaxis for 4 weeks. Previous hospital course notable for Post-operative Ileus, NSVT while off BB, and persistent drainage from surgical site. Drainage was noticed by nursing staff at the facility who contacted the orthopedist and was referred to [facility] for further evaluation. No Fever, chills, or night sweats. (2018 21:27). Currently no chest pain. No orthopnea. No further arrhythmias.      PAST MEDICAL & SURGICAL HISTORY:  PVD (peripheral vascular disease)  KENNY (obstructive sleep apnea)  Hyperlipidemia  Hypertension, unspecified type  Spinal stenosis  CHF (congestive heart failure)  Emphysema  COPD (chronic obstructive pulmonary disease)  No significant past surgical history      PREVIOUS CARDIAC WORKUP:      Echo:  10/16 Nml EF, mild LVH, diastolic dysfunction, trace MR  Stress Test:   No ichemia    ALLERGIES:    No Known Allergies       MEDICATIONS  (STANDING):  amLODIPine   Tablet 5 milliGRAM(s) Oral daily  atorvastatin 20 milliGRAM(s) Oral at bedtime  benzonatate 100 milliGRAM(s) Oral daily  buDESOnide 160 MICROgram(s)/formoterol 4.5 MICROgram(s) Inhaler 2 Puff(s) Inhalation two times a day  finasteride 5 milliGRAM(s) Oral daily  furosemide    Tablet 20 milliGRAM(s) Oral daily  heparin  Injectable 5000 Unit(s) SubCutaneous every 8 hours  isosorbide   mononitrate ER Tablet (IMDUR) 60 milliGRAM(s) Oral daily  lisinopril 20 milliGRAM(s) Oral daily  melatonin 3 milliGRAM(s) Oral at bedtime  metoprolol succinate ER 25 milliGRAM(s) Oral daily  pantoprazole  Injectable 40 milliGRAM(s) IV Push daily  ranolazine 500 milliGRAM(s) Oral two times a day  sodium chloride 0.9% lock flush 3 milliLiter(s) IV Push every 8 hours  sodium chloride 0.9%. 1000 milliLiter(s) (75 mL/Hr) IV Continuous <Continuous>  tamsulosin 0.4 milliGRAM(s) Oral at bedtime  tiotropium 18 MICROgram(s) Capsule 1 Capsule(s) Inhalation daily    MEDICATIONS  (PRN):  acetaminophen   Tablet. 650 milliGRAM(s) Oral every 6 hours PRN Mild Pain (1 - 3)  ondansetron Injectable 4 milliGRAM(s) IV Push every 6 hours PRN Nausea      FAMILY HISTORY:  No pertinent family history in first degree relatives        SOCIAL HISTORY:  Nonsmoker. No ETOH abuse. No illicit drugs.     ROS:     Detailed ten system ROS was performed and was negative except for history as eluded to above.    no fever  no chills  no nausea  no vomiting  no diarrhea  no constipation  no melena  no hematochezia  no chest pain  no palpitations  no sob at rest  no dyspnea on exertion  + cough  no wheezing  no anorexia  no headache  no dizziness  no syncope  no weakness  no myalgia  no dysuria  no polyuria  no hematuria     Vital Signs Last 24 Hrs  T(C): 36.7 (2018 11:34), Max: 37.3 (2018 19:43)  T(F): 98 (2018 11:34), Max: 99.1 (2018 19:43)  HR: 80 (2018 11:34) (80 - 100)  BP: 135/67 (2018 11:34) (101/81 - 147/58)  BP(mean): 85 (2018 21:16) (85 - 85)  RR: 18 (2018 11:34) (16 - 18)  SpO2: 99% (2018 11:34) (93% - 100%)    I&O's Summary    2018 07:01  -  2018 12:30  --------------------------------------------------------  IN: 480 mL / OUT: 450 mL / NET: 30 mL        PHYSICAL EXAM:    General:                Comfortable, AAO X 3, in no distress. Obese.  HEENT:                  Atraumatic, PERRLA, EOMI, conjunctiva clear.   Neck:                     Supple, no adenopathy, no thyromegaly, no JVD, no bruit.  Back:                     Symmetric, non tender.  Chest:                    Clear, B/L symmetric air entry, no tachypnea  Heart:                     S1, S2 normal, no gallop, no murmur.  Abdomen:              Soft, non-tender, bowel sounds active. No palpable masses.  Extremities:           no cyanosis, no edema. Peripheral pulses normal.   Skin:                      Skin color, texture normal. No rashes.  Neurologic:            Grossly nonfocal.        TELEMETRY:  Normal sinus rhythm with no tachy or chris events     ECG:  NSR, no ST T changes of ischemia or infarction.     LABS:                          10.0   12.5  )-----------( 339      ( 2018 07:33 )             30.4         141  |  103  |  9   ----------------------------<  136<H>  4.2   |  33<H>  |  0.56    Ca    8.3<L>      2018 07:33  Phos  2.8       Mg     1.9         TPro  5.7<L>  /  Alb  2.3<L>  /  TBili  0.4  /  DBili  x   /  AST  18  /  ALT  35  /  AlkPhos  133<H>   @ 18:55  Trop-I  <0.015      PT/INR - ( 2018 07:33 )   PT: 11.3 sec;   INR: 1.05 ratio         PTT - ( 2018 07:33 )  PTT:28.3 sec  Urinalysis Basic - ( 2018 11:00 )    Color: Yellow / Appearance: Clear / S.005 / pH: x  Gluc: x / Ketone: Negative  / Bili: Negative / Urobili: Negative mg/dL   Blood: x / Protein: Negative mg/dL / Nitrite: Negative   Leuk Esterase: Negative / RBC: x / WBC x   Sq Epi: x / Non Sq Epi: x / Bacteria: x      RADIOLOGY & ADDITIONAL STUDIES:    Xray Chest 1 View AP/PA. (18 @ 18:31) > IMPRESSION: Interval development of mild infiltration and atelectatic changes in the left lower lobe and associated small left pleural effusion.

## 2018-02-27 NOTE — PROGRESS NOTE ADULT - SUBJECTIVE AND OBJECTIVE BOX
Pt S/E at bedside, no acute events overnight, pain controlled. Isolation for Hx of C diff.     ICU Vital Signs Last 24 Hrs  T(C): 36.8 (27 Feb 2018 05:24), Max: 37.3 (26 Feb 2018 19:43)  T(F): 98.2 (27 Feb 2018 05:24), Max: 99.1 (26 Feb 2018 19:43)  HR: 90 (27 Feb 2018 05:24) (85 - 100)  BP: 137/66 (27 Feb 2018 05:24) (101/81 - 147/58)  BP(mean): 85 (26 Feb 2018 21:16) (85 - 85)  RR: 18 (27 Feb 2018 00:03) (16 - 18)  SpO2: 93% (27 Feb 2018 05:24) (93% - 100%)    Gen: NAD, AAOx3    Right Lower Extremity:  Dressing clean dry intact following recent AM dressing change  +EHL/FHL/TA/GS  SILT L3-S1  +DP/PT Pulses  Compartments soft  No calf TTP B/L

## 2018-02-27 NOTE — ED ADULT NURSE REASSESSMENT NOTE - NS ED NURSE REASSESS COMMENT FT1
Pt received from ER Nurse, Anxious alert and O x 4. Denies any pain. Surgical wound ,R hip with abd binder Dsg D/I, underneath purulent drainage noted. Pt with moist prod cough, yellowish sputum. MD made aware. Tessolon pearls given as ordered. xanax given for anxiety. VSS. Afebrile. Pt with two stg 2s on Left buttock. Stg two R buttock. Using urinal to urinate. Gilberto leg edema. Will continue to monitor.

## 2018-02-28 ENCOUNTER — RESULT REVIEW (OUTPATIENT)
Age: 73
End: 2018-02-28

## 2018-02-28 LAB
ANION GAP SERPL CALC-SCNC: 2 MMOL/L — LOW (ref 5–17)
ANION GAP SERPL CALC-SCNC: 8 MMOL/L — SIGNIFICANT CHANGE UP (ref 5–17)
APTT BLD: 26 SEC — LOW (ref 27.5–37.4)
BUN SERPL-MCNC: 14 MG/DL — SIGNIFICANT CHANGE UP (ref 7–23)
BUN SERPL-MCNC: 9 MG/DL — SIGNIFICANT CHANGE UP (ref 7–23)
CALCIUM SERPL-MCNC: 8.2 MG/DL — LOW (ref 8.5–10.1)
CALCIUM SERPL-MCNC: 8.7 MG/DL — SIGNIFICANT CHANGE UP (ref 8.5–10.1)
CHLORIDE SERPL-SCNC: 102 MMOL/L — SIGNIFICANT CHANGE UP (ref 96–108)
CHLORIDE SERPL-SCNC: 99 MMOL/L — SIGNIFICANT CHANGE UP (ref 96–108)
CO2 SERPL-SCNC: 31 MMOL/L — SIGNIFICANT CHANGE UP (ref 22–31)
CO2 SERPL-SCNC: 34 MMOL/L — HIGH (ref 22–31)
CREAT SERPL-MCNC: 0.51 MG/DL — SIGNIFICANT CHANGE UP (ref 0.5–1.3)
CREAT SERPL-MCNC: 0.79 MG/DL — SIGNIFICANT CHANGE UP (ref 0.5–1.3)
GLUCOSE SERPL-MCNC: 136 MG/DL — HIGH (ref 70–99)
GLUCOSE SERPL-MCNC: 191 MG/DL — HIGH (ref 70–99)
HCT VFR BLD CALC: 29.7 % — LOW (ref 39–50)
HCT VFR BLD CALC: 32.2 % — LOW (ref 39–50)
HGB BLD-MCNC: 10.5 G/DL — LOW (ref 13–17)
HGB BLD-MCNC: 9.6 G/DL — LOW (ref 13–17)
INR BLD: 1.11 RATIO — SIGNIFICANT CHANGE UP (ref 0.88–1.16)
MCHC RBC-ENTMCNC: 30.6 PG — SIGNIFICANT CHANGE UP (ref 27–34)
MCHC RBC-ENTMCNC: 30.6 PG — SIGNIFICANT CHANGE UP (ref 27–34)
MCHC RBC-ENTMCNC: 32.4 GM/DL — SIGNIFICANT CHANGE UP (ref 32–36)
MCHC RBC-ENTMCNC: 32.5 GM/DL — SIGNIFICANT CHANGE UP (ref 32–36)
MCV RBC AUTO: 94 FL — SIGNIFICANT CHANGE UP (ref 80–100)
MCV RBC AUTO: 94.7 FL — SIGNIFICANT CHANGE UP (ref 80–100)
PLATELET # BLD AUTO: 319 K/UL — SIGNIFICANT CHANGE UP (ref 150–400)
PLATELET # BLD AUTO: 343 K/UL — SIGNIFICANT CHANGE UP (ref 150–400)
POTASSIUM SERPL-MCNC: 4.2 MMOL/L — SIGNIFICANT CHANGE UP (ref 3.5–5.3)
POTASSIUM SERPL-MCNC: 4.3 MMOL/L — SIGNIFICANT CHANGE UP (ref 3.5–5.3)
POTASSIUM SERPL-SCNC: 4.2 MMOL/L — SIGNIFICANT CHANGE UP (ref 3.5–5.3)
POTASSIUM SERPL-SCNC: 4.3 MMOL/L — SIGNIFICANT CHANGE UP (ref 3.5–5.3)
PROTHROM AB SERPL-ACNC: 12 SEC — SIGNIFICANT CHANGE UP (ref 9.8–12.7)
RAPID RVP RESULT: SIGNIFICANT CHANGE UP
RBC # BLD: 3.14 M/UL — LOW (ref 4.2–5.8)
RBC # BLD: 3.42 M/UL — LOW (ref 4.2–5.8)
RBC # FLD: 13.9 % — SIGNIFICANT CHANGE UP (ref 10.3–14.5)
RBC # FLD: 14.4 % — SIGNIFICANT CHANGE UP (ref 10.3–14.5)
SODIUM SERPL-SCNC: 138 MMOL/L — SIGNIFICANT CHANGE UP (ref 135–145)
SODIUM SERPL-SCNC: 138 MMOL/L — SIGNIFICANT CHANGE UP (ref 135–145)
WBC # BLD: 11.2 K/UL — HIGH (ref 3.8–10.5)
WBC # BLD: 11.7 K/UL — HIGH (ref 3.8–10.5)
WBC # FLD AUTO: 11.2 K/UL — HIGH (ref 3.8–10.5)
WBC # FLD AUTO: 11.7 K/UL — HIGH (ref 3.8–10.5)

## 2018-02-28 PROCEDURE — 73501 X-RAY EXAM HIP UNI 1 VIEW: CPT | Mod: 26

## 2018-02-28 PROCEDURE — 88300 SURGICAL PATH GROSS: CPT | Mod: 26

## 2018-02-28 RX ORDER — ONDANSETRON 8 MG/1
4 TABLET, FILM COATED ORAL ONCE
Qty: 0 | Refills: 0 | Status: DISCONTINUED | OUTPATIENT
Start: 2018-02-28 | End: 2018-02-28

## 2018-02-28 RX ORDER — FUROSEMIDE 40 MG
20 TABLET ORAL DAILY
Qty: 0 | Refills: 0 | Status: DISCONTINUED | OUTPATIENT
Start: 2018-02-28 | End: 2018-03-06

## 2018-02-28 RX ORDER — OXYCODONE HYDROCHLORIDE 5 MG/1
5 TABLET ORAL EVERY 4 HOURS
Qty: 0 | Refills: 0 | Status: DISCONTINUED | OUTPATIENT
Start: 2018-02-28 | End: 2018-03-06

## 2018-02-28 RX ORDER — DOCUSATE SODIUM 100 MG
100 CAPSULE ORAL THREE TIMES A DAY
Qty: 0 | Refills: 0 | Status: DISCONTINUED | OUTPATIENT
Start: 2018-02-28 | End: 2018-03-06

## 2018-02-28 RX ORDER — OXYCODONE HYDROCHLORIDE 5 MG/1
10 TABLET ORAL EVERY 4 HOURS
Qty: 0 | Refills: 0 | Status: DISCONTINUED | OUTPATIENT
Start: 2018-02-28 | End: 2018-03-06

## 2018-02-28 RX ORDER — OXYCODONE HYDROCHLORIDE 5 MG/1
10 TABLET ORAL EVERY 6 HOURS
Qty: 0 | Refills: 0 | Status: DISCONTINUED | OUTPATIENT
Start: 2018-02-28 | End: 2018-02-28

## 2018-02-28 RX ORDER — BENZOCAINE AND MENTHOL 5; 1 G/100ML; G/100ML
1 LIQUID ORAL EVERY 4 HOURS
Qty: 0 | Refills: 0 | Status: DISCONTINUED | OUTPATIENT
Start: 2018-02-28 | End: 2018-03-06

## 2018-02-28 RX ORDER — VANCOMYCIN HCL 1 G
1000 VIAL (EA) INTRAVENOUS EVERY 12 HOURS
Qty: 0 | Refills: 0 | Status: DISCONTINUED | OUTPATIENT
Start: 2018-02-28 | End: 2018-03-06

## 2018-02-28 RX ORDER — ACETAMINOPHEN 500 MG
650 TABLET ORAL EVERY 6 HOURS
Qty: 0 | Refills: 0 | Status: DISCONTINUED | OUTPATIENT
Start: 2018-02-28 | End: 2018-03-06

## 2018-02-28 RX ORDER — VANCOMYCIN HCL 1 G
750 VIAL (EA) INTRAVENOUS EVERY 12 HOURS
Qty: 0 | Refills: 0 | Status: CANCELLED | OUTPATIENT
Start: 2018-03-01 | End: 2018-02-28

## 2018-02-28 RX ORDER — DIPHENHYDRAMINE HCL 50 MG
25 CAPSULE ORAL AT BEDTIME
Qty: 0 | Refills: 0 | Status: DISCONTINUED | OUTPATIENT
Start: 2018-02-28 | End: 2018-03-06

## 2018-02-28 RX ORDER — ATORVASTATIN CALCIUM 80 MG/1
20 TABLET, FILM COATED ORAL AT BEDTIME
Qty: 0 | Refills: 0 | Status: DISCONTINUED | OUTPATIENT
Start: 2018-02-28 | End: 2018-03-06

## 2018-02-28 RX ORDER — CEFTRIAXONE 500 MG/1
2000 INJECTION, POWDER, FOR SOLUTION INTRAMUSCULAR; INTRAVENOUS EVERY 24 HOURS
Qty: 0 | Refills: 0 | Status: DISCONTINUED | OUTPATIENT
Start: 2018-02-28 | End: 2018-02-28

## 2018-02-28 RX ORDER — FINASTERIDE 5 MG/1
5 TABLET, FILM COATED ORAL DAILY
Qty: 0 | Refills: 0 | Status: DISCONTINUED | OUTPATIENT
Start: 2018-02-28 | End: 2018-03-06

## 2018-02-28 RX ORDER — MAGNESIUM HYDROXIDE 400 MG/1
30 TABLET, CHEWABLE ORAL DAILY
Qty: 0 | Refills: 0 | Status: DISCONTINUED | OUTPATIENT
Start: 2018-02-28 | End: 2018-03-06

## 2018-02-28 RX ORDER — FENTANYL CITRATE 50 UG/ML
50 INJECTION INTRAVENOUS
Qty: 0 | Refills: 0 | Status: DISCONTINUED | OUTPATIENT
Start: 2018-02-28 | End: 2018-02-28

## 2018-02-28 RX ORDER — HYDROMORPHONE HYDROCHLORIDE 2 MG/ML
1 INJECTION INTRAMUSCULAR; INTRAVENOUS; SUBCUTANEOUS
Qty: 0 | Refills: 0 | Status: DISCONTINUED | OUTPATIENT
Start: 2018-02-28 | End: 2018-03-06

## 2018-02-28 RX ORDER — BUDESONIDE AND FORMOTEROL FUMARATE DIHYDRATE 160; 4.5 UG/1; UG/1
2 AEROSOL RESPIRATORY (INHALATION)
Qty: 0 | Refills: 0 | Status: DISCONTINUED | OUTPATIENT
Start: 2018-02-28 | End: 2018-03-06

## 2018-02-28 RX ORDER — ACETAMINOPHEN 500 MG
1000 TABLET ORAL ONCE
Qty: 0 | Refills: 0 | Status: DISCONTINUED | OUTPATIENT
Start: 2018-02-28 | End: 2018-02-28

## 2018-02-28 RX ORDER — AMLODIPINE BESYLATE 2.5 MG/1
5 TABLET ORAL DAILY
Qty: 0 | Refills: 0 | Status: DISCONTINUED | OUTPATIENT
Start: 2018-02-28 | End: 2018-03-06

## 2018-02-28 RX ORDER — HEPARIN SODIUM 5000 [USP'U]/ML
5000 INJECTION INTRAVENOUS; SUBCUTANEOUS EVERY 8 HOURS
Qty: 0 | Refills: 0 | Status: DISCONTINUED | OUTPATIENT
Start: 2018-03-01 | End: 2018-03-06

## 2018-02-28 RX ORDER — ISOSORBIDE MONONITRATE 60 MG/1
60 TABLET, EXTENDED RELEASE ORAL DAILY
Qty: 0 | Refills: 0 | Status: DISCONTINUED | OUTPATIENT
Start: 2018-02-28 | End: 2018-03-06

## 2018-02-28 RX ORDER — CEFAZOLIN SODIUM 1 G
2000 VIAL (EA) INJECTION EVERY 8 HOURS
Qty: 0 | Refills: 0 | Status: DISCONTINUED | OUTPATIENT
Start: 2018-02-28 | End: 2018-03-01

## 2018-02-28 RX ORDER — POLYETHYLENE GLYCOL 3350 17 G/17G
17 POWDER, FOR SOLUTION ORAL DAILY
Qty: 0 | Refills: 0 | Status: DISCONTINUED | OUTPATIENT
Start: 2018-02-28 | End: 2018-03-06

## 2018-02-28 RX ORDER — METOPROLOL TARTRATE 50 MG
25 TABLET ORAL DAILY
Qty: 0 | Refills: 0 | Status: DISCONTINUED | OUTPATIENT
Start: 2018-02-28 | End: 2018-03-06

## 2018-02-28 RX ORDER — SENNA PLUS 8.6 MG/1
2 TABLET ORAL AT BEDTIME
Qty: 0 | Refills: 0 | Status: DISCONTINUED | OUTPATIENT
Start: 2018-02-28 | End: 2018-03-06

## 2018-02-28 RX ORDER — ONDANSETRON 8 MG/1
4 TABLET, FILM COATED ORAL EVERY 6 HOURS
Qty: 0 | Refills: 0 | Status: DISCONTINUED | OUTPATIENT
Start: 2018-02-28 | End: 2018-03-06

## 2018-02-28 RX ORDER — ALPRAZOLAM 0.25 MG
0.5 TABLET ORAL THREE TIMES A DAY
Qty: 0 | Refills: 0 | Status: DISCONTINUED | OUTPATIENT
Start: 2018-02-28 | End: 2018-03-06

## 2018-02-28 RX ORDER — DIPHENHYDRAMINE HCL 50 MG
25 CAPSULE ORAL EVERY 6 HOURS
Qty: 0 | Refills: 0 | Status: DISCONTINUED | OUTPATIENT
Start: 2018-02-28 | End: 2018-03-06

## 2018-02-28 RX ORDER — SODIUM CHLORIDE 9 MG/ML
1000 INJECTION, SOLUTION INTRAVENOUS
Qty: 0 | Refills: 0 | Status: DISCONTINUED | OUTPATIENT
Start: 2018-02-28 | End: 2018-03-06

## 2018-02-28 RX ORDER — SODIUM CHLORIDE 9 MG/ML
1000 INJECTION, SOLUTION INTRAVENOUS
Qty: 0 | Refills: 0 | Status: DISCONTINUED | OUTPATIENT
Start: 2018-02-28 | End: 2018-02-28

## 2018-02-28 RX ORDER — LANOLIN ALCOHOL/MO/W.PET/CERES
3 CREAM (GRAM) TOPICAL AT BEDTIME
Qty: 0 | Refills: 0 | Status: DISCONTINUED | OUTPATIENT
Start: 2018-02-28 | End: 2018-03-06

## 2018-02-28 RX ADMIN — Medication 40 MILLIGRAM(S): at 13:14

## 2018-02-28 RX ADMIN — LISINOPRIL 20 MILLIGRAM(S): 2.5 TABLET ORAL at 05:18

## 2018-02-28 RX ADMIN — Medication 100 MILLIGRAM(S): at 23:09

## 2018-02-28 RX ADMIN — SODIUM CHLORIDE 3 MILLILITER(S): 9 INJECTION INTRAMUSCULAR; INTRAVENOUS; SUBCUTANEOUS at 05:17

## 2018-02-28 RX ADMIN — Medication 600 MILLIGRAM(S): at 05:18

## 2018-02-28 RX ADMIN — AMLODIPINE BESYLATE 5 MILLIGRAM(S): 2.5 TABLET ORAL at 05:18

## 2018-02-28 RX ADMIN — Medication 100 MILLIGRAM(S): at 23:08

## 2018-02-28 RX ADMIN — SODIUM CHLORIDE 3 MILLILITER(S): 9 INJECTION INTRAMUSCULAR; INTRAVENOUS; SUBCUTANEOUS at 10:46

## 2018-02-28 RX ADMIN — RANOLAZINE 500 MILLIGRAM(S): 500 TABLET, FILM COATED, EXTENDED RELEASE ORAL at 05:18

## 2018-02-28 RX ADMIN — BUDESONIDE AND FORMOTEROL FUMARATE DIHYDRATE 2 PUFF(S): 160; 4.5 AEROSOL RESPIRATORY (INHALATION) at 08:18

## 2018-02-28 RX ADMIN — ATORVASTATIN CALCIUM 20 MILLIGRAM(S): 80 TABLET, FILM COATED ORAL at 22:26

## 2018-02-28 RX ADMIN — Medication 0.5 MILLIGRAM(S): at 22:25

## 2018-02-28 RX ADMIN — TIOTROPIUM BROMIDE 1 CAPSULE(S): 18 CAPSULE ORAL; RESPIRATORY (INHALATION) at 08:16

## 2018-02-28 RX ADMIN — Medication 20 MILLIGRAM(S): at 05:18

## 2018-02-28 RX ADMIN — Medication 3 MILLIGRAM(S): at 23:09

## 2018-02-28 RX ADMIN — Medication 25 MILLIGRAM(S): at 05:18

## 2018-02-28 RX ADMIN — Medication 40 MILLIGRAM(S): at 09:47

## 2018-02-28 NOTE — PROGRESS NOTE ADULT - SUBJECTIVE AND OBJECTIVE BOX
Patient seen and examined. Pain controlled.    Physical exam  VS: Afebrile, vital signs stable  Gen: NAD  Right LE: Dressing with serous drainage. +EHL/FHL/TA/GSC. SILT L3-S1. +Capillary refill brisk. Compartments soft and nontender.      72M with right hip incision drainage s/p hip hemiarthroplasty    OR today - I&D  WBAT  Pain control  NPO except meds  Hold chemical anticoagulation  IV fluids while NPO  Will discuss with attending and advise if change Patient seen and examined. Pain controlled.    Physical exam  VS: Afebrile, HR 100s overnight  Gen: NAD  Right LE: Dressing with serous drainage. +EHL/FHL/TA/GSC. SILT L3-S1. +Capillary refill brisk. Compartments soft and nontender.      72M with right hip incision drainage s/p hip hemiarthroplasty    OR today - I&D  WBAT  Pain control  NPO except meds  Hold chemical anticoagulation  IV fluids while NPO  Will discuss with attending and advise if change

## 2018-02-28 NOTE — CONSULT NOTE ADULT - SUBJECTIVE AND OBJECTIVE BOX
Patient is a 72y old  Male who presents with a chief complaint of Sent from 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 He was undergoing rehabilitation and drainage was noticed by Nursing staff at the facility who contacted the orthopedist and the patient was referred to [facility] for further evaluation. No Fever, chills reported.      PMH: as above  Patient was hospitalized after fall and underwent right Hip Hemiarthroplasty on 02/05/18, discharged to [facility] on Lovenox for DVT prophyalaxis for 4 weeks. Previous hospital course notable for Post-operative Ileus, NSVT.  PSH: as above  Meds: per reconciliation sheet, noted below  MEDICATIONS  (STANDING):  amLODIPine   Tablet 5 milliGRAM(s) Oral daily  atorvastatin 20 milliGRAM(s) Oral at bedtime  benzonatate 100 milliGRAM(s) Oral daily  buDESOnide 160 MICROgram(s)/formoterol 4.5 MICROgram(s) Inhaler 2 Puff(s) Inhalation two times a day  finasteride 5 milliGRAM(s) Oral daily  furosemide    Tablet 20 milliGRAM(s) Oral daily  guaiFENesin  milliGRAM(s) Oral every 12 hours  isosorbide   mononitrate ER Tablet (IMDUR) 60 milliGRAM(s) Oral daily  lisinopril 20 milliGRAM(s) Oral daily  melatonin 3 milliGRAM(s) Oral at bedtime  methylPREDNISolone sodium succinate Injectable 40 milliGRAM(s) IV Push every 8 hours  metoprolol succinate ER 25 milliGRAM(s) Oral daily  pantoprazole  Injectable 40 milliGRAM(s) IV Push daily  ranolazine 500 milliGRAM(s) Oral two times a day  sodium chloride 0.9% lock flush 3 milliLiter(s) IV Push every 8 hours  tamsulosin 0.4 milliGRAM(s) Oral at bedtime  tiotropium 18 MICROgram(s) Capsule 1 Capsule(s) Inhalation daily    MEDICATIONS  (PRN):  acetaminophen   Tablet. 650 milliGRAM(s) Oral every 6 hours PRN Mild Pain (1 - 3)  ALPRAZolam 0.5 milliGRAM(s) Oral three times a day PRN anxiety  HYDROcodone/homatropine Syrup 5 milliLiter(s) Oral every 4 hours PRN Cough  ondansetron Injectable 4 milliGRAM(s) IV Push every 6 hours PRN Nausea    Allergies    No Known Allergies    Intolerances      Social: no smoking, no alcohol, no illegal drugs; no recent travel, no exposure to TB  FAMILY HISTORY:  No pertinent family history in first degree relatives    ROS: the patient denies fever, no chills, no HA, no dizziness, no sore throat, no blurry vision, no CP, no palpitations, no SOB, no cough, no abdominal pain, no diarrhea, no N/V, no dysuria, no leg pain, no claudication, has left hip draining wound, no joint aches, no rectal pain or bleeding, no night sweats    Vital Signs Last 24 Hrs  T(C): 36.9 (28 Feb 2018 05:18), Max: 37.3 (27 Feb 2018 21:00)  T(F): 98.5 (28 Feb 2018 05:18), Max: 99.2 (27 Feb 2018 21:00)  HR: 108 (28 Feb 2018 05:18) (57 - 113)  BP: 123/57 (28 Feb 2018 05:18) (123/57 - 142/85)  BP(mean): --  RR: 19 (28 Feb 2018 05:18) (18 - 20)  SpO2: 92% (28 Feb 2018 05:18) (90% - 97%)  Daily     Daily     PE:    Constitutional: frail looking  HEENT: NC/AT, EOMI, PERRLA  Neck: supple  Back: no tenderness  Respiratory: clear  Cardiovascular: S1S2 regular, no murmurs  Abdomen: soft, not tender, not distended, positive BS  Genitourinary: no LN palpable  Rectal: deferred  Musculoskeletal: no muscle tenderness, no joint swelling or tenderness  Extremities: no pedal edema; has left hip draining wound  Neurological: AxOx3, moving all extremities  Skin: no rashes    Labs:                        9.6    11.7  )-----------( 319      ( 28 Feb 2018 04:25 )             29.7     02-28    138  |  102  |  9   ----------------------------<  136<H>  4.2   |  34<H>  |  0.51    Ca    8.2<L>      28 Feb 2018 04:25  Phos  2.8     02-27  Mg     1.9     02-27    TPro  5.7<L>  /  Alb  2.3<L>  /  TBili  0.4  /  DBili  x   /  AST  18  /  ALT  35  /  AlkPhos  133<H>  02-27     LIVER FUNCTIONS - ( 27 Feb 2018 05:48 )  Alb: 2.3 g/dL / Pro: 5.7 gm/dL / ALK PHOS: 133 U/L / ALT: 35 U/L / AST: 18 U/L / GGT: x                 Radiology:    Advanced directives addressed: full resuscitation Patient is a 72y old  Male who presents with a chief complaint of Sent from [facility] for continued drainage from surgical site (26 Feb 2018 21:27)    HPI:  73 y/o male with h/o Diastolic Heart Failure, Oxygen-Dependant COPD on 4L Home Oxygen, HTN, HLD, CAD s/p stent in LCx, Spinal Stenosis, Obesity, KENNY, PAD, recent  right Hip Hemiarthroplasty on 02/05/18 was admitted on 2/26 for continued drainage from surgical site. He was undergoing rehabilitation and drainage was noticed by Nursing staff at the facility who contacted the orthopedist and the patient was referred to [facility] for further evaluation. No Fever, chills reported.      PMH: as above  Patient was hospitalized after fall and underwent right Hip Hemiarthroplasty on 02/05/18, discharged to [facility] on Lovenox for DVT prophyalaxis for 4 weeks. Previous hospital course notable for Post-operative Ileus, NSVT.  PSH: as above  Meds: per reconciliation sheet, noted below  MEDICATIONS  (STANDING):  amLODIPine   Tablet 5 milliGRAM(s) Oral daily  atorvastatin 20 milliGRAM(s) Oral at bedtime  benzonatate 100 milliGRAM(s) Oral daily  buDESOnide 160 MICROgram(s)/formoterol 4.5 MICROgram(s) Inhaler 2 Puff(s) Inhalation two times a day  finasteride 5 milliGRAM(s) Oral daily  furosemide    Tablet 20 milliGRAM(s) Oral daily  guaiFENesin  milliGRAM(s) Oral every 12 hours  isosorbide   mononitrate ER Tablet (IMDUR) 60 milliGRAM(s) Oral daily  lisinopril 20 milliGRAM(s) Oral daily  melatonin 3 milliGRAM(s) Oral at bedtime  methylPREDNISolone sodium succinate Injectable 40 milliGRAM(s) IV Push every 8 hours  metoprolol succinate ER 25 milliGRAM(s) Oral daily  pantoprazole  Injectable 40 milliGRAM(s) IV Push daily  ranolazine 500 milliGRAM(s) Oral two times a day  sodium chloride 0.9% lock flush 3 milliLiter(s) IV Push every 8 hours  tamsulosin 0.4 milliGRAM(s) Oral at bedtime  tiotropium 18 MICROgram(s) Capsule 1 Capsule(s) Inhalation daily    MEDICATIONS  (PRN):  acetaminophen   Tablet. 650 milliGRAM(s) Oral every 6 hours PRN Mild Pain (1 - 3)  ALPRAZolam 0.5 milliGRAM(s) Oral three times a day PRN anxiety  HYDROcodone/homatropine Syrup 5 milliLiter(s) Oral every 4 hours PRN Cough  ondansetron Injectable 4 milliGRAM(s) IV Push every 6 hours PRN Nausea    Allergies    No Known Allergies    Intolerances      Social: no smoking, no alcohol, no illegal drugs; no recent travel, no exposure to TB  FAMILY HISTORY:  No pertinent family history in first degree relatives    ROS: the patient denies fever, no chills, no HA, no dizziness, no sore throat, no blurry vision, no CP, no palpitations, no SOB, no cough, no abdominal pain, no diarrhea, no N/V, no dysuria, no leg pain, no claudication, has left hip draining wound, no joint aches, no rectal pain or bleeding, no night sweats    Vital Signs Last 24 Hrs  T(C): 36.9 (28 Feb 2018 05:18), Max: 37.3 (27 Feb 2018 21:00)  T(F): 98.5 (28 Feb 2018 05:18), Max: 99.2 (27 Feb 2018 21:00)  HR: 108 (28 Feb 2018 05:18) (57 - 113)  BP: 123/57 (28 Feb 2018 05:18) (123/57 - 142/85)  BP(mean): --  RR: 19 (28 Feb 2018 05:18) (18 - 20)  SpO2: 92% (28 Feb 2018 05:18) (90% - 97%)  Daily     Daily     PE:    Constitutional: frail looking  HEENT: NC/AT, EOMI, PERRLA  Neck: supple  Back: no tenderness  Respiratory: clear  Cardiovascular: S1S2 regular, no murmurs  Abdomen: soft, not tender, not distended, positive BS  Genitourinary: no LN palpable  Rectal: deferred  Musculoskeletal: no muscle tenderness, no joint swelling or tenderness  Extremities: no pedal edema; has right hip draining wound with surrounding erythema  Neurological: AxOx3, moving all extremities  Skin: no rashes    Labs:                        9.6    11.7  )-----------( 319      ( 28 Feb 2018 04:25 )             29.7     02-28    138  |  102  |  9   ----------------------------<  136<H>  4.2   |  34<H>  |  0.51    Ca    8.2<L>      28 Feb 2018 04:25  Phos  2.8     02-27  Mg     1.9     02-27    TPro  5.7<L>  /  Alb  2.3<L>  /  TBili  0.4  /  DBili  x   /  AST  18  /  ALT  35  /  AlkPhos  133<H>  02-27     LIVER FUNCTIONS - ( 27 Feb 2018 05:48 )  Alb: 2.3 g/dL / Pro: 5.7 gm/dL / ALK PHOS: 133 U/L / ALT: 35 U/L / AST: 18 U/L / GGT: x                 Radiology:    Advanced directives addressed: full resuscitation

## 2018-02-28 NOTE — CONSULT NOTE ADULT - ASSESSMENT
71 y/o male with h/o Diastolic Heart Failure, Oxygen-Dependant COPD on 4L Home Oxygen, HTN, HLD, CAD s/p stent in LCx, Spinal Stenosis, Obesity, KENNY, PAD, recent  right Hip Hemiarthroplasty on 02/05/18 was admitted on 2/26 for continued drainage from surgical site. He was undergoing rehabilitation and drainage was noticed by Nursing staff at the facility who contacted the orthopedist and the patient was referred to [facility] for further evaluation. No Fever, chills reported.    1. Low grade fever. Left hip postsurgical site infection. 73 y/o male with h/o Diastolic Heart Failure, Oxygen-Dependant COPD on 4L Home Oxygen, HTN, HLD, CAD s/p stent in LCx, Spinal Stenosis, Obesity, KENNY, PAD, recent  right Hip Hemiarthroplasty on 02/05/18 was admitted on 2/26 for continued drainage from surgical site. He was undergoing rehabilitation and drainage was noticed by Nursing staff at the facility who contacted the orthopedist and the patient was referred to  for further evaluation. No Fever, chills reported.    1. Low grade fever. Right hip postsurgical site infection. Left hip cellulitis.   -obtain BC x 2  -plan for washout surgery later today  -would obtain culture intraoperative  -after surgery start vancomycin 750 mg IV q12h and ceftriaxone 2 gm IV qd  -reason for abx use and side effects reviewed with patient; monitor BMP and vancomycin trough levels   -local wound care  -monitor temps  -f/u CBC  -supportive care  2. Other issues:   -care per medicine

## 2018-02-28 NOTE — PROGRESS NOTE ADULT - SUBJECTIVE AND OBJECTIVE BOX
Post-Op Check:    Pt S/E at bedside, tolerated procedure well, pain controlled    Vital Signs Last 24 Hrs  T(C): 36.3 (28 Feb 2018 21:00), Max: 37.9 (28 Feb 2018 12:15)  T(F): 97.4 (28 Feb 2018 21:00), Max: 100.3 (28 Feb 2018 12:15)  HR: 88 (28 Feb 2018 21:00) (88 - 108)  BP: 106/48 (28 Feb 2018 21:00) (106/48 - 160/77)  RR: 16 (28 Feb 2018 21:00) (16 - 22)  SpO2: 88% (28 Feb 2018 21:00) (88% - 95%)    Gen: NAD, AAOx3    Right Lower Extremity:  Dressing clean dry intact  +EHL/FHL/TA/GS  SILT L3-S1  +DP/PT Pulses  HMV in place  Compartments soft  No calf TTP B/L  Abd pillow

## 2018-02-28 NOTE — BRIEF OPERATIVE NOTE - PROCEDURE
<<-----Click on this checkbox to enter Procedure Revision of hemiarthroplasty of hip  02/28/2018    Active  AROSS7

## 2018-03-01 ENCOUNTER — TRANSCRIPTION ENCOUNTER (OUTPATIENT)
Age: 73
End: 2018-03-01

## 2018-03-01 LAB
ANION GAP SERPL CALC-SCNC: 5 MMOL/L — SIGNIFICANT CHANGE UP (ref 5–17)
BUN SERPL-MCNC: 18 MG/DL — SIGNIFICANT CHANGE UP (ref 7–23)
CALCIUM SERPL-MCNC: 8.6 MG/DL — SIGNIFICANT CHANGE UP (ref 8.5–10.1)
CHLORIDE SERPL-SCNC: 100 MMOL/L — SIGNIFICANT CHANGE UP (ref 96–108)
CO2 SERPL-SCNC: 34 MMOL/L — HIGH (ref 22–31)
CREAT SERPL-MCNC: 0.66 MG/DL — SIGNIFICANT CHANGE UP (ref 0.5–1.3)
GLUCOSE SERPL-MCNC: 177 MG/DL — HIGH (ref 70–99)
GRAM STN FLD: SIGNIFICANT CHANGE UP
GRAM STN FLD: SIGNIFICANT CHANGE UP
HCT VFR BLD CALC: 29.3 % — LOW (ref 39–50)
HGB BLD-MCNC: 9.3 G/DL — LOW (ref 13–17)
MCHC RBC-ENTMCNC: 30.4 PG — SIGNIFICANT CHANGE UP (ref 27–34)
MCHC RBC-ENTMCNC: 31.9 GM/DL — LOW (ref 32–36)
MCV RBC AUTO: 95.3 FL — SIGNIFICANT CHANGE UP (ref 80–100)
PLATELET # BLD AUTO: 296 K/UL — SIGNIFICANT CHANGE UP (ref 150–400)
POTASSIUM SERPL-MCNC: 4.2 MMOL/L — SIGNIFICANT CHANGE UP (ref 3.5–5.3)
POTASSIUM SERPL-SCNC: 4.2 MMOL/L — SIGNIFICANT CHANGE UP (ref 3.5–5.3)
RBC # BLD: 3.08 M/UL — LOW (ref 4.2–5.8)
RBC # FLD: 13.9 % — SIGNIFICANT CHANGE UP (ref 10.3–14.5)
SODIUM SERPL-SCNC: 139 MMOL/L — SIGNIFICANT CHANGE UP (ref 135–145)
SPECIMEN SOURCE: SIGNIFICANT CHANGE UP
SPECIMEN SOURCE: SIGNIFICANT CHANGE UP
WBC # BLD: 12.1 K/UL — HIGH (ref 3.8–10.5)
WBC # FLD AUTO: 12.1 K/UL — HIGH (ref 3.8–10.5)

## 2018-03-01 PROCEDURE — 99223 1ST HOSP IP/OBS HIGH 75: CPT

## 2018-03-01 RX ORDER — CEFTRIAXONE 500 MG/1
INJECTION, POWDER, FOR SOLUTION INTRAMUSCULAR; INTRAVENOUS
Qty: 0 | Refills: 0 | Status: DISCONTINUED | OUTPATIENT
Start: 2018-03-01 | End: 2018-03-01

## 2018-03-01 RX ORDER — TIOTROPIUM BROMIDE 18 UG/1
1 CAPSULE ORAL; RESPIRATORY (INHALATION) DAILY
Qty: 0 | Refills: 0 | Status: DISCONTINUED | OUTPATIENT
Start: 2018-03-01 | End: 2018-03-06

## 2018-03-01 RX ORDER — CEFTRIAXONE 500 MG/1
2000 INJECTION, POWDER, FOR SOLUTION INTRAMUSCULAR; INTRAVENOUS EVERY 24 HOURS
Qty: 0 | Refills: 0 | Status: DISCONTINUED | OUTPATIENT
Start: 2018-03-01 | End: 2018-03-06

## 2018-03-01 RX ORDER — ALBUTEROL 90 UG/1
2.5 AEROSOL, METERED ORAL EVERY 6 HOURS
Qty: 0 | Refills: 0 | Status: DISCONTINUED | OUTPATIENT
Start: 2018-03-01 | End: 2018-03-06

## 2018-03-01 RX ADMIN — Medication 100 MILLIGRAM(S): at 05:42

## 2018-03-01 RX ADMIN — Medication 100 MILLIGRAM(S): at 11:42

## 2018-03-01 RX ADMIN — ISOSORBIDE MONONITRATE 60 MILLIGRAM(S): 60 TABLET, EXTENDED RELEASE ORAL at 11:42

## 2018-03-01 RX ADMIN — Medication 20 MILLIGRAM(S): at 05:42

## 2018-03-01 RX ADMIN — AMLODIPINE BESYLATE 5 MILLIGRAM(S): 2.5 TABLET ORAL at 08:28

## 2018-03-01 RX ADMIN — ALBUTEROL 2.5 MILLIGRAM(S): 90 AEROSOL, METERED ORAL at 15:40

## 2018-03-01 RX ADMIN — Medication 600 MILLIGRAM(S): at 16:54

## 2018-03-01 RX ADMIN — Medication 250 MILLIGRAM(S): at 18:41

## 2018-03-01 RX ADMIN — TIOTROPIUM BROMIDE 1 CAPSULE(S): 18 CAPSULE ORAL; RESPIRATORY (INHALATION) at 15:42

## 2018-03-01 RX ADMIN — Medication 25 MILLIGRAM(S): at 08:29

## 2018-03-01 RX ADMIN — Medication 40 MILLIGRAM(S): at 20:51

## 2018-03-01 RX ADMIN — CEFTRIAXONE 2000 MILLIGRAM(S): 500 INJECTION, POWDER, FOR SOLUTION INTRAMUSCULAR; INTRAVENOUS at 13:01

## 2018-03-01 RX ADMIN — Medication 40 MILLIGRAM(S): at 09:54

## 2018-03-01 RX ADMIN — FINASTERIDE 5 MILLIGRAM(S): 5 TABLET, FILM COATED ORAL at 11:42

## 2018-03-01 RX ADMIN — Medication 0.5 MILLIGRAM(S): at 20:49

## 2018-03-01 RX ADMIN — HEPARIN SODIUM 5000 UNIT(S): 5000 INJECTION INTRAVENOUS; SUBCUTANEOUS at 20:50

## 2018-03-01 RX ADMIN — HEPARIN SODIUM 5000 UNIT(S): 5000 INJECTION INTRAVENOUS; SUBCUTANEOUS at 13:00

## 2018-03-01 RX ADMIN — ATORVASTATIN CALCIUM 20 MILLIGRAM(S): 80 TABLET, FILM COATED ORAL at 20:50

## 2018-03-01 RX ADMIN — Medication 250 MILLIGRAM(S): at 06:20

## 2018-03-01 RX ADMIN — ALBUTEROL 2.5 MILLIGRAM(S): 90 AEROSOL, METERED ORAL at 20:14

## 2018-03-01 RX ADMIN — Medication 600 MILLIGRAM(S): at 05:42

## 2018-03-01 RX ADMIN — Medication 650 MILLIGRAM(S): at 14:47

## 2018-03-01 NOTE — PROGRESS NOTE ADULT - SUBJECTIVE AND OBJECTIVE BOX
Patient is a 72y old  Male who presents with a chief complaint of Sent from [facility] for continued drainage from surgical site (26 Feb 2018 21:27)    HPI:  73 y/o male with h/o Diastolic Heart Failure, Oxygen-Dependant COPD on 4L Home Oxygen, HTN, HLD, CAD s/p stent in LCx, Spinal Stenosis, Obesity, KENNY, PAD, recent  right Hip Hemiarthroplasty on 02/05/18 was admitted on 2/26 for continued drainage from surgical site. He was undergoing rehabilitation and drainage was noticed by Nursing staff at the facility who contacted the orthopedist and the patient was referred to [facility] for further evaluation. No Fever, chills reported.    Date of service: 03-01-18 @ 11:05    OOB to chair  Right hip pain is improving  Wants to go home    MEDICATIONS  (STANDING):  ALBUTerol    0.083% 2.5 milliGRAM(s) Nebulizer every 6 hours  amLODIPine   Tablet 5 milliGRAM(s) Oral daily  atorvastatin 20 milliGRAM(s) Oral at bedtime  benzonatate 100 milliGRAM(s) Oral daily  buDESOnide 160 MICROgram(s)/formoterol 4.5 MICROgram(s) Inhaler 2 Puff(s) Inhalation two times a day  ceFAZolin   IVPB 2000 milliGRAM(s) IV Intermittent every 8 hours  docusate sodium 100 milliGRAM(s) Oral three times a day  finasteride 5 milliGRAM(s) Oral daily  furosemide    Tablet 20 milliGRAM(s) Oral daily  guaiFENesin  milliGRAM(s) Oral every 12 hours  heparin  Injectable 5000 Unit(s) SubCutaneous every 8 hours  isosorbide   mononitrate ER Tablet (IMDUR) 60 milliGRAM(s) Oral daily  lactated ringers. 1000 milliLiter(s) (75 mL/Hr) IV Continuous <Continuous>  melatonin 3 milliGRAM(s) Oral at bedtime  methylPREDNISolone sodium succinate Injectable 40 milliGRAM(s) IV Push every 12 hours  metoprolol succinate ER 25 milliGRAM(s) Oral daily  polyethylene glycol 3350 17 Gram(s) Oral daily  tiotropium 18 MICROgram(s) Capsule 1 Capsule(s) Inhalation daily    vancomycin  IVPB 1000 milliGRAM(s) IV Intermittent every 12 hours    MEDICATIONS  (PRN):  acetaminophen   Tablet 650 milliGRAM(s) Oral every 6 hours PRN For Temp greater than 38 C (100.4 F)  acetaminophen   Tablet 650 milliGRAM(s) Oral every 6 hours PRN Headache  ALPRAZolam 0.5 milliGRAM(s) Oral three times a day PRN anxiety  aluminum hydroxide/magnesium hydroxide/simethicone Suspension 30 milliLiter(s) Oral four times a day PRN Indigestion  benzocaine 15 mG/menthol 3.6 mG Lozenge 1 Lozenge Oral every 4 hours PRN Sore Throat  diphenhydrAMINE   Capsule 25 milliGRAM(s) Oral every 6 hours PRN Rash and/or Itching  diphenhydrAMINE   Capsule 25 milliGRAM(s) Oral at bedtime PRN Insomnia  HYDROmorphone  Injectable 1 milliGRAM(s) IV Push every 3 hours PRN Severe Pain  magnesium hydroxide Suspension 30 milliLiter(s) Oral daily PRN Constipation  ondansetron Injectable 4 milliGRAM(s) IV Push every 6 hours PRN Nausea and/or Vomiting  oxyCODONE    IR 5 milliGRAM(s) Oral every 4 hours PRN Mild Pain  oxyCODONE    IR 10 milliGRAM(s) Oral every 4 hours PRN Moderate Pain  senna 2 Tablet(s) Oral at bedtime PRN Constipation      Vital Signs Last 24 Hrs  T(C): 36.4 (01 Mar 2018 04:36), Max: 37.9 (28 Feb 2018 12:15)  T(F): 97.6 (01 Mar 2018 04:36), Max: 100.3 (28 Feb 2018 12:15)  HR: 92 (01 Mar 2018 04:36) (88 - 92)  BP: 108/49 (01 Mar 2018 08:15) (101/51 - 160/77)  BP(mean): --  RR: 18 (01 Mar 2018 08:15) (16 - 22)  SpO2: 91% (01 Mar 2018 08:15) (88% - 95%)    Physical Exam:      Constitutional: frail looking  HEENT: NC/AT, EOMI, PERRLA  Neck: supple  Back: no tenderness  Respiratory: clear  Cardiovascular: S1S2 regular, no murmurs  Abdomen: soft, not tender, not distended, positive BS  Genitourinary: no LN palpable  Rectal: deferred  Musculoskeletal: no muscle tenderness, no joint swelling or tenderness  Extremities: no pedal edema; right hip s/p washout surgery dressed  Neurological: AxOx3, moving all extremities  Skin: no rashes    Labs:                        9.3    12.1  )-----------( 296      ( 01 Mar 2018 06:27 )             29.3     03-01    139  |  100  |  18  ----------------------------<  177<H>  4.2   |  34<H>  |  0.66    Ca    8.6      01 Mar 2018 06:27                        9.6    11.7  )-----------( 319      ( 28 Feb 2018 04:25 )             29.7     02-28    138  |  102  |  9   ----------------------------<  136<H>  4.2   |  34<H>  |  0.51    Ca    8.2<L>      28 Feb 2018 04:25  Phos  2.8     02-27  Mg     1.9     02-27    TPro  5.7<L>  /  Alb  2.3<L>  /  TBili  0.4  /  DBili  x   /  AST  18  /  ALT  35  /  AlkPhos  133<H>  02-27     LIVER FUNCTIONS - ( 27 Feb 2018 05:48 )  Alb: 2.3 g/dL / Pro: 5.7 gm/dL / ALK PHOS: 133 U/L / ALT: 35 U/L / AST: 18 U/L / GGT: x             Culture - Tissue with Gram Stain (collected 28 Feb 2018 17:45)  Source: .Tissue right hip joint tissue for culture  Gram Stain (01 Mar 2018 03:36):    No polymorphonuclear cells seen    No organisms seen    Culture - Tissue with Gram Stain (collected 28 Feb 2018 17:45)  Source: .Tissue right hip subcutaneous tissue for culture  Gram Stain (01 Mar 2018 03:35):    Rare polymorphonuclear leukocytes seen per low power field    No organisms seen    Culture - Blood (collected 26 Feb 2018 19:02)  Source: .Blood Blood  Preliminary Report (28 Feb 2018 01:02):    No growth to date.    Culture - Blood (collected 26 Feb 2018 18:55)  Source: .Blood None  Preliminary Report (28 Feb 2018 01:02):    No growth to date.          Radiology:    Advanced directives addressed: full resuscitation

## 2018-03-01 NOTE — DISCHARGE NOTE ADULT - PLAN OF CARE
Return to baseline activities of daily living Discharge Instructions  Hip Hemiarthroplasty    1. Diet: Resume previous diet  2. Activity: WBAT. Rolling walker. Posterior Hip Dislocation Precautions. Abduction Pillow while in bed and Chair. Daily Physical Therapy. Rolling walker.  3. Call with: fever over 101, wound redness, drainage or open area, calf pain/calf swelling.  4. Wound Care: Remove old and Place new Aquacel bandage to hip wound every 7 days.   5. RN to Remove sutures Post Op Day #14 so long as wound is healed, no drainage or open area.   6. OK to Shower with Aquacel.  Avoid direct water beating on bandage.   7. DVT PE Prophylaxis: see med rec for details  8. Labs: Check H&H weekly while on Anticoagulation; Check INR if on Coumadin  9. Follow Up: Orthopedics, 14-21 days in office. Call to schedule. Follow Up with Plastic Surgery Dr. Conte for wound management and suture removal. Please call office to schedule.   10. Discharge Instructions Left Hip I&D and Revision Hemiarthroplasty    1. Diet: Resume previous diet  2. Activity: WBAT. Rolling walker. Posterior Hip Dislocation Precautions. Abduction Pillow while in bed and Chair. Daily Physical Therapy. Rolling walker.  3. Call with: fever over 101, wound redness, drainage or open area, calf pain/calf swelling.  4. Wound Care: Remove old and Place new Aquacel bandage to hip wound every 7 days.   5. Follow Up with Plastic Surgery Dr. Conte for wound management and suture removal. Please call office to schedule.   6. OK to Shower with Aquacel.  Avoid direct water beating on bandage.   7. DVT PE Prophylaxis: see med rec for details.  8. Labs: Check H&H weekly while on Anticoagulation.  9. Follow Up: Orthopedics Dr. Tang 14-21 days in office. Call to schedule. Discharge Instructions Left Hip I&D and Revision Hemiarthroplasty    1. Diet: Resume previous diet  2. Activity: WBAT. Rolling walker. Posterior Hip Dislocation Precautions. Abduction Pillow while in bed and Chair. Daily Physical Therapy. Rolling walker.  3. Call with: fever over 101, wound redness, drainage or open area, calf pain/calf swelling.  4. Wound Care: Remove old and Place new Aquacel bandage to hip wound every 7 days.   5. Follow Up with Plastic Surgery Dr. Conte for wound management and suture removal. Please call office to schedule.   6. OK to Shower with Aquacel.  Avoid direct water beating on bandage.   7. DVT PE Prophylaxis: see med rec for details.  8. Labs: Check H&H weekly while on Anticoagulation.  9. Follow Up: Orthopedics Dr. Tang 14-21 days in office. Call to schedule.  10. PICC line and IV Antibiotics management per ID recommendations. Discharge Instructions Left Hip I&D and Revision Hemiarthroplasty    1. Diet: Resume previous diet  2. Activity: WBAT. Rolling walker. Posterior Hip Dislocation Precautions. Abduction Pillow while in bed and Chair. Daily Physical Therapy. Rolling walker.  3. Call with: fever over 101, wound redness, drainage or open area, calf pain/calf swelling.  4. Wound Care: Remove old and Place new Aquacel bandage to hip wound every 7 days.   5. Follow Up with Plastic Surgery Dr. Conte for wound management and suture removal. Please call office to schedule.   6. OK to Shower with Aquacel.  Avoid direct water beating on bandage.   7. DVT PE Prophylaxis: see med rec for details.  8. Labs: Check H&H weekly while on Anticoagulation.  9. Follow Up: Orthopedics Dr. Tang in 10 days in office. Call to schedule.  10. PICC line and IV Antibiotics management per ID recommendations.

## 2018-03-01 NOTE — DISCHARGE NOTE ADULT - MEDICATION SUMMARY - MEDICATIONS TO TAKE
I will START or STAY ON the medications listed below when I get home from the hospital:    Proscar 5 mg oral tablet  -- 1 tab(s) by mouth once a day  -- Indication: For Home med    predniSONE 20 mg oral tablet  -- 1 tab(s) by mouth once a day, decrease to 10mg daily on 3/7 X 2 days, then stop.   -- Indication: For copd exacerbation    oxyCODONE 5 mg oral tablet  -- 1 tab(s) by mouth every 4 hours, As needed, moderate to severe pain  -- Indication: For Pain    acetaminophen 325 mg oral tablet  -- 2 tab(s) by mouth every 6 hours, As needed, for mild pain  -- Indication: For Pain    lisinopril 20 mg oral tablet  -- 1 tab(s) by mouth once a day  -- Indication: For Home med    Flomax 0.4 mg oral capsule  -- 1 cap(s) by mouth once a day  -- Indication: For Home med    Imdur 60 mg oral tablet, extended release  -- 1 tab(s) by mouth once a day (in the morning)  -- Indication: For Home med    Ranexa 500 mg oral tablet, extended release  -- 1 tab(s) by mouth 2 times a day  -- Indication: For Home med    enoxaparin  -- 40 milligram(s) subcutaneous every 12 hours. stop after march 28 2018  -- Indication: For dvt prophylaxis    Lipitor 20 mg oral tablet  -- 1 tab(s) by mouth once a day  -- Indication: For Home med    ALPRAZolam 0.5 mg oral tablet  -- 1 tab(s) by mouth 3 times a day, As needed, anxiety  -- Indication: For Home med    Toprol-XL 25 mg oral tablet, extended release  -- 1 tab(s) by mouth once a day  -- Indication: For Home med    Spiriva 18 mcg inhalation capsule  -- 1 cap(s) inhaled once a day  -- Indication: For Home med    Advair Diskus 250 mcg-50 mcg inhalation powder  -- 1 puff(s) inhaled 2 times a day  -- Indication: For Home med    albuterol 2.5 mg/3 mL (0.083%) inhalation solution  -- 3 milliliter(s) inhaled every 4 hours, As Needed for sob/wheeze  -- Indication: For copd exacerbation    Norvasc 5 mg oral tablet  -- 1 tab(s) by mouth once a day  -- Indication: For Home med    cefTRIAXone 2 g intravenous injection  -- 2 gram(s) intravenous every 24 hours. stop after april 11, 2018  -- Indication: For Post op infection    furosemide 20 mg oral tablet  -- 1 tab(s) by mouth once a day  -- Indication: For Home med    guaiFENesin 600 mg oral tablet, extended release  -- 1 tab(s) by mouth every 12 hours for 4 more days  -- Indication: For copd exacerbation    vancomycin 1 g intravenous injection  -- 1 gram(s) intravenous every 12 hours. stop after 4/11/18  -- Indication: For Post op hip infn    bisacodyl 10 mg rectal suppository  -- 1 suppository(ies) rectally once a day  -- Indication: For constipation    polyethylene glycol 3350 oral powder for reconstitution  -- 17 gram(s) by mouth once a day  -- Indication: For constipation    senna oral tablet  -- 2 tab(s) by mouth once a day (at bedtime)  -- Indication: For constipation    docusate sodium 100 mg oral capsule  -- 1 cap(s) by mouth 3 times a day  -- Indication: For constipation    melatonin 3 mg oral tablet  -- 1 tab(s) by mouth once a day (at bedtime)  -- Indication: For insomnia    pantoprazole 40 mg oral delayed release tablet  -- 1 tab(s) by mouth once a day  -- Indication: For Home med    Multiple Vitamins oral tablet  -- 1 tab(s) by mouth once a day  -- Indication: For Home med    ascorbic acid 500 mg oral tablet  -- 1 tab(s) by mouth once a day  -- Indication: For Home med

## 2018-03-01 NOTE — DISCHARGE NOTE ADULT - HOSPITAL COURSE
Orthopedic Summary  H&P:  Pt is a 72y Male  PAST MEDICAL & SURGICAL HISTORY:  PVD (peripheral vascular disease)  KENNY (obstructive sleep apnea)  Hyperlipidemia  Hypertension, unspecified type  Spinal stenosis  CHF (congestive heart failure)  Emphysema  COPD (chronic obstructive pulmonary disease)  No significant past surgical history       Now s/p RIGHT Hip revision Hemiarthroplasty for surgical site infection. Pt is afebrile with stable vital signs. Pain is controlled. Exam reveals intact EHL FHL TA GS, +DP. Dressing is clean and dry.    WB Status: Weight bearing as tolerated with rolling walker. Abduction pillow in bed or chair. Posterior hip precautions.    Vital Signs Last 24 Hrs  T(C): 36.8 (03-06-18 @ 04:34), Max: 37.1 (03-05-18 @ 17:16)  T(F): 98.2 (03-06-18 @ 04:34), Max: 98.7 (03-05-18 @ 17:16)  HR: 80 (03-06-18 @ 04:34) (80 - 99)  BP: 151/75 (03-06-18 @ 04:34) (127/62 - 166/82)  BP(mean): --  RR: 18 (03-05-18 @ 17:16) (18 - 18)  SpO2: 98% (03-06-18 @ 04:34) (97% - 98%)                        10.2   14.8  )-----------( 343      ( 05 Mar 2018 06:31 )             31.2     Hospital Course:  Patient presented to [facility] ED after continued hip drainage concerning for Surgical Site infection. Pt was admitted to medicine, medically/cardiac cleared prior to surgery. Prophylactic antibiotics were started before the procedure and continued for 24 hours. They were admitted after surgery to the orthopedic floor.  He received and ID consult and PICC line for long term ABX. Pt also received consult from plastic surgery for wound managment. There were no complications during the hospital stay. All home medications were continued.    Routine consults were obtained from the Anticoagulation Team for DVT/PE prophylaxis, from Physical Therapy for posterior hip precaution teaching and twice daily PT, and pt was followed by Medicine. Patient was placed on HEPARIN SC for anticoagulation.  Pertinent home medications were continued.  Daily labs were followed.      On POD 0 there were no overnight events. Received twice daily PT and new dressing was applied prior to discharge. The pt will likely need DC to Rehab for ongoing PT, once evaluated and ok per Medicine.  The orthopedic Attending is aware and agrees. See addendum to DC summary per medical team below for any additional info or if any changes. Orthopedic Summary  H&P:  Pt is a 72y Male  PAST MEDICAL & SURGICAL HISTORY:  PVD (peripheral vascular disease)  KENNY (obstructive sleep apnea)  Hyperlipidemia  Hypertension, unspecified type  Spinal stenosis  CHF (congestive heart failure)  COPD (chronic obstructive pulmonary disease)  No significant past surgical history    Now s/p RIGHT Hip revision Hemiarthroplasty for surgical site infection. Pt is afebrile with stable vital signs. Pain is controlled. Exam reveals intact EHL FHL TA GS, +DP. Dressing is clean and dry.    WB Status: Weight bearing as tolerated with rolling walker. Abduction pillow in bed or chair. Posterior hip precautions.    Vital Signs Last 24 Hrs  T(C): 36.8 (03-06-18 @ 04:34), Max: 37.1 (03-05-18 @ 17:16)  T(F): 98.2 (03-06-18 @ 04:34), Max: 98.7 (03-05-18 @ 17:16)  HR: 80 (03-06-18 @ 04:34) (80 - 99)  BP: 151/75 (03-06-18 @ 04:34) (127/62 - 166/82)  BP(mean): --  RR: 18 (03-05-18 @ 17:16) (18 - 18)  SpO2: 98% (03-06-18 @ 04:34) (97% - 98%)          LABS:                10.6   13.6  )-----------( 364      ( 06 Mar 2018 06:05 )             33.4     139  |  96  |  23  ----------------------------<  113<H>  4.0   |  39<H>  |  0.72    Ca    9.6           Hospital Course:  Patient presented to [facility] ED after continued hip drainage concerning for Surgical Site infection. Pt was admitted to medicine, medically/cardiac cleared prior to surgery. Prophylactic antibiotics were started before the procedure and continued for 24 hours. They were admitted after surgery to the orthopedic floor.  He received and ID consult and PICC line for long term ABX. Pt also received consult from plastic surgery for wound managment. There were no complications during the hospital stay. All home medications were continued.    Routine consults were obtained from the Anticoagulation Team for DVT/PE prophylaxis, from Physical Therapy for posterior hip precaution teaching and twice daily PT, and pt was followed by Medicine. Patient was placed on HEPARIN SC for anticoagulation.  Pertinent home medications were continued.  Daily labs were followed.      On POD 0 there were no overnight events. Received twice daily PT and new dressing was applied prior to discharge. The pt will likely need DC to Rehab for ongoing PT, once evaluated and ok per Medicine.  The orthopedic Attending is aware and agrees. See addendum to DC summary per medical team below for any additional info or if any changes.    HOSPITALIST ADDENDUM:  Hospital course was also notable for acute copd exacerbation for which he was seen by dr. koch and treated with iv steroids/nebs/antitussives. Overall patient has improved. He will be discharged on steroid taper. Picc line was placed and pt will be discharged on 5 more weeks of iv abx as per Dr. Pierce.  He will be discharged on lovenox for dvt px.   He is advised outpt f/u with ortho/pcp and pulmonary    FINAL DIAGNOSIS:    1. Recent right hip hemiarthroplasty with post surgical infn with mssa, enfae and cnst  2. Acute copd exacerbation with acute on chronic respiratory failure  3. h/o CAD s/p stents  4. Chr diastolic CHF  5. HTN  6. BPH  7. h/o PVD

## 2018-03-01 NOTE — PROGRESS NOTE ADULT - SUBJECTIVE AND OBJECTIVE BOX
SUBJECTIVE     Patient un eventful recovery . still has cough . on iv antibiotics for the possible infection of the hip . no current wheezing or nasal drip . Has low grade fever         PAST MEDICAL & SURGICAL HISTORY:  PVD (peripheral vascular disease)  KENNY (obstructive sleep apnea)  Hyperlipidemia  Hypertension, unspecified type  Spinal stenosis  CHF (congestive heart failure)  Emphysema  COPD (chronic obstructive pulmonary disease)  No significant past surgical history          OBJECTIVE       Vital Signs Last 24 Hrs  T(C): 36.4 (01 Mar 2018 04:36), Max: 37.9 (28 Feb 2018 12:15)  T(F): 97.6 (01 Mar 2018 04:36), Max: 100.3 (28 Feb 2018 12:15)  HR: 92 (01 Mar 2018 04:36) (88 - 92)  BP: 108/49 (01 Mar 2018 08:15) (101/51 - 160/77)  BP(mean): --  RR: 18 (01 Mar 2018 08:15) (16 - 22)  SpO2: 91% (01 Mar 2018 08:15) (88% - 95%)        Daily     Daily       I&O's Summary    28 Feb 2018 07:01  -  01 Mar 2018 07:00  --------------------------------------------------------  IN: 850 mL / OUT: 205 mL / NET: 645 mL          PHYSICAL EXAM:    Constitutional: , awake and alert, not in distress.     HEENT: Normo cephalic atraumatic    Neck: Soft and supple, No J.V.D     Respiratory: vesicular breathing , transmitted sounds with few rales over the bases     Cardiovascular: S1 and S2, regular rate .     Gastrointestinal:  soft, nontender and obese     Extremities: stasis dermatitis with recent hip surgery with the post op wound     Neurological: No new  focal deficits.      Medications:  MEDICATIONS  (STANDING):  amLODIPine   Tablet 5 milliGRAM(s) Oral daily  atorvastatin 20 milliGRAM(s) Oral at bedtime  benzonatate 100 milliGRAM(s) Oral daily  buDESOnide 160 MICROgram(s)/formoterol 4.5 MICROgram(s) Inhaler 2 Puff(s) Inhalation two times a day  ceFAZolin   IVPB 2000 milliGRAM(s) IV Intermittent every 8 hours  docusate sodium 100 milliGRAM(s) Oral three times a day  finasteride 5 milliGRAM(s) Oral daily  furosemide    Tablet 20 milliGRAM(s) Oral daily  guaiFENesin  milliGRAM(s) Oral every 12 hours  heparin  Injectable 5000 Unit(s) SubCutaneous every 8 hours  isosorbide   mononitrate ER Tablet (IMDUR) 60 milliGRAM(s) Oral daily  lactated ringers. 1000 milliLiter(s) (75 mL/Hr) IV Continuous <Continuous>  melatonin 3 milliGRAM(s) Oral at bedtime  metoprolol succinate ER 25 milliGRAM(s) Oral daily  polyethylene glycol 3350 17 Gram(s) Oral daily  vancomycin  IVPB 1000 milliGRAM(s) IV Intermittent every 12 hours                                  9.3    12.1  )-----------( 296      ( 01 Mar 2018 06:27 )             29.3     03-01    139  |  100  |  18  ----------------------------<  177<H>  4.2   |  34<H>  |  0.66    Ca    8.6      01 Mar 2018 06:27        Culture - Tissue with Gram Stain (collected 28 Feb 2018 17:45)  Source: .Tissue right hip joint tissue for culture  Gram Stain (01 Mar 2018 03:36):    No polymorphonuclear cells seen    No organisms seen    Culture - Tissue with Gram Stain (collected 28 Feb 2018 17:45)  Source: .Tissue right hip subcutaneous tissue for culture  Gram Stain (01 Mar 2018 03:35):    Rare polymorphonuclear leukocytes seen per low power field    No organisms seen    Culture - Blood (collected 26 Feb 2018 19:02)  Source: .Blood Blood  Preliminary Report (28 Feb 2018 01:02):    No growth to date.    Culture - Blood (collected 26 Feb 2018 18:55)  Source: .Blood None  Preliminary Report (28 Feb 2018 01:02):    No growth to date.

## 2018-03-01 NOTE — DISCHARGE NOTE ADULT - PATIENT PORTAL LINK FT
You can access the [facility] Patient Portal, offered by [facility], by registering with the following website: [email or web address]

## 2018-03-01 NOTE — CONSULT NOTE ADULT - SUBJECTIVE AND OBJECTIVE BOX
HPI:  72 year old male with past medical history of Diastolic Heart Failure, Oxygen-Dependant COPD on 4L Home Oxygen, HTN, HLD, CAD s/p stent in LCx, Spinal Stenosis, Obesity, KENNY, and PAD sent in from [facility] for continued drainage from surgical site. Patient was recently admitted for fall now s/p right Hip Hemiarthroplasty on 02/05/18, discharged to [facility] on Lovenox for DVT Prophyalaxis for 4 weeks. Previous hospital course notable for Post-operative Ileus, NSVT while off BB, and persistent drainage from surgical site. Drainage was noticed by Nursing staff at the facility who contacted the orthopedist and was referred to [facility] for further evaluation. No Fever, chills, or night sweats. (26 Feb 2018 21:27)      Patient is a 72y old  Male who presents with a chief complaint of Sent from [facility] for continued drainage from surgical site (26 Feb 2018 21:27)      Consulted by Dr. Tang  for VTE prophylaxis, risk stratification, and anticoagulation management.    PAST MEDICAL & SURGICAL HISTORY:  PVD (peripheral vascular disease)  KENNY (obstructive sleep apnea)  Hyperlipidemia  Hypertension, unspecified type  Spinal stenosis  CHF (congestive heart failure)  Emphysema  COPD (chronic obstructive pulmonary disease)  No significant past surgical history          CrCl: 210.5    Caprini VTE Risk Score: #6          IMPROVE Bleeding Risk Score #2.5    Falls Risk:   High ( x )  Mod (  )  Low (  )      FAMILY HISTORY:  No pertinent family history in first degree relatives    Denies any personal or familial history of clotting or bleeding disorders.    Allergies    No Known Allergies    Intolerances        REVIEW OF SYSTEMS    (  )Fever	     (  )Constipation	(  )SOB				(  )Headache	(  )Dysuria  (  )Chills	     (  )Melena	(  )Dyspnea present on exertion	                    (  )Dizziness                    (  )Polyuria  (  )Nausea	     (  )Hematochezia	(  )Cough			                    (  )Syncope   	(  )Hematuria  (  )Vomiting    (  )Chest Pain	(  )Wheezing			(  )Weakness  (  )Diarrhea     (  )Palpitations	(  )Anorexia			(  )Myalgia    All other review of systems negative: Yes          PHYSICAL EXAM:    Constitutional: Appears Well    Neurological: A& O x 3    Skin: Warm    Respiratory and Thorax: normal effort; Breath sounds: normal; No rales/wheezing/rhonchi  	  Cardiovascular: S1, S2, regular, NMBR	    Gastrointestinal: BS + x 4Q, nontender	    Genitourinary:  Bladder nondistended, nontender    Musculoskeletal:   General Right:   + muscle/joint tenderness,   normal tone, no joint swelling,   ROM: limited	    General Left:   no muscle/joint tenderness,   normal tone, no joint swelling,   ROM: limited/full    Hip:  Right: Dressing CDI; Drain: hemovac ; Type of drng.: serosang.; Amt. of drng: small              Lower extrems:   Right: no calf tenderness              negative rolando's sign               + pedal pulses    Left:   no calf tenderness              negative rolando's sign               + pedal pulses                          9.3    12.1  )-----------( 296      ( 01 Mar 2018 06:27 )             29.3       03-01    139  |  100  |  18  ----------------------------<  177<H>  4.2   |  34<H>  |  0.66    Ca    8.6      01 Mar 2018 06:27        PT/INR - ( 28 Feb 2018 04:25 )   PT: 12.0 sec;   INR: 1.11 ratio         PTT - ( 28 Feb 2018 04:25 )  PTT:26.0 sec				    MEDICATIONS  (STANDING):  amLODIPine   Tablet 5 milliGRAM(s) Oral daily  atorvastatin 20 milliGRAM(s) Oral at bedtime  benzonatate 100 milliGRAM(s) Oral daily  buDESOnide 160 MICROgram(s)/formoterol 4.5 MICROgram(s) Inhaler 2 Puff(s) Inhalation two times a day  ceFAZolin   IVPB 2000 milliGRAM(s) IV Intermittent every 8 hours  docusate sodium 100 milliGRAM(s) Oral three times a day  finasteride 5 milliGRAM(s) Oral daily  furosemide    Tablet 20 milliGRAM(s) Oral daily  guaiFENesin  milliGRAM(s) Oral every 12 hours  heparin  Injectable 5000 Unit(s) SubCutaneous every 8 hours  isosorbide   mononitrate ER Tablet (IMDUR) 60 milliGRAM(s) Oral daily  lactated ringers. 1000 milliLiter(s) IV Continuous <Continuous>  melatonin 3 milliGRAM(s) Oral at bedtime  metoprolol succinate ER 25 milliGRAM(s) Oral daily  polyethylene glycol 3350 17 Gram(s) Oral daily  vancomycin  IVPB 1000 milliGRAM(s) IV Intermittent every 12 hours          DVT Prophylaxis:  LMWH                   (  )  Heparin SQ           ( x )  Coumadin             (  )  Xarelto                  (  )  Eliquis                   (  )  Venodynes           ( x )  Ambulation          (x  )  UFH                       (  )  Contraindicated  (  ) HPI:  72 year old male with past medical history of Diastolic Heart Failure, Oxygen-Dependant COPD on 4L Home Oxygen, HTN, HLD, CAD s/p stent in LCx, Spinal Stenosis, Obesity, KENNY, and PAD sent in from [facility] for continued drainage from surgical site. Patient was recently admitted for fall now s/p right Hip Hemiarthroplasty on 02/05/18, discharged to [facility] on Lovenox for DVT Prophyalaxis for 4 weeks. Previous hospital course notable for Post-operative Ileus, NSVT while off BB, and persistent drainage from surgical site. Drainage was noticed by Nursing staff at the facility who contacted the orthopedist and was referred to [facility] for further evaluation. No Fever, chills, or night sweats. (26 Feb 2018 21:27)      Patient is a 72y old  Male who presents with a chief complaint of Sent from [facility] for continued drainage from surgical site (26 Feb 2018 21:27)      Consulted by Dr. Tang  for VTE prophylaxis, risk stratification, and anticoagulation management.    PAST MEDICAL & SURGICAL HISTORY:  PVD (peripheral vascular disease)  KENNY (obstructive sleep apnea)  Hyperlipidemia  Hypertension, unspecified type  Spinal stenosis  CHF (congestive heart failure)  Emphysema  COPD (chronic obstructive pulmonary disease)  No significant past surgical history          CrCl: 210.5    Caprini VTE Risk Score: #6          IMPROVE Bleeding Risk Score #2.5    Falls Risk:   High ( x )  Mod (  )  Low (  )      FAMILY HISTORY:  No pertinent family history in first degree relatives    Denies any personal or familial history of clotting or bleeding disorders.    Allergies    No Known Allergies    Intolerances        REVIEW OF SYSTEMS    (  )Fever	     (  )Constipation	(  )SOB				(  )Headache	(  )Dysuria  (  )Chills	     (  )Melena	(  )Dyspnea present on exertion	                    (  )Dizziness                    (  )Polyuria  (  )Nausea	     (  )Hematochezia	(  )Cough			                    (  )Syncope   	(  )Hematuria  (  )Vomiting    (  )Chest Pain	(  )Wheezing			(  )Weakness  (  )Diarrhea     (  )Palpitations	(  )Anorexia			(  )Myalgia    All other review of systems negative: Yes          PHYSICAL EXAM:    Constitutional: Appears Well    Neurological: A& O x 3    Skin: Warm    Respiratory and Thorax: normal effort; Breath sounds: diminished throughout, O2 @L NC inplace  	  Cardiovascular: S1, S2, regular, NMBR	    Gastrointestinal: BS + x 4Q, nontender	    Genitourinary:  Bladder nondistended, nontender    Musculoskeletal:   General Right:   + muscle/joint tenderness,   normal tone, no joint swelling,   ROM: limited	    General Left:   no muscle/joint tenderness,   normal tone, no joint swelling,   ROM: limited/full    Hip:  Right: Dressing CDI; Drain: hemovac ; Type of drng.: serosang.; Amt. of drng: small              Lower extrems:   Right: no calf tenderness              negative rolando's sign               + pedal pulses    Left:   no calf tenderness              negative rolando's sign               + pedal pulses                          9.3    12.1  )-----------( 296      ( 01 Mar 2018 06:27 )             29.3       03-01    139  |  100  |  18  ----------------------------<  177<H>  4.2   |  34<H>  |  0.66    Ca    8.6      01 Mar 2018 06:27        PT/INR - ( 28 Feb 2018 04:25 )   PT: 12.0 sec;   INR: 1.11 ratio         PTT - ( 28 Feb 2018 04:25 )  PTT:26.0 sec				    MEDICATIONS  (STANDING):  amLODIPine   Tablet 5 milliGRAM(s) Oral daily  atorvastatin 20 milliGRAM(s) Oral at bedtime  benzonatate 100 milliGRAM(s) Oral daily  buDESOnide 160 MICROgram(s)/formoterol 4.5 MICROgram(s) Inhaler 2 Puff(s) Inhalation two times a day  ceFAZolin   IVPB 2000 milliGRAM(s) IV Intermittent every 8 hours  docusate sodium 100 milliGRAM(s) Oral three times a day  finasteride 5 milliGRAM(s) Oral daily  furosemide    Tablet 20 milliGRAM(s) Oral daily  guaiFENesin  milliGRAM(s) Oral every 12 hours  heparin  Injectable 5000 Unit(s) SubCutaneous every 8 hours  isosorbide   mononitrate ER Tablet (IMDUR) 60 milliGRAM(s) Oral daily  lactated ringers. 1000 milliLiter(s) IV Continuous <Continuous>  melatonin 3 milliGRAM(s) Oral at bedtime  metoprolol succinate ER 25 milliGRAM(s) Oral daily  polyethylene glycol 3350 17 Gram(s) Oral daily  vancomycin  IVPB 1000 milliGRAM(s) IV Intermittent every 12 hours          DVT Prophylaxis:  LMWH                   (  )  Heparin SQ           ( x )  Coumadin             (  )  Xarelto                  (  )  Eliquis                   (  )  Venodynes           ( x )  Ambulation          (x  )  UFH                       (  )  Contraindicated  (  )

## 2018-03-01 NOTE — PROGRESS NOTE ADULT - SUBJECTIVE AND OBJECTIVE BOX
PCP- DR Viera    CC- Patient is a 72y old  Male who presents with a chief complaint of Sent from [facility] for continued drainage from surgical site (01 Mar 2018 12:41)    HPI:  72 year old male with past medical history of Diastolic Heart Failure, Oxygen-Dependant COPD on 4L Home Oxygen, HTN, HLD, CAD s/p stent in LCx, Spinal Stenosis, Obesity, KENNY, and PAD sent in from [facility] for continued drainage from surgical site. Patient was recently admitted for fall now s/p right Hip Hemiarthroplasty on 02/05/18, discharged to [facility] on Lovenox for DVT Prophyalaxis for 4 weeks. Previous hospital course notable for Post-operative Ileus, NSVT while off BB, and persistent drainage from surgical site. Drainage was noticed by Nursing staff at the facility who contacted the orthopedist and was referred to [facility] for further evaluation. No Fever, chills, or night sweats. (26 Feb 2018 21:27)    Hospital stay- underwent revision RT THR/hardware exchange/wash out    3/1/18- doing good.  Review of system- All 10 systems reviewed and is as per HPI otherwise negative.     T(C): 36.2 (03-01-18 @ 10:46), Max: 36.4 (02-28-18 @ 21:34)  HR: 92 (03-01-18 @ 10:46) (88 - 92)  BP: 116/72 (03-01-18 @ 10:46) (101/51 - 160/77)  RR: 16 (03-01-18 @ 10:46) (16 - 22)  SpO2: 96% (03-01-18 @ 10:46) (88% - 96%)  Wt(kg): --    LABS:                        9.3    12.1  )-----------( 296      ( 01 Mar 2018 06:27 )             29.3     139  |  100  |  18  ----------------------------<  177<H>  4.2   |  34<H>  |  0.66  Ca    8.6      01 Mar 2018 06:27  PT/INR - ( 28 Feb 2018 04:25 )   PT: 12.0 sec;   INR: 1.11 ratio     PTT - ( 28 Feb 2018 04:25 )  PTT:26.0 sec    RADIOLOGY & ADDITIONAL TESTS:  EXAM:  XR HIP INCL PELV 1V RT                        PROCEDURE DATE:  02/28/2018    INTERPRETATION:  CLINICAL INFORMATION: Intraoperative  AP view of the right hip    COMPARISON:  February 26, 2018    FINDINGS: Status post right hip arthroplasty. Alignment of the prosthetic   components appears anatomic, however evaluation is limited due to lack of   an orthogonal view. Antibiotic eluting beads are noted.    IMPRESSION:  Status post right hip arthroplasty.     PHYSICAL EXAM:  GENERAL: NAD, well-groomed, well-developed  HEAD:  Atraumatic, Normocephalic  EYES: EOMI, PERRLA, conjunctiva and sclera clear  HEENT: Moist mucous membranes  NECK: Supple, No JVD  NERVOUS SYSTEM:  Alert & Oriented X3, Motor Strength 5/5 B/L upper and lower extremities; DTRs 2+ intact and symmetric  CHEST/LUNG: some wheezing  HEART: Regular rate and rhythm; No murmurs, rubs, or gallops  ABDOMEN: Soft, Nontender, Nondistended; Bowel sounds present  GENITOURINARY- Voiding, no palpable bladder  EXTREMITIES:  2+ Peripheral Pulses, No clubbing, cyanosis, or edema  MUSCULOSKELTAL- RT hip dressing dry  SKIN-no rash, no lesion  CNS- alert, oriented X3, non focal     MEDICATIONS  (STANDING):  ALBUTerol    0.083% 2.5 milliGRAM(s) Nebulizer every 6 hours  amLODIPine   Tablet 5 milliGRAM(s) Oral daily  atorvastatin 20 milliGRAM(s) Oral at bedtime  benzonatate 100 milliGRAM(s) Oral daily  buDESOnide 160 MICROgram(s)/formoterol 4.5 MICROgram(s) Inhaler 2 Puff(s) Inhalation two times a day  cefTRIAXone Injectable. 2000 milliGRAM(s) IV Push every 24 hours  docusate sodium 100 milliGRAM(s) Oral three times a day  finasteride 5 milliGRAM(s) Oral daily  furosemide    Tablet 20 milliGRAM(s) Oral daily  guaiFENesin  milliGRAM(s) Oral every 12 hours  heparin  Injectable 5000 Unit(s) SubCutaneous every 8 hours  isosorbide   mononitrate ER Tablet (IMDUR) 60 milliGRAM(s) Oral daily  lactated ringers. 1000 milliLiter(s) (75 mL/Hr) IV Continuous <Continuous>  melatonin 3 milliGRAM(s) Oral at bedtime  methylPREDNISolone sodium succinate Injectable 40 milliGRAM(s) IV Push every 12 hours  metoprolol succinate ER 25 milliGRAM(s) Oral daily  polyethylene glycol 3350 17 Gram(s) Oral daily  tiotropium 18 MICROgram(s) Capsule 1 Capsule(s) Inhalation daily  vancomycin  IVPB 1000 milliGRAM(s) IV Intermittent every 12 hours    MEDICATIONS  (PRN):  acetaminophen   Tablet 650 milliGRAM(s) Oral every 6 hours PRN For Temp greater than 38 C (100.4 F)  acetaminophen   Tablet 650 milliGRAM(s) Oral every 6 hours PRN Headache  ALPRAZolam 0.5 milliGRAM(s) Oral three times a day PRN anxiety  aluminum hydroxide/magnesium hydroxide/simethicone Suspension 30 milliLiter(s) Oral four times a day PRN Indigestion  benzocaine 15 mG/menthol 3.6 mG Lozenge 1 Lozenge Oral every 4 hours PRN Sore Throat  diphenhydrAMINE   Capsule 25 milliGRAM(s) Oral every 6 hours PRN Rash and/or Itching  diphenhydrAMINE   Capsule 25 milliGRAM(s) Oral at bedtime PRN Insomnia  HYDROmorphone  Injectable 1 milliGRAM(s) IV Push every 3 hours PRN Severe Pain  magnesium hydroxide Suspension 30 milliLiter(s) Oral daily PRN Constipation  ondansetron Injectable 4 milliGRAM(s) IV Push every 6 hours PRN Nausea and/or Vomiting  oxyCODONE    IR 5 milliGRAM(s) Oral every 4 hours PRN Mild Pain  oxyCODONE    IR 10 milliGRAM(s) Oral every 4 hours PRN Moderate Pain  senna 2 Tablet(s) Oral at bedtime PRN Constipation    Assessment/Plan  #RT hip oozing/ s/p RT THR/wash out  Ortho eval appreciated  ID eval appreciated  Cont IV abx as per ID  Will need PICC line and 6 weeks of IV abx on discharge  Cont PT  AC by heparin subq  Bowel regimen  Incentive spirometry    #COPD exacerbation  Pulm f/u appreciated  IV steroids, nebs    #CAD s/p stents  Cont cardiac meds    #Chr diastolic CHF- compensated    #Dispo- PT/will need 6 weeks of IV abx on discharge

## 2018-03-01 NOTE — ADVANCED PRACTICE NURSE CONSULT - ASSESSMENT
This is a 72 year old male that was admitted to the hospital on 2/26/2018 for infected hip wound.  PMH- Diastolic Heart Failure, Oxygen-Dependant COPD on 4L Home Oxygen, HTN, HLD, CAD s/p stent in LCx, Spinal Stenosis, Obesity, KENNY, and PAD.    Requested by MD to assess patient's right dorsal foot and BL inner buttocks.     Patient presents sitting in a chair. Patient's right dorsum foot assessed to have a ruptured blister. Wound base clean and pink. No odor. Periwound intact. Wound irrigated with normal saline. Adaptic touch placed over wound and nonadhesive dressing placed over and wrapped with kerlix.    Patient assisted to a standing position. Left inner buttocks with 2 shallow full thickness wounds measuring [id number]. Wound bed with 100% pink tissue. No odor. Wound cleansed with saline and triad wound paste placed on wounds to promote autolytic debridement and wound healing.  Foam dressing placed as cover dressing. Would classify as a stage 3 pressure ulcer. Right inner buttocks with a partial thickness wound measuring 5dfm3aye4.2cm. Wound bed with 100% pink tissue. No odor. Wound cleansed with saline and triad wound paste placed on wounds to promote autolytic debridement and wound healing.  Foam dressing placed as cover dressing. Right inner buttocks with a partial thickness wound measuring 9ehl3byc0.2cm. Would classify as a stage 2 pressure ulcer.     PT working with patient after dressing changes completed.

## 2018-03-01 NOTE — DISCHARGE NOTE ADULT - PROVIDER TOKENS
TOKEN:'[id number]',TOKEN:'[id number]' TOKEN:'[id number]:'[id number]'

## 2018-03-01 NOTE — DISCHARGE NOTE ADULT - CARE PLAN
Principal Discharge DX:	Wound drainage  Goal:	Return to baseline activities of daily living  Assessment and plan of treatment:	Discharge Instructions  Hip Hemiarthroplasty    1. Diet: Resume previous diet  2. Activity: WBAT. Rolling walker. Posterior Hip Dislocation Precautions. Abduction Pillow while in bed and Chair. Daily Physical Therapy. Rolling walker.  3. Call with: fever over 101, wound redness, drainage or open area, calf pain/calf swelling.  4. Wound Care: Remove old and Place new Aquacel bandage to hip wound every 7 days.   5. RN to Remove sutures Post Op Day #14 so long as wound is healed, no drainage or open area.   6. OK to Shower with Aquacel.  Avoid direct water beating on bandage.   7. DVT PE Prophylaxis: see med rec for details  8. Labs: Check H&H weekly while on Anticoagulation; Check INR if on Coumadin  9. Follow Up: Orthopedics, 14-21 days in office. Call to schedule. Follow Up with Plastic Surgery Dr. Conte for wound management and suture removal. Please call office to schedule.   10. Principal Discharge DX:	Wound drainage  Goal:	Return to baseline activities of daily living  Assessment and plan of treatment:	Discharge Instructions Left Hip I&D and Revision Hemiarthroplasty    1. Diet: Resume previous diet  2. Activity: WBAT. Rolling walker. Posterior Hip Dislocation Precautions. Abduction Pillow while in bed and Chair. Daily Physical Therapy. Rolling walker.  3. Call with: fever over 101, wound redness, drainage or open area, calf pain/calf swelling.  4. Wound Care: Remove old and Place new Aquacel bandage to hip wound every 7 days.   5. Follow Up with Plastic Surgery Dr. Conte for wound management and suture removal. Please call office to schedule.   6. OK to Shower with Aquacel.  Avoid direct water beating on bandage.   7. DVT PE Prophylaxis: see med rec for details.  8. Labs: Check H&H weekly while on Anticoagulation.  9. Follow Up: Orthopedics Dr. Tang 14-21 days in office. Call to schedule. Principal Discharge DX:	Wound drainage  Goal:	Return to baseline activities of daily living  Assessment and plan of treatment:	Discharge Instructions Left Hip I&D and Revision Hemiarthroplasty    1. Diet: Resume previous diet  2. Activity: WBAT. Rolling walker. Posterior Hip Dislocation Precautions. Abduction Pillow while in bed and Chair. Daily Physical Therapy. Rolling walker.  3. Call with: fever over 101, wound redness, drainage or open area, calf pain/calf swelling.  4. Wound Care: Remove old and Place new Aquacel bandage to hip wound every 7 days.   5. Follow Up with Plastic Surgery Dr. Conte for wound management and suture removal. Please call office to schedule.   6. OK to Shower with Aquacel.  Avoid direct water beating on bandage.   7. DVT PE Prophylaxis: see med rec for details.  8. Labs: Check H&H weekly while on Anticoagulation.  9. Follow Up: Orthopedics Dr. Tang 14-21 days in office. Call to schedule.  10. PICC line and IV Antibiotics management per ID recommendations. Principal Discharge DX:	Wound drainage  Goal:	Return to baseline activities of daily living  Assessment and plan of treatment:	Discharge Instructions Left Hip I&D and Revision Hemiarthroplasty    1. Diet: Resume previous diet  2. Activity: WBAT. Rolling walker. Posterior Hip Dislocation Precautions. Abduction Pillow while in bed and Chair. Daily Physical Therapy. Rolling walker.  3. Call with: fever over 101, wound redness, drainage or open area, calf pain/calf swelling.  4. Wound Care: Remove old and Place new Aquacel bandage to hip wound every 7 days.   5. Follow Up with Plastic Surgery Dr. Conte for wound management and suture removal. Please call office to schedule.   6. OK to Shower with Aquacel.  Avoid direct water beating on bandage.   7. DVT PE Prophylaxis: see med rec for details.  8. Labs: Check H&H weekly while on Anticoagulation.  9. Follow Up: Orthopedics Dr. Tang in 10 days in office. Call to schedule.  10. PICC line and IV Antibiotics management per ID recommendations.

## 2018-03-01 NOTE — DISCHARGE NOTE ADULT - CARE PROVIDER_API CALL
Alex Tang (), Orthopaedic Surgery  [address] [place] [address]  Phone: [phone]  Fax: [phone]    Zack Conte), Plastic Surgery  [address] [place] [address]  Phone: [phone]  Fax: [phone] Alex Tang (), Orthopaedic Surgery  [address] [place] [address]  Phone: [phone]  Fax: [phone]    Zack Conte (MD), Plastic Surgery  [address] [place] [address]  Phone: [phone]  Fax: [phone]    Arturo Viera), Hematology; Internal Medicine  [address]  [place] [address]  Phone: [phone]  Fax: [phone]

## 2018-03-01 NOTE — ADVANCED PRACTICE NURSE CONSULT - RECOMMEDATIONS
1) Turn and position every 2 hours while in bed  2) Assist to a standing position every hour while in chair for full minute  3) Elevate heels off mattress  4) Change dressing to right foot daily with adaptic   5) Change dressings to inner buttock every other day with triad and foam dressing  6) Albumin-2.3 on 2/27. Would recommend nutritional consult to assist with wound healing and nutritional status.
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
## 2018-03-02 DIAGNOSIS — D72.829 ELEVATED WHITE BLOOD CELL COUNT, UNSPECIFIED: ICD-10-CM

## 2018-03-02 DIAGNOSIS — E87.1 HYPO-OSMOLALITY AND HYPONATREMIA: ICD-10-CM

## 2018-03-02 DIAGNOSIS — Z95.5 PRESENCE OF CORONARY ANGIOPLASTY IMPLANT AND GRAFT: ICD-10-CM

## 2018-03-02 DIAGNOSIS — S72.011A UNSPECIFIED INTRACAPSULAR FRACTURE OF RIGHT FEMUR, INITIAL ENCOUNTER FOR CLOSED FRACTURE: ICD-10-CM

## 2018-03-02 DIAGNOSIS — I73.9 PERIPHERAL VASCULAR DISEASE, UNSPECIFIED: ICD-10-CM

## 2018-03-02 DIAGNOSIS — M21.379 FOOT DROP, UNSPECIFIED FOOT: ICD-10-CM

## 2018-03-02 DIAGNOSIS — I50.32 CHRONIC DIASTOLIC (CONGESTIVE) HEART FAILURE: ICD-10-CM

## 2018-03-02 DIAGNOSIS — Z99.81 DEPENDENCE ON SUPPLEMENTAL OXYGEN: ICD-10-CM

## 2018-03-02 DIAGNOSIS — J96.10 CHRONIC RESPIRATORY FAILURE, UNSPECIFIED WHETHER WITH HYPOXIA OR HYPERCAPNIA: ICD-10-CM

## 2018-03-02 DIAGNOSIS — I11.0 HYPERTENSIVE HEART DISEASE WITH HEART FAILURE: ICD-10-CM

## 2018-03-02 DIAGNOSIS — I25.10 ATHEROSCLEROTIC HEART DISEASE OF NATIVE CORONARY ARTERY WITHOUT ANGINA PECTORIS: ICD-10-CM

## 2018-03-02 DIAGNOSIS — M19.90 UNSPECIFIED OSTEOARTHRITIS, UNSPECIFIED SITE: ICD-10-CM

## 2018-03-02 DIAGNOSIS — N40.0 BENIGN PROSTATIC HYPERPLASIA WITHOUT LOWER URINARY TRACT SYMPTOMS: ICD-10-CM

## 2018-03-02 DIAGNOSIS — J43.9 EMPHYSEMA, UNSPECIFIED: ICD-10-CM

## 2018-03-02 DIAGNOSIS — K56.7 ILEUS, UNSPECIFIED: ICD-10-CM

## 2018-03-02 DIAGNOSIS — K59.00 CONSTIPATION, UNSPECIFIED: ICD-10-CM

## 2018-03-02 DIAGNOSIS — E78.5 HYPERLIPIDEMIA, UNSPECIFIED: ICD-10-CM

## 2018-03-02 DIAGNOSIS — E43 UNSPECIFIED SEVERE PROTEIN-CALORIE MALNUTRITION: ICD-10-CM
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
## 2018-03-02 DIAGNOSIS — E66.01 MORBID (SEVERE) OBESITY DUE TO EXCESS CALORIES: ICD-10-CM

## 2018-03-02 DIAGNOSIS — G47.33 OBSTRUCTIVE SLEEP APNEA (ADULT) (PEDIATRIC): ICD-10-CM

## 2018-03-02 DIAGNOSIS — K91.89 OTHER POSTPROCEDURAL COMPLICATIONS AND DISORDERS OF DIGESTIVE SYSTEM: ICD-10-CM

## 2018-03-02 DIAGNOSIS — M48.00 SPINAL STENOSIS, SITE UNSPECIFIED: ICD-10-CM

## 2018-03-02 LAB
ANION GAP SERPL CALC-SCNC: 3 MMOL/L — LOW (ref 5–17)
BUN SERPL-MCNC: 21 MG/DL — SIGNIFICANT CHANGE UP (ref 7–23)
CALCIUM SERPL-MCNC: 8.9 MG/DL — SIGNIFICANT CHANGE UP (ref 8.5–10.1)
CHLORIDE SERPL-SCNC: 100 MMOL/L — SIGNIFICANT CHANGE UP (ref 96–108)
CO2 SERPL-SCNC: 35 MMOL/L — HIGH (ref 22–31)
CREAT SERPL-MCNC: 0.76 MG/DL — SIGNIFICANT CHANGE UP (ref 0.5–1.3)
GLUCOSE SERPL-MCNC: 247 MG/DL — HIGH (ref 70–99)
HCT VFR BLD CALC: 26.3 % — LOW (ref 39–50)
HGB BLD-MCNC: 8.9 G/DL — LOW (ref 13–17)
MCHC RBC-ENTMCNC: 31.1 PG — SIGNIFICANT CHANGE UP (ref 27–34)
MCHC RBC-ENTMCNC: 33.9 GM/DL — SIGNIFICANT CHANGE UP (ref 32–36)
MCV RBC AUTO: 91.9 FL — SIGNIFICANT CHANGE UP (ref 80–100)
PLATELET # BLD AUTO: 316 K/UL — SIGNIFICANT CHANGE UP (ref 150–400)
POTASSIUM SERPL-MCNC: 4.1 MMOL/L — SIGNIFICANT CHANGE UP (ref 3.5–5.3)
POTASSIUM SERPL-SCNC: 4.1 MMOL/L — SIGNIFICANT CHANGE UP (ref 3.5–5.3)
RBC # BLD: 2.86 M/UL — LOW (ref 4.2–5.8)
RBC # FLD: 13.9 % — SIGNIFICANT CHANGE UP (ref 10.3–14.5)
SODIUM SERPL-SCNC: 138 MMOL/L — SIGNIFICANT CHANGE UP (ref 135–145)
SURGICAL PATHOLOGY FINAL REPORT - CH: SIGNIFICANT CHANGE UP
TROPONIN I SERPL-MCNC: 0.02 NG/ML — SIGNIFICANT CHANGE UP (ref 0.01–0.04)
VANCOMYCIN TROUGH SERPL-MCNC: 10.7 UG/ML — SIGNIFICANT CHANGE UP (ref 10–20)
WBC # BLD: 15.4 K/UL — HIGH (ref 3.8–10.5)
WBC # FLD AUTO: 15.4 K/UL — HIGH (ref 3.8–10.5)

## 2018-03-02 PROCEDURE — 99233 SBSQ HOSP IP/OBS HIGH 50: CPT

## 2018-03-02 PROCEDURE — 93010 ELECTROCARDIOGRAM REPORT: CPT

## 2018-03-02 RX ADMIN — HEPARIN SODIUM 5000 UNIT(S): 5000 INJECTION INTRAVENOUS; SUBCUTANEOUS at 05:02

## 2018-03-02 RX ADMIN — Medication 3 MILLIGRAM(S): at 22:00

## 2018-03-02 RX ADMIN — Medication 3 MILLIGRAM(S): at 00:34

## 2018-03-02 RX ADMIN — Medication 100 MILLIGRAM(S): at 10:44

## 2018-03-02 RX ADMIN — BUDESONIDE AND FORMOTEROL FUMARATE DIHYDRATE 2 PUFF(S): 160; 4.5 AEROSOL RESPIRATORY (INHALATION) at 08:10

## 2018-03-02 RX ADMIN — Medication 250 MILLIGRAM(S): at 17:46

## 2018-03-02 RX ADMIN — Medication 0.5 MILLIGRAM(S): at 20:04

## 2018-03-02 RX ADMIN — Medication 250 MILLIGRAM(S): at 06:26

## 2018-03-02 RX ADMIN — ALBUTEROL 2.5 MILLIGRAM(S): 90 AEROSOL, METERED ORAL at 13:22

## 2018-03-02 RX ADMIN — AMLODIPINE BESYLATE 5 MILLIGRAM(S): 2.5 TABLET ORAL at 05:02

## 2018-03-02 RX ADMIN — Medication 100 MILLIGRAM(S): at 22:00

## 2018-03-02 RX ADMIN — BENZOCAINE AND MENTHOL 1 LOZENGE: 5; 1 LIQUID ORAL at 20:07

## 2018-03-02 RX ADMIN — Medication 40 MILLIGRAM(S): at 17:46

## 2018-03-02 RX ADMIN — BUDESONIDE AND FORMOTEROL FUMARATE DIHYDRATE 2 PUFF(S): 160; 4.5 AEROSOL RESPIRATORY (INHALATION) at 20:22

## 2018-03-02 RX ADMIN — HEPARIN SODIUM 5000 UNIT(S): 5000 INJECTION INTRAVENOUS; SUBCUTANEOUS at 22:00

## 2018-03-02 RX ADMIN — Medication 25 MILLIGRAM(S): at 05:02

## 2018-03-02 RX ADMIN — HEPARIN SODIUM 5000 UNIT(S): 5000 INJECTION INTRAVENOUS; SUBCUTANEOUS at 13:23

## 2018-03-02 RX ADMIN — ISOSORBIDE MONONITRATE 60 MILLIGRAM(S): 60 TABLET, EXTENDED RELEASE ORAL at 10:44

## 2018-03-02 RX ADMIN — FINASTERIDE 5 MILLIGRAM(S): 5 TABLET, FILM COATED ORAL at 10:44

## 2018-03-02 RX ADMIN — CEFTRIAXONE 2000 MILLIGRAM(S): 500 INJECTION, POWDER, FOR SOLUTION INTRAMUSCULAR; INTRAVENOUS at 13:23

## 2018-03-02 RX ADMIN — Medication 600 MILLIGRAM(S): at 05:02

## 2018-03-02 RX ADMIN — ALBUTEROL 2.5 MILLIGRAM(S): 90 AEROSOL, METERED ORAL at 20:09

## 2018-03-02 RX ADMIN — ALBUTEROL 2.5 MILLIGRAM(S): 90 AEROSOL, METERED ORAL at 08:10

## 2018-03-02 RX ADMIN — TIOTROPIUM BROMIDE 1 CAPSULE(S): 18 CAPSULE ORAL; RESPIRATORY (INHALATION) at 08:10

## 2018-03-02 RX ADMIN — Medication 600 MILLIGRAM(S): at 17:46

## 2018-03-02 RX ADMIN — ATORVASTATIN CALCIUM 20 MILLIGRAM(S): 80 TABLET, FILM COATED ORAL at 22:00

## 2018-03-02 RX ADMIN — Medication 40 MILLIGRAM(S): at 05:02

## 2018-03-02 RX ADMIN — Medication 20 MILLIGRAM(S): at 05:02

## 2018-03-02 NOTE — PROVIDER CONTACT NOTE (CRITICAL VALUE NOTIFICATION) - TEST AND RESULT REPORTED:
right subcutaneous tissue culture  preliminary few stapholococcus aureus. surgical cx right hip wound preliminary moderate stapholococcus aureus

## 2018-03-02 NOTE — PROGRESS NOTE ADULT - SUBJECTIVE AND OBJECTIVE BOX
Pt seen & examined. Pain controlled. No acute events overnight. Doing well, wearing headphones in bed comfortably.    Vital Signs Last 24 Hrs  T(C): 36.8 (02 Mar 2018 00:02), Max: 37.3 (01 Mar 2018 17:30)  T(F): 98.2 (02 Mar 2018 00:02), Max: 99.1 (01 Mar 2018 17:30)  HR: 117 (02 Mar 2018 04:08) (90 - 117)  BP: 162/80 (02 Mar 2018 04:08) (108/49 - 162/80)  BP(mean): --  RR: 16 (02 Mar 2018 00:02) (16 - 18)  SpO2: 90% (02 Mar 2018 00:02) (90% - 96%)    Gen: NAD  RLE:  Dressing clean D&I  +sensation L2-S1  +dorsiflexion/plantarflexion of ankle/hallux  +dorsalis pedis pulse  Soft compartments, - calf tenderness

## 2018-03-02 NOTE — CHART NOTE - NSCHARTNOTEFT_GEN_A_CORE
Called by nurse for chest pain. Pt reports L sided chest pain. At time of interview he report pain completely resolved after drinking some ginger ale. He is completely asymptomatic at this time.    Vital Signs Last 24 Hrs  T(C): 36.8 (03-02-18 @ 00:02)  T(F): 98.2 (03-02-18 @ 00:02), Max: 99.1 (03-01-18 @ 17:30)  HR: 117 (03-02-18 @ 04:08) (90 - 117)  BP: 162/80 (03-02-18 @ 04:08)  BP(mean): --  RR: 16 (03-02-18 @ 00:02) (16 - 18)  SpO2: 90% (03-02-18 @ 00:02) (90% - 96%)  Wt(kg): --    02-28 @ 07:01  -  03-01 @ 07:00  --------------------------------------------------------  IN: 850 mL / OUT: 205 mL / NET: 645 mL    03-01 @ 07:01  -  03-02 @ 04:48  --------------------------------------------------------  IN: 0 mL / OUT: 450 mL / NET: -450 mL    ROS  Negative    Physical exam  Gen - AOx3, NAD, resting comfortably  Card - clear S1/S2, RRR  Resp - mild wheezing b/l, not tachypneic    A/P: Likely indigestion, however given cardiac history will get stat troponin    stat ekg showed no ST segment changes, unchanged from previous EKG (w/ PVCs)  f/u stat troponin  Already on statin and beta blocker  Will hold off giving 1x ASA until troponin back    Discussed w/ hospitalist & senior resident

## 2018-03-02 NOTE — PROGRESS NOTE ADULT - SUBJECTIVE AND OBJECTIVE BOX
SUBJECTIVE     Patient says he still has wheezing and cough is bothering him . No  sob or fever and tissue cultures grew staphylococcus on vanco         PAST MEDICAL & SURGICAL HISTORY:  PVD (peripheral vascular disease)  KENNY (obstructive sleep apnea)  Hyperlipidemia  Hypertension, unspecified type  Spinal stenosis  CHF (congestive heart failure)  Emphysema  COPD (chronic obstructive pulmonary disease)  No significant past surgical history          OBJECTIVE       Vital Signs Last 24 Hrs  T(C): 36.8 (02 Mar 2018 00:02), Max: 37.3 (01 Mar 2018 17:30)  T(F): 98.2 (02 Mar 2018 00:02), Max: 99.1 (01 Mar 2018 17:30)  HR: 86 (02 Mar 2018 08:10) (86 - 117)  BP: 162/80 (02 Mar 2018 04:08) (113/49 - 162/80)  BP(mean): --  RR: 16 (02 Mar 2018 00:02) (16 - 16)  SpO2: 90% (02 Mar 2018 00:02) (90% - 96%)              PHYSICAL EXAM:    Constitutional: , awake and alert, not in distress.     HEENT: Normo cephalic ,atraumatic .    Neck: Soft and supple, No J.V.D     Respiratory: vesicular breathing has mild wheeze with rales over the bases     Cardiovascular: S1 and S2, regular rate .     Gastrointestinal:  soft, nontender and obese     Extremities: stasis dermatitis with recent hip surgery with the post op wound     Neurological: No new  focal deficits.      Medications:  MEDICATIONS  (STANDING):  amLODIPine   Tablet 5 milliGRAM(s) Oral daily  atorvastatin 20 milliGRAM(s) Oral at bedtime  benzonatate 100 milliGRAM(s) Oral daily  buDESOnide 160 MICROgram(s)/formoterol 4.5 MICROgram(s) Inhaler 2 Puff(s) Inhalation two times a day  ceFAZolin   IVPB 2000 milliGRAM(s) IV Intermittent every 8 hours  docusate sodium 100 milliGRAM(s) Oral three times a day  finasteride 5 milliGRAM(s) Oral daily  furosemide    Tablet 20 milliGRAM(s) Oral daily  guaiFENesin  milliGRAM(s) Oral every 12 hours  heparin  Injectable 5000 Unit(s) SubCutaneous every 8 hours  isosorbide   mononitrate ER Tablet (IMDUR) 60 milliGRAM(s) Oral daily  lactated ringers. 1000 milliLiter(s) (75 mL/Hr) IV Continuous <Continuous>  melatonin 3 milliGRAM(s) Oral at bedtime  metoprolol succinate ER 25 milliGRAM(s) Oral daily  polyethylene glycol 3350 17 Gram(s) Oral daily  vancomycin  IVPB 1000 milliGRAM(s) IV Intermittent every 12 hours                            8.9    15.4  )-----------( 316      ( 02 Mar 2018 05:37 )             26.3         03-02    138  |  100  |  21  ----------------------------<  247<H>  4.1   |  35<H>  |  0.76    Ca    8.9      02 Mar 2018 05:37

## 2018-03-02 NOTE — PROGRESS NOTE ADULT - SUBJECTIVE AND OBJECTIVE BOX
HPI:  72 year old male with past medical history of Diastolic Heart Failure, Oxygen-Dependant COPD on 4L Home Oxygen, HTN, HLD, CAD s/p stent in LCx, Spinal Stenosis, Obesity, KENNY, and PAD sent in from [facility] for continued drainage from surgical site. Patient was recently admitted for fall now s/p right Hip Hemiarthroplasty on 02/05/18, discharged to [facility] on Lovenox for DVT Prophyalaxis for 4 weeks. Previous hospital course notable for Post-operative Ileus, NSVT while off BB, and persistent drainage from surgical site. Drainage was noticed by Nursing staff at the facility who contacted the orthopedist and was referred to [facility] for further evaluation. No Fever, chills, or night sweats. (26 Feb 2018 21:27)      Patient is a 72y old  Male who presents with a chief complaint of Sent from [facility] for continued drainage from surgical site (26 Feb 2018 21:27)      Consulted by Dr. Tang  for VTE prophylaxis, risk stratification, and anticoagulation management.    PAST MEDICAL & SURGICAL HISTORY:  PVD (peripheral vascular disease)  KENNY (obstructive sleep apnea)  Hyperlipidemia  Hypertension, unspecified type  Spinal stenosis  CHF (congestive heart failure)  Emphysema  COPD (chronic obstructive pulmonary disease)  No significant past surgical history    CrCl: 210.5  Caprini VTE Risk Score: #6  IMPROVE Bleeding Risk Score #2.5    Falls Risk:   High ( x )  Mod (  )  Low (  )    3/2/18: Patient seen at bedside- OOB to chair- dressing maintained dry. PICC line in place. Discussed heparin SQ with patient. Verbalized understanding and agreement.    FAMILY HISTORY:  No pertinent family history in first degree relatives    Denies any personal or familial history of clotting or bleeding disorders.    Allergies    No Known Allergies    Intolerances        REVIEW OF SYSTEMS    (  )Fever	     (  )Constipation	(  )SOB				(  )Headache	(  )Dysuria  (  )Chills	     (  )Melena	(  )Dyspnea present on exertion	                    (  )Dizziness                    (  )Polyuria  (  )Nausea	     (  )Hematochezia	(  )Cough			                    (  )Syncope   	(  )Hematuria  (  )Vomiting    (  )Chest Pain	(  )Wheezing			(  )Weakness  (  )Diarrhea     (  )Palpitations	(  )Anorexia			(  )Myalgia    All other review of systems negative: Yes    PHYSICAL EXAM:    Constitutional: Appears Well    Neurological: A& O x 3    Skin: Warm    Respiratory and Thorax: normal effort; Breath sounds: diminished throughout, O2 @L NC in place  	  Cardiovascular: S1, S2, regular, NMBR	    Gastrointestinal: BS + x 4Q, nontender	    Genitourinary:  Bladder nondistended, nontender    Musculoskeletal:   General Right:   + muscle/joint tenderness,   normal tone, no joint swelling,   ROM: limited	    General Left:   no muscle/joint tenderness,   normal tone, no joint swelling,   ROM: limited/full    Hip:  Right: Dressing CDI              Lower extrems:   Right: no calf tenderness              negative rolando's sign               + pedal pulses    Left:   no calf tenderness              negative rolando's sign               + pedal pulses                           8.9    15.4  )-----------( 316      ( 02 Mar 2018 05:37 )             26.3       03-02    138  |  100  |  21  ----------------------------<  247<H>  4.1   |  35<H>  |  0.76    Ca    8.9      02 Mar 2018 05:37                             9.3    12.1  )-----------( 296      ( 01 Mar 2018 06:27 )             29.3       03-01    139  |  100  |  18  ----------------------------<  177<H>  4.2   |  34<H>  |  0.66    Ca    8.6      01 Mar 2018 06:27        PT/INR - ( 28 Feb 2018 04:25 )   PT: 12.0 sec;   INR: 1.11 ratio         PTT - ( 28 Feb 2018 04:25 )  PTT:26.0 sec				    MEDICATIONS  (STANDING):  ALBUTerol    0.083% 2.5 milliGRAM(s) Nebulizer every 6 hours  amLODIPine   Tablet 5 milliGRAM(s) Oral daily  atorvastatin 20 milliGRAM(s) Oral at bedtime  benzonatate 100 milliGRAM(s) Oral daily  buDESOnide 160 MICROgram(s)/formoterol 4.5 MICROgram(s) Inhaler 2 Puff(s) Inhalation two times a day  cefTRIAXone Injectable. 2000 milliGRAM(s) IV Push every 24 hours  docusate sodium 100 milliGRAM(s) Oral three times a day  finasteride 5 milliGRAM(s) Oral daily  furosemide    Tablet 20 milliGRAM(s) Oral daily  guaiFENesin  milliGRAM(s) Oral every 12 hours  heparin  Injectable 5000 Unit(s) SubCutaneous every 8 hours  isosorbide   mononitrate ER Tablet (IMDUR) 60 milliGRAM(s) Oral daily  lactated ringers. 1000 milliLiter(s) IV Continuous <Continuous>  melatonin 3 milliGRAM(s) Oral at bedtime  methylPREDNISolone sodium succinate Injectable 40 milliGRAM(s) IV Push every 12 hours  metoprolol succinate ER 25 milliGRAM(s) Oral daily  polyethylene glycol 3350 17 Gram(s) Oral daily  tiotropium 18 MICROgram(s) Capsule 1 Capsule(s) Inhalation daily  vancomycin  IVPB 1000 milliGRAM(s) IV Intermittent every 12 hours            DVT Prophylaxis:  LMWH                   (  )  Heparin SQ           ( x )  Coumadin             (  )  Xarelto                  (  )  Eliquis                   (  )  Venodynes           ( x )  Ambulation          (x  )  UFH                       (  )  Contraindicated  (  )

## 2018-03-02 NOTE — PROGRESS NOTE ADULT - SUBJECTIVE AND OBJECTIVE BOX
Patient is a 72y old  Male who presents with a chief complaint of Sent from [facility] for continued drainage from surgical site (26 Feb 2018 21:27)    HPI:  73 y/o male with h/o Diastolic Heart Failure, Oxygen-Dependant COPD on 4L Home Oxygen, HTN, HLD, CAD s/p stent in LCx, Spinal Stenosis, Obesity, KENNY, PAD, recent  right Hip Hemiarthroplasty on 02/05/18 was admitted on 2/26 for continued drainage from surgical site. He was undergoing rehabilitation and drainage was noticed by Nursing staff at the facility who contacted the orthopedist and the patient was referred to [facility] for further evaluation. No Fever, chills reported.    Date of service: 03-02-18 @ 09:17    C/o chest pain: workup in progress  Mild right hip discomfort  No fever or chills    MEDICATIONS  (STANDING):  ALBUTerol    0.083% 2.5 milliGRAM(s) Nebulizer every 6 hours  amLODIPine   Tablet 5 milliGRAM(s) Oral daily  atorvastatin 20 milliGRAM(s) Oral at bedtime  benzonatate 100 milliGRAM(s) Oral daily  buDESOnide 160 MICROgram(s)/formoterol 4.5 MICROgram(s) Inhaler 2 Puff(s) Inhalation two times a day  cefTRIAXone Injectable. 2000 milliGRAM(s) IV Push every 24 hours  docusate sodium 100 milliGRAM(s) Oral three times a day  finasteride 5 milliGRAM(s) Oral daily  furosemide    Tablet 20 milliGRAM(s) Oral daily  guaiFENesin  milliGRAM(s) Oral every 12 hours  heparin  Injectable 5000 Unit(s) SubCutaneous every 8 hours  isosorbide   mononitrate ER Tablet (IMDUR) 60 milliGRAM(s) Oral daily  lactated ringers. 1000 milliLiter(s) (75 mL/Hr) IV Continuous <Continuous>  melatonin 3 milliGRAM(s) Oral at bedtime  methylPREDNISolone sodium succinate Injectable 40 milliGRAM(s) IV Push every 12 hours  metoprolol succinate ER 25 milliGRAM(s) Oral daily  polyethylene glycol 3350 17 Gram(s) Oral daily  tiotropium 18 MICROgram(s) Capsule 1 Capsule(s) Inhalation daily  vancomycin  IVPB 1000 milliGRAM(s) IV Intermittent every 12 hours    MEDICATIONS  (PRN):  acetaminophen   Tablet 650 milliGRAM(s) Oral every 6 hours PRN For Temp greater than 38 C (100.4 F)  acetaminophen   Tablet 650 milliGRAM(s) Oral every 6 hours PRN Headache  ALPRAZolam 0.5 milliGRAM(s) Oral three times a day PRN anxiety  aluminum hydroxide/magnesium hydroxide/simethicone Suspension 30 milliLiter(s) Oral four times a day PRN Indigestion  benzocaine 15 mG/menthol 3.6 mG Lozenge 1 Lozenge Oral every 4 hours PRN Sore Throat  bisacodyl Suppository 10 milliGRAM(s) Rectal daily PRN If no bowel movement by POD#2  diphenhydrAMINE   Capsule 25 milliGRAM(s) Oral every 6 hours PRN Rash and/or Itching  diphenhydrAMINE   Capsule 25 milliGRAM(s) Oral at bedtime PRN Insomnia  HYDROmorphone  Injectable 1 milliGRAM(s) IV Push every 3 hours PRN Severe Pain  magnesium hydroxide Suspension 30 milliLiter(s) Oral daily PRN Constipation  ondansetron Injectable 4 milliGRAM(s) IV Push every 6 hours PRN Nausea and/or Vomiting  oxyCODONE    IR 5 milliGRAM(s) Oral every 4 hours PRN Mild Pain  oxyCODONE    IR 10 milliGRAM(s) Oral every 4 hours PRN Moderate Pain  senna 2 Tablet(s) Oral at bedtime PRN Constipation      Vital Signs Last 24 Hrs  T(C): 36.8 (02 Mar 2018 00:02), Max: 37.3 (01 Mar 2018 17:30)  T(F): 98.2 (02 Mar 2018 00:02), Max: 99.1 (01 Mar 2018 17:30)  HR: 91 (02 Mar 2018 09:10) (86 - 117)  BP: 137/65 (02 Mar 2018 09:10) (113/49 - 162/80)  BP(mean): --  RR: 18 (02 Mar 2018 09:10) (16 - 18)  SpO2: 91% (02 Mar 2018 09:10) (90% - 96%)    Physical Exam:      Constitutional: frail looking  HEENT: NC/AT, EOMI, PERRLA  Neck: supple  Back: no tenderness  Respiratory: clear  Cardiovascular: S1S2 regular, no murmurs  Abdomen: soft, not tender, not distended, positive BS  Genitourinary: no LN palpable  Rectal: deferred  Musculoskeletal: no muscle tenderness, no joint swelling or tenderness  Extremities: no pedal edema; right hip s/p washout surgery dressed  Neurological: AxOx3, moving all extremities  Skin: no rashes    Labs:                        8.9    15.4  )-----------( 316      ( 02 Mar 2018 05:37 )             26.3     03-02    138  |  100  |  21  ----------------------------<  247<H>  4.1   |  35<H>  |  0.76    Ca    8.9      02 Mar 2018 05:37         Vancomycin Level, Trough: 10.7 ug/mL (03-02 @ 05:37)                        9.3    12.1  )-----------( 296      ( 01 Mar 2018 06:27 )             29.3     03-01    139  |  100  |  18  ----------------------------<  177<H>  4.2   |  34<H>  |  0.66    Ca    8.6      01 Mar 2018 06:27                        9.6    11.7  )-----------( 319      ( 28 Feb 2018 04:25 )             29.7     02-28    138  |  102  |  9   ----------------------------<  136<H>  4.2   |  34<H>  |  0.51    Ca    8.2<L>      28 Feb 2018 04:25  Phos  2.8     02-27  Mg     1.9     02-27    TPro  5.7<L>  /  Alb  2.3<L>  /  TBili  0.4  /  DBili  x   /  AST  18  /  ALT  35  /  AlkPhos  133<H>  02-27     LIVER FUNCTIONS - ( 27 Feb 2018 05:48 )  Alb: 2.3 g/dL / Pro: 5.7 gm/dL / ALK PHOS: 133 U/L / ALT: 35 U/L / AST: 18 U/L / GGT: x               Culture - Tissue with Gram Stain (collected 28 Feb 2018 17:45)  Source: .Tissue right hip subcutaneous tissue for culture  Gram Stain (01 Mar 2018 03:35):    Rare polymorphonucle    Culture - Tissue with Gram Stain (collected 28 Feb 2018 17:45)  Source: .Tissue right hip joint tissue for culture  Gram Stain (01 Mar 2018 03:36):    No polymorphonuclear cells seen    No organisms seen  Preliminary Report (01 Mar 2018 23:14):    No growth to date.    Culture - Surgical Swab (collected 28 Feb 2018 17:45)  Source: .Surgical Swab right hip joint fluid for culture #2  Preliminary Report (01 Mar 2018 22:52):    No growth to date.    Culture - Surgical Swab (collected 28 Feb 2018 17:45)  Source: .Surgical Swab right hip joint fluid #1  Preliminary Report (01 Mar 2018 22:54):    No growth to date.    Culture - Tissue with Gram Stain (collected 28 Feb 2018 17:45)  Source: .Tissue right hip subcutaneous tissue for culture  Gram Stain (01 Mar 2018 03:35):    Rare polymorphonuclear leukocytes seen per low power field    No organisms seen    Culture - Surgical Swab (collected 28 Feb 2018 17:45)  Source: .Surgical Swab culture of right hip wound dehiscence #2  Preliminary Report (01 Mar 2018 22:43):    Few Staphylococcus aureus    Culture - Surgical Swab (collected 28 Feb 2018 17:45)  Source: .Surgical Swab culture of right hip wound dehiscence #1  Preliminary Report (01 Mar 2018 23:04):    Moderate Staphylococcus aureus    See previous culture [id number]    Culture - Blood (collected 26 Feb 2018 19:02)  Source: .Blood Blood  Preliminary Report (28 Feb 2018 01:02):    No growth to date.    Culture - Blood (collected 26 Feb 2018 18:55)  Source: .Blood None  Preliminary Report (28 Feb 2018 01:02):    No growth to date.          Radiology:    Advanced directives addressed: full resuscitation

## 2018-03-02 NOTE — PROGRESS NOTE ADULT - SUBJECTIVE AND OBJECTIVE BOX
PCP- DR Viera    CC- Patient is a 72y old  Male who presents with a chief complaint of Sent from [facility] for continued drainage from surgical site (01 Mar 2018 12:41)    HPI:  72 year old male with past medical history of Diastolic Heart Failure, Oxygen-Dependant COPD on 4L Home Oxygen, HTN, HLD, CAD s/p stent in LCx, Spinal Stenosis, Obesity, KENNY, and PAD sent in from [facility] for continued drainage from surgical site. Patient was recently admitted for fall now s/p right Hip Hemiarthroplasty on 02/05/18, discharged to [facility] on Lovenox for DVT Prophyalaxis for 4 weeks. Previous hospital course notable for Post-operative Ileus, NSVT while off BB, and persistent drainage from surgical site. Drainage was noticed by Nursing staff at the facility who contacted the orthopedist and was referred to [facility] for further evaluation. No Fever, chills, or night sweats. (26 Feb 2018 21:27)    Hospital stay- underwent revision RT THR/hardware exchange/wash out    3/1/18- doing good.  3/2/18 had chest pain earlier responded well to maalox. Otherwise doing good    Review of system- All 10 systems reviewed and is as per HPI otherwise negative.     Vital Signs Last 24 Hrs  T(C): 36.6 (02 Mar 2018 12:05), Max: 37.3 (01 Mar 2018 17:30)  T(F): 97.9 (02 Mar 2018 12:05), Max: 99.1 (01 Mar 2018 17:30)  HR: 103 (02 Mar 2018 12:05) (86 - 117)  BP: 145/60 (02 Mar 2018 12:05) (113/49 - 162/80)  BP(mean): --  RR: 18 (02 Mar 2018 12:05) (16 - 18)  SpO2: 97% (02 Mar 2018 12:05) (90% - 97%)    LABS:                                 8.9    15.4  )-----------( 316      ( 02 Mar 2018 05:37 )             26.3     02 Mar 2018 05:37    138    |  100    |  21     ----------------------------<  247    4.1     |  35     |  0.76   Ca    8.9        02 Mar 2018 05:37    CARDIAC MARKERS ( 02 Mar 2018 05:37 )  0.023 ng/mL / x     / x     / x     / x        RADIOLOGY & ADDITIONAL TESTS:  EXAM:  XR HIP INCL PELV 1V RT                        PROCEDURE DATE:  02/28/2018    INTERPRETATION:  CLINICAL INFORMATION: Intraoperative  AP view of the right hip    COMPARISON:  February 26, 2018    FINDINGS: Status post right hip arthroplasty. Alignment of the prosthetic   components appears anatomic, however evaluation is limited due to lack of   an orthogonal view. Antibiotic eluting beads are noted.    IMPRESSION:  Status post right hip arthroplasty.     PHYSICAL EXAM:  GENERAL: NAD, well-groomed, well-developed  HEAD:  Atraumatic, Normocephalic  EYES: EOMI, PERRLA, conjunctiva and sclera clear  HEENT: Moist mucous membranes  NECK: Supple, No JVD  NERVOUS SYSTEM:  Alert & Oriented X3, Motor Strength 5/5 B/L upper and lower extremities; DTRs 2+ intact and symmetric  CHEST/LUNG: some wheezing  HEART: Regular rate and rhythm; No murmurs, rubs, or gallops  ABDOMEN: Soft, Nontender, Nondistended; Bowel sounds present  GENITOURINARY- Voiding, no palpable bladder  EXTREMITIES:  2+ Peripheral Pulses, No clubbing, cyanosis, or edema  MUSCULOSKELTAL- RT hip dressing dry  SKIN-no rash, no lesion  CNS- alert, oriented X3, non focal     MEDICATIONS  (STANDING):  ALBUTerol    0.083% 2.5 milliGRAM(s) Nebulizer every 6 hours  amLODIPine   Tablet 5 milliGRAM(s) Oral daily  atorvastatin 20 milliGRAM(s) Oral at bedtime  benzonatate 100 milliGRAM(s) Oral daily  buDESOnide 160 MICROgram(s)/formoterol 4.5 MICROgram(s) Inhaler 2 Puff(s) Inhalation two times a day  cefTRIAXone Injectable. 2000 milliGRAM(s) IV Push every 24 hours  docusate sodium 100 milliGRAM(s) Oral three times a day  finasteride 5 milliGRAM(s) Oral daily  furosemide    Tablet 20 milliGRAM(s) Oral daily  guaiFENesin  milliGRAM(s) Oral every 12 hours  heparin  Injectable 5000 Unit(s) SubCutaneous every 8 hours  isosorbide   mononitrate ER Tablet (IMDUR) 60 milliGRAM(s) Oral daily  lactated ringers. 1000 milliLiter(s) (75 mL/Hr) IV Continuous <Continuous>  melatonin 3 milliGRAM(s) Oral at bedtime  methylPREDNISolone sodium succinate Injectable 40 milliGRAM(s) IV Push every 12 hours  metoprolol succinate ER 25 milliGRAM(s) Oral daily  polyethylene glycol 3350 17 Gram(s) Oral daily  tiotropium 18 MICROgram(s) Capsule 1 Capsule(s) Inhalation daily  vancomycin  IVPB 1000 milliGRAM(s) IV Intermittent every 12 hours    MEDICATIONS  (PRN):  acetaminophen   Tablet 650 milliGRAM(s) Oral every 6 hours PRN For Temp greater than 38 C (100.4 F)  acetaminophen   Tablet 650 milliGRAM(s) Oral every 6 hours PRN Headache  ALPRAZolam 0.5 milliGRAM(s) Oral three times a day PRN anxiety  aluminum hydroxide/magnesium hydroxide/simethicone Suspension 30 milliLiter(s) Oral four times a day PRN Indigestion  benzocaine 15 mG/menthol 3.6 mG Lozenge 1 Lozenge Oral every 4 hours PRN Sore Throat  bisacodyl Suppository 10 milliGRAM(s) Rectal daily PRN If no bowel movement by POD#2  diphenhydrAMINE   Capsule 25 milliGRAM(s) Oral every 6 hours PRN Rash and/or Itching  diphenhydrAMINE   Capsule 25 milliGRAM(s) Oral at bedtime PRN Insomnia  HYDROmorphone  Injectable 1 milliGRAM(s) IV Push every 3 hours PRN Severe Pain  magnesium hydroxide Suspension 30 milliLiter(s) Oral daily PRN Constipation  ondansetron Injectable 4 milliGRAM(s) IV Push every 6 hours PRN Nausea and/or Vomiting  oxyCODONE    IR 5 milliGRAM(s) Oral every 4 hours PRN Mild Pain  oxyCODONE    IR 10 milliGRAM(s) Oral every 4 hours PRN Moderate Pain  senna 2 Tablet(s) Oral at bedtime PRN Constipation    Assessment/Plan  #RT hip oozing/ s/p RT THR/wash out  Ortho eval appreciated  ID eval appreciated  Cont IV abx as per ID  Will need PICC line and 6 weeks of IV abx on discharge  Cont PT  AC by heparin subq  Bowel regimen  Incentive spirometry    #COPD exacerbation  Pulm f/u appreciated  IV steroids, nebs    #CAD s/p stents  Cont cardiac meds    #Chr diastolic CHF- compensated    #Dispo- PT/will need 6 weeks of IV abx on discharge likely early of next week

## 2018-03-02 NOTE — PROVIDER CONTACT NOTE (OTHER) - SITUATION
called Dr Pierce service
SERVICE CALLED, MD AWARE OF CONSULT
Tele service spoke to Bailey to request consult.
notified id office of consult
pt with complaints of chest pain

## 2018-03-03 LAB
-  AMPICILLIN/SULBACTAM: SIGNIFICANT CHANGE UP
-  AMPICILLIN: SIGNIFICANT CHANGE UP
-  CEFAZOLIN: SIGNIFICANT CHANGE UP
-  CIPROFLOXACIN: SIGNIFICANT CHANGE UP
-  CLINDAMYCIN: SIGNIFICANT CHANGE UP
-  DAPTOMYCIN: SIGNIFICANT CHANGE UP
-  ERYTHROMYCIN: SIGNIFICANT CHANGE UP
-  GENTAMICIN: SIGNIFICANT CHANGE UP
-  LEVOFLOXACIN: SIGNIFICANT CHANGE UP
-  MOXIFLOXACIN(AEROBIC): SIGNIFICANT CHANGE UP
-  OXACILLIN: SIGNIFICANT CHANGE UP
-  PENICILLIN: SIGNIFICANT CHANGE UP
-  RIFAMPIN: SIGNIFICANT CHANGE UP
-  TETRACYCLINE: SIGNIFICANT CHANGE UP
-  TRIMETHOPRIM/SULFAMETHOXAZOLE: SIGNIFICANT CHANGE UP
-  VANCOMYCIN: SIGNIFICANT CHANGE UP
ANION GAP SERPL CALC-SCNC: 7 MMOL/L — SIGNIFICANT CHANGE UP (ref 5–17)
BLD GP AB SCN SERPL QL: SIGNIFICANT CHANGE UP
BUN SERPL-MCNC: 20 MG/DL — SIGNIFICANT CHANGE UP (ref 7–23)
CALCIUM SERPL-MCNC: 10.1 MG/DL — SIGNIFICANT CHANGE UP (ref 8.5–10.1)
CHLORIDE SERPL-SCNC: 99 MMOL/L — SIGNIFICANT CHANGE UP (ref 96–108)
CO2 SERPL-SCNC: 33 MMOL/L — HIGH (ref 22–31)
CREAT SERPL-MCNC: 0.79 MG/DL — SIGNIFICANT CHANGE UP (ref 0.5–1.3)
GLUCOSE SERPL-MCNC: 222 MG/DL — HIGH (ref 70–99)
HCT VFR BLD CALC: 31 % — LOW (ref 39–50)
HGB BLD-MCNC: 10 G/DL — LOW (ref 13–17)
MCHC RBC-ENTMCNC: 30 PG — SIGNIFICANT CHANGE UP (ref 27–34)
MCHC RBC-ENTMCNC: 32.3 GM/DL — SIGNIFICANT CHANGE UP (ref 32–36)
MCV RBC AUTO: 93 FL — SIGNIFICANT CHANGE UP (ref 80–100)
METHOD TYPE: SIGNIFICANT CHANGE UP
PLATELET # BLD AUTO: 378 K/UL — SIGNIFICANT CHANGE UP (ref 150–400)
POTASSIUM SERPL-MCNC: 4 MMOL/L — SIGNIFICANT CHANGE UP (ref 3.5–5.3)
POTASSIUM SERPL-SCNC: 4 MMOL/L — SIGNIFICANT CHANGE UP (ref 3.5–5.3)
RBC # BLD: 3.34 M/UL — LOW (ref 4.2–5.8)
RBC # FLD: 14.1 % — SIGNIFICANT CHANGE UP (ref 10.3–14.5)
SODIUM SERPL-SCNC: 139 MMOL/L — SIGNIFICANT CHANGE UP (ref 135–145)
TYPE + AB SCN PNL BLD: SIGNIFICANT CHANGE UP
WBC # BLD: 16.2 K/UL — HIGH (ref 3.8–10.5)
WBC # FLD AUTO: 16.2 K/UL — HIGH (ref 3.8–10.5)

## 2018-03-03 PROCEDURE — 99233 SBSQ HOSP IP/OBS HIGH 50: CPT

## 2018-03-03 RX ADMIN — Medication 650 MILLIGRAM(S): at 09:42

## 2018-03-03 RX ADMIN — BUDESONIDE AND FORMOTEROL FUMARATE DIHYDRATE 2 PUFF(S): 160; 4.5 AEROSOL RESPIRATORY (INHALATION) at 20:48

## 2018-03-03 RX ADMIN — Medication 40 MILLIGRAM(S): at 17:22

## 2018-03-03 RX ADMIN — HEPARIN SODIUM 5000 UNIT(S): 5000 INJECTION INTRAVENOUS; SUBCUTANEOUS at 06:03

## 2018-03-03 RX ADMIN — Medication 600 MILLIGRAM(S): at 17:22

## 2018-03-03 RX ADMIN — ISOSORBIDE MONONITRATE 60 MILLIGRAM(S): 60 TABLET, EXTENDED RELEASE ORAL at 12:57

## 2018-03-03 RX ADMIN — CEFTRIAXONE 2000 MILLIGRAM(S): 500 INJECTION, POWDER, FOR SOLUTION INTRAMUSCULAR; INTRAVENOUS at 12:57

## 2018-03-03 RX ADMIN — Medication 25 MILLIGRAM(S): at 06:04

## 2018-03-03 RX ADMIN — Medication 250 MILLIGRAM(S): at 06:03

## 2018-03-03 RX ADMIN — ALBUTEROL 2.5 MILLIGRAM(S): 90 AEROSOL, METERED ORAL at 08:48

## 2018-03-03 RX ADMIN — FINASTERIDE 5 MILLIGRAM(S): 5 TABLET, FILM COATED ORAL at 12:56

## 2018-03-03 RX ADMIN — ALBUTEROL 2.5 MILLIGRAM(S): 90 AEROSOL, METERED ORAL at 20:46

## 2018-03-03 RX ADMIN — AMLODIPINE BESYLATE 5 MILLIGRAM(S): 2.5 TABLET ORAL at 06:04

## 2018-03-03 RX ADMIN — TIOTROPIUM BROMIDE 1 CAPSULE(S): 18 CAPSULE ORAL; RESPIRATORY (INHALATION) at 08:45

## 2018-03-03 RX ADMIN — Medication 0.5 MILLIGRAM(S): at 19:50

## 2018-03-03 RX ADMIN — Medication 40 MILLIGRAM(S): at 06:22

## 2018-03-03 RX ADMIN — HEPARIN SODIUM 5000 UNIT(S): 5000 INJECTION INTRAVENOUS; SUBCUTANEOUS at 13:10

## 2018-03-03 RX ADMIN — Medication 600 MILLIGRAM(S): at 06:04

## 2018-03-03 RX ADMIN — BENZOCAINE AND MENTHOL 1 LOZENGE: 5; 1 LIQUID ORAL at 19:50

## 2018-03-03 RX ADMIN — HEPARIN SODIUM 5000 UNIT(S): 5000 INJECTION INTRAVENOUS; SUBCUTANEOUS at 21:40

## 2018-03-03 RX ADMIN — Medication 250 MILLIGRAM(S): at 17:23

## 2018-03-03 RX ADMIN — Medication 3 MILLIGRAM(S): at 21:40

## 2018-03-03 RX ADMIN — Medication 20 MILLIGRAM(S): at 06:04

## 2018-03-03 RX ADMIN — Medication 100 MILLIGRAM(S): at 12:55

## 2018-03-03 RX ADMIN — ATORVASTATIN CALCIUM 20 MILLIGRAM(S): 80 TABLET, FILM COATED ORAL at 21:40

## 2018-03-03 RX ADMIN — BUDESONIDE AND FORMOTEROL FUMARATE DIHYDRATE 2 PUFF(S): 160; 4.5 AEROSOL RESPIRATORY (INHALATION) at 08:46

## 2018-03-03 NOTE — PROGRESS NOTE ADULT - SUBJECTIVE AND OBJECTIVE BOX
PCP- DR Maximiliano GRANADOS- Patient is a 72y old  Male who presents with a chief complaint of Sent from [facility] for continued drainage from surgical site (01 Mar 2018 12:41)    HPI:  72 year old male with past medical history of Diastolic Heart Failure, Oxygen-Dependant COPD on 4L Home Oxygen, HTN, HLD, CAD s/p stent in LCx, Spinal Stenosis, Obesity, KENNY, and PAD sent in from [facility] for continued drainage from surgical site. Patient was recently admitted for fall now s/p right Hip Hemiarthroplasty on 02/05/18, discharged to [facility] on Lovenox for DVT Prophyalaxis for 4 weeks. Previous hospital course notable for Post-operative Ileus, NSVT while off BB, and persistent drainage from surgical site. Drainage was noticed by Nursing staff at the facility who contacted the orthopedist and was referred to [facility] for further evaluation. No Fever, chills, or night sweats. (26 Feb 2018 21:27)    Hospital stay- underwent revision RT THR/hardware exchange/wash out    3/1/18- doing good.  3/2/18 had chest pain earlier responded well to maalox. Otherwise doing good  3/3/18 doing good, no active complaints    Review of system- All 10 systems reviewed and is as per HPI otherwise negative.     Vital Signs Last 24 Hrs  T(C): 36.6 (03 Mar 2018 11:52), Max: 37.2 (02 Mar 2018 17:54)  T(F): 97.9 (03 Mar 2018 11:52), Max: 99 (02 Mar 2018 17:54)  HR: 102 (03 Mar 2018 11:52) (88 - 109)  BP: 135/46 (03 Mar 2018 11:52) (135/46 - 158/77)  BP(mean): --  RR: 18 (03 Mar 2018 11:52) (18 - 18)  SpO2: 94% (03 Mar 2018 11:52) (94% - 99%)    LABS:                                          10.0   16.2  )-----------( 378      ( 03 Mar 2018 11:02 )             31.0     139    |  99     |  20     ----------------------------<  222    4.0     |  33     |  0.79     Ca    10.1       03 Mar 2018 05:39    CARDIAC MARKERS ( 02 Mar 2018 05:37 )  0.023 ng/mL / x     / x     / x     / x        RADIOLOGY & ADDITIONAL TESTS:  EXAM:  XR HIP INCL PELV 1V RT                        PROCEDURE DATE:  02/28/2018    INTERPRETATION:  CLINICAL INFORMATION: Intraoperative  AP view of the right hip    COMPARISON:  February 26, 2018    FINDINGS: Status post right hip arthroplasty. Alignment of the prosthetic   components appears anatomic, however evaluation is limited due to lack of   an orthogonal view. Antibiotic eluting beads are noted.    IMPRESSION:  Status post right hip arthroplasty.     PHYSICAL EXAM:  GENERAL: NAD, well-groomed, well-developed  HEAD:  Atraumatic, Normocephalic  EYES: EOMI, PERRLA, conjunctiva and sclera clear  HEENT: Moist mucous membranes  NECK: Supple, No JVD  NERVOUS SYSTEM:  Alert & Oriented X3, Motor Strength 5/5 B/L upper and lower extremities; DTRs 2+ intact and symmetric  CHEST/LUNG: some wheezing  HEART: Regular rate and rhythm; No murmurs, rubs, or gallops  ABDOMEN: Soft, Nontender, Nondistended; Bowel sounds present  GENITOURINARY- Voiding, no palpable bladder  EXTREMITIES:  2+ Peripheral Pulses, No clubbing, cyanosis, or edema  MUSCULOSKELTAL- RT hip dressing dry  SKIN-no rash, no lesion  CNS- alert, oriented X3, non focal     MEDICATIONS  (STANDING):  ALBUTerol    0.083% 2.5 milliGRAM(s) Nebulizer every 6 hours  amLODIPine   Tablet 5 milliGRAM(s) Oral daily  atorvastatin 20 milliGRAM(s) Oral at bedtime  benzonatate 100 milliGRAM(s) Oral daily  buDESOnide 160 MICROgram(s)/formoterol 4.5 MICROgram(s) Inhaler 2 Puff(s) Inhalation two times a day  cefTRIAXone Injectable. 2000 milliGRAM(s) IV Push every 24 hours  docusate sodium 100 milliGRAM(s) Oral three times a day  finasteride 5 milliGRAM(s) Oral daily  furosemide    Tablet 20 milliGRAM(s) Oral daily  guaiFENesin  milliGRAM(s) Oral every 12 hours  heparin  Injectable 5000 Unit(s) SubCutaneous every 8 hours  isosorbide   mononitrate ER Tablet (IMDUR) 60 milliGRAM(s) Oral daily  lactated ringers. 1000 milliLiter(s) (75 mL/Hr) IV Continuous <Continuous>  melatonin 3 milliGRAM(s) Oral at bedtime  methylPREDNISolone sodium succinate Injectable 40 milliGRAM(s) IV Push every 12 hours  metoprolol succinate ER 25 milliGRAM(s) Oral daily  polyethylene glycol 3350 17 Gram(s) Oral daily  tiotropium 18 MICROgram(s) Capsule 1 Capsule(s) Inhalation daily  vancomycin  IVPB 1000 milliGRAM(s) IV Intermittent every 12 hours    MEDICATIONS  (PRN):  acetaminophen   Tablet 650 milliGRAM(s) Oral every 6 hours PRN For Temp greater than 38 C (100.4 F)  acetaminophen   Tablet 650 milliGRAM(s) Oral every 6 hours PRN Headache  ALPRAZolam 0.5 milliGRAM(s) Oral three times a day PRN anxiety  aluminum hydroxide/magnesium hydroxide/simethicone Suspension 30 milliLiter(s) Oral four times a day PRN Indigestion  benzocaine 15 mG/menthol 3.6 mG Lozenge 1 Lozenge Oral every 4 hours PRN Sore Throat  bisacodyl Suppository 10 milliGRAM(s) Rectal daily PRN If no bowel movement by POD#2  diphenhydrAMINE   Capsule 25 milliGRAM(s) Oral every 6 hours PRN Rash and/or Itching  diphenhydrAMINE   Capsule 25 milliGRAM(s) Oral at bedtime PRN Insomnia  HYDROmorphone  Injectable 1 milliGRAM(s) IV Push every 3 hours PRN Severe Pain  magnesium hydroxide Suspension 30 milliLiter(s) Oral daily PRN Constipation  ondansetron Injectable 4 milliGRAM(s) IV Push every 6 hours PRN Nausea and/or Vomiting  oxyCODONE    IR 5 milliGRAM(s) Oral every 4 hours PRN Mild Pain  oxyCODONE    IR 10 milliGRAM(s) Oral every 4 hours PRN Moderate Pain  senna 2 Tablet(s) Oral at bedtime PRN Constipation    Assessment/Plan  #RT hip oozing/ s/p RT THR/wash out  Ortho eval appreciated  ID eval appreciated  Cont IV abx as per ID  Will need PICC line and 6 weeks of IV abx on discharge  Cont PT  AC by heparin subq  Bowel regimen  Incentive spirometry    #COPD exacerbation  Pulm f/u appreciated  IV steroids, nebs    #CAD s/p stents  Cont cardiac meds    #Chr diastolic CHF- compensated    #Dispo- PT/will need 6 weeks of IV abx on discharge likely early of next week. Likely to [facility]

## 2018-03-03 NOTE — PROGRESS NOTE ADULT - SUBJECTIVE AND OBJECTIVE BOX
Pt seen & examined. Pain controlled. No acute events overnight. Doing well, wearing headphones in bed comfortably.    Vital Signs Last 24 Hrs  T(C): 36.8 (03 Mar 2018 05:38), Max: 37.2 (02 Mar 2018 17:54)  T(F): 98.2 (03 Mar 2018 05:38), Max: 99 (02 Mar 2018 17:54)  HR: 96 (03 Mar 2018 05:38) (86 - 109)  BP: 158/77 (03 Mar 2018 05:38) (137/65 - 158/77)  RR: 18 (03 Mar 2018 05:38) (18 - 18)  SpO2: 95% (03 Mar 2018 05:38) (91% - 99%)    Gen: NAD  RLE:  Dressing clean D&I without drainage or strikethrough  +sensation L2-S1  +dorsiflexion/plantarflexion of ankle/hallux  +dorsalis pedis pulse  Soft compartments, - calf tenderness

## 2018-03-03 NOTE — PROGRESS NOTE ADULT - SUBJECTIVE AND OBJECTIVE BOX
HPI:  72 year old male with past medical history of Diastolic Heart Failure, Oxygen-Dependant COPD on 4L Home Oxygen, HTN, HLD, CAD s/p stent in LCx, Spinal Stenosis, Obesity, KENNY, and PAD sent in from [facility] for continued drainage from surgical site. Patient was recently admitted for fall now s/p right Hip Hemiarthroplasty on 02/05/18, discharged to [facility] on Lovenox for DVT Prophyalaxis for 4 weeks. Previous hospital course notable for Post-operative Ileus, NSVT while off BB, and persistent drainage from surgical site. Drainage was noticed by Nursing staff at the facility who contacted the orthopedist and was referred to [facility] for further evaluation. No Fever, chills, or night sweats. (26 Feb 2018 21:27)      Patient is a 72y old  Male who presents with a chief complaint of Sent from [facility] for continued drainage from surgical site (26 Feb 2018 21:27)      Consulted by Dr. Tang  for VTE prophylaxis, risk stratification, and anticoagulation management.    PAST MEDICAL & SURGICAL HISTORY:  PVD (peripheral vascular disease)  KENNY (obstructive sleep apnea)  Hyperlipidemia  Hypertension, unspecified type  Spinal stenosis  CHF (congestive heart failure)  Emphysema  COPD (chronic obstructive pulmonary disease)  No significant past surgical history    CrCl: 210.5  Caprini VTE Risk Score: #6  IMPROVE Bleeding Risk Score #2.5    Falls Risk:   High ( x )  Mod (  )  Low (  )    3/2/18: Patient seen at bedside- OOB to chair- dressing maintained dry. PICC line in place. Discussed heparin SQ with patient. Verbalized understanding and agreement.    FAMILY HISTORY:  No pertinent family history in first degree relatives    Denies any personal or familial history of clotting or bleeding disorders.    Allergies    No Known Allergies    Intolerances        REVIEW OF SYSTEMS    (  )Fever	     (  )Constipation	(  )SOB				(  )Headache	(  )Dysuria  (  )Chills	     (  )Melena	(  )Dyspnea present on exertion	                    (  )Dizziness                    (  )Polyuria  (  )Nausea	     (  )Hematochezia	(  )Cough			                    (  )Syncope   	(  )Hematuria  (  )Vomiting    (  )Chest Pain	(  )Wheezing			(  )Weakness  (  )Diarrhea     (  )Palpitations	(  )Anorexia			(  )Myalgia    All other review of systems negative: Yes    PHYSICAL EXAM:    Constitutional: Appears Well    Neurological: A& O x 3    Skin: Warm    Respiratory and Thorax: normal effort; Breath sounds: diminished throughout, O2 @L NC in place  	  Cardiovascular: S1, S2, regular, NMBR	    Gastrointestinal: BS + x 4Q, nontender	    Genitourinary:  Bladder nondistended, nontender    Musculoskeletal:   General Right:   + muscle/joint tenderness,   normal tone, no joint swelling,   ROM: limited	    General Left:   no muscle/joint tenderness,   normal tone, no joint swelling,   ROM: limited/full    Hip:  Right: Dressing CDI              Lower extrems:   Right: no calf tenderness              negative rolando's sign               + pedal pulses    Left:   no calf tenderness              negative rolando's sign               + pedal pulses        03-03    139  |  99  |  20  ----------------------------<  222<H>  4.0   |  33<H>  |  0.79    Ca    10.1      03 Mar 2018 05:39                                 8.9    15.4  )-----------( 316      ( 02 Mar 2018 05:37 )             26.3       03-02    138  |  100  |  21  ----------------------------<  247<H>  4.1   |  35<H>  |  0.76    Ca    8.9      02 Mar 2018 05:37                             9.3    12.1  )-----------( 296      ( 01 Mar 2018 06:27 )             29.3       03-01    139  |  100  |  18  ----------------------------<  177<H>  4.2   |  34<H>  |  0.66    Ca    8.6      01 Mar 2018 06:27        PT/INR - ( 28 Feb 2018 04:25 )   PT: 12.0 sec;   INR: 1.11 ratio         PTT - ( 28 Feb 2018 04:25 )  PTT:26.0 sec				  MEDICATIONS  (STANDING):  ALBUTerol    0.083% 2.5 milliGRAM(s) Nebulizer every 6 hours  amLODIPine   Tablet 5 milliGRAM(s) Oral daily  atorvastatin 20 milliGRAM(s) Oral at bedtime  benzonatate 100 milliGRAM(s) Oral daily  buDESOnide 160 MICROgram(s)/formoterol 4.5 MICROgram(s) Inhaler 2 Puff(s) Inhalation two times a day  cefTRIAXone Injectable. 2000 milliGRAM(s) IV Push every 24 hours  docusate sodium 100 milliGRAM(s) Oral three times a day  finasteride 5 milliGRAM(s) Oral daily  furosemide    Tablet 20 milliGRAM(s) Oral daily  guaiFENesin  milliGRAM(s) Oral every 12 hours  heparin  Injectable 5000 Unit(s) SubCutaneous every 8 hours  isosorbide   mononitrate ER Tablet (IMDUR) 60 milliGRAM(s) Oral daily  lactated ringers. 1000 milliLiter(s) IV Continuous <Continuous>  melatonin 3 milliGRAM(s) Oral at bedtime  methylPREDNISolone sodium succinate Injectable 40 milliGRAM(s) IV Push every 12 hours  metoprolol succinate ER 25 milliGRAM(s) Oral daily  polyethylene glycol 3350 17 Gram(s) Oral daily  tiotropium 18 MICROgram(s) Capsule 1 Capsule(s) Inhalation daily  vancomycin  IVPB 1000 milliGRAM(s) IV Intermittent every 12 hours      Vital Signs Last 24 Hrs  T(C): 36.8 (03-03-18 @ 05:38), Max: 37.2 (03-02-18 @ 17:54)  T(F): 98.2 (03-03-18 @ 05:38), Max: 99 (03-02-18 @ 17:54)  HR: 96 (03-03-18 @ 05:38) (90 - 109)  BP: 158/77 (03-03-18 @ 05:38) (137/65 - 158/77)  BP(mean): --  RR: 18 (03-03-18 @ 05:38) (18 - 18)  SpO2: 95% (03-03-18 @ 05:38) (91% - 99%)            DVT Prophylaxis:  LMWH                   (  )  Heparin SQ           ( x )  Coumadin             (  )  Xarelto                  (  )  Eliquis                   (  )  Venodynes           ( x )  Ambulation          (x  )  UFH                       (  )  Contraindicated  (  )

## 2018-03-03 NOTE — CHART NOTE - NSCHARTNOTEFT_GEN_A_CORE
CT and MRI imaging reviewed, no acute fracture of left hip (radiology read confirmed). Recommend pain control, PT eval, WBAT, fall precautions, ice as needed, medrol dose malcolm for pain control if OK with primary team. Follow up with dr valdez as outpatient within 1-2 weeks of discharge. no acute ortho intervention at this time. note entered in error

## 2018-03-03 NOTE — PROGRESS NOTE ADULT - SUBJECTIVE AND OBJECTIVE BOX
Patient is a 72y old  Male who presents with a chief complaint of Sent from [facility] for continued drainage from surgical site (26 Feb 2018 21:27)    HPI:  71 y/o male with h/o Diastolic Heart Failure, Oxygen-Dependant COPD on 4L Home Oxygen, HTN, HLD, CAD s/p stent in LCx, Spinal Stenosis, Obesity, KENNY, PAD, recent  right Hip Hemiarthroplasty on 02/05/18 was admitted on 2/26 for continued drainage from surgical site. He was undergoing rehabilitation and drainage was noticed by Nursing staff at the facility who contacted the orthopedist and the patient was referred to [facility] for further evaluation. No Fever, chills reported.    Date of service: 03-03-18 @ 10:31    OOB to chair  Right hip mild pain  No drainage    MEDICATIONS  (STANDING):  ALBUTerol    0.083% 2.5 milliGRAM(s) Nebulizer every 6 hours  amLODIPine   Tablet 5 milliGRAM(s) Oral daily  atorvastatin 20 milliGRAM(s) Oral at bedtime  benzonatate 100 milliGRAM(s) Oral daily  buDESOnide 160 MICROgram(s)/formoterol 4.5 MICROgram(s) Inhaler 2 Puff(s) Inhalation two times a day  cefTRIAXone Injectable. 2000 milliGRAM(s) IV Push every 24 hours  docusate sodium 100 milliGRAM(s) Oral three times a day  finasteride 5 milliGRAM(s) Oral daily  furosemide    Tablet 20 milliGRAM(s) Oral daily  guaiFENesin  milliGRAM(s) Oral every 12 hours  heparin  Injectable 5000 Unit(s) SubCutaneous every 8 hours  isosorbide   mononitrate ER Tablet (IMDUR) 60 milliGRAM(s) Oral daily  lactated ringers. 1000 milliLiter(s) (75 mL/Hr) IV Continuous <Continuous>  melatonin 3 milliGRAM(s) Oral at bedtime  methylPREDNISolone sodium succinate Injectable 40 milliGRAM(s) IV Push every 12 hours  metoprolol succinate ER 25 milliGRAM(s) Oral daily  polyethylene glycol 3350 17 Gram(s) Oral daily  tiotropium 18 MICROgram(s) Capsule 1 Capsule(s) Inhalation daily  vancomycin  IVPB 1000 milliGRAM(s) IV Intermittent every 12 hours    MEDICATIONS  (PRN):  acetaminophen   Tablet 650 milliGRAM(s) Oral every 6 hours PRN For Temp greater than 38 C (100.4 F)  acetaminophen   Tablet 650 milliGRAM(s) Oral every 6 hours PRN Headache  ALPRAZolam 0.5 milliGRAM(s) Oral three times a day PRN anxiety  aluminum hydroxide/magnesium hydroxide/simethicone Suspension 30 milliLiter(s) Oral four times a day PRN Indigestion  benzocaine 15 mG/menthol 3.6 mG Lozenge 1 Lozenge Oral every 4 hours PRN Sore Throat  bisacodyl Suppository 10 milliGRAM(s) Rectal daily PRN If no bowel movement by POD#2  diphenhydrAMINE   Capsule 25 milliGRAM(s) Oral every 6 hours PRN Rash and/or Itching  diphenhydrAMINE   Capsule 25 milliGRAM(s) Oral at bedtime PRN Insomnia  HYDROmorphone  Injectable 1 milliGRAM(s) IV Push every 3 hours PRN Severe Pain  magnesium hydroxide Suspension 30 milliLiter(s) Oral daily PRN Constipation  ondansetron Injectable 4 milliGRAM(s) IV Push every 6 hours PRN Nausea and/or Vomiting  oxyCODONE    IR 5 milliGRAM(s) Oral every 4 hours PRN Mild Pain  oxyCODONE    IR 10 milliGRAM(s) Oral every 4 hours PRN Moderate Pain  senna 2 Tablet(s) Oral at bedtime PRN Constipation      Vital Signs Last 24 Hrs  T(C): 36.8 (03 Mar 2018 05:38), Max: 37.2 (02 Mar 2018 17:54)  T(F): 98.2 (03 Mar 2018 05:38), Max: 99 (02 Mar 2018 17:54)  HR: 96 (03 Mar 2018 05:38) (90 - 109)  BP: 158/77 (03 Mar 2018 05:38) (144/63 - 158/77)  BP(mean): --  RR: 18 (03 Mar 2018 05:38) (18 - 18)  SpO2: 95% (03 Mar 2018 05:38) (95% - 99%)    Physical Exam:      Constitutional: frail looking  HEENT: NC/AT, EOMI, PERRLA  Neck: supple  Back: no tenderness  Respiratory: clear  Cardiovascular: S1S2 regular, no murmurs  Abdomen: soft, not tender, not distended, positive BS  Genitourinary: no LN palpable  Rectal: deferred  Musculoskeletal: no muscle tenderness, no joint swelling or tenderness  Extremities: no pedal edema; right hip s/p washout surgery dressed; mild tenderness  Neurological: AxOx3, moving all extremities  Skin: no rashes    Labs:                        8.9    15.4  )-----------( 316      ( 02 Mar 2018 05:37 )             26.3     03-02    138  |  100  |  21  ----------------------------<  247<H>  4.1   |  35<H>  |  0.76    Ca    8.9      02 Mar 2018 05:37         Vancomycin Level, Trough: 10.7 ug/mL (03-02 @ 05:37)                        9.3    12.1  )-----------( 296      ( 01 Mar 2018 06:27 )             29.3     03-01    139  |  100  |  18  ----------------------------<  177<H>  4.2   |  34<H>  |  0.66    Ca    8.6      01 Mar 2018 06:27                        9.6    11.7  )-----------( 319      ( 28 Feb 2018 04:25 )             29.7     02-28    138  |  102  |  9   ----------------------------<  136<H>  4.2   |  34<H>  |  0.51    Ca    8.2<L>      28 Feb 2018 04:25  Phos  2.8     02-27  Mg     1.9     02-27    TPro  5.7<L>  /  Alb  2.3<L>  /  TBili  0.4  /  DBili  x   /  AST  18  /  ALT  35  /  AlkPhos  133<H>  02-27     LIVER FUNCTIONS - ( 27 Feb 2018 05:48 )  Alb: 2.3 g/dL / Pro: 5.7 gm/dL / ALK PHOS: 133 U/L / ALT: 35 U/L / AST: 18 U/L / GGT: x             Culture - Tissue with Gram Stain (collected 28 Feb 2018 17:45)  Source: .Tissue right hip joint tissue for culture  Gram Stain (01 Mar 2018 03:36):    No polymorphonuclear cells seen    No organisms seen  Preliminary Report (02 Mar 2018 19:41):    Rare Staphylococcus aureus    Few Coag Negative Staphylococcus    Culture - Surgical Swab (collected 28 Feb 2018 17:45)  Source: .Surgical Swab right hip joint fluid for culture #2  Preliminary Report (02 Mar 2018 20:06):    Growth in fluid media only Gram Positive Cocci in Clusters    Culture - Surgical Swab (collected 28 Feb 2018 17:45)  Source: .Surgical Swab right hip joint fluid #1  Preliminary Report (02 Mar 2018 18:15):    Rare Staphylococcus aureus    See previous culture [id number]    Culture - Tissue with Gram Stain (collected 28 Feb 2018 17:45)  Source: .Tissue right hip subcutaneous tissue for culture  Gram Stain (01 Mar 2018 03:35):    Rare polymorphonuclear leukocytes seen per low power field    No organisms seen  Preliminary Report (02 Mar 2018 21:29):    Few Staphylococcus aureus    See previous culture [id number]    Culture - Surgical Swab (collected 28 Feb 2018 17:45)  Source: .Surgical Swab culture of right hip wound dehiscence #2  Preliminary Report (01 Mar 2018 22:43):    Few Staphylococcus aureus    Culture - Surgical Swab (collected 28 Feb 2018 17:45)  Source: .Surgical Swab culture of right hip wound dehiscence #1  Preliminary Report (01 Mar 2018 23:04):    Moderate Staphylococcus aureus    See previous culture [id number]    Culture - Blood (collected 26 Feb 2018 19:02)  Source: .Blood Blood  Preliminary Report (28 Feb 2018 01:02):    No growth to date.    Culture - Blood (collected 26 Feb 2018 18:55)  Source: .Blood None  Preliminary Report (28 Feb 2018 01:02):    No growth to date.        Radiology:    Advanced directives addressed: full resuscitation

## 2018-03-03 NOTE — PROGRESS NOTE ADULT - SUBJECTIVE AND OBJECTIVE BOX
SUBJECTIVE     Patient says his cough is better now with no wheeze . no fever or further episodes of chest pain.         PAST MEDICAL & SURGICAL HISTORY:  PVD (peripheral vascular disease)  KENNY (obstructive sleep apnea)  Hyperlipidemia  Hypertension, unspecified type  Spinal stenosis  CHF (congestive heart failure)  Emphysema  COPD (chronic obstructive pulmonary disease)  No significant past surgical history          OBJECTIVE       Vital Signs Last 24 Hrs  T(C): 36.8 (03 Mar 2018 05:38), Max: 37.2 (02 Mar 2018 17:54)  T(F): 98.2 (03 Mar 2018 05:38), Max: 99 (02 Mar 2018 17:54)  HR: 96 (03 Mar 2018 05:38) (90 - 109)  BP: 158/77 (03 Mar 2018 05:38) (144/63 - 158/77)  BP(mean): --  RR: 18 (03 Mar 2018 05:38) (18 - 18)  SpO2: 95% (03 Mar 2018 05:38) (95% - 99%)              PHYSICAL EXAM:    Constitutional: , awake and alert, not in distress.     HEENT: Normo cephalic ,atraumatic .    Neck: Soft and supple, No J.V.D     Respiratory: vesicular breathing has  no wheeze     Cardiovascular: S1 and S2, regular rate .     Gastrointestinal:  soft, nontender and obese     Extremities: stasis dermatitis with recent hip surgery with the post op wound     Neurological: No new  focal deficits.      MEDICATIONS  (STANDING):  ALBUTerol    0.083% 2.5 milliGRAM(s) Nebulizer every 6 hours  amLODIPine   Tablet 5 milliGRAM(s) Oral daily  atorvastatin 20 milliGRAM(s) Oral at bedtime  benzonatate 100 milliGRAM(s) Oral daily  buDESOnide 160 MICROgram(s)/formoterol 4.5 MICROgram(s) Inhaler 2 Puff(s) Inhalation two times a day  cefTRIAXone Injectable. 2000 milliGRAM(s) IV Push every 24 hours  docusate sodium 100 milliGRAM(s) Oral three times a day  finasteride 5 milliGRAM(s) Oral daily  furosemide    Tablet 20 milliGRAM(s) Oral daily  guaiFENesin  milliGRAM(s) Oral every 12 hours  heparin  Injectable 5000 Unit(s) SubCutaneous every 8 hours  isosorbide   mononitrate ER Tablet (IMDUR) 60 milliGRAM(s) Oral daily  lactated ringers. 1000 milliLiter(s) (75 mL/Hr) IV Continuous <Continuous>  melatonin 3 milliGRAM(s) Oral at bedtime  methylPREDNISolone sodium succinate Injectable 40 milliGRAM(s) IV Push every 12 hours  metoprolol succinate ER 25 milliGRAM(s) Oral daily  polyethylene glycol 3350 17 Gram(s) Oral daily  tiotropium 18 MICROgram(s) Capsule 1 Capsule(s) Inhalation daily  vancomycin  IVPB 1000 milliGRAM(s) IV Intermittent every 12 hours                            8.9    15.4  )-----------( 316      ( 02 Mar 2018 05:37 )             26.3   03-03    139  |  99  |  20  ----------------------------<  222<H>  4.0   |  33<H>  |  0.79    Ca    10.1      03 Mar 2018 05:39

## 2018-03-04 LAB
-  AMPICILLIN/SULBACTAM: SIGNIFICANT CHANGE UP
-  AMPICILLIN/SULBACTAM: SIGNIFICANT CHANGE UP
-  CEFAZOLIN: SIGNIFICANT CHANGE UP
-  CEFAZOLIN: SIGNIFICANT CHANGE UP
-  CIPROFLOXACIN: SIGNIFICANT CHANGE UP
-  CIPROFLOXACIN: SIGNIFICANT CHANGE UP
-  CLINDAMYCIN: SIGNIFICANT CHANGE UP
-  CLINDAMYCIN: SIGNIFICANT CHANGE UP
-  ERYTHROMYCIN: SIGNIFICANT CHANGE UP
-  ERYTHROMYCIN: SIGNIFICANT CHANGE UP
-  GENTAMICIN: SIGNIFICANT CHANGE UP
-  GENTAMICIN: SIGNIFICANT CHANGE UP
-  LEVOFLOXACIN: SIGNIFICANT CHANGE UP
-  LEVOFLOXACIN: SIGNIFICANT CHANGE UP
-  MOXIFLOXACIN(AEROBIC): SIGNIFICANT CHANGE UP
-  MOXIFLOXACIN(AEROBIC): SIGNIFICANT CHANGE UP
-  OXACILLIN: SIGNIFICANT CHANGE UP
-  OXACILLIN: SIGNIFICANT CHANGE UP
-  PENICILLIN: SIGNIFICANT CHANGE UP
-  PENICILLIN: SIGNIFICANT CHANGE UP
-  RIFAMPIN: SIGNIFICANT CHANGE UP
-  RIFAMPIN: SIGNIFICANT CHANGE UP
-  TETRACYCLINE: SIGNIFICANT CHANGE UP
-  TETRACYCLINE: SIGNIFICANT CHANGE UP
-  TRIMETHOPRIM/SULFAMETHOXAZOLE: SIGNIFICANT CHANGE UP
-  TRIMETHOPRIM/SULFAMETHOXAZOLE: SIGNIFICANT CHANGE UP
-  VANCOMYCIN: SIGNIFICANT CHANGE UP
-  VANCOMYCIN: SIGNIFICANT CHANGE UP
ANION GAP SERPL CALC-SCNC: 6 MMOL/L — SIGNIFICANT CHANGE UP (ref 5–17)
BUN SERPL-MCNC: 21 MG/DL — SIGNIFICANT CHANGE UP (ref 7–23)
CALCIUM SERPL-MCNC: 10.3 MG/DL — HIGH (ref 8.5–10.1)
CHLORIDE SERPL-SCNC: 95 MMOL/L — LOW (ref 96–108)
CO2 SERPL-SCNC: 36 MMOL/L — HIGH (ref 22–31)
CREAT SERPL-MCNC: 0.76 MG/DL — SIGNIFICANT CHANGE UP (ref 0.5–1.3)
CULTURE RESULTS: SIGNIFICANT CHANGE UP
CULTURE RESULTS: SIGNIFICANT CHANGE UP
GLUCOSE SERPL-MCNC: 205 MG/DL — HIGH (ref 70–99)
HCT VFR BLD CALC: 33.3 % — LOW (ref 39–50)
HGB BLD-MCNC: 10.7 G/DL — LOW (ref 13–17)
MCHC RBC-ENTMCNC: 29.9 PG — SIGNIFICANT CHANGE UP (ref 27–34)
MCHC RBC-ENTMCNC: 32 GM/DL — SIGNIFICANT CHANGE UP (ref 32–36)
MCV RBC AUTO: 93.4 FL — SIGNIFICANT CHANGE UP (ref 80–100)
METHOD TYPE: SIGNIFICANT CHANGE UP
METHOD TYPE: SIGNIFICANT CHANGE UP
PLATELET # BLD AUTO: 379 K/UL — SIGNIFICANT CHANGE UP (ref 150–400)
POTASSIUM SERPL-MCNC: 3.8 MMOL/L — SIGNIFICANT CHANGE UP (ref 3.5–5.3)
POTASSIUM SERPL-SCNC: 3.8 MMOL/L — SIGNIFICANT CHANGE UP (ref 3.5–5.3)
RBC # BLD: 3.57 M/UL — LOW (ref 4.2–5.8)
RBC # FLD: 13.9 % — SIGNIFICANT CHANGE UP (ref 10.3–14.5)
SODIUM SERPL-SCNC: 137 MMOL/L — SIGNIFICANT CHANGE UP (ref 135–145)
SPECIMEN SOURCE: SIGNIFICANT CHANGE UP
SPECIMEN SOURCE: SIGNIFICANT CHANGE UP
WBC # BLD: 15.7 K/UL — HIGH (ref 3.8–10.5)
WBC # FLD AUTO: 15.7 K/UL — HIGH (ref 3.8–10.5)

## 2018-03-04 PROCEDURE — 99233 SBSQ HOSP IP/OBS HIGH 50: CPT

## 2018-03-04 RX ADMIN — ATORVASTATIN CALCIUM 20 MILLIGRAM(S): 80 TABLET, FILM COATED ORAL at 20:44

## 2018-03-04 RX ADMIN — ISOSORBIDE MONONITRATE 60 MILLIGRAM(S): 60 TABLET, EXTENDED RELEASE ORAL at 11:33

## 2018-03-04 RX ADMIN — ALBUTEROL 2.5 MILLIGRAM(S): 90 AEROSOL, METERED ORAL at 09:53

## 2018-03-04 RX ADMIN — ALBUTEROL 2.5 MILLIGRAM(S): 90 AEROSOL, METERED ORAL at 19:59

## 2018-03-04 RX ADMIN — Medication 40 MILLIGRAM(S): at 06:04

## 2018-03-04 RX ADMIN — AMLODIPINE BESYLATE 5 MILLIGRAM(S): 2.5 TABLET ORAL at 06:05

## 2018-03-04 RX ADMIN — BUDESONIDE AND FORMOTEROL FUMARATE DIHYDRATE 2 PUFF(S): 160; 4.5 AEROSOL RESPIRATORY (INHALATION) at 09:55

## 2018-03-04 RX ADMIN — Medication 20 MILLIGRAM(S): at 06:04

## 2018-03-04 RX ADMIN — Medication 250 MILLIGRAM(S): at 17:21

## 2018-03-04 RX ADMIN — Medication 25 MILLIGRAM(S): at 06:04

## 2018-03-04 RX ADMIN — CEFTRIAXONE 2000 MILLIGRAM(S): 500 INJECTION, POWDER, FOR SOLUTION INTRAMUSCULAR; INTRAVENOUS at 14:34

## 2018-03-04 RX ADMIN — HEPARIN SODIUM 5000 UNIT(S): 5000 INJECTION INTRAVENOUS; SUBCUTANEOUS at 06:05

## 2018-03-04 RX ADMIN — Medication 0.5 MILLIGRAM(S): at 20:44

## 2018-03-04 RX ADMIN — BUDESONIDE AND FORMOTEROL FUMARATE DIHYDRATE 2 PUFF(S): 160; 4.5 AEROSOL RESPIRATORY (INHALATION) at 20:02

## 2018-03-04 RX ADMIN — Medication 250 MILLIGRAM(S): at 06:03

## 2018-03-04 RX ADMIN — Medication 100 MILLIGRAM(S): at 11:33

## 2018-03-04 RX ADMIN — Medication 600 MILLIGRAM(S): at 06:05

## 2018-03-04 RX ADMIN — HEPARIN SODIUM 5000 UNIT(S): 5000 INJECTION INTRAVENOUS; SUBCUTANEOUS at 14:37

## 2018-03-04 RX ADMIN — TIOTROPIUM BROMIDE 1 CAPSULE(S): 18 CAPSULE ORAL; RESPIRATORY (INHALATION) at 09:54

## 2018-03-04 RX ADMIN — FINASTERIDE 5 MILLIGRAM(S): 5 TABLET, FILM COATED ORAL at 11:33

## 2018-03-04 RX ADMIN — BENZOCAINE AND MENTHOL 1 LOZENGE: 5; 1 LIQUID ORAL at 20:44

## 2018-03-04 RX ADMIN — HEPARIN SODIUM 5000 UNIT(S): 5000 INJECTION INTRAVENOUS; SUBCUTANEOUS at 20:44

## 2018-03-04 RX ADMIN — BENZOCAINE AND MENTHOL 1 LOZENGE: 5; 1 LIQUID ORAL at 14:34

## 2018-03-04 RX ADMIN — Medication 600 MILLIGRAM(S): at 17:21

## 2018-03-04 NOTE — PROGRESS NOTE ADULT - SUBJECTIVE AND OBJECTIVE BOX
HPI:  72 year old male with past medical history of Diastolic Heart Failure, Oxygen-Dependant COPD on 4L Home Oxygen, HTN, HLD, CAD s/p stent in LCx, Spinal Stenosis, Obesity, KENNY, and PAD sent in from [facility] for continued drainage from surgical site. Patient was recently admitted for fall now s/p right Hip Hemiarthroplasty on 02/05/18, discharged to [facility] on Lovenox for DVT Prophyalaxis for 4 weeks. Previous hospital course notable for Post-operative Ileus, NSVT while off BB, and persistent drainage from surgical site. Drainage was noticed by Nursing staff at the facility who contacted the orthopedist and was referred to [facility] for further evaluation. No Fever, chills, or night sweats. (26 Feb 2018 21:27)      Patient is a 72y old  Male who presents with a chief complaint of Sent from [facility] for continued drainage from surgical site (26 Feb 2018 21:27)      Consulted by Dr. Tang  for VTE prophylaxis, risk stratification, and anticoagulation management.    PAST MEDICAL & SURGICAL HISTORY:  PVD (peripheral vascular disease)  KENNY (obstructive sleep apnea)  Hyperlipidemia  Hypertension, unspecified type  Spinal stenosis  CHF (congestive heart failure)  Emphysema  COPD (chronic obstructive pulmonary disease)  No significant past surgical history    CrCl: 210.5  Caprini VTE Risk Score: #6  IMPROVE Bleeding Risk Score #2.5    Falls Risk:   High ( x )  Mod (  )  Low (  )    3/2/18: Patient seen at bedside- OOB to chair- dressing maintained dry. PICC line in place. Discussed heparin SQ with patient. Verbalized understanding and agreement.    FAMILY HISTORY:  No pertinent family history in first degree relatives    Denies any personal or familial history of clotting or bleeding disorders.    Allergies    No Known Allergies    Intolerances        REVIEW OF SYSTEMS    (  )Fever	     (  )Constipation	(  )SOB				(  )Headache	(  )Dysuria  (  )Chills	     (  )Melena	(  )Dyspnea present on exertion	                    (  )Dizziness                    (  )Polyuria  (  )Nausea	     (  )Hematochezia	(  )Cough			                    (  )Syncope   	(  )Hematuria  (  )Vomiting    (  )Chest Pain	(  )Wheezing			(  )Weakness  (  )Diarrhea     (  )Palpitations	(  )Anorexia			(  )Myalgia    All other review of systems negative: Yes    PHYSICAL EXAM:    Constitutional: Appears Well    Neurological: A& O x 3    Skin: Warm    Respiratory and Thorax: normal effort; Breath sounds: diminished throughout, O2 @L NC in place  	  Cardiovascular: S1, S2, regular, NMBR	    Gastrointestinal: BS + x 4Q, nontender	    Genitourinary:  Bladder nondistended, nontender    Musculoskeletal:   General Right:   + muscle/joint tenderness,   normal tone, no joint swelling,   ROM: limited	    General Left:   no muscle/joint tenderness,   normal tone, no joint swelling,   ROM: limited/full    Hip:  Right: Dressing CDI              Lower extrems:   Right: no calf tenderness              negative rolando's sign               + pedal pulses    Left:   no calf tenderness              negative rolando's sign               + pedal pulses                            10.7   15.7  )-----------( 379      ( 04 Mar 2018 08:43 )             33.3       03-04    137  |  95<L>  |  21  ----------------------------<  205<H>  3.8   |  36<H>  |  0.76    Ca    10.3<H>      04 Mar 2018 06:02              03-03    139  |  99  |  20  ----------------------------<  222<H>  4.0   |  33<H>  |  0.79    Ca    10.1      03 Mar 2018 05:39                                 8.9    15.4  )-----------( 316      ( 02 Mar 2018 05:37 )             26.3       03-02    138  |  100  |  21  ----------------------------<  247<H>  4.1   |  35<H>  |  0.76    Ca    8.9      02 Mar 2018 05:37                             9.3    12.1  )-----------( 296      ( 01 Mar 2018 06:27 )             29.3       03-01    139  |  100  |  18  ----------------------------<  177<H>  4.2   |  34<H>  |  0.66    Ca    8.6      01 Mar 2018 06:27        PT/INR - ( 28 Feb 2018 04:25 )   PT: 12.0 sec;   INR: 1.11 ratio         PTT - ( 28 Feb 2018 04:25 )  PTT:26.0 sec				  MEDICATIONS  (STANDING):  ALBUTerol    0.083% 2.5 milliGRAM(s) Nebulizer every 6 hours  amLODIPine   Tablet 5 milliGRAM(s) Oral daily  atorvastatin 20 milliGRAM(s) Oral at bedtime  benzonatate 100 milliGRAM(s) Oral daily  buDESOnide 160 MICROgram(s)/formoterol 4.5 MICROgram(s) Inhaler 2 Puff(s) Inhalation two times a day  cefTRIAXone Injectable. 2000 milliGRAM(s) IV Push every 24 hours  docusate sodium 100 milliGRAM(s) Oral three times a day  finasteride 5 milliGRAM(s) Oral daily  furosemide    Tablet 20 milliGRAM(s) Oral daily  guaiFENesin  milliGRAM(s) Oral every 12 hours  heparin  Injectable 5000 Unit(s) SubCutaneous every 8 hours  isosorbide   mononitrate ER Tablet (IMDUR) 60 milliGRAM(s) Oral daily  lactated ringers. 1000 milliLiter(s) IV Continuous <Continuous>  melatonin 3 milliGRAM(s) Oral at bedtime  methylPREDNISolone sodium succinate Injectable 40 milliGRAM(s) IV Push every 12 hours  metoprolol succinate ER 25 milliGRAM(s) Oral daily  polyethylene glycol 3350 17 Gram(s) Oral daily  tiotropium 18 MICROgram(s) Capsule 1 Capsule(s) Inhalation daily  vancomycin  IVPB 1000 milliGRAM(s) IV Intermittent every 12 hours    Vital Signs Last 24 Hrs  T(C): 36.7 (03-04-18 @ 03:28), Max: 36.7 (03-04-18 @ 03:28)  T(F): 98 (03-04-18 @ 03:28), Max: 98 (03-04-18 @ 03:28)  HR: 97 (03-04-18 @ 03:28) (97 - 105)  BP: 133/91 (03-04-18 @ 03:28) (133/91 - 159/84)  BP(mean): --  RR: 18 (03-04-18 @ 03:28) (18 - 18)  SpO2: 94% (03-04-18 @ 03:28) (92% - 99%)        DVT Prophylaxis:  LMWH                   (  )  Heparin SQ           ( x )  Coumadin             (  )  Xarelto                  (  )  Eliquis                   (  )  Venodynes           ( x )  Ambulation          (x  )  UFH                       (  )  Contraindicated  (  )

## 2018-03-04 NOTE — PROGRESS NOTE ADULT - SUBJECTIVE AND OBJECTIVE BOX
Patient is a 72y old  Male who presents with a chief complaint of Sent from [facility] for continued drainage from surgical site (26 Feb 2018 21:27)    HPI:  71 y/o male with h/o Diastolic Heart Failure, Oxygen-Dependant COPD on 4L Home Oxygen, HTN, HLD, CAD s/p stent in LCx, Spinal Stenosis, Obesity, KENNY, PAD, recent  right Hip Hemiarthroplasty on 02/05/18 was admitted on 2/26 for continued drainage from surgical site. He was undergoing rehabilitation and drainage was noticed by Nursing staff at the facility who contacted the orthopedist and the patient was referred to [facility] for further evaluation. No Fever, chills reported.    Date of service: 03-04-18 @ 10:05    Sitting in bed in NAD  Weak looking  Denies hip pain   No fever or chills    MEDICATIONS  (STANDING):  ALBUTerol    0.083% 2.5 milliGRAM(s) Nebulizer every 6 hours  amLODIPine   Tablet 5 milliGRAM(s) Oral daily  atorvastatin 20 milliGRAM(s) Oral at bedtime  benzonatate 100 milliGRAM(s) Oral daily  buDESOnide 160 MICROgram(s)/formoterol 4.5 MICROgram(s) Inhaler 2 Puff(s) Inhalation two times a day  cefTRIAXone Injectable. 2000 milliGRAM(s) IV Push every 24 hours  docusate sodium 100 milliGRAM(s) Oral three times a day  finasteride 5 milliGRAM(s) Oral daily  furosemide    Tablet 20 milliGRAM(s) Oral daily  guaiFENesin  milliGRAM(s) Oral every 12 hours  heparin  Injectable 5000 Unit(s) SubCutaneous every 8 hours  isosorbide   mononitrate ER Tablet (IMDUR) 60 milliGRAM(s) Oral daily  lactated ringers. 1000 milliLiter(s) (75 mL/Hr) IV Continuous <Continuous>  melatonin 3 milliGRAM(s) Oral at bedtime  metoprolol succinate ER 25 milliGRAM(s) Oral daily  polyethylene glycol 3350 17 Gram(s) Oral daily  tiotropium 18 MICROgram(s) Capsule 1 Capsule(s) Inhalation daily  vancomycin  IVPB 1000 milliGRAM(s) IV Intermittent every 12 hours    MEDICATIONS  (PRN):  acetaminophen   Tablet 650 milliGRAM(s) Oral every 6 hours PRN For Temp greater than 38 C (100.4 F)  acetaminophen   Tablet 650 milliGRAM(s) Oral every 6 hours PRN Headache  ALPRAZolam 0.5 milliGRAM(s) Oral three times a day PRN anxiety  aluminum hydroxide/magnesium hydroxide/simethicone Suspension 30 milliLiter(s) Oral four times a day PRN Indigestion  benzocaine 15 mG/menthol 3.6 mG Lozenge 1 Lozenge Oral every 4 hours PRN Sore Throat  bisacodyl Suppository 10 milliGRAM(s) Rectal daily PRN If no bowel movement by POD#2  diphenhydrAMINE   Capsule 25 milliGRAM(s) Oral every 6 hours PRN Rash and/or Itching  diphenhydrAMINE   Capsule 25 milliGRAM(s) Oral at bedtime PRN Insomnia  HYDROmorphone  Injectable 1 milliGRAM(s) IV Push every 3 hours PRN Severe Pain  magnesium hydroxide Suspension 30 milliLiter(s) Oral daily PRN Constipation  ondansetron Injectable 4 milliGRAM(s) IV Push every 6 hours PRN Nausea and/or Vomiting  oxyCODONE    IR 5 milliGRAM(s) Oral every 4 hours PRN Mild Pain  oxyCODONE    IR 10 milliGRAM(s) Oral every 4 hours PRN Moderate Pain  senna 2 Tablet(s) Oral at bedtime PRN Constipation      Vital Signs Last 24 Hrs  T(C): 36.7 (04 Mar 2018 03:28), Max: 36.7 (04 Mar 2018 03:28)  T(F): 98 (04 Mar 2018 03:28), Max: 98 (04 Mar 2018 03:28)  HR: 97 (04 Mar 2018 03:28) (97 - 105)  BP: 133/91 (04 Mar 2018 03:28) (133/91 - 159/84)  BP(mean): --  RR: 18 (04 Mar 2018 03:28) (18 - 18)  SpO2: 94% (04 Mar 2018 03:28) (92% - 99%)    Physical Exam:      Constitutional: frail looking  HEENT: NC/AT, EOMI, PERRLA  Neck: supple  Back: no tenderness  Respiratory: clear  Cardiovascular: S1S2 regular, no murmurs  Abdomen: soft, not tender, not distended, positive BS  Genitourinary: no LN palpable  Rectal: deferred  Musculoskeletal: no muscle tenderness, no joint swelling or tenderness  Extremities: no pedal edema; right hip s/p washout surgery dressed; mild tenderness  Neurological: AxOx3, moving all extremities  Skin: no rashes    Labs:                        10.7   15.7  )-----------( 379      ( 04 Mar 2018 08:43 )             33.3     03-04    137  |  95<L>  |  21  ----------------------------<  205<H>  3.8   |  36<H>  |  0.76    Ca    10.3<H>      04 Mar 2018 06:02                 Vancomycin Level, Trough: 10.7 ug/mL (03-02 @ 05:37)                        9.3    12.1  )-----------( 296      ( 01 Mar 2018 06:27 )             29.3     03-01    139  |  100  |  18  ----------------------------<  177<H>  4.2   |  34<H>  |  0.66    Ca    8.6      01 Mar 2018 06:27                        9.6    11.7  )-----------( 319      ( 28 Feb 2018 04:25 )             29.7     02-28    138  |  102  |  9   ----------------------------<  136<H>  4.2   |  34<H>  |  0.51    Ca    8.2<L>      28 Feb 2018 04:25  Phos  2.8     02-27  Mg     1.9     02-27    TPro  5.7<L>  /  Alb  2.3<L>  /  TBili  0.4  /  DBili  x   /  AST  18  /  ALT  35  /  AlkPhos  133<H>  02-27     LIVER FUNCTIONS - ( 27 Feb 2018 05:48 )  Alb: 2.3 g/dL / Pro: 5.7 gm/dL / ALK PHOS: 133 U/L / ALT: 35 U/L / AST: 18 U/L / GGT: x             Culture - Tissue with Gram Stain (collected 28 Feb 2018 17:45)  Source: .Tissue right hip joint tissue for culture  Gram Stain (01 Mar 2018 03:36):    No polymorphonuclear cells seen    No organisms seen  Preliminary Report (02 Mar 2018 19:41):    Rare Staphylococcus aureus    Few Coag Negative Staphylococcus  Organism: Staphylococcus aureus (03 Mar 2018 22:49)  Organism: Staphylococcus aureus (03 Mar 2018 22:49)      -  Ampicillin/Sulbactam: S <=8/4      -  Cefazolin: S <=4      -  Ciprofloxacin: S <=1      -  Clindamycin: S 0.5      -  Erythromycin: S <=0.25      -  Gentamicin: S <=1      -  Levofloxacin: S <=0.5      -  Moxifloxacin(Aerobic): S <=0.5      -  Oxacillin: S 0.5      -  Penicillin: R >8      -  RIF- Rifampin: S <=1      -  Tetra/Doxy: S <=1      -  Trimethoprim/Sulfamethoxazole: S <=0.5/9.5      -  Vancomycin: S 2      Method Type: KARIS    Culture - Surgical Swab (collected 28 Feb 2018 17:45)  Source: .Surgical Swab right hip joint fluid for culture #2  Preliminary Report (03 Mar 2018 23:34):    Growth in fluid media only Staphylococcus aureus    Growth in fluid media only Enterococcus faecalis    Culture - Surgical Swab (collected 28 Feb 2018 17:45)  Source: .Surgical Swab right hip joint fluid #1  Preliminary Report (03 Mar 2018 23:19):    Rare Staphylococcus aureus    Growth in fluid media only Enterococcus faecalis    See previous culture [id number]    Culture - Tissue with Gram Stain (collected 28 Feb 2018 17:45)  Source: .Tissue right hip subcutaneous tissue for culture  Gram Stain (01 Mar 2018 03:35):    Rare polymorphonuclear leukocytes seen per low power field    No organisms seen  Preliminary Report (02 Mar 2018 21:29):    Few Staphylococcus aureus    See previous culture [id number]    Culture - Surgical Swab (collected 28 Feb 2018 17:45)  Source: .Surgical Swab culture of right hip wound dehiscence #2  Preliminary Report (03 Mar 2018 23:17):    Few Staphylococcus aureus    Growth in fluid media only Enterococcus faecalis    Few Coag Negative Staphylococcus  Organism: Staphylococcus aureus  Enterococcus faecalis  Coag Negative Staphylococcus (03 Mar 2018 23:15)  Organism: Coag Negative Staphylococcus (03 Mar 2018 23:15)      -  Ampicillin/Sulbactam: R <=8/4      -  Cefazolin: R <=4      -  Ciprofloxacin: R >2      -  Clindamycin: S 0.5      -  Erythromycin: S <=0.25      -  Gentamicin: S <=1      -  Levofloxacin: R >4      -  Moxifloxacin(Aerobic): R >4      -  Oxacillin: R >2      -  Penicillin: R >8      -  RIF- Rifampin: S <=1      -  Tetra/Doxy: S 2      -  Trimethoprim/Sulfamethoxazole: R >2/38      -  Vancomycin: S 2      Method Type: KARIS  Organism: Enterococcus faecalis (03 Mar 2018 23:14)      -  Ampicillin: S <=2      -  Ciprofloxacin: I 2      -  Erythromycin: R >4      -  Tetra/Doxy: R >8      -  Vancomycin: S 2      Method Type: KARIS  Organism: Staphylococcus aureus (03 Mar 2018 23:11)      -  Ampicillin/Sulbactam: S <=8/4      -  Cefazolin: S <=4      -  Ciprofloxacin: S <=1      -  Clindamycin: S 0.5      -  Daptomycin: S 1      -  Erythromycin: S <=0.25      -  Gentamicin: S <=1      -  Levofloxacin: S <=0.5      -  Moxifloxacin(Aerobic): S <=0.5      -  Oxacillin: S 0.5      -  Penicillin: R >8      -  RIF- Rifampin: S <=1      -  Tetra/Doxy: S <=1      -  Trimethoprim/Sulfamethoxazole: S <=0.5/9.5      -  Vancomycin: S 2      Method Type: KARIS    Culture - Surgical Swab (collected 28 Feb 2018 17:45)  Source: .Surgical Swab culture of right hip wound dehiscence #1  Preliminary Report (01 Mar 2018 23:04):    Moderate Staphylococcus aureus    See previous culture [id number]    Culture - Blood (collected 26 Feb 2018 19:02)  Source: .Blood Blood  Final Report (04 Mar 2018 01:01):    No growth at 5 days.    Culture - Blood (collected 26 Feb 2018 18:55)  Source: .Blood None  Final Report (04 Mar 2018 01:01):    No growth at 5 days.        Radiology:    Advanced directives addressed: full resuscitation

## 2018-03-04 NOTE — PROGRESS NOTE ADULT - SUBJECTIVE AND OBJECTIVE BOX
SUBJECTIVE     Patient says has mild cough and congestion at night . over all he feels clinically better and getting antibiotics for the hip infection .         PAST MEDICAL & SURGICAL HISTORY:  PVD (peripheral vascular disease)  KENNY (obstructive sleep apnea)  Hyperlipidemia  Hypertension, unspecified type  Spinal stenosis  CHF (congestive heart failure)  Emphysema  COPD (chronic obstructive pulmonary disease)  No significant past surgical history          OBJECTIVE       Vital Signs Last 24 Hrs  T(C): 36.7 (04 Mar 2018 03:28), Max: 36.7 (04 Mar 2018 03:28)  T(F): 98 (04 Mar 2018 03:28), Max: 98 (04 Mar 2018 03:28)  HR: 97 (04 Mar 2018 03:28) (88 - 105)  BP: 133/91 (04 Mar 2018 03:28) (133/91 - 159/84)  BP(mean): --  RR: 18 (04 Mar 2018 03:28) (18 - 18)  SpO2: 94% (04 Mar 2018 03:28) (92% - 99%)            PHYSICAL EXAM:    Constitutional: , awake and alert, not in distress.     HEENT: Normo cephalic ,atraumatic .    Neck: Soft and supple, No J.V.D     Respiratory: vesicular breathing has no wheeze     Cardiovascular: S1 and S2, regular rate .     Gastrointestinal:  soft, nontender and obese     Extremities: stasis dermatitis with recent hip surgery with the post op wound     Neurological: No new  focal deficits.      MEDICATIONS  (STANDING):  ALBUTerol    0.083% 2.5 milliGRAM(s) Nebulizer every 6 hours  amLODIPine   Tablet 5 milliGRAM(s) Oral daily  atorvastatin 20 milliGRAM(s) Oral at bedtime  benzonatate 100 milliGRAM(s) Oral daily  buDESOnide 160 MICROgram(s)/formoterol 4.5 MICROgram(s) Inhaler 2 Puff(s) Inhalation two times a day  cefTRIAXone Injectable. 2000 milliGRAM(s) IV Push every 24 hours  docusate sodium 100 milliGRAM(s) Oral three times a day  finasteride 5 milliGRAM(s) Oral daily  furosemide    Tablet 20 milliGRAM(s) Oral daily  guaiFENesin  milliGRAM(s) Oral every 12 hours  heparin  Injectable 5000 Unit(s) SubCutaneous every 8 hours  isosorbide   mononitrate ER Tablet (IMDUR) 60 milliGRAM(s) Oral daily  lactated ringers. 1000 milliLiter(s) (75 mL/Hr) IV Continuous <Continuous>  melatonin 3 milliGRAM(s) Oral at bedtime  metoprolol succinate ER 25 milliGRAM(s) Oral daily  polyethylene glycol 3350 17 Gram(s) Oral daily  tiotropium 18 MICROgram(s) Capsule 1 Capsule(s) Inhalation daily  vancomycin  IVPB 1000 milliGRAM(s) IV Intermittent every 12 hours                                10.0   16.2  )-----------( 378      ( 03 Mar 2018 11:02 )             31.0   03-04    137  |  95<L>  |  21  ----------------------------<  205<H>  3.8   |  36<H>  |  0.76    Ca    10.3<H>      04 Mar 2018 06:02

## 2018-03-04 NOTE — PROGRESS NOTE ADULT - SUBJECTIVE AND OBJECTIVE BOX
PCP- DR Maximiliano GRANADOS- Patient is a 72y old  Male who presents with a chief complaint of Sent from [facility] for continued drainage from surgical site (01 Mar 2018 12:41)    HPI:  72 year old male with past medical history of Diastolic Heart Failure, Oxygen-Dependant COPD on 4L Home Oxygen, HTN, HLD, CAD s/p stent in LCx, Spinal Stenosis, Obesity, KENNY, and PAD sent in from [facility] for continued drainage from surgical site. Patient was recently admitted for fall now s/p right Hip Hemiarthroplasty on 02/05/18, discharged to [facility] on Lovenox for DVT Prophyalaxis for 4 weeks. Previous hospital course notable for Post-operative Ileus, NSVT while off BB, and persistent drainage from surgical site. Drainage was noticed by Nursing staff at the facility who contacted the orthopedist and was referred to [facility] for further evaluation. No Fever, chills, or night sweats. (26 Feb 2018 21:27)    Hospital stay- underwent revision RT THR/hardware exchange/wash out    3/1/18- doing good.  3/2/18 had chest pain earlier responded well to maalox. Otherwise doing good  3/3/18 doing good, no active complaints  3/4/18 no acute events    Review of system- All 10 systems reviewed and is as per HPI otherwise negative.     Vital Signs Last 24 Hrs  T(C): 36.3 (04 Mar 2018 11:25), Max: 36.7 (04 Mar 2018 03:28)  T(F): 97.4 (04 Mar 2018 11:25), Max: 98 (04 Mar 2018 03:28)  HR: 100 (04 Mar 2018 11:25) (97 - 105)  BP: 157/83 (04 Mar 2018 11:25) (133/91 - 159/84)  BP(mean): --  RR: 18 (04 Mar 2018 11:25) (18 - 18)  SpO2: 95% (04 Mar 2018 11:25) (92% - 99%)    LABS:                                                10.7   15.7  )-----------( 379      ( 04 Mar 2018 08:43 )             33.3     04 Mar 2018 06:02    137    |  95     |  21     ----------------------------<  205    3.8     |  36     |  0.76   Ca    10.3       04 Mar 2018 06:02    RADIOLOGY & ADDITIONAL TESTS:  EXAM:  XR HIP INCL PELV 1V RT                        PROCEDURE DATE:  02/28/2018    INTERPRETATION:  CLINICAL INFORMATION: Intraoperative  AP view of the right hip    COMPARISON:  February 26, 2018    FINDINGS: Status post right hip arthroplasty. Alignment of the prosthetic   components appears anatomic, however evaluation is limited due to lack of   an orthogonal view. Antibiotic eluting beads are noted.    IMPRESSION:  Status post right hip arthroplasty.     PHYSICAL EXAM:  GENERAL: NAD, well-groomed, well-developed  HEAD:  Atraumatic, Normocephalic  EYES: EOMI, PERRLA, conjunctiva and sclera clear  HEENT: Moist mucous membranes  NECK: Supple, No JVD  NERVOUS SYSTEM:  Alert & Oriented X3, Motor Strength 5/5 B/L upper and lower extremities; DTRs 2+ intact and symmetric  CHEST/LUNG: good air entry  HEART: Regular rate and rhythm; No murmurs, rubs, or gallops  ABDOMEN: Soft, Nontender, Nondistended; Bowel sounds present  GENITOURINARY- Voiding, no palpable bladder  EXTREMITIES:  2+ Peripheral Pulses, No clubbing, cyanosis, or edema  MUSCULOSKELTAL- RT hip dressing dry  SKIN-no rash, no lesion  CNS- alert, oriented X3, non focal     MEDICATIONS  (STANDING):  ALBUTerol    0.083% 2.5 milliGRAM(s) Nebulizer every 6 hours  amLODIPine   Tablet 5 milliGRAM(s) Oral daily  atorvastatin 20 milliGRAM(s) Oral at bedtime  benzonatate 100 milliGRAM(s) Oral daily  buDESOnide 160 MICROgram(s)/formoterol 4.5 MICROgram(s) Inhaler 2 Puff(s) Inhalation two times a day  cefTRIAXone Injectable. 2000 milliGRAM(s) IV Push every 24 hours  docusate sodium 100 milliGRAM(s) Oral three times a day  finasteride 5 milliGRAM(s) Oral daily  furosemide    Tablet 20 milliGRAM(s) Oral daily  guaiFENesin  milliGRAM(s) Oral every 12 hours  heparin  Injectable 5000 Unit(s) SubCutaneous every 8 hours  isosorbide   mononitrate ER Tablet (IMDUR) 60 milliGRAM(s) Oral daily  lactated ringers. 1000 milliLiter(s) (75 mL/Hr) IV Continuous <Continuous>  melatonin 3 milliGRAM(s) Oral at bedtime  methylPREDNISolone sodium succinate Injectable 40 milliGRAM(s) IV Push every 12 hours  metoprolol succinate ER 25 milliGRAM(s) Oral daily  polyethylene glycol 3350 17 Gram(s) Oral daily  tiotropium 18 MICROgram(s) Capsule 1 Capsule(s) Inhalation daily  vancomycin  IVPB 1000 milliGRAM(s) IV Intermittent every 12 hours    MEDICATIONS  (PRN):  acetaminophen   Tablet 650 milliGRAM(s) Oral every 6 hours PRN For Temp greater than 38 C (100.4 F)  acetaminophen   Tablet 650 milliGRAM(s) Oral every 6 hours PRN Headache  ALPRAZolam 0.5 milliGRAM(s) Oral three times a day PRN anxiety  aluminum hydroxide/magnesium hydroxide/simethicone Suspension 30 milliLiter(s) Oral four times a day PRN Indigestion  benzocaine 15 mG/menthol 3.6 mG Lozenge 1 Lozenge Oral every 4 hours PRN Sore Throat  bisacodyl Suppository 10 milliGRAM(s) Rectal daily PRN If no bowel movement by POD#2  diphenhydrAMINE   Capsule 25 milliGRAM(s) Oral every 6 hours PRN Rash and/or Itching  diphenhydrAMINE   Capsule 25 milliGRAM(s) Oral at bedtime PRN Insomnia  HYDROmorphone  Injectable 1 milliGRAM(s) IV Push every 3 hours PRN Severe Pain  magnesium hydroxide Suspension 30 milliLiter(s) Oral daily PRN Constipation  ondansetron Injectable 4 milliGRAM(s) IV Push every 6 hours PRN Nausea and/or Vomiting  oxyCODONE    IR 5 milliGRAM(s) Oral every 4 hours PRN Mild Pain  oxyCODONE    IR 10 milliGRAM(s) Oral every 4 hours PRN Moderate Pain  senna 2 Tablet(s) Oral at bedtime PRN Constipation    Assessment/Plan  #RT hip oozing/ s/p RT THR/wash out  Ortho eval appreciated  ID eval appreciated  Cont IV abx as per ID  Will need PICC line and 6 weeks of IV abx on discharge  Cont PT  AC by heparin subq  Bowel regimen  Incentive spirometry    #COPD exacerbation  Pulm f/u appreciated  PO tapering steroids    #CAD s/p stents  Cont cardiac meds    #Chr diastolic CHF- compensated    #Dispo- likely [facility] tomorrow after PICC line placed

## 2018-03-04 NOTE — PROGRESS NOTE ADULT - SUBJECTIVE AND OBJECTIVE BOX
Pt seen & examined. Pain controlled. No acute events overnight. Doing well, resting comfortably.    Vital Signs Last 24 Hrs  T(C): 36.7 (04 Mar 2018 03:28), Max: 36.7 (04 Mar 2018 03:28)  T(F): 98 (04 Mar 2018 03:28), Max: 98 (04 Mar 2018 03:28)  HR: 97 (04 Mar 2018 03:28) (88 - 105)  BP: 133/91 (04 Mar 2018 03:28) (133/91 - 159/84)  RR: 18 (04 Mar 2018 03:28) (18 - 18)  SpO2: 94% (04 Mar 2018 03:28) (92% - 99%)    Gen: NAD  RLE:  Dressing w/ minor strikethrough and drainage  +sensation L2-S1  +dorsiflexion/plantarflexion of ankle/hallux  +dorsalis pedis pulse  Soft compartments, - calf tenderness

## 2018-03-05 LAB
-  AMPICILLIN: SIGNIFICANT CHANGE UP
-  AMPICILLIN: SIGNIFICANT CHANGE UP
-  CIPROFLOXACIN: SIGNIFICANT CHANGE UP
-  CIPROFLOXACIN: SIGNIFICANT CHANGE UP
-  ERYTHROMYCIN: SIGNIFICANT CHANGE UP
-  ERYTHROMYCIN: SIGNIFICANT CHANGE UP
-  TETRACYCLINE: SIGNIFICANT CHANGE UP
-  TETRACYCLINE: SIGNIFICANT CHANGE UP
-  VANCOMYCIN: SIGNIFICANT CHANGE UP
-  VANCOMYCIN: SIGNIFICANT CHANGE UP
ANION GAP SERPL CALC-SCNC: 7 MMOL/L — SIGNIFICANT CHANGE UP (ref 5–17)
BUN SERPL-MCNC: 20 MG/DL — SIGNIFICANT CHANGE UP (ref 7–23)
CALCIUM SERPL-MCNC: 10.2 MG/DL — HIGH (ref 8.5–10.1)
CHLORIDE SERPL-SCNC: 94 MMOL/L — LOW (ref 96–108)
CO2 SERPL-SCNC: 37 MMOL/L — HIGH (ref 22–31)
CREAT SERPL-MCNC: 0.72 MG/DL — SIGNIFICANT CHANGE UP (ref 0.5–1.3)
CULTURE RESULTS: SIGNIFICANT CHANGE UP
GLUCOSE SERPL-MCNC: 130 MG/DL — HIGH (ref 70–99)
HCT VFR BLD CALC: 31.2 % — LOW (ref 39–50)
HGB BLD-MCNC: 10.2 G/DL — LOW (ref 13–17)
MCHC RBC-ENTMCNC: 30.4 PG — SIGNIFICANT CHANGE UP (ref 27–34)
MCHC RBC-ENTMCNC: 32.6 GM/DL — SIGNIFICANT CHANGE UP (ref 32–36)
MCV RBC AUTO: 93.3 FL — SIGNIFICANT CHANGE UP (ref 80–100)
METHOD TYPE: SIGNIFICANT CHANGE UP
METHOD TYPE: SIGNIFICANT CHANGE UP
ORGANISM # SPEC MICROSCOPIC CNT: SIGNIFICANT CHANGE UP
PLATELET # BLD AUTO: 343 K/UL — SIGNIFICANT CHANGE UP (ref 150–400)
POTASSIUM SERPL-MCNC: 3.2 MMOL/L — LOW (ref 3.5–5.3)
POTASSIUM SERPL-SCNC: 3.2 MMOL/L — LOW (ref 3.5–5.3)
RBC # BLD: 3.35 M/UL — LOW (ref 4.2–5.8)
RBC # FLD: 14.2 % — SIGNIFICANT CHANGE UP (ref 10.3–14.5)
SODIUM SERPL-SCNC: 138 MMOL/L — SIGNIFICANT CHANGE UP (ref 135–145)
SPECIMEN SOURCE: SIGNIFICANT CHANGE UP
WBC # BLD: 14.8 K/UL — HIGH (ref 3.8–10.5)
WBC # FLD AUTO: 14.8 K/UL — HIGH (ref 3.8–10.5)

## 2018-03-05 PROCEDURE — 76000 FLUOROSCOPY <1 HR PHYS/QHP: CPT | Mod: 26

## 2018-03-05 PROCEDURE — 71045 X-RAY EXAM CHEST 1 VIEW: CPT | Mod: 26

## 2018-03-05 PROCEDURE — 36597 REPOSITION VENOUS CATHETER: CPT

## 2018-03-05 RX ORDER — POTASSIUM CHLORIDE 20 MEQ
40 PACKET (EA) ORAL EVERY 4 HOURS
Qty: 0 | Refills: 0 | Status: COMPLETED | OUTPATIENT
Start: 2018-03-05 | End: 2018-03-05

## 2018-03-05 RX ADMIN — HEPARIN SODIUM 5000 UNIT(S): 5000 INJECTION INTRAVENOUS; SUBCUTANEOUS at 21:00

## 2018-03-05 RX ADMIN — Medication 250 MILLIGRAM(S): at 13:27

## 2018-03-05 RX ADMIN — HEPARIN SODIUM 5000 UNIT(S): 5000 INJECTION INTRAVENOUS; SUBCUTANEOUS at 05:40

## 2018-03-05 RX ADMIN — Medication 250 MILLIGRAM(S): at 23:08

## 2018-03-05 RX ADMIN — HEPARIN SODIUM 5000 UNIT(S): 5000 INJECTION INTRAVENOUS; SUBCUTANEOUS at 13:31

## 2018-03-05 RX ADMIN — ISOSORBIDE MONONITRATE 60 MILLIGRAM(S): 60 TABLET, EXTENDED RELEASE ORAL at 13:31

## 2018-03-05 RX ADMIN — Medication 100 MILLIGRAM(S): at 13:36

## 2018-03-05 RX ADMIN — FINASTERIDE 5 MILLIGRAM(S): 5 TABLET, FILM COATED ORAL at 13:31

## 2018-03-05 RX ADMIN — Medication 25 MILLIGRAM(S): at 05:40

## 2018-03-05 RX ADMIN — Medication 3 MILLIGRAM(S): at 22:03

## 2018-03-05 RX ADMIN — Medication 40 MILLIGRAM(S): at 05:40

## 2018-03-05 RX ADMIN — ALBUTEROL 2.5 MILLIGRAM(S): 90 AEROSOL, METERED ORAL at 19:53

## 2018-03-05 RX ADMIN — AMLODIPINE BESYLATE 5 MILLIGRAM(S): 2.5 TABLET ORAL at 05:40

## 2018-03-05 RX ADMIN — Medication 600 MILLIGRAM(S): at 05:40

## 2018-03-05 RX ADMIN — Medication 20 MILLIGRAM(S): at 05:40

## 2018-03-05 RX ADMIN — Medication 40 MILLIEQUIVALENT(S): at 21:00

## 2018-03-05 RX ADMIN — Medication 40 MILLIEQUIVALENT(S): at 17:32

## 2018-03-05 RX ADMIN — ATORVASTATIN CALCIUM 20 MILLIGRAM(S): 80 TABLET, FILM COATED ORAL at 21:00

## 2018-03-05 RX ADMIN — Medication 600 MILLIGRAM(S): at 17:32

## 2018-03-05 RX ADMIN — BENZOCAINE AND MENTHOL 1 LOZENGE: 5; 1 LIQUID ORAL at 17:34

## 2018-03-05 RX ADMIN — Medication 0.5 MILLIGRAM(S): at 20:51

## 2018-03-05 RX ADMIN — CEFTRIAXONE 2000 MILLIGRAM(S): 500 INJECTION, POWDER, FOR SOLUTION INTRAMUSCULAR; INTRAVENOUS at 13:29

## 2018-03-05 RX ADMIN — BUDESONIDE AND FORMOTEROL FUMARATE DIHYDRATE 2 PUFF(S): 160; 4.5 AEROSOL RESPIRATORY (INHALATION) at 19:54

## 2018-03-05 NOTE — PROGRESS NOTE ADULT - SUBJECTIVE AND OBJECTIVE BOX
Patient is a 72y old  Male who presents with a chief complaint of Sent from [facility] for continued drainage from surgical site (26 Feb 2018 21:27)    HPI:  71 y/o male with h/o Diastolic Heart Failure, Oxygen-Dependant COPD on 4L Home Oxygen, HTN, HLD, CAD s/p stent in LCx, Spinal Stenosis, Obesity, KENNY, PAD, recent  right Hip Hemiarthroplasty on 02/05/18 was admitted on 2/26 for continued drainage from surgical site. He was undergoing rehabilitation and drainage was noticed by Nursing staff at the facility who contacted the orthopedist and the patient was referred to [facility] for further evaluation. No Fever, chills reported.    Lying in bed in [facility]  For PICC line today  Denies hip pain    Date of service: 03-05-18    MEDICATIONS  (STANDING):  ALBUTerol    0.083% 2.5 milliGRAM(s) Nebulizer every 6 hours  amLODIPine   Tablet 5 milliGRAM(s) Oral daily  atorvastatin 20 milliGRAM(s) Oral at bedtime  benzonatate 100 milliGRAM(s) Oral daily  buDESOnide 160 MICROgram(s)/formoterol 4.5 MICROgram(s) Inhaler 2 Puff(s) Inhalation two times a day  cefTRIAXone Injectable. 2000 milliGRAM(s) IV Push every 24 hours  docusate sodium 100 milliGRAM(s) Oral three times a day  finasteride 5 milliGRAM(s) Oral daily  furosemide    Tablet 20 milliGRAM(s) Oral daily  guaiFENesin  milliGRAM(s) Oral every 12 hours  heparin  Injectable 5000 Unit(s) SubCutaneous every 8 hours  isosorbide   mononitrate ER Tablet (IMDUR) 60 milliGRAM(s) Oral daily  lactated ringers. 1000 milliLiter(s) (75 mL/Hr) IV Continuous <Continuous>  melatonin 3 milliGRAM(s) Oral at bedtime  metoprolol succinate ER 25 milliGRAM(s) Oral daily  polyethylene glycol 3350 17 Gram(s) Oral daily  tiotropium 18 MICROgram(s) Capsule 1 Capsule(s) Inhalation daily  vancomycin  IVPB 1000 milliGRAM(s) IV Intermittent every 12 hours    MEDICATIONS  (PRN):  acetaminophen   Tablet 650 milliGRAM(s) Oral every 6 hours PRN For Temp greater than 38 C (100.4 F)  acetaminophen   Tablet 650 milliGRAM(s) Oral every 6 hours PRN Headache  ALPRAZolam 0.5 milliGRAM(s) Oral three times a day PRN anxiety  aluminum hydroxide/magnesium hydroxide/simethicone Suspension 30 milliLiter(s) Oral four times a day PRN Indigestion  benzocaine 15 mG/menthol 3.6 mG Lozenge 1 Lozenge Oral every 4 hours PRN Sore Throat  bisacodyl Suppository 10 milliGRAM(s) Rectal daily PRN If no bowel movement by POD#2  diphenhydrAMINE   Capsule 25 milliGRAM(s) Oral every 6 hours PRN Rash and/or Itching  diphenhydrAMINE   Capsule 25 milliGRAM(s) Oral at bedtime PRN Insomnia  HYDROmorphone  Injectable 1 milliGRAM(s) IV Push every 3 hours PRN Severe Pain  magnesium hydroxide Suspension 30 milliLiter(s) Oral daily PRN Constipation  ondansetron Injectable 4 milliGRAM(s) IV Push every 6 hours PRN Nausea and/or Vomiting  oxyCODONE    IR 5 milliGRAM(s) Oral every 4 hours PRN Mild Pain  oxyCODONE    IR 10 milliGRAM(s) Oral every 4 hours PRN Moderate Pain  senna 2 Tablet(s) Oral at bedtime PRN Constipation      Vital Signs Last 24 Hrs  T(C): 36.2 (05 Mar 2018 05:28), Max: 36.2 (04 Mar 2018 17:22)  T(F): 97.2 (05 Mar 2018 05:28), Max: 97.2 (04 Mar 2018 17:22)  HR: 92 (05 Mar 2018 05:28) (88 - 106)  BP: 147/69 (05 Mar 2018 05:28) (140/75 - 147/69)  BP(mean): --  RR: 18 (05 Mar 2018 05:28) (18 - 18)  SpO2: 94% (05 Mar 2018 05:28) (94% - 100%)    Physical Exam:      Constitutional: frail looking  HEENT: NC/AT, EOMI, PERRLA  Neck: supple  Back: no tenderness  Respiratory: clear  Cardiovascular: S1S2 regular, no murmurs  Abdomen: soft, not tender, not distended, positive BS  Genitourinary: no LN palpable  Rectal: deferred  Musculoskeletal: no muscle tenderness, no joint swelling or tenderness  Extremities: no pedal edema; right hip s/p washout surgery dressed; mild tenderness  Neurological: AxOx3, moving all extremities  Skin: no rashes    Labs:                        10.2   14.8  )-----------( 343      ( 05 Mar 2018 06:31 )             31.2     03-05    138  |  94<L>  |  20  ----------------------------<  130<H>  3.2<L>   |  37<H>  |  0.72    Ca    10.2<H>      05 Mar 2018 06:31               10.7   15.7  )-----------( 379      ( 04 Mar 2018 08:43 )             33.3     03-04    137  |  95<L>  |  21  ----------------------------<  205<H>  3.8   |  36<H>  |  0.76    Ca    10.3<H>      04 Mar 2018 06:02                 Vancomycin Level, Trough: 10.7 ug/mL (03-02 @ 05:37)                        9.3    12.1  )-----------( 296      ( 01 Mar 2018 06:27 )             29.3     03-01    139  |  100  |  18  ----------------------------<  177<H>  4.2   |  34<H>  |  0.66    Ca    8.6      01 Mar 2018 06:27                        9.6    11.7  )-----------( 319      ( 28 Feb 2018 04:25 )             29.7     02-28    138  |  102  |  9   ----------------------------<  136<H>  4.2   |  34<H>  |  0.51    Ca    8.2<L>      28 Feb 2018 04:25  Phos  2.8     02-27  Mg     1.9     02-27    TPro  5.7<L>  /  Alb  2.3<L>  /  TBili  0.4  /  DBili  x   /  AST  18  /  ALT  35  /  AlkPhos  133<H>  02-27     LIVER FUNCTIONS - ( 27 Feb 2018 05:48 )  Alb: 2.3 g/dL / Pro: 5.7 gm/dL / ALK PHOS: 133 U/L / ALT: 35 U/L / AST: 18 U/L / GGT: x             Culture - Tissue with Gram Stain (collected 28 Feb 2018 17:45)  Source: .Tissue right hip joint tissue for culture  Gram Stain (01 Mar 2018 03:36):    No polymorphonuclear cells seen    No organisms seen  Preliminary Report (02 Mar 2018 19:41):    Rare Staphylococcus aureus    Few Coag Negative Staphylococcus  Organism: Staphylococcus aureus (03 Mar 2018 22:49)  Organism: Staphylococcus aureus (03 Mar 2018 22:49)      -  Ampicillin/Sulbactam: S <=8/4      -  Cefazolin: S <=4      -  Ciprofloxacin: S <=1      -  Clindamycin: S 0.5      -  Erythromycin: S <=0.25      -  Gentamicin: S <=1      -  Levofloxacin: S <=0.5      -  Moxifloxacin(Aerobic): S <=0.5      -  Oxacillin: S 0.5      -  Penicillin: R >8      -  RIF- Rifampin: S <=1      -  Tetra/Doxy: S <=1      -  Trimethoprim/Sulfamethoxazole: S <=0.5/9.5      -  Vancomycin: S 2      Method Type: KARIS    Culture - Surgical Swab (collected 28 Feb 2018 17:45)  Source: .Surgical Swab right hip joint fluid for culture #2  Preliminary Report (03 Mar 2018 23:34):    Growth in fluid media only Staphylococcus aureus    Growth in fluid media only Enterococcus faecalis    Culture - Surgical Swab (collected 28 Feb 2018 17:45)  Source: .Surgical Swab right hip joint fluid #1  Preliminary Report (03 Mar 2018 23:19):    Rare Staphylococcus aureus    Growth in fluid media only Enterococcus faecalis    See previous culture [id number]    Culture - Tissue with Gram Stain (collected 28 Feb 2018 17:45)  Source: .Tissue right hip subcutaneous tissue for culture  Gram Stain (01 Mar 2018 03:35):    Rare polymorphonuclear leukocytes seen per low power field    No organisms seen  Preliminary Report (02 Mar 2018 21:29):    Few Staphylococcus aureus    See previous culture [id number]    Culture - Surgical Swab (collected 28 Feb 2018 17:45)  Source: .Surgical Swab culture of right hip wound dehiscence #2  Preliminary Report (03 Mar 2018 23:17):    Few Staphylococcus aureus    Growth in fluid media only Enterococcus faecalis    Few Coag Negative Staphylococcus  Organism: Staphylococcus aureus  Enterococcus faecalis  Coag Negative Staphylococcus (03 Mar 2018 23:15)  Organism: Coag Negative Staphylococcus (03 Mar 2018 23:15)      -  Ampicillin/Sulbactam: R <=8/4      -  Cefazolin: R <=4      -  Ciprofloxacin: R >2      -  Clindamycin: S 0.5      -  Erythromycin: S <=0.25      -  Gentamicin: S <=1      -  Levofloxacin: R >4      -  Moxifloxacin(Aerobic): R >4      -  Oxacillin: R >2      -  Penicillin: R >8      -  RIF- Rifampin: S <=1      -  Tetra/Doxy: S 2      -  Trimethoprim/Sulfamethoxazole: R >2/38      -  Vancomycin: S 2      Method Type: KARIS  Organism: Enterococcus faecalis (03 Mar 2018 23:14)      -  Ampicillin: S <=2      -  Ciprofloxacin: I 2      -  Erythromycin: R >4      -  Tetra/Doxy: R >8      -  Vancomycin: S 2      Method Type: KARIS  Organism: Staphylococcus aureus (03 Mar 2018 23:11)      -  Ampicillin/Sulbactam: S <=8/4      -  Cefazolin: S <=4      -  Ciprofloxacin: S <=1      -  Clindamycin: S 0.5      -  Daptomycin: S 1      -  Erythromycin: S <=0.25      -  Gentamicin: S <=1      -  Levofloxacin: S <=0.5      -  Moxifloxacin(Aerobic): S <=0.5      -  Oxacillin: S 0.5      -  Penicillin: R >8      -  RIF- Rifampin: S <=1      -  Tetra/Doxy: S <=1      -  Trimethoprim/Sulfamethoxazole: S <=0.5/9.5      -  Vancomycin: S 2      Method Type: KARIS    Culture - Surgical Swab (collected 28 Feb 2018 17:45)  Source: .Surgical Swab culture of right hip wound dehiscence #1  Preliminary Report (01 Mar 2018 23:04):    Moderate Staphylococcus aureus    See previous culture [id number]    Culture - Blood (collected 26 Feb 2018 19:02)  Source: .Blood Blood  Final Report (04 Mar 2018 01:01):    No growth at 5 days.    Culture - Blood (collected 26 Feb 2018 18:55)  Source: .Blood None  Final Report (04 Mar 2018 01:01):    No growth at 5 days.        Radiology:    Advanced directives addressed: full resuscitation

## 2018-03-05 NOTE — PROGRESS NOTE ADULT - SUBJECTIVE AND OBJECTIVE BOX
SUBJECTIVE     Patient says he feeling better while steroids are being tapered with out any wheezing .  waiting for the picc line         PAST MEDICAL & SURGICAL HISTORY:  PVD (peripheral vascular disease)  KENNY (obstructive sleep apnea)  Hyperlipidemia  Hypertension, unspecified type  Spinal stenosis  CHF (congestive heart failure)  Emphysema  COPD (chronic obstructive pulmonary disease)  No significant past surgical history          OBJECTIVE       Vital Signs Last 24 Hrs  T(C): 37.1 (05 Mar 2018 17:16), Max: 37.1 (05 Mar 2018 17:16)  T(F): 98.7 (05 Mar 2018 17:16), Max: 98.7 (05 Mar 2018 17:16)  HR: 99 (05 Mar 2018 17:16) (88 - 99)  BP: 151/93 (05 Mar 2018 17:16) (147/69 - 166/82)  BP(mean): --  RR: 18 (05 Mar 2018 17:16) (18 - 18)  SpO2: 97% (05 Mar 2018 17:16) (94% - 97%)          PHYSICAL EXAM:    Constitutional: , awake and alert, not in distress.     HEENT: Normo cephalic ,atraumatic .    Neck: Soft and supple, No J.V.D     Respiratory: vesicular breathing has no wheeze     Cardiovascular: S1 and S2, regular rate .     Gastrointestinal:  soft, nontender and obese     Extremities: stasis dermatitis with recent hip surgery with the post op wound     Neurological: No new  focal deficits.      MEDICATIONS  (STANDING):  ALBUTerol    0.083% 2.5 milliGRAM(s) Nebulizer every 6 hours  amLODIPine   Tablet 5 milliGRAM(s) Oral daily  atorvastatin 20 milliGRAM(s) Oral at bedtime  benzonatate 100 milliGRAM(s) Oral daily  buDESOnide 160 MICROgram(s)/formoterol 4.5 MICROgram(s) Inhaler 2 Puff(s) Inhalation two times a day  cefTRIAXone Injectable. 2000 milliGRAM(s) IV Push every 24 hours  docusate sodium 100 milliGRAM(s) Oral three times a day  finasteride 5 milliGRAM(s) Oral daily  furosemide    Tablet 20 milliGRAM(s) Oral daily  guaiFENesin  milliGRAM(s) Oral every 12 hours  heparin  Injectable 5000 Unit(s) SubCutaneous every 8 hours  isosorbide   mononitrate ER Tablet (IMDUR) 60 milliGRAM(s) Oral daily  lactated ringers. 1000 milliLiter(s) (75 mL/Hr) IV Continuous <Continuous>  melatonin 3 milliGRAM(s) Oral at bedtime  metoprolol succinate ER 25 milliGRAM(s) Oral daily  polyethylene glycol 3350 17 Gram(s) Oral daily  potassium chloride    Tablet ER 40 milliEquivalent(s) Oral every 4 hours  tiotropium 18 MICROgram(s) Capsule 1 Capsule(s) Inhalation daily  vancomycin  IVPB 1000 milliGRAM(s) IV Intermittent every 12 hours                                        10.2   14.8  )-----------( 343      ( 05 Mar 2018 06:31 )             31.2   03-05    138  |  94<L>  |  20  ----------------------------<  130<H>  3.2<L>   |  37<H>  |  0.72    Ca    10.2<H>      05 Mar 2018 06:31

## 2018-03-05 NOTE — PROGRESS NOTE ADULT - SUBJECTIVE AND OBJECTIVE BOX
PCP- DR Viera    CC-drainage for surgical site (right hip)    HPI:  72 year old male with past medical history of Diastolic Heart Failure, Oxygen-Dependant COPD on 4L Home Oxygen, HTN, HLD, CAD s/p stent in LCx, Spinal Stenosis, Obesity, KENNY, and PAD sent in from [facility] for continued drainage from surgical site. Patient was recently admitted for fall and underwent right Hip Hemiarthroplasty on 02/05/18,  hospital course notable for Post-operative Ileus, NSVT while off BB. He was eventually discharged to rehab. While at rehab,  Drainage was noticed by Nursing staff at the facility who contacted the orthopedist and was referred to [facility] for further evaluation. He underwent Revision of his right hip hemiarthroplasty/hardware exchange/washout. Was followed by ID as well and plan is for picc line and iv abx X 6 weeks total. Hospital course notable for acute copd exacerbation treated with steroids/nebs.    3/5: pt seen and examined this am. Felt well. Was awaiting picc line. No sob/chest pain. LE edema markedly improved from previous hospitalization.       Review of system- All 10 systems reviewed and is as per HPI otherwise negative.     Vital Signs Last 24 Hrs  T(C): 36.8 (05 Mar 2018 13:24), Max: 36.8 (05 Mar 2018 13:24)  T(F): 98.2 (05 Mar 2018 13:24), Max: 98.2 (05 Mar 2018 13:24)  HR: 88 (05 Mar 2018 13:24) (88 - 106)  BP: 166/82 (05 Mar 2018 13:24) (140/75 - 166/82)  RR: 18 (05 Mar 2018 13:24) (18 - 18)  SpO2: 97% (05 Mar 2018 13:24) (94% - 100%)      PHYSICAL EXAM:    GENERAL: Comfortable, no acute distress   HEAD:  Normocephalic, atraumatic  EYES: EOMI, PERRLA  HEENT: Moist mucous membranes  NECK: Supple, No JVD  NERVOUS SYSTEM:  Alert & Oriented X3, non focal  CHEST/LUNG: Clear to auscultation bilaterally  HEART: Regular rate and rhythm  ABDOMEN: Soft, Nontender, Nondistended, Bowel sounds present  GENITOURINARY: Voiding, no palpable bladder  EXTREMITIES:   No clubbing, cyanosis, or edema. right hip dressing intact  MUSCULOSKELTAL- normal tone  SKIN-no rash    LABS:                        10.2   14.8  )-----------( 343      ( 05 Mar 2018 06:31 )             31.2     03-05    138  |  94<L>  |  20  ----------------------------<  130<H>  3.2<L>   |  37<H>  |  0.72    Ca    10.2<H>      05 Mar 2018 06:31      RADIOLOGY & ADDITIONAL TESTS:  EXAM:  XR HIP INCL PELV 1V RT                        PROCEDURE DATE:  02/28/2018    INTERPRETATION:  CLINICAL INFORMATION: Intraoperative  AP view of the right hip    COMPARISON:  February 26, 2018    FINDINGS: Status post right hip arthroplasty. Alignment of the prosthetic   components appears anatomic, however evaluation is limited due to lack of   an orthogonal view. Antibiotic eluting beads are noted.    IMPRESSION:  Status post right hip arthroplasty.       Assessment/Plan  72M, pmh as above a/w:    1. Recent right hip hemiarthroplasty with post surgical infn with mssa, enfae and cnst:  -s/p revision of right hip hemiarthroplasty/washout  -on iv rocephin/vancomycin  -for PICC LINE  -to dc on 5 more weeks iv abx per ID    2. Acute copd exacerbation:  -improving  -steroids being tapered  -nebs  -O2    3. h/o CAD s/p stents  Cont cardiac meds    4. Chr diastolic CHF  - compensated    5. DVT px    Dispo:  dc planning to rehab after picc line

## 2018-03-05 NOTE — PROGRESS NOTE ADULT - SUBJECTIVE AND OBJECTIVE BOX
Pt seen & examined. Pain controlled. No acute events overnight. Doing well, resting comfortably.      Gen: NAD  RLE:  Dressing changed, incision well appearing, sersosang drainage  +sensation L2-S1  +ehl/fhl/ta/gsc  +dorsalis pedis pulse  Soft compartments, - calf tenderness

## 2018-03-05 NOTE — ADVANCED PRACTICE NURSE CONSULT - ASSESSMENT
Pt received awake, alert, oriented x 4, anxious at times but pleasant. Benefits, risks, and possible complications of procedure explained to patient and gave verbal and written consent. Patient's first and last name, , MRN/Acct number, procedure and correct site confirmed prior to start of procedure. Navilyst 4F PICC with PASV technology inserted via ultrasound guidance to right basilic vein and cut to 48cm. Pt placed in semi-fowlers position. Site prepped with CHG. Draped in sterile fashion. Strict aeseptic technique performed. Site covered with gauze and sterile dressing. End cap placed. Sterile field maintained throughout. Minimal blood loss. Chest xray verified PICC line not in right position. Attempted to flush with 80cc to redirect line but unable to correct placement. Pt referred to IR, transported via stretcher with RN. Update given to district RN from 2N.

## 2018-03-06 VITALS
HEART RATE: 76 BPM | RESPIRATION RATE: 16 BRPM | DIASTOLIC BLOOD PRESSURE: 65 MMHG | OXYGEN SATURATION: 100 % | TEMPERATURE: 98 F | SYSTOLIC BLOOD PRESSURE: 122 MMHG

## 2018-03-06 LAB
ANION GAP SERPL CALC-SCNC: 4 MMOL/L — LOW (ref 5–17)
BUN SERPL-MCNC: 23 MG/DL — SIGNIFICANT CHANGE UP (ref 7–23)
CALCIUM SERPL-MCNC: 9.6 MG/DL — SIGNIFICANT CHANGE UP (ref 8.5–10.1)
CHLORIDE SERPL-SCNC: 96 MMOL/L — SIGNIFICANT CHANGE UP (ref 96–108)
CO2 SERPL-SCNC: 39 MMOL/L — HIGH (ref 22–31)
CREAT SERPL-MCNC: 0.72 MG/DL — SIGNIFICANT CHANGE UP (ref 0.5–1.3)
GLUCOSE SERPL-MCNC: 113 MG/DL — HIGH (ref 70–99)
HCT VFR BLD CALC: 33.4 % — LOW (ref 39–50)
HGB BLD-MCNC: 10.6 G/DL — LOW (ref 13–17)
MCHC RBC-ENTMCNC: 29.8 PG — SIGNIFICANT CHANGE UP (ref 27–34)
MCHC RBC-ENTMCNC: 31.8 GM/DL — LOW (ref 32–36)
MCV RBC AUTO: 93.7 FL — SIGNIFICANT CHANGE UP (ref 80–100)
PLATELET # BLD AUTO: 364 K/UL — SIGNIFICANT CHANGE UP (ref 150–400)
POTASSIUM SERPL-MCNC: 4 MMOL/L — SIGNIFICANT CHANGE UP (ref 3.5–5.3)
POTASSIUM SERPL-SCNC: 4 MMOL/L — SIGNIFICANT CHANGE UP (ref 3.5–5.3)
RBC # BLD: 3.57 M/UL — LOW (ref 4.2–5.8)
RBC # FLD: 14.7 % — HIGH (ref 10.3–14.5)
SODIUM SERPL-SCNC: 139 MMOL/L — SIGNIFICANT CHANGE UP (ref 135–145)
VANCOMYCIN TROUGH SERPL-MCNC: 16.5 UG/ML — SIGNIFICANT CHANGE UP (ref 10–20)
WBC # BLD: 13.6 K/UL — HIGH (ref 3.8–10.5)
WBC # FLD AUTO: 13.6 K/UL — HIGH (ref 3.8–10.5)

## 2018-03-06 PROCEDURE — 99233 SBSQ HOSP IP/OBS HIGH 50: CPT

## 2018-03-06 RX ORDER — POTASSIUM CHLORIDE 20 MEQ
2 PACKET (EA) ORAL
Qty: 0 | Refills: 0 | COMMUNITY

## 2018-03-06 RX ORDER — ALBUTEROL 90 UG/1
0 AEROSOL, METERED ORAL
Qty: 0 | Refills: 0 | DISCHARGE
Start: 2018-03-06

## 2018-03-06 RX ORDER — OXYCODONE HYDROCHLORIDE 5 MG/1
1 TABLET ORAL
Qty: 0 | Refills: 0 | DISCHARGE
Start: 2018-03-06

## 2018-03-06 RX ORDER — ALPRAZOLAM 0.25 MG
1 TABLET ORAL
Qty: 0 | Refills: 0 | DISCHARGE
Start: 2018-03-06

## 2018-03-06 RX ORDER — FUROSEMIDE 40 MG
1 TABLET ORAL
Qty: 0 | Refills: 0 | COMMUNITY

## 2018-03-06 RX ORDER — FUROSEMIDE 40 MG
1 TABLET ORAL
Qty: 0 | Refills: 0 | DISCHARGE
Start: 2018-03-06

## 2018-03-06 RX ORDER — POLYETHYLENE GLYCOL 3350 17 G/17G
17 POWDER, FOR SOLUTION ORAL
Qty: 0 | Refills: 0 | DISCHARGE
Start: 2018-03-06

## 2018-03-06 RX ORDER — ALBUTEROL 90 UG/1
3 AEROSOL, METERED ORAL
Qty: 0 | Refills: 0 | DISCHARGE
Start: 2018-03-06

## 2018-03-06 RX ORDER — CEFTRIAXONE 500 MG/1
2 INJECTION, POWDER, FOR SOLUTION INTRAMUSCULAR; INTRAVENOUS
Qty: 0 | Refills: 0 | COMMUNITY
Start: 2018-03-06

## 2018-03-06 RX ORDER — VANCOMYCIN HCL 1 G
1 VIAL (EA) INTRAVENOUS
Qty: 0 | Refills: 0 | DISCHARGE
Start: 2018-03-06

## 2018-03-06 RX ORDER — LANOLIN ALCOHOL/MO/W.PET/CERES
1 CREAM (GRAM) TOPICAL
Qty: 0 | Refills: 0 | DISCHARGE
Start: 2018-03-06

## 2018-03-06 RX ORDER — VANCOMYCIN HCL 1 G
1 VIAL (EA) INTRAVENOUS
Qty: 0 | Refills: 0 | COMMUNITY
Start: 2018-03-06

## 2018-03-06 RX ORDER — CEFTRIAXONE 500 MG/1
2 INJECTION, POWDER, FOR SOLUTION INTRAMUSCULAR; INTRAVENOUS
Qty: 0 | Refills: 0 | DISCHARGE
Start: 2018-03-06

## 2018-03-06 RX ADMIN — BENZOCAINE AND MENTHOL 1 LOZENGE: 5; 1 LIQUID ORAL at 14:06

## 2018-03-06 RX ADMIN — CEFTRIAXONE 2000 MILLIGRAM(S): 500 INJECTION, POWDER, FOR SOLUTION INTRAMUSCULAR; INTRAVENOUS at 12:38

## 2018-03-06 RX ADMIN — Medication 250 MILLIGRAM(S): at 11:25

## 2018-03-06 RX ADMIN — Medication 100 MILLIGRAM(S): at 11:21

## 2018-03-06 RX ADMIN — Medication 600 MILLIGRAM(S): at 05:08

## 2018-03-06 RX ADMIN — Medication 20 MILLIGRAM(S): at 05:08

## 2018-03-06 RX ADMIN — Medication 25 MILLIGRAM(S): at 05:08

## 2018-03-06 RX ADMIN — BENZOCAINE AND MENTHOL 1 LOZENGE: 5; 1 LIQUID ORAL at 04:35

## 2018-03-06 RX ADMIN — HEPARIN SODIUM 5000 UNIT(S): 5000 INJECTION INTRAVENOUS; SUBCUTANEOUS at 15:30

## 2018-03-06 RX ADMIN — AMLODIPINE BESYLATE 5 MILLIGRAM(S): 2.5 TABLET ORAL at 05:08

## 2018-03-06 RX ADMIN — FINASTERIDE 5 MILLIGRAM(S): 5 TABLET, FILM COATED ORAL at 11:21

## 2018-03-06 RX ADMIN — POLYETHYLENE GLYCOL 3350 17 GRAM(S): 17 POWDER, FOR SOLUTION ORAL at 11:22

## 2018-03-06 RX ADMIN — ISOSORBIDE MONONITRATE 60 MILLIGRAM(S): 60 TABLET, EXTENDED RELEASE ORAL at 11:21

## 2018-03-06 RX ADMIN — HEPARIN SODIUM 5000 UNIT(S): 5000 INJECTION INTRAVENOUS; SUBCUTANEOUS at 05:08

## 2018-03-06 NOTE — PROGRESS NOTE ADULT - SUBJECTIVE AND OBJECTIVE BOX
SUBJECTIVE     Patient was seen in [facility] and to improve with the cough with no wheezing and steroids has been reduced . has piccline placement         PAST MEDICAL & SURGICAL HISTORY:  PVD (peripheral vascular disease)  KENNY (obstructive sleep apnea)  Hyperlipidemia  Hypertension, unspecified type  Spinal stenosis  CHF (congestive heart failure)  Emphysema  COPD (chronic obstructive pulmonary disease)  No significant past surgical history          OBJECTIVE       Vital Signs Last 24 Hrs  T(C): 36.9 (06 Mar 2018 16:57), Max: 36.9 (06 Mar 2018 16:57)  T(F): 98.5 (06 Mar 2018 16:57), Max: 98.5 (06 Mar 2018 16:57)  HR: 76 (06 Mar 2018 16:57) (76 - 103)  BP: 122/65 (06 Mar 2018 16:57) (101/54 - 151/75)  BP(mean): --  RR: 16 (06 Mar 2018 16:57) (16 - 16)  SpO2: 100% (06 Mar 2018 16:57) (97% - 100%)          PHYSICAL EXAM:    Constitutional: , awake and alert, not in distress.     HEENT: Normo cephalic ,atraumatic .    Neck: Soft and supple, No J.V.D     Respiratory: vesicular breathing has no wheeze     Cardiovascular: S1 and S2, regular rate .     Gastrointestinal:  soft, nontender and obese     Extremities: stasis dermatitis with recent hip surgery with the post op wound     Neurological: No new  focal deficits.      MEDICATIONS  (STANDING):  ALBUTerol    0.083% 2.5 milliGRAM(s) Nebulizer every 6 hours  amLODIPine   Tablet 5 milliGRAM(s) Oral daily  atorvastatin 20 milliGRAM(s) Oral at bedtime  benzonatate 100 milliGRAM(s) Oral daily  buDESOnide 160 MICROgram(s)/formoterol 4.5 MICROgram(s) Inhaler 2 Puff(s) Inhalation two times a day  cefTRIAXone Injectable. 2000 milliGRAM(s) IV Push every 24 hours  docusate sodium 100 milliGRAM(s) Oral three times a day  finasteride 5 milliGRAM(s) Oral daily  furosemide    Tablet 20 milliGRAM(s) Oral daily  guaiFENesin  milliGRAM(s) Oral every 12 hours  heparin  Injectable 5000 Unit(s) SubCutaneous every 8 hours  isosorbide   mononitrate ER Tablet (IMDUR) 60 milliGRAM(s) Oral daily  lactated ringers. 1000 milliLiter(s) (75 mL/Hr) IV Continuous <Continuous>  melatonin 3 milliGRAM(s) Oral at bedtime  metoprolol succinate ER 25 milliGRAM(s) Oral daily  polyethylene glycol 3350 17 Gram(s) Oral daily  potassium chloride    Tablet ER 40 milliEquivalent(s) Oral every 4 hours  tiotropium 18 MICROgram(s) Capsule 1 Capsule(s) Inhalation daily  vancomycin  IVPB 1000 milliGRAM(s) IV Intermittent every 12 hours                                        10.2   14.8  )-----------( 343      ( 05 Mar 2018 06:31 )             31.2   03-05    138  |  94<L>  |  20  ----------------------------<  130<H>  3.2<L>   |  37<H>  |  0.72    Ca    10.2<H>      05 Mar 2018 06:31

## 2018-03-06 NOTE — PROGRESS NOTE ADULT - ASSESSMENT
cough and wheeze with mild symptoms of copd exacerbation with wheezing intermittent   - severe copd 02 dependent at baseline   - hypercarbic respiratory insufficiency with the pco2 retention from the last admission   - drainage from the right hip wound being planned for the surgery   - sleep apnea patient is non compliant with the cpap       PLAN     - would add iv steroids for the bronchiospasm and send RVP panel to rule out any viral infection  that might need isolation .   - continue with the nebs and use if needed in the post op course   - use of bipap if needed 12/7 in the post op phase   - symptomatic relief of the cough   - would try to taper the steroids early once the cough improves
- copd with mild symptoms of exacerbation clinically improving with improved cough and wheezing  - severe copd 02 dependent at baseline   - hypercarbic respiratory insufficiency with the pco2 retention from the last admission   - drainage from the right hip status post hip revision with incision and drainage   - sleep apnea patient is non compliant with the cpap       PLAN     - contine with the taper of the prednisone and discontinue early if wheezing is resolved   - continue with the nebs and symbicort and spiriva and symptomatic relief for the cough and patient takes advair at home .   - incentive spirometry    - adequate dvt prophylaxis with sq heparin .  - iv antibiotics as per the I.D   - discharge planning as per the medicine when other issues are solved and he was advised to follow up with me once discharged from the rehab
- copd with mild symptoms of exacerbation clinically improving with improved cough and wheezing  - severe copd 02 dependent at baseline   - hypercarbic respiratory insufficiency with the pco2 retention from the last admission   - drainage from the right hip status post hip revision with incision and drainage   - sleep apnea patient is non compliant with the cpap       PLAN     - would discontinue iv steroids after the A.M dose and change to po prednisone from A.M   - continue with the nebs and symbicort and spiriva and symptomatic relief for the cough   - incentive spirometry    - adequate dvt prophylaxis with sq heparin .  - iv antibiotics as per the I.D
- copd with mild symptoms of exacerbation clinically improving with improved cough and wheezing  - severe copd 02 dependent at baseline   - hypercarbic respiratory insufficiency with the pco2 retention from the last admission   - drainage from the right hip status post hip revision with incision and drainage   - sleep apnea patient is non compliant with the cpap       PLAN     - would discontinue iv steroids after the A.M dose and change to po prednisone from A.M from tomorrow   - continue with the nebs and symbicort and spiriva and symptomatic relief for the cough and patient takes advair at home .   - incentive spirometry    - adequate dvt prophylaxis with sq heparin .  - iv antibiotics as per the I.D   - discharge planning as per the medicine .
- copd with mild symptoms of exacerbation improving with the residual cough  - severe copd 02 dependent at baseline   - hypercarbic respiratory insufficiency with the pco2 retention from the last admission   - drainage from the right hip status post hip revision with incision and drainage   - sleep apnea patient is non compliant with the cpap       PLAN     - contine with the taper of the prednisone and discontinue in the next few weeks   - continue with the nebs and symbicort and spiriva and symptomatic relief for the cough and patient takes advair at home .   - incentive spirometry    - adequate dvt prophylaxis with sq heparin .  - iv antibiotics as per the I.D   - discharge planning as per the medicine
- cough and wheeze with mild symptoms of copd exacerbation with persistent cough with mild wheeze .  - severe copd 02 dependent at baseline   - hypercarbic respiratory insufficiency with the pco2 retention from the last admission   - drainage from the right hip status post hip revision with incision and drainage   - sleep apnea patient is non compliant with the cpap       PLAN     - would continue with the current dose of steroids for the severe copd with exacerbation of symptoms with continued wheeze and cough   - continue with the nebs and symbicort and spiriva and symptomatic relief for the cough   - symptomatic relief of the cough  - incentive spirometry    - adequate dvt prophylaxis with sq heparin
71 y/o male with h/o Diastolic Heart Failure, Oxygen-Dependant COPD on 4L Home Oxygen, HTN, HLD, CAD s/p stent in LCx, Spinal Stenosis, Obesity, KENNY, PAD, recent  right Hip Hemiarthroplasty on 02/05/18 was admitted on 2/26 for continued drainage from surgical site. He was undergoing rehabilitation and drainage was noticed by Nursing staff at the facility who contacted the orthopedist and the patient was referred to [facility] for further evaluation. No Fever, chills reported.    1. Low grade fever. Right hip postsurgical site infection with MSSA, ENFAE and CNST s/p surgical washout. Left hip cellulitis resolving.   -BC x 2 are negative to date  -improving slowly  -on vancomycin 1000 mg IV q12h and ceftriaxone 2 gm IV qd # 6  -tolerating abx well so far; no side effects noted  -repeat vancomycin trough level   -plan for PICC line and long tem IV abx therapy  -continue abx coverage for 5 more weeks  -local wound care  -monitor temps  -f/u CBC  -supportive care  2. Other issues: chest pain  -care per medicine  -care per medicine
72M s/p I&D and revision R hip hemiarthroplasty	POD3  Analgesia  DVT ppx  Incentive spirometry  WBAT  P/T  Discharge planning  FU Wound care and ID consult
72M s/p I&D and revision R hip hemiarthroplasty	POD4  Analgesia  DVT ppx  Incentive spirometry  WBAT  P/T  Discharge planning  FU Wound care and ID recs for Abx
72M s/p I&D and revision R hip hemiarthroplasty	POD5  Analgesia  DVT ppx  Incentive spirometry  WBAT  P/T  Discharge planning  FU Wound care and ID recs for Abx
72M s/p I&D and revision R hip hemiarthroplasty  Analgesia  DVT ppx  Incentive spirometry  WBAT  P/T  Discharge planning  FU Wound care and ID consult
72M s/p I&D and revision R hip hemiarthroplasty  Analgesia  DVT ppx  Incentive spirometry  WBAT  P/T  Discharge planning  FU Wound care and ID consult
73 y/o male with h/o Diastolic Heart Failure, Oxygen-Dependant COPD on 4L Home Oxygen, HTN, HLD, CAD s/p stent in LCx, Spinal Stenosis, Obesity, KENNY, PAD, recent  right Hip Hemiarthroplasty on 02/05/18 was admitted on 2/26 for continued drainage from surgical site. He was undergoing rehabilitation and drainage was noticed by Nursing staff at the facility who contacted the orthopedist and the patient was referred to [facility] for further evaluation. No Fever, chills reported.    1. Low grade fever. Right hip postsurgical site infection with MSSA, ENFAE and CNST s/p surgical washout. Left hip cellulitis resolving.   -BC x 2 are negative to date  -improving slowly  -on vancomycin 750 mg IV q12h and ceftriaxone 2 gm IV qd # 5  -tolerating abx well so far; no side effects noted  -vancomycin trough level is therapeutic  -plan for PICC line and long tem IV abx therapy  -continue abx coverage  -local wound care  -monitor temps  -f/u CBC  -supportive care  2. Other issues: chest pain  -care per medicine  -care per medicine
73 y/o male with h/o Diastolic Heart Failure, Oxygen-Dependant COPD on 4L Home Oxygen, HTN, HLD, CAD s/p stent in LCx, Spinal Stenosis, Obesity, KENNY, PAD, recent  right Hip Hemiarthroplasty on 02/05/18 was admitted on 2/26 for continued drainage from surgical site. He was undergoing rehabilitation and drainage was noticed by Nursing staff at the facility who contacted the orthopedist and the patient was referred to [facility] for further evaluation. No Fever, chills reported.    1. Low grade fever. Right hip postsurgical site infection with STAU and CNST s/p surgical washout. Left hip cellulitis.   -BC x 2 are negative to date  -improving  -f/u OR culture  -on vancomycin 750 mg IV q12h and ceftriaxone 2 gm IV qd # 4  -tolerating abx well so far; no side effects noted  -vancomycin trough level is therapeutic  -plan for PICC line and long tem IV abx therapy  -continue abx coverage  -local wound care  -monitor temps  -f/u CBC  -supportive care  2. Other issues: chest pain  -care per medicine  -care per medicine
73 y/o male with h/o Diastolic Heart Failure, Oxygen-Dependant COPD on 4L Home Oxygen, HTN, HLD, CAD s/p stent in LCx, Spinal Stenosis, Obesity, KENNY, PAD, recent  right Hip Hemiarthroplasty on 02/05/18 was admitted on 2/26 for continued drainage from surgical site. He was undergoing rehabilitation and drainage was noticed by Nursing staff at the facility who contacted the orthopedist and the patient was referred to [facility] for further evaluation. No Fever, chills reported.    1. Low grade fever. Right hip postsurgical site infection with STAU s/p surgical washout. Left hip cellulitis.   -BC x 2 are negative to date  -improving  -f/u OR culture  -on vancomycin 750 mg IV q12h and ceftriaxone 2 gm IV qd # 3  -tolerating abx well so far; no side effects noted  -vancomycin trough level is therapeutic  -continue abx coverage  -local wound care  -monitor temps  -f/u CBC  -supportive care  2. Other issues: chest pain  -care per medicine  -care per medicine
73 y/o male with h/o Diastolic Heart Failure, Oxygen-Dependant COPD on 4L Home Oxygen, HTN, HLD, CAD s/p stent in LCx, Spinal Stenosis, Obesity, KENNY, PAD, recent  right Hip Hemiarthroplasty on 02/05/18 was admitted on 2/26 for continued drainage from surgical site. He was undergoing rehabilitation and drainage was noticed by Nursing staff at the facility who contacted the orthopedist and the patient was referred to [facility] for further evaluation. No Fever, chills reported.    1. Low grade fever. Right hip postsurgical site infection s/p surgical washout. Left hip cellulitis.   -BC x 2 are negative to date  -plan for washout surgery later today  -f/u OR culture  -on vancomycin 750 mg IV q12h and ceftriaxone 2 gm IV qd # 2  -tolerating abx well so far; no side effects noted  -obtain vancomycin trough levels  -continue abx coverage  -local wound care  -monitor temps  -f/u CBC  -supportive care  2. Other issues:   -care per medicine
A/P: 72M s/p I&D & revision brynn arthroplasty Right hip POD 6  Analgesia  DVT ppx  WBAT  PT/OT  DC planning
A/P: 72M s/p I&D and revision R hip hemiarthroplasty POD0  Pain Control  DVT ppx  WBAT  PT/OT  Incentive Spirometry  Medical management appreciated  Monitor HMV output  Abduction pillow at night for 30 days  FU OR cultures  FU wound care and ID consult  Cont IV Ancef and Vanc until rec from ID team  DC planning
A/P: 72M w/ wound drainage s/p R hemiarthroplasty 2/5  Pain Control  DVT ppx  WBAT  PT/OT  Incentive Spirometry  Medical management appreciated  No antibiotics  Dressing changes PRN  Medical clearance/optimization for OR  NPO at midnight  IVF while NPO  Hold chemical DVT ppx at midnight  Plan OR tomorrow 2/28  D/w attending Dr. Tang and agrees with above plan
I/P  71 yo male adm from Rehab due to inc drainage from surgical hip site, recent hemiarthroplasty on 2/5/18, now on [address] [place] [address] S/P I&D and revision R hip hemiarthroplasty  right hip I&D on 2-28-18.      s/p  PICC line,placed yesterday 3-5-18  for  6 weeks of IVAB  :cont Heparin 5000 units sq q8 h  ::Monitor CBC, BMP daily  ::BLE venodynes  ::INC mobility as joanne  pt to go to rehab today at [facility]  Will continue to follow.
I/P  71 yo male adm from Rehab due to inc drainage from surgical hip site, recent hemiarthroplasty on 2/5/18, now on [address] S/P right hip I&D,         awaiting PICC line, 6 weeks of IVAB  :cont Heparin 5000 units sq q8 h  ::Monitor CBC, BMP daily  ::BLE venodynes  ::INC mobility as joanne    Will continue to follow.
I/P  71 yo male adm from Rehab due to inc drainage from surgical hip site, recent hemiarthroplasty on 2/5/18, now on [address] S/P right hip I&D,         awaiting PICC line, 6 weeks of IVAB  :cont Heparin 5000 units sq q8 h  ::Monitor CBC, BMP daily  ::BLE venodynes  ::INC mobility as joanne    Will continue to follow.
I/P  71 yo male adm from Rehab due to inc drainage from surgical hip site, recent hemiarthroplasty on 2/5/18, now on [address] S/P right hip I&D, POD#1      ::Heparin 5000 units sq q8 h  ::Monitor CBC, BMP daily  ::BLE venodynes  ::INC mobility as joanne    Will continue to follow.
cough and wheeze with mild symptoms of copd exacerbation with persistent cough with no wheeze today . no pneumonia in the ct scan   - severe copd 02 dependent at baseline   - hypercarbic respiratory insufficiency with the pco2 retention from the last admission   - drainage from the right hip status post hip revision and drinage yesterday   - sleep apnea patient is non compliant with the cpap       PLAN     - would continue with the low dose steroids for the severe copd with exacerbation of symptoms    - continue with the nebs and symbicort and spiriva and symptomatic relief for the cough   - symptomatic relief of the cough  - incentive spirometry    - adequate dvt prophylaxis

## 2018-03-06 NOTE — PROGRESS NOTE ADULT - SUBJECTIVE AND OBJECTIVE BOX
PCP- DR Viera    CC-drainage for surgical site (right hip)    HPI:  72 year old male with past medical history of Diastolic Heart Failure, Oxygen-Dependant COPD on 4L Home Oxygen, HTN, HLD, CAD s/p stent in LCx, Spinal Stenosis, Obesity, KENNY, and PAD sent in from [facility] for continued drainage from surgical site. Patient was recently admitted for fall and underwent right Hip Hemiarthroplasty on 02/05/18,  hospital course notable for Post-operative Ileus, NSVT while off BB. He was eventually discharged to rehab. While at rehab,  Drainage was noticed by Nursing staff at the facility who contacted the orthopedist and was referred to [facility] for further evaluation. He underwent Revision of his right hip hemiarthroplasty/hardware exchange/washout. Was followed by ID as well and plan is for picc line and iv abx X 6 weeks total. Hospital course notable for acute copd exacerbation treated with steroids/nebs.Underwent PICC line in anticipation of prolonged abx treatment.     3/6: pt seen and examined this am. [place] well. For dc to [facility] today. No acute overnight events. Tolerating po. +bm today. No sob/chest pain    Review of system- All 10 systems reviewed and is as per HPI otherwise negative.     Vital Signs Last 24 Hrs  T(C): 36.8 (06 Mar 2018 04:34), Max: 37.1 (05 Mar 2018 17:16)  T(F): 98.2 (06 Mar 2018 04:34), Max: 98.7 (05 Mar 2018 17:16)  HR: 80 (06 Mar 2018 04:34) (80 - 99)  BP: 151/75 (06 Mar 2018 04:34) (127/62 - 166/82)  RR: 18 (05 Mar 2018 17:16) (18 - 18)  SpO2: 98% (06 Mar 2018 04:34) (97% - 98%)    PHYSICAL EXAM:    GENERAL: Comfortable, no acute distress   HEAD:  Normocephalic, atraumatic  EYES: EOMI, PERRLA  HEENT: Moist mucous membranes  NECK: Supple, No JVD  NERVOUS SYSTEM:  Alert & Oriented X3, non focal  CHEST/LUNG: Clear to auscultation bilaterally  HEART: Regular rate and rhythm  ABDOMEN: Soft, Nontender, Nondistended, Bowel sounds present  GENITOURINARY: Voiding, no palpable bladder  EXTREMITIES:   No clubbing, cyanosis, or edema. right hip dressing intact (changed today)  MUSCULOSKELETAL- normal tone  SKIN-no rash    LABS:                        10.6   13.6  )-----------( 364      ( 06 Mar 2018 06:05 )             33.4     03-06    139  |  96  |  23  ----------------------------<  113<H>  4.0   |  39<H>  |  0.72    Ca    9.6      06 Mar 2018 06:05    RADIOLOGY & ADDITIONAL TESTS:  EXAM:  XR HIP INCL PELV 1V RT                        PROCEDURE DATE:  02/28/2018    INTERPRETATION:  CLINICAL INFORMATION: Intraoperative  AP view of the right hip    COMPARISON:  February 26, 2018    FINDINGS: Status post right hip arthroplasty. Alignment of the prosthetic   components appears anatomic, however evaluation is limited due to lack of   an orthogonal view. Antibiotic eluting beads are noted.    IMPRESSION:  Status post right hip arthroplasty.     MEDICATIONS  (STANDING):  ALBUTerol    0.083% 2.5 milliGRAM(s) Nebulizer every 6 hours  amLODIPine   Tablet 5 milliGRAM(s) Oral daily  atorvastatin 20 milliGRAM(s) Oral at bedtime  benzonatate 100 milliGRAM(s) Oral daily  buDESOnide 160 MICROgram(s)/formoterol 4.5 MICROgram(s) Inhaler 2 Puff(s) Inhalation two times a day  cefTRIAXone Injectable. 2000 milliGRAM(s) IV Push every 24 hours  docusate sodium 100 milliGRAM(s) Oral three times a day  finasteride 5 milliGRAM(s) Oral daily  furosemide    Tablet 20 milliGRAM(s) Oral daily  guaiFENesin  milliGRAM(s) Oral every 12 hours  heparin  Injectable 5000 Unit(s) SubCutaneous every 8 hours  isosorbide   mononitrate ER Tablet (IMDUR) 60 milliGRAM(s) Oral daily  lactated ringers. 1000 milliLiter(s) (75 mL/Hr) IV Continuous <Continuous>  melatonin 3 milliGRAM(s) Oral at bedtime  metoprolol succinate ER 25 milliGRAM(s) Oral daily  polyethylene glycol 3350 17 Gram(s) Oral daily  predniSONE   Tablet 20 milliGRAM(s) Oral daily  tiotropium 18 MICROgram(s) Capsule 1 Capsule(s) Inhalation daily  vancomycin  IVPB 1000 milliGRAM(s) IV Intermittent every 12 hours    MEDICATIONS  (PRN):  acetaminophen   Tablet 650 milliGRAM(s) Oral every 6 hours PRN For Temp greater than 38 C (100.4 F)  acetaminophen   Tablet 650 milliGRAM(s) Oral every 6 hours PRN Headache  ALPRAZolam 0.5 milliGRAM(s) Oral three times a day PRN anxiety  aluminum hydroxide/magnesium hydroxide/simethicone Suspension 30 milliLiter(s) Oral four times a day PRN Indigestion  benzocaine 15 mG/menthol 3.6 mG Lozenge 1 Lozenge Oral every 4 hours PRN Sore Throat  bisacodyl Suppository 10 milliGRAM(s) Rectal daily PRN If no bowel movement by POD#2  diphenhydrAMINE   Capsule 25 milliGRAM(s) Oral every 6 hours PRN Rash and/or Itching  diphenhydrAMINE   Capsule 25 milliGRAM(s) Oral at bedtime PRN Insomnia  HYDROmorphone  Injectable 1 milliGRAM(s) IV Push every 3 hours PRN Severe Pain  magnesium hydroxide Suspension 30 milliLiter(s) Oral daily PRN Constipation  ondansetron Injectable 4 milliGRAM(s) IV Push every 6 hours PRN Nausea and/or Vomiting  oxyCODONE    IR 5 milliGRAM(s) Oral every 4 hours PRN Mild Pain  oxyCODONE    IR 10 milliGRAM(s) Oral every 4 hours PRN Moderate Pain  senna 2 Tablet(s) Oral at bedtime PRN Constipation      Assessment/Plan  72M, pmh as above a/w:    1. Recent right hip hemiarthroplasty with post surgical infn with mssa, enfae and cnst:  -s/p revision of right hip hemiarthroplasty/washout  -on iv rocephin/vancomycin D#7, to continue abx for 5 more weeks per ID (end date-4/11/18)  -s/p PICC LINE      2. Acute copd exacerbation with acute on chronic respiratory failure:  -improving  -steroids being tapered  -nebs  -O2    3. h/o CAD s/p stents  -continue imdur/statin/bb  -resume ranexa    4. Chr diastolic CHF  - compensated  -continue po lasix.     5. HTN:  -resume lisinopril  -continue norvasc, bb    6. BPH:  -continue proscar  -resume flomax    7. DVT px  -d/w ac services, will dc on lovenox 40mg bid X 4 weeks from procedure (end date march 28, 2018)    dispo: dc planning to [facility] today PCP- DR Viera    CC-drainage for surgical site (right hip)    HPI:  72 year old male with past medical history of Diastolic Heart Failure, Oxygen-Dependant COPD on 4L Home Oxygen, HTN, HLD, CAD s/p stent in LCx, Spinal Stenosis, Obesity, KENNY, and PAD sent in from [facility] for continued drainage from surgical site. Patient was recently admitted for fall and underwent right Hip Hemiarthroplasty on 02/05/18,  hospital course notable for Post-operative Ileus, NSVT while off BB. He was eventually discharged to rehab. While at rehab,  Drainage was noticed by Nursing staff at the facility who contacted the orthopedist and was referred to [facility] for further evaluation. He underwent Revision of his right hip hemiarthroplasty/hardware exchange/washout. Was followed by ID as well and plan is for picc line and iv abx X 6 weeks total. Hospital course notable for acute copd exacerbation treated with steroids/nebs.Underwent PICC line in anticipation of prolonged abx treatment.     3/6: pt seen and examined this am. [place] well. For dc to [facility] today. No acute overnight events. Tolerating po. +bm today. No sob/chest pain    Review of system- All 10 systems reviewed and is as per HPI otherwise negative.     Vital Signs Last 24 Hrs  T(C): 36.8 (06 Mar 2018 04:34), Max: 37.1 (05 Mar 2018 17:16)  T(F): 98.2 (06 Mar 2018 04:34), Max: 98.7 (05 Mar 2018 17:16)  HR: 80 (06 Mar 2018 04:34) (80 - 99)  BP: 151/75 (06 Mar 2018 04:34) (127/62 - 166/82)  RR: 18 (05 Mar 2018 17:16) (18 - 18)  SpO2: 98% (06 Mar 2018 04:34) (97% - 98%)    PHYSICAL EXAM:    GENERAL: Comfortable, no acute distress   HEAD:  Normocephalic, atraumatic  EYES: EOMI, PERRLA  HEENT: Moist mucous membranes  NECK: Supple, No JVD  NERVOUS SYSTEM:  Alert & Oriented X3, non focal  CHEST/LUNG: Clear to auscultation bilaterally  HEART: Regular rate and rhythm  ABDOMEN: Soft, Nontender, Nondistended, Bowel sounds present  GENITOURINARY: Voiding, no palpable bladder  EXTREMITIES:   No clubbing, cyanosis, or edema. right hip dressing intact (changed today)  MUSCULOSKELETAL- normal tone  SKIN-no rash    LABS:                        10.6   13.6  )-----------( 364      ( 06 Mar 2018 06:05 )             33.4     03-06    139  |  96  |  23  ----------------------------<  113<H>  4.0   |  39<H>  |  0.72    Ca    9.6      06 Mar 2018 06:05    RADIOLOGY & ADDITIONAL TESTS:  EXAM:  XR HIP INCL PELV 1V RT                        PROCEDURE DATE:  02/28/2018    INTERPRETATION:  CLINICAL INFORMATION: Intraoperative  AP view of the right hip    COMPARISON:  February 26, 2018    FINDINGS: Status post right hip arthroplasty. Alignment of the prosthetic   components appears anatomic, however evaluation is limited due to lack of   an orthogonal view. Antibiotic eluting beads are noted.    IMPRESSION:  Status post right hip arthroplasty.     MEDICATIONS  (STANDING):  ALBUTerol    0.083% 2.5 milliGRAM(s) Nebulizer every 6 hours  amLODIPine   Tablet 5 milliGRAM(s) Oral daily  atorvastatin 20 milliGRAM(s) Oral at bedtime  benzonatate 100 milliGRAM(s) Oral daily  buDESOnide 160 MICROgram(s)/formoterol 4.5 MICROgram(s) Inhaler 2 Puff(s) Inhalation two times a day  cefTRIAXone Injectable. 2000 milliGRAM(s) IV Push every 24 hours  docusate sodium 100 milliGRAM(s) Oral three times a day  finasteride 5 milliGRAM(s) Oral daily  furosemide    Tablet 20 milliGRAM(s) Oral daily  guaiFENesin  milliGRAM(s) Oral every 12 hours  heparin  Injectable 5000 Unit(s) SubCutaneous every 8 hours  isosorbide   mononitrate ER Tablet (IMDUR) 60 milliGRAM(s) Oral daily  lactated ringers. 1000 milliLiter(s) (75 mL/Hr) IV Continuous <Continuous>  melatonin 3 milliGRAM(s) Oral at bedtime  metoprolol succinate ER 25 milliGRAM(s) Oral daily  polyethylene glycol 3350 17 Gram(s) Oral daily  predniSONE   Tablet 20 milliGRAM(s) Oral daily  tiotropium 18 MICROgram(s) Capsule 1 Capsule(s) Inhalation daily  vancomycin  IVPB 1000 milliGRAM(s) IV Intermittent every 12 hours    MEDICATIONS  (PRN):  acetaminophen   Tablet 650 milliGRAM(s) Oral every 6 hours PRN For Temp greater than 38 C (100.4 F)  acetaminophen   Tablet 650 milliGRAM(s) Oral every 6 hours PRN Headache  ALPRAZolam 0.5 milliGRAM(s) Oral three times a day PRN anxiety  aluminum hydroxide/magnesium hydroxide/simethicone Suspension 30 milliLiter(s) Oral four times a day PRN Indigestion  benzocaine 15 mG/menthol 3.6 mG Lozenge 1 Lozenge Oral every 4 hours PRN Sore Throat  bisacodyl Suppository 10 milliGRAM(s) Rectal daily PRN If no bowel movement by POD#2  diphenhydrAMINE   Capsule 25 milliGRAM(s) Oral every 6 hours PRN Rash and/or Itching  diphenhydrAMINE   Capsule 25 milliGRAM(s) Oral at bedtime PRN Insomnia  HYDROmorphone  Injectable 1 milliGRAM(s) IV Push every 3 hours PRN Severe Pain  magnesium hydroxide Suspension 30 milliLiter(s) Oral daily PRN Constipation  ondansetron Injectable 4 milliGRAM(s) IV Push every 6 hours PRN Nausea and/or Vomiting  oxyCODONE    IR 5 milliGRAM(s) Oral every 4 hours PRN Mild Pain  oxyCODONE    IR 10 milliGRAM(s) Oral every 4 hours PRN Moderate Pain  senna 2 Tablet(s) Oral at bedtime PRN Constipation      Assessment/Plan  72M, pmh as above a/w:    1. Recent right hip hemiarthroplasty with post surgical infn with mssa, enfae and cnst:  -s/p revision of right hip hemiarthroplasty/washout  -on iv rocephin/vancomycin D#7, to continue abx for 5 more weeks per ID (end date-4/11/18)  -s/p PICC LINE      2. Acute copd exacerbation with acute on chronic respiratory failure:  -improving  -steroids being tapered  -nebs  -O2    3. h/o CAD s/p stents  -continue imdur/statin/bb  -resume ranexa    4. Chr diastolic CHF  - compensated  -continue po lasix.     5. HTN:  -resume lisinopril  -continue norvasc, bb    6. BPH:  -continue proscar  -resume flomax    7. DVT px  -d/w ac services, will dc on lovenox 40mg daily X 4 weeks from procedure (end date march 28, 2018)    dispo: dc planning to [facility] today

## 2018-03-06 NOTE — PROGRESS NOTE ADULT - PROVIDER SPECIALTY LIST ADULT
Anticoag Management
Hospitalist
Infectious Disease
Orthopedics
Pulmonology
Hospitalist
Hospitalist

## 2018-03-09 DIAGNOSIS — K59.00 CONSTIPATION, UNSPECIFIED: ICD-10-CM

## 2018-03-09 DIAGNOSIS — Z87.891 PERSONAL HISTORY OF NICOTINE DEPENDENCE: ICD-10-CM

## 2018-03-09 DIAGNOSIS — I47.2 VENTRICULAR TACHYCARDIA: ICD-10-CM
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
## 2018-03-09 DIAGNOSIS — G47.00 INSOMNIA, UNSPECIFIED: ICD-10-CM

## 2018-03-09 DIAGNOSIS — I87.2 VENOUS INSUFFICIENCY (CHRONIC) (PERIPHERAL): ICD-10-CM

## 2018-03-09 DIAGNOSIS — L03.115 CELLULITIS OF RIGHT LOWER LIMB: ICD-10-CM

## 2018-03-09 DIAGNOSIS — I25.10 ATHEROSCLEROTIC HEART DISEASE OF NATIVE CORONARY ARTERY WITHOUT ANGINA PECTORIS: ICD-10-CM

## 2018-03-09 DIAGNOSIS — Z99.81 DEPENDENCE ON SUPPLEMENTAL OXYGEN: ICD-10-CM

## 2018-03-09 DIAGNOSIS — T81.4XXA INFECTION FOLLOWING A PROCEDURE, INITIAL ENCOUNTER: ICD-10-CM
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
## 2018-03-09 DIAGNOSIS — I73.9 PERIPHERAL VASCULAR DISEASE, UNSPECIFIED: ICD-10-CM

## 2018-03-09 DIAGNOSIS — B95.2 ENTEROCOCCUS AS THE CAUSE OF DISEASES CLASSIFIED ELSEWHERE: ICD-10-CM

## 2018-03-09 DIAGNOSIS — Z91.19 PATIENT'S NONCOMPLIANCE WITH OTHER MEDICAL TREATMENT AND REGIMEN: ICD-10-CM

## 2018-03-09 DIAGNOSIS — J96.21 ACUTE AND CHRONIC RESPIRATORY FAILURE WITH HYPOXIA: ICD-10-CM

## 2018-03-09 DIAGNOSIS — I50.32 CHRONIC DIASTOLIC (CONGESTIVE) HEART FAILURE: ICD-10-CM

## 2018-03-09 DIAGNOSIS — J44.1 CHRONIC OBSTRUCTIVE PULMONARY DISEASE WITH (ACUTE) EXACERBATION: ICD-10-CM

## 2018-03-09 DIAGNOSIS — E66.9 OBESITY, UNSPECIFIED: ICD-10-CM

## 2018-03-09 DIAGNOSIS — B95.61 METHICILLIN SUSCEPTIBLE STAPHYLOCOCCUS AUREUS INFECTION AS THE CAUSE OF DISEASES CLASSIFIED ELSEWHERE: ICD-10-CM

## 2018-03-09 DIAGNOSIS — N40.0 BENIGN PROSTATIC HYPERPLASIA WITHOUT LOWER URINARY TRACT SYMPTOMS: ICD-10-CM

## 2018-03-09 DIAGNOSIS — M48.00 SPINAL STENOSIS, SITE UNSPECIFIED: ICD-10-CM

## 2018-03-09 DIAGNOSIS — Z95.5 PRESENCE OF CORONARY ANGIOPLASTY IMPLANT AND GRAFT: ICD-10-CM

## 2018-03-09 DIAGNOSIS — T82.524A DISPLACEMENT OF INFUSION CATHETER, INITIAL ENCOUNTER: ICD-10-CM

## 2018-03-09 DIAGNOSIS — R07.9 CHEST PAIN, UNSPECIFIED: ICD-10-CM

## 2018-03-09 DIAGNOSIS — D64.9 ANEMIA, UNSPECIFIED: ICD-10-CM

## 2018-03-09 DIAGNOSIS — I11.0 HYPERTENSIVE HEART DISEASE WITH HEART FAILURE: ICD-10-CM

## 2018-03-09 DIAGNOSIS — G47.33 OBSTRUCTIVE SLEEP APNEA (ADULT) (PEDIATRIC): ICD-10-CM

## 2019-11-08 PROBLEM — G47.33 OBSTRUCTIVE SLEEP APNEA (ADULT) (PEDIATRIC): Chronic | Status: ACTIVE | Noted: 2018-02-26

## 2019-11-08 PROBLEM — I10 ESSENTIAL (PRIMARY) HYPERTENSION: Chronic | Status: ACTIVE | Noted: 2018-02-26

## 2019-11-08 PROBLEM — I73.9 PERIPHERAL VASCULAR DISEASE, UNSPECIFIED: Chronic | Status: ACTIVE | Noted: 2018-02-26

## 2019-11-08 PROBLEM — I50.9 HEART FAILURE, UNSPECIFIED: Chronic | Status: ACTIVE | Noted: 2018-02-04

## 2019-11-08 PROBLEM — M48.00 SPINAL STENOSIS, SITE UNSPECIFIED: Chronic | Status: ACTIVE | Noted: 2018-02-04

## 2019-11-08 PROBLEM — J43.9 EMPHYSEMA, UNSPECIFIED: Chronic | Status: ACTIVE | Noted: 2018-02-04

## 2019-11-08 PROBLEM — J44.9 CHRONIC OBSTRUCTIVE PULMONARY DISEASE, UNSPECIFIED: Chronic | Status: ACTIVE | Noted: 2018-02-04

## 2019-11-08 PROBLEM — E78.5 HYPERLIPIDEMIA, UNSPECIFIED: Chronic | Status: ACTIVE | Noted: 2018-02-26

## 2019-11-20 RX ORDER — LISINOPRIL 2.5 MG/1
1 TABLET ORAL
Qty: 0 | Refills: 0 | DISCHARGE

## 2019-11-20 NOTE — ASU PATIENT PROFILE, ADULT - FALL HARM RISK
walks with walker, shortness of breath 02 dependent/coagulation(Bleeding disorder R/T clinical cond/anti-coags)

## 2019-11-21 ENCOUNTER — OUTPATIENT (OUTPATIENT)
Dept: OUTPATIENT SERVICES | Facility: HOSPITAL | Age: 74
LOS: 1 days | Discharge: HOME | End: 2019-11-21
Payer: MEDICARE

## 2019-11-21 VITALS
RESPIRATION RATE: 22 BRPM | HEART RATE: 78 BPM | DIASTOLIC BLOOD PRESSURE: 54 MMHG | SYSTOLIC BLOOD PRESSURE: 145 MMHG | OXYGEN SATURATION: 92 %

## 2019-11-21 VITALS
OXYGEN SATURATION: 92 % | RESPIRATION RATE: 18 BRPM | DIASTOLIC BLOOD PRESSURE: 67 MMHG | SYSTOLIC BLOOD PRESSURE: 128 MMHG | TEMPERATURE: 98 F | WEIGHT: 228.4 LBS | HEIGHT: 73 IN | HEART RATE: 75 BPM

## 2019-11-21 DIAGNOSIS — I48.91 UNSPECIFIED ATRIAL FIBRILLATION: ICD-10-CM

## 2019-11-21 PROCEDURE — 93320 DOPPLER ECHO COMPLETE: CPT

## 2019-11-21 PROCEDURE — 93312 ECHO TRANSESOPHAGEAL: CPT

## 2019-11-21 PROCEDURE — 93325 DOPPLER ECHO COLOR FLOW MAPG: CPT

## 2019-11-21 PROCEDURE — 92960 CARDIOVERSION ELECTRIC EXT: CPT

## 2019-11-21 PROCEDURE — 93010 ELECTROCARDIOGRAM REPORT: CPT | Mod: 76

## 2019-11-21 PROCEDURE — 93005 ELECTROCARDIOGRAM TRACING: CPT | Mod: XU

## 2019-11-21 RX ORDER — METOPROLOL TARTRATE 50 MG
5 TABLET ORAL ONCE
Refills: 0 | Status: DISCONTINUED | OUTPATIENT
Start: 2019-11-21 | End: 2019-11-21

## 2019-11-21 RX ORDER — METOPROLOL TARTRATE 50 MG
5 TABLET ORAL ONCE
Refills: 0 | Status: COMPLETED | OUTPATIENT
Start: 2019-11-21 | End: 2019-11-21

## 2019-11-21 RX ORDER — METOPROLOL TARTRATE 50 MG
1 TABLET ORAL
Qty: 30 | Refills: 2
Start: 2019-11-21 | End: 2020-01-01

## 2019-11-21 RX ORDER — DIGOXIN 250 MCG
1 TABLET ORAL
Qty: 30 | Refills: 2
Start: 2019-11-21 | End: 2020-01-01

## 2019-11-21 RX ORDER — METOPROLOL TARTRATE 50 MG
1 TABLET ORAL
Qty: 0 | Refills: 0 | DISCHARGE

## 2019-11-21 RX ORDER — DIGOXIN 250 MCG
0.5 TABLET ORAL ONCE
Refills: 0 | Status: COMPLETED | OUTPATIENT
Start: 2019-11-21 | End: 2019-11-21

## 2019-11-21 RX ADMIN — Medication 5 MILLIGRAM(S): at 14:53

## 2019-11-21 RX ADMIN — Medication 0.5 MILLIGRAM(S): at 14:49

## 2019-11-21 RX ADMIN — Medication 5 MILLIGRAM(S): at 14:47

## 2019-11-21 NOTE — ASU PREOP CHECKLIST - ALLERGIES REVIEWED
done
A/P: 88F with impacted right femoral neck fracture  Plan for OR tomorrow 9/8 for CRPP right hip   NPO after midnight except meds  IVFs while NPO  Hold chemical dvt ppx after midnight  SCDs  Medical optimization for OR  FU Labs  Pain control  Will discuss with attending and advise if plan changes

## 2019-11-21 NOTE — PROCEDURAL SAFETY CHECKLIST WITH OR WITHOUT SEDATION - NSPOSTCOMMENTFT_GEN_ALL_CORE
Time out/brief @, anesthesia @, probe in @, bubbles @, probe out @, Cardioverted with, Took over from anesthesia @. Time out/brief @ 1430, anesthesia @1434, probe in @1437, bubbles @1440, probe out @1442, Cardioverted with 120j x 1, 200j x 1 and 200j x 3, Lopressor 5mg IVP given @ 1447, Digoxin 0.5mg IVP given @ 1449 and Lopressor 5mg IVP @ 1453Took over from anesthesia @ 1500.

## 2019-11-21 NOTE — CHART NOTE - NSCHARTNOTEFT_GEN_A_CORE
S/P JOLIE and cardioversion. Prescription for Toprol XL 50 mg daily and Digoxin 0.125 mg PO daily sent to  pharmacy per Dr Amador. Changes in medication regimen explained to the pt

## 2019-11-22 DIAGNOSIS — I48.91 UNSPECIFIED ATRIAL FIBRILLATION: ICD-10-CM

## 2019-11-22 DIAGNOSIS — G47.33 OBSTRUCTIVE SLEEP APNEA (ADULT) (PEDIATRIC): ICD-10-CM

## 2019-11-22 DIAGNOSIS — J44.9 CHRONIC OBSTRUCTIVE PULMONARY DISEASE, UNSPECIFIED: ICD-10-CM

## 2019-11-22 DIAGNOSIS — I10 ESSENTIAL (PRIMARY) HYPERTENSION: ICD-10-CM

## 2019-11-22 DIAGNOSIS — J43.9 EMPHYSEMA, UNSPECIFIED: ICD-10-CM

## 2019-11-22 DIAGNOSIS — M48.00 SPINAL STENOSIS, SITE UNSPECIFIED: ICD-10-CM

## 2019-11-22 DIAGNOSIS — E78.5 HYPERLIPIDEMIA, UNSPECIFIED: ICD-10-CM

## 2019-11-22 DIAGNOSIS — I73.9 PERIPHERAL VASCULAR DISEASE, UNSPECIFIED: ICD-10-CM

## 2020-01-01 ENCOUNTER — INPATIENT (INPATIENT)
Facility: HOSPITAL | Age: 75
LOS: 5 days | DRG: 291 | End: 2020-12-04
Attending: HOSPITALIST | Admitting: SURGERY
Payer: MEDICARE

## 2020-01-01 VITALS
OXYGEN SATURATION: 77 % | WEIGHT: 250 LBS | HEART RATE: 90 BPM | HEIGHT: 73 IN | TEMPERATURE: 98 F | SYSTOLIC BLOOD PRESSURE: 179 MMHG | DIASTOLIC BLOOD PRESSURE: 84 MMHG | RESPIRATION RATE: 22 BRPM

## 2020-01-01 VITALS
OXYGEN SATURATION: 100 % | SYSTOLIC BLOOD PRESSURE: 146 MMHG | RESPIRATION RATE: 18 BRPM | DIASTOLIC BLOOD PRESSURE: 82 MMHG | TEMPERATURE: 96 F | HEART RATE: 91 BPM

## 2020-01-01 DIAGNOSIS — I11.0 HYPERTENSIVE HEART DISEASE WITH HEART FAILURE: ICD-10-CM

## 2020-01-01 DIAGNOSIS — Z66 DO NOT RESUSCITATE: ICD-10-CM

## 2020-01-01 DIAGNOSIS — N14.1 NEPHROPATHY INDUCED BY OTHER DRUGS, MEDICAMENTS AND BIOLOGICAL SUBSTANCES: ICD-10-CM

## 2020-01-01 DIAGNOSIS — Z87.891 PERSONAL HISTORY OF NICOTINE DEPENDENCE: ICD-10-CM

## 2020-01-01 DIAGNOSIS — J44.9 CHRONIC OBSTRUCTIVE PULMONARY DISEASE, UNSPECIFIED: ICD-10-CM

## 2020-01-01 DIAGNOSIS — I49.3 VENTRICULAR PREMATURE DEPOLARIZATION: ICD-10-CM

## 2020-01-01 DIAGNOSIS — R73.9 HYPERGLYCEMIA, UNSPECIFIED: ICD-10-CM

## 2020-01-01 DIAGNOSIS — J43.9 EMPHYSEMA, UNSPECIFIED: ICD-10-CM

## 2020-01-01 DIAGNOSIS — Z79.02 LONG TERM (CURRENT) USE OF ANTITHROMBOTICS/ANTIPLATELETS: ICD-10-CM

## 2020-01-01 DIAGNOSIS — G47.33 OBSTRUCTIVE SLEEP APNEA (ADULT) (PEDIATRIC): ICD-10-CM

## 2020-01-01 DIAGNOSIS — J96.22 ACUTE AND CHRONIC RESPIRATORY FAILURE WITH HYPERCAPNIA: ICD-10-CM

## 2020-01-01 DIAGNOSIS — I25.10 ATHEROSCLEROTIC HEART DISEASE OF NATIVE CORONARY ARTERY WITHOUT ANGINA PECTORIS: ICD-10-CM

## 2020-01-01 DIAGNOSIS — I47.2 VENTRICULAR TACHYCARDIA: ICD-10-CM

## 2020-01-01 DIAGNOSIS — Z99.81 DEPENDENCE ON SUPPLEMENTAL OXYGEN: ICD-10-CM

## 2020-01-01 DIAGNOSIS — N40.0 BENIGN PROSTATIC HYPERPLASIA WITHOUT LOWER URINARY TRACT SYMPTOMS: ICD-10-CM

## 2020-01-01 DIAGNOSIS — E87.2 ACIDOSIS: ICD-10-CM

## 2020-01-01 DIAGNOSIS — Z79.51 LONG TERM (CURRENT) USE OF INHALED STEROIDS: ICD-10-CM

## 2020-01-01 DIAGNOSIS — E87.3 ALKALOSIS: ICD-10-CM

## 2020-01-01 DIAGNOSIS — I48.20 CHRONIC ATRIAL FIBRILLATION, UNSPECIFIED: ICD-10-CM

## 2020-01-01 DIAGNOSIS — Z79.01 LONG TERM (CURRENT) USE OF ANTICOAGULANTS: ICD-10-CM

## 2020-01-01 DIAGNOSIS — J96.21 ACUTE AND CHRONIC RESPIRATORY FAILURE WITH HYPOXIA: ICD-10-CM

## 2020-01-01 DIAGNOSIS — R41.0 DISORIENTATION, UNSPECIFIED: ICD-10-CM

## 2020-01-01 DIAGNOSIS — T50.1X5A ADVERSE EFFECT OF LOOP [HIGH-CEILING] DIURETICS, INITIAL ENCOUNTER: ICD-10-CM

## 2020-01-01 DIAGNOSIS — I50.33 ACUTE ON CHRONIC DIASTOLIC (CONGESTIVE) HEART FAILURE: ICD-10-CM

## 2020-01-01 DIAGNOSIS — I08.1 RHEUMATIC DISORDERS OF BOTH MITRAL AND TRICUSPID VALVES: ICD-10-CM

## 2020-01-01 DIAGNOSIS — Z51.5 ENCOUNTER FOR PALLIATIVE CARE: ICD-10-CM

## 2020-01-01 DIAGNOSIS — I73.9 PERIPHERAL VASCULAR DISEASE, UNSPECIFIED: ICD-10-CM

## 2020-01-01 DIAGNOSIS — L03.116 CELLULITIS OF LEFT LOWER LIMB: ICD-10-CM

## 2020-01-01 DIAGNOSIS — Z95.5 PRESENCE OF CORONARY ANGIOPLASTY IMPLANT AND GRAFT: ICD-10-CM

## 2020-01-01 DIAGNOSIS — E78.5 HYPERLIPIDEMIA, UNSPECIFIED: ICD-10-CM

## 2020-01-01 DIAGNOSIS — Z86.718 PERSONAL HISTORY OF OTHER VENOUS THROMBOSIS AND EMBOLISM: ICD-10-CM

## 2020-01-01 DIAGNOSIS — N17.9 ACUTE KIDNEY FAILURE, UNSPECIFIED: ICD-10-CM

## 2020-01-01 DIAGNOSIS — M48.00 SPINAL STENOSIS, SITE UNSPECIFIED: ICD-10-CM

## 2020-01-01 DIAGNOSIS — E66.9 OBESITY, UNSPECIFIED: ICD-10-CM

## 2020-01-01 DIAGNOSIS — Z86.73 PERSONAL HISTORY OF TRANSIENT ISCHEMIC ATTACK (TIA), AND CEREBRAL INFARCTION WITHOUT RESIDUAL DEFICITS: ICD-10-CM

## 2020-01-01 LAB
A1C WITH ESTIMATED AVERAGE GLUCOSE RESULT: 6.5 % — HIGH (ref 4–5.6)
ADD ON TEST-SPECIMEN IN LAB: SIGNIFICANT CHANGE UP
ADD ON TEST-SPECIMEN IN LAB: SIGNIFICANT CHANGE UP
ALBUMIN SERPL ELPH-MCNC: 3.4 G/DL — SIGNIFICANT CHANGE UP (ref 3.3–5)
ALP SERPL-CCNC: 113 U/L — SIGNIFICANT CHANGE UP (ref 40–120)
ALT FLD-CCNC: 17 U/L — SIGNIFICANT CHANGE UP (ref 12–78)
ANION GAP SERPL CALC-SCNC: 0 MMOL/L — LOW (ref 5–17)
ANION GAP SERPL CALC-SCNC: 0 MMOL/L — LOW (ref 5–17)
ANION GAP SERPL CALC-SCNC: 1 MMOL/L — LOW (ref 5–17)
ANION GAP SERPL CALC-SCNC: 1 MMOL/L — LOW (ref 5–17)
ANION GAP SERPL CALC-SCNC: 2 MMOL/L — LOW (ref 5–17)
ANION GAP SERPL CALC-SCNC: 4 MMOL/L — LOW (ref 5–17)
APTT BLD: 39.3 SEC — HIGH (ref 27.5–35.5)
AST SERPL-CCNC: 12 U/L — LOW (ref 15–37)
BASE EXCESS BLDA CALC-SCNC: 11 MMOL/L — HIGH (ref -2–2)
BASE EXCESS BLDA CALC-SCNC: 12.2 MMOL/L — HIGH (ref -2–2)
BASE EXCESS BLDA CALC-SCNC: 12.4 MMOL/L — HIGH (ref -2–2)
BASE EXCESS BLDA CALC-SCNC: 14.2 MMOL/L — HIGH (ref -2–2)
BASE EXCESS BLDA CALC-SCNC: 14.3 MMOL/L — HIGH (ref -2–2)
BASE EXCESS BLDA CALC-SCNC: 4.6 MMOL/L — HIGH (ref -2–2)
BASE EXCESS BLDA CALC-SCNC: 7.6 MMOL/L — HIGH (ref -2–2)
BASE EXCESS BLDA CALC-SCNC: 7.7 MMOL/L — HIGH (ref -2–2)
BASE EXCESS BLDA CALC-SCNC: 7.9 MMOL/L — HIGH (ref -2–2)
BASE EXCESS BLDA CALC-SCNC: 9.8 MMOL/L — HIGH (ref -2–2)
BASE EXCESS BLDV CALC-SCNC: 8.5 MMOL/L — HIGH (ref -2–2)
BASOPHILS # BLD AUTO: 0.04 K/UL — SIGNIFICANT CHANGE UP (ref 0–0.2)
BASOPHILS NFR BLD AUTO: 0.3 % — SIGNIFICANT CHANGE UP (ref 0–2)
BILIRUB SERPL-MCNC: 0.4 MG/DL — SIGNIFICANT CHANGE UP (ref 0.2–1.2)
BLOOD GAS COMMENTS ARTERIAL: SIGNIFICANT CHANGE UP
BUN SERPL-MCNC: 20 MG/DL — SIGNIFICANT CHANGE UP (ref 7–23)
BUN SERPL-MCNC: 25 MG/DL — HIGH (ref 7–23)
BUN SERPL-MCNC: 42 MG/DL — HIGH (ref 7–23)
BUN SERPL-MCNC: 45 MG/DL — HIGH (ref 7–23)
BUN SERPL-MCNC: 55 MG/DL — HIGH (ref 7–23)
BUN SERPL-MCNC: 63 MG/DL — HIGH (ref 7–23)
BUN SERPL-MCNC: 92 MG/DL — HIGH (ref 7–23)
CALCIUM SERPL-MCNC: 8.8 MG/DL — SIGNIFICANT CHANGE UP (ref 8.5–10.1)
CALCIUM SERPL-MCNC: 8.9 MG/DL — SIGNIFICANT CHANGE UP (ref 8.5–10.1)
CALCIUM SERPL-MCNC: 8.9 MG/DL — SIGNIFICANT CHANGE UP (ref 8.5–10.1)
CALCIUM SERPL-MCNC: 9 MG/DL — SIGNIFICANT CHANGE UP (ref 8.5–10.1)
CALCIUM SERPL-MCNC: 9.1 MG/DL — SIGNIFICANT CHANGE UP (ref 8.5–10.1)
CALCIUM SERPL-MCNC: 9.1 MG/DL — SIGNIFICANT CHANGE UP (ref 8.5–10.1)
CALCIUM SERPL-MCNC: 9.4 MG/DL — SIGNIFICANT CHANGE UP (ref 8.5–10.1)
CHLORIDE SERPL-SCNC: 100 MMOL/L — SIGNIFICANT CHANGE UP (ref 96–108)
CHLORIDE SERPL-SCNC: 101 MMOL/L — SIGNIFICANT CHANGE UP (ref 96–108)
CHLORIDE SERPL-SCNC: 97 MMOL/L — SIGNIFICANT CHANGE UP (ref 96–108)
CHLORIDE SERPL-SCNC: 98 MMOL/L — SIGNIFICANT CHANGE UP (ref 96–108)
CHLORIDE SERPL-SCNC: 99 MMOL/L — SIGNIFICANT CHANGE UP (ref 96–108)
CK SERPL-CCNC: 54 U/L — SIGNIFICANT CHANGE UP (ref 26–308)
CO2 SERPL-SCNC: 40 MMOL/L — HIGH (ref 22–31)
CO2 SERPL-SCNC: 42 MMOL/L — HIGH (ref 22–31)
CO2 SERPL-SCNC: 43 MMOL/L — HIGH (ref 22–31)
CO2 SERPL-SCNC: 45 MMOL/L — CRITICAL HIGH (ref 22–31)
CO2 SERPL-SCNC: >45 MMOL/L — CRITICAL HIGH (ref 22–31)
CREAT SERPL-MCNC: 1.02 MG/DL — SIGNIFICANT CHANGE UP (ref 0.5–1.3)
CREAT SERPL-MCNC: 1.07 MG/DL — SIGNIFICANT CHANGE UP (ref 0.5–1.3)
CREAT SERPL-MCNC: 1.09 MG/DL — SIGNIFICANT CHANGE UP (ref 0.5–1.3)
CREAT SERPL-MCNC: 1.17 MG/DL — SIGNIFICANT CHANGE UP (ref 0.5–1.3)
CREAT SERPL-MCNC: 1.3 MG/DL — SIGNIFICANT CHANGE UP (ref 0.5–1.3)
CREAT SERPL-MCNC: 1.55 MG/DL — HIGH (ref 0.5–1.3)
CREAT SERPL-MCNC: 1.98 MG/DL — HIGH (ref 0.5–1.3)
CULTURE RESULTS: SIGNIFICANT CHANGE UP
CULTURE RESULTS: SIGNIFICANT CHANGE UP
EOSINOPHIL # BLD AUTO: 0.13 K/UL — SIGNIFICANT CHANGE UP (ref 0–0.5)
EOSINOPHIL NFR BLD AUTO: 1.1 % — SIGNIFICANT CHANGE UP (ref 0–6)
ESTIMATED AVERAGE GLUCOSE: 140 MG/DL — HIGH (ref 68–114)
GAS PNL BLDA: SIGNIFICANT CHANGE UP
GLUCOSE SERPL-MCNC: 154 MG/DL — HIGH (ref 70–99)
GLUCOSE SERPL-MCNC: 160 MG/DL — HIGH (ref 70–99)
GLUCOSE SERPL-MCNC: 164 MG/DL — HIGH (ref 70–99)
GLUCOSE SERPL-MCNC: 168 MG/DL — HIGH (ref 70–99)
GLUCOSE SERPL-MCNC: 182 MG/DL — HIGH (ref 70–99)
GLUCOSE SERPL-MCNC: 204 MG/DL — HIGH (ref 70–99)
GLUCOSE SERPL-MCNC: 215 MG/DL — HIGH (ref 70–99)
HCO3 BLDA-SCNC: 35 MMOL/L — HIGH (ref 21–29)
HCO3 BLDA-SCNC: 36 MMOL/L — HIGH (ref 21–29)
HCO3 BLDA-SCNC: 38 MMOL/L — HIGH (ref 21–29)
HCO3 BLDA-SCNC: 39 MMOL/L — HIGH (ref 21–29)
HCO3 BLDA-SCNC: 40 MMOL/L — HIGH (ref 21–29)
HCO3 BLDA-SCNC: 41 MMOL/L — HIGH (ref 21–29)
HCO3 BLDA-SCNC: 43 MMOL/L — HIGH (ref 21–29)
HCO3 BLDA-SCNC: 45 MMOL/L — HIGH (ref 21–29)
HCO3 BLDA-SCNC: 46 MMOL/L — HIGH (ref 21–29)
HCO3 BLDA-SCNC: 47 MMOL/L — HIGH (ref 21–29)
HCO3 BLDV-SCNC: 39 MMOL/L — HIGH (ref 21–29)
HCT VFR BLD CALC: 38.1 % — LOW (ref 39–50)
HCT VFR BLD CALC: 38.6 % — LOW (ref 39–50)
HCT VFR BLD CALC: 41.6 % — SIGNIFICANT CHANGE UP (ref 39–50)
HCT VFR BLD CALC: 42.6 % — SIGNIFICANT CHANGE UP (ref 39–50)
HCT VFR BLD CALC: 44 % — SIGNIFICANT CHANGE UP (ref 39–50)
HCT VFR BLD CALC: 46.6 % — SIGNIFICANT CHANGE UP (ref 39–50)
HCT VFR BLD CALC: 51.1 % — HIGH (ref 39–50)
HCV AB S/CO SERPL IA: 0.12 S/CO — SIGNIFICANT CHANGE UP (ref 0–0.99)
HCV AB SERPL-IMP: SIGNIFICANT CHANGE UP
HGB BLD-MCNC: 11.6 G/DL — LOW (ref 13–17)
HGB BLD-MCNC: 11.6 G/DL — LOW (ref 13–17)
HGB BLD-MCNC: 12.4 G/DL — LOW (ref 13–17)
HGB BLD-MCNC: 12.6 G/DL — LOW (ref 13–17)
HGB BLD-MCNC: 13.3 G/DL — SIGNIFICANT CHANGE UP (ref 13–17)
HGB BLD-MCNC: 13.8 G/DL — SIGNIFICANT CHANGE UP (ref 13–17)
HGB BLD-MCNC: 14.5 G/DL — SIGNIFICANT CHANGE UP (ref 13–17)
HOROWITZ INDEX BLDA+IHG-RTO: 60 — SIGNIFICANT CHANGE UP
IMM GRANULOCYTES NFR BLD AUTO: 0.3 % — SIGNIFICANT CHANGE UP (ref 0–1.5)
INR BLD: 1.33 RATIO — HIGH (ref 0.88–1.16)
LACTATE SERPL-SCNC: 1 MMOL/L — SIGNIFICANT CHANGE UP (ref 0.7–2)
LYMPHOCYTES # BLD AUTO: 0.52 K/UL — LOW (ref 1–3.3)
LYMPHOCYTES # BLD AUTO: 4.5 % — LOW (ref 13–44)
MAGNESIUM SERPL-MCNC: 2.3 MG/DL — SIGNIFICANT CHANGE UP (ref 1.6–2.6)
MAGNESIUM SERPL-MCNC: 2.4 MG/DL — SIGNIFICANT CHANGE UP (ref 1.6–2.6)
MAGNESIUM SERPL-MCNC: 2.8 MG/DL — HIGH (ref 1.6–2.6)
MAGNESIUM SERPL-MCNC: 3 MG/DL — HIGH (ref 1.6–2.6)
MCHC RBC-ENTMCNC: 27.4 PG — SIGNIFICANT CHANGE UP (ref 27–34)
MCHC RBC-ENTMCNC: 27.6 PG — SIGNIFICANT CHANGE UP (ref 27–34)
MCHC RBC-ENTMCNC: 27.8 PG — SIGNIFICANT CHANGE UP (ref 27–34)
MCHC RBC-ENTMCNC: 27.8 PG — SIGNIFICANT CHANGE UP (ref 27–34)
MCHC RBC-ENTMCNC: 28 PG — SIGNIFICANT CHANGE UP (ref 27–34)
MCHC RBC-ENTMCNC: 28 PG — SIGNIFICANT CHANGE UP (ref 27–34)
MCHC RBC-ENTMCNC: 28.1 PG — SIGNIFICANT CHANGE UP (ref 27–34)
MCHC RBC-ENTMCNC: 28.4 GM/DL — LOW (ref 32–36)
MCHC RBC-ENTMCNC: 29.6 GM/DL — LOW (ref 32–36)
MCHC RBC-ENTMCNC: 29.6 GM/DL — LOW (ref 32–36)
MCHC RBC-ENTMCNC: 29.8 GM/DL — LOW (ref 32–36)
MCHC RBC-ENTMCNC: 30.1 GM/DL — LOW (ref 32–36)
MCHC RBC-ENTMCNC: 30.2 GM/DL — LOW (ref 32–36)
MCHC RBC-ENTMCNC: 30.4 GM/DL — LOW (ref 32–36)
MCV RBC AUTO: 91.3 FL — SIGNIFICANT CHANGE UP (ref 80–100)
MCV RBC AUTO: 91.4 FL — SIGNIFICANT CHANGE UP (ref 80–100)
MCV RBC AUTO: 92.3 FL — SIGNIFICANT CHANGE UP (ref 80–100)
MCV RBC AUTO: 92.6 FL — SIGNIFICANT CHANGE UP (ref 80–100)
MCV RBC AUTO: 94.1 FL — SIGNIFICANT CHANGE UP (ref 80–100)
MCV RBC AUTO: 94.7 FL — SIGNIFICANT CHANGE UP (ref 80–100)
MCV RBC AUTO: 98.6 FL — SIGNIFICANT CHANGE UP (ref 80–100)
MONOCYTES # BLD AUTO: 1.01 K/UL — HIGH (ref 0–0.9)
MONOCYTES NFR BLD AUTO: 8.7 % — SIGNIFICANT CHANGE UP (ref 2–14)
NEUTROPHILS # BLD AUTO: 9.9 K/UL — HIGH (ref 1.8–7.4)
NEUTROPHILS NFR BLD AUTO: 85.1 % — HIGH (ref 43–77)
NT-PROBNP SERPL-SCNC: 2884 PG/ML — HIGH (ref 0–125)
PCO2 BLDA: 104 MMHG — CRITICAL HIGH (ref 32–46)
PCO2 BLDA: 106 MMHG — CRITICAL HIGH (ref 32–46)
PCO2 BLDA: 109 MMHG — CRITICAL HIGH (ref 32–46)
PCO2 BLDA: 149 MMHG — CRITICAL HIGH (ref 32–46)
PCO2 BLDA: 77 MMHG — CRITICAL HIGH (ref 32–46)
PCO2 BLDA: 79 MMHG — CRITICAL HIGH (ref 32–46)
PCO2 BLDA: 79 MMHG — CRITICAL HIGH (ref 32–46)
PCO2 BLDA: 81 MMHG — CRITICAL HIGH (ref 32–46)
PCO2 BLDA: 93 MMHG — CRITICAL HIGH (ref 32–46)
PCO2 BLDA: 95 MMHG — CRITICAL HIGH (ref 32–46)
PCO2 BLDV: 95 MMHG — HIGH (ref 35–50)
PH BLDA: 7.13 — CRITICAL LOW (ref 7.35–7.45)
PH BLDA: 7.2 — CRITICAL LOW (ref 7.35–7.45)
PH BLDA: 7.21 — LOW (ref 7.35–7.45)
PH BLDA: 7.23 — LOW (ref 7.35–7.45)
PH BLDA: 7.24 — LOW (ref 7.35–7.45)
PH BLDA: 7.26 — LOW (ref 7.35–7.45)
PH BLDA: 7.29 — LOW (ref 7.35–7.45)
PH BLDA: 7.3 — LOW (ref 7.35–7.45)
PH BLDA: 7.33 — LOW (ref 7.35–7.45)
PH BLDA: 7.36 — SIGNIFICANT CHANGE UP (ref 7.35–7.45)
PH BLDV: 7.24 — LOW (ref 7.35–7.45)
PHOSPHATE SERPL-MCNC: 4.4 MG/DL — SIGNIFICANT CHANGE UP (ref 2.5–4.5)
PHOSPHATE SERPL-MCNC: 4.6 MG/DL — HIGH (ref 2.5–4.5)
PLATELET # BLD AUTO: 262 K/UL — SIGNIFICANT CHANGE UP (ref 150–400)
PLATELET # BLD AUTO: 265 K/UL — SIGNIFICANT CHANGE UP (ref 150–400)
PLATELET # BLD AUTO: 281 K/UL — SIGNIFICANT CHANGE UP (ref 150–400)
PLATELET # BLD AUTO: 286 K/UL — SIGNIFICANT CHANGE UP (ref 150–400)
PLATELET # BLD AUTO: 296 K/UL — SIGNIFICANT CHANGE UP (ref 150–400)
PLATELET # BLD AUTO: 307 K/UL — SIGNIFICANT CHANGE UP (ref 150–400)
PLATELET # BLD AUTO: 323 K/UL — SIGNIFICANT CHANGE UP (ref 150–400)
PO2 BLDA: 138 MMHG — HIGH (ref 74–108)
PO2 BLDA: 156 MMHG — HIGH (ref 74–108)
PO2 BLDA: 38 MMHG — CRITICAL LOW (ref 74–108)
PO2 BLDA: 60 MMHG — LOW (ref 74–108)
PO2 BLDA: 63 MMHG — LOW (ref 74–108)
PO2 BLDA: 64 MMHG — LOW (ref 74–108)
PO2 BLDA: 74 MMHG — SIGNIFICANT CHANGE UP (ref 74–108)
PO2 BLDA: 85 MMHG — SIGNIFICANT CHANGE UP (ref 74–108)
PO2 BLDV: 98 MMHG — HIGH (ref 25–45)
POTASSIUM SERPL-MCNC: 3.9 MMOL/L — SIGNIFICANT CHANGE UP (ref 3.5–5.3)
POTASSIUM SERPL-MCNC: 4.1 MMOL/L — SIGNIFICANT CHANGE UP (ref 3.5–5.3)
POTASSIUM SERPL-MCNC: 4.4 MMOL/L — SIGNIFICANT CHANGE UP (ref 3.5–5.3)
POTASSIUM SERPL-MCNC: 4.4 MMOL/L — SIGNIFICANT CHANGE UP (ref 3.5–5.3)
POTASSIUM SERPL-MCNC: 4.5 MMOL/L — SIGNIFICANT CHANGE UP (ref 3.5–5.3)
POTASSIUM SERPL-MCNC: 5.1 MMOL/L — SIGNIFICANT CHANGE UP (ref 3.5–5.3)
POTASSIUM SERPL-MCNC: 5.2 MMOL/L — SIGNIFICANT CHANGE UP (ref 3.5–5.3)
POTASSIUM SERPL-SCNC: 3.9 MMOL/L — SIGNIFICANT CHANGE UP (ref 3.5–5.3)
POTASSIUM SERPL-SCNC: 4.1 MMOL/L — SIGNIFICANT CHANGE UP (ref 3.5–5.3)
POTASSIUM SERPL-SCNC: 4.4 MMOL/L — SIGNIFICANT CHANGE UP (ref 3.5–5.3)
POTASSIUM SERPL-SCNC: 4.4 MMOL/L — SIGNIFICANT CHANGE UP (ref 3.5–5.3)
POTASSIUM SERPL-SCNC: 4.5 MMOL/L — SIGNIFICANT CHANGE UP (ref 3.5–5.3)
POTASSIUM SERPL-SCNC: 5.1 MMOL/L — SIGNIFICANT CHANGE UP (ref 3.5–5.3)
POTASSIUM SERPL-SCNC: 5.2 MMOL/L — SIGNIFICANT CHANGE UP (ref 3.5–5.3)
PROT SERPL-MCNC: 7.3 GM/DL — SIGNIFICANT CHANGE UP (ref 6–8.3)
PROTHROM AB SERPL-ACNC: 15.3 SEC — HIGH (ref 10.6–13.6)
RAPID RVP RESULT: SIGNIFICANT CHANGE UP
RBC # BLD: 4.17 M/UL — LOW (ref 4.2–5.8)
RBC # BLD: 4.18 M/UL — LOW (ref 4.2–5.8)
RBC # BLD: 4.42 M/UL — SIGNIFICANT CHANGE UP (ref 4.2–5.8)
RBC # BLD: 4.6 M/UL — SIGNIFICANT CHANGE UP (ref 4.2–5.8)
RBC # BLD: 4.82 M/UL — SIGNIFICANT CHANGE UP (ref 4.2–5.8)
RBC # BLD: 4.92 M/UL — SIGNIFICANT CHANGE UP (ref 4.2–5.8)
RBC # BLD: 5.18 M/UL — SIGNIFICANT CHANGE UP (ref 4.2–5.8)
RBC # FLD: 14.8 % — HIGH (ref 10.3–14.5)
RBC # FLD: 14.9 % — HIGH (ref 10.3–14.5)
RBC # FLD: 15 % — HIGH (ref 10.3–14.5)
RBC # FLD: 15.1 % — HIGH (ref 10.3–14.5)
RBC # FLD: 15.2 % — HIGH (ref 10.3–14.5)
RBC # FLD: 15.2 % — HIGH (ref 10.3–14.5)
RBC # FLD: 15.3 % — HIGH (ref 10.3–14.5)
SAO2 % BLDA: 67 % — LOW (ref 92–96)
SAO2 % BLDA: 88 % — LOW (ref 92–96)
SAO2 % BLDA: 89 % — LOW (ref 92–96)
SAO2 % BLDA: 90 % — LOW (ref 92–96)
SAO2 % BLDA: 92 % — SIGNIFICANT CHANGE UP (ref 92–96)
SAO2 % BLDA: 94 % — SIGNIFICANT CHANGE UP (ref 92–96)
SAO2 % BLDA: 99 % — HIGH (ref 92–96)
SAO2 % BLDA: 99 % — HIGH (ref 92–96)
SAO2 % BLDV: 96 % — HIGH (ref 67–88)
SARS-COV-2 IGG SERPL QL IA: NEGATIVE — SIGNIFICANT CHANGE UP
SARS-COV-2 IGM SERPL IA-ACNC: 0.08 INDEX — SIGNIFICANT CHANGE UP
SARS-COV-2 RNA SPEC QL NAA+PROBE: SIGNIFICANT CHANGE UP
SODIUM SERPL-SCNC: 137 MMOL/L — SIGNIFICANT CHANGE UP (ref 135–145)
SODIUM SERPL-SCNC: 140 MMOL/L — SIGNIFICANT CHANGE UP (ref 135–145)
SODIUM SERPL-SCNC: 141 MMOL/L — SIGNIFICANT CHANGE UP (ref 135–145)
SODIUM SERPL-SCNC: 144 MMOL/L — SIGNIFICANT CHANGE UP (ref 135–145)
SODIUM SERPL-SCNC: 144 MMOL/L — SIGNIFICANT CHANGE UP (ref 135–145)
SODIUM SERPL-SCNC: 145 MMOL/L — SIGNIFICANT CHANGE UP (ref 135–145)
SODIUM SERPL-SCNC: 145 MMOL/L — SIGNIFICANT CHANGE UP (ref 135–145)
SPECIMEN SOURCE: SIGNIFICANT CHANGE UP
SPECIMEN SOURCE: SIGNIFICANT CHANGE UP
TROPONIN I SERPL-MCNC: <0.015 NG/ML — SIGNIFICANT CHANGE UP (ref 0.01–0.04)
TROPONIN I SERPL-MCNC: <0.015 NG/ML — SIGNIFICANT CHANGE UP (ref 0.01–0.04)
WBC # BLD: 11.64 K/UL — HIGH (ref 3.8–10.5)
WBC # BLD: 13.25 K/UL — HIGH (ref 3.8–10.5)
WBC # BLD: 14.81 K/UL — HIGH (ref 3.8–10.5)
WBC # BLD: 18.03 K/UL — HIGH (ref 3.8–10.5)
WBC # BLD: 19.51 K/UL — HIGH (ref 3.8–10.5)
WBC # BLD: 26.12 K/UL — HIGH (ref 3.8–10.5)
WBC # BLD: 8.64 K/UL — SIGNIFICANT CHANGE UP (ref 3.8–10.5)
WBC # FLD AUTO: 11.64 K/UL — HIGH (ref 3.8–10.5)
WBC # FLD AUTO: 13.25 K/UL — HIGH (ref 3.8–10.5)
WBC # FLD AUTO: 14.81 K/UL — HIGH (ref 3.8–10.5)
WBC # FLD AUTO: 18.03 K/UL — HIGH (ref 3.8–10.5)
WBC # FLD AUTO: 19.51 K/UL — HIGH (ref 3.8–10.5)
WBC # FLD AUTO: 26.12 K/UL — HIGH (ref 3.8–10.5)
WBC # FLD AUTO: 8.64 K/UL — SIGNIFICANT CHANGE UP (ref 3.8–10.5)

## 2020-01-01 PROCEDURE — 99291 CRITICAL CARE FIRST HOUR: CPT

## 2020-01-01 PROCEDURE — 99223 1ST HOSP IP/OBS HIGH 75: CPT

## 2020-01-01 PROCEDURE — 36415 COLL VENOUS BLD VENIPUNCTURE: CPT

## 2020-01-01 PROCEDURE — 83735 ASSAY OF MAGNESIUM: CPT

## 2020-01-01 PROCEDURE — 93970 EXTREMITY STUDY: CPT

## 2020-01-01 PROCEDURE — 97530 THERAPEUTIC ACTIVITIES: CPT | Mod: GP

## 2020-01-01 PROCEDURE — 97162 PT EVAL MOD COMPLEX 30 MIN: CPT | Mod: GP

## 2020-01-01 PROCEDURE — 99233 SBSQ HOSP IP/OBS HIGH 50: CPT | Mod: GC

## 2020-01-01 PROCEDURE — 99233 SBSQ HOSP IP/OBS HIGH 50: CPT

## 2020-01-01 PROCEDURE — 83880 ASSAY OF NATRIURETIC PEPTIDE: CPT

## 2020-01-01 PROCEDURE — 94640 AIRWAY INHALATION TREATMENT: CPT

## 2020-01-01 PROCEDURE — 86803 HEPATITIS C AB TEST: CPT

## 2020-01-01 PROCEDURE — 84100 ASSAY OF PHOSPHORUS: CPT

## 2020-01-01 PROCEDURE — 93306 TTE W/DOPPLER COMPLETE: CPT | Mod: 26

## 2020-01-01 PROCEDURE — 83036 HEMOGLOBIN GLYCOSYLATED A1C: CPT

## 2020-01-01 PROCEDURE — 71045 X-RAY EXAM CHEST 1 VIEW: CPT

## 2020-01-01 PROCEDURE — 36600 WITHDRAWAL OF ARTERIAL BLOOD: CPT

## 2020-01-01 PROCEDURE — 71045 X-RAY EXAM CHEST 1 VIEW: CPT | Mod: 26

## 2020-01-01 PROCEDURE — 93010 ELECTROCARDIOGRAM REPORT: CPT | Mod: 76

## 2020-01-01 PROCEDURE — 80162 ASSAY OF DIGOXIN TOTAL: CPT

## 2020-01-01 PROCEDURE — 84484 ASSAY OF TROPONIN QUANT: CPT

## 2020-01-01 PROCEDURE — 94660 CPAP INITIATION&MGMT: CPT

## 2020-01-01 PROCEDURE — 93306 TTE W/DOPPLER COMPLETE: CPT

## 2020-01-01 PROCEDURE — 99232 SBSQ HOSP IP/OBS MODERATE 35: CPT

## 2020-01-01 PROCEDURE — 93970 EXTREMITY STUDY: CPT | Mod: 26

## 2020-01-01 PROCEDURE — 80048 BASIC METABOLIC PNL TOTAL CA: CPT

## 2020-01-01 PROCEDURE — 82962 GLUCOSE BLOOD TEST: CPT

## 2020-01-01 PROCEDURE — 82803 BLOOD GASES ANY COMBINATION: CPT

## 2020-01-01 PROCEDURE — 85027 COMPLETE CBC AUTOMATED: CPT

## 2020-01-01 RX ORDER — ISOSORBIDE MONONITRATE 60 MG/1
1 TABLET, EXTENDED RELEASE ORAL
Qty: 0 | Refills: 0 | DISCHARGE

## 2020-01-01 RX ORDER — APIXABAN 2.5 MG/1
5 TABLET, FILM COATED ORAL EVERY 12 HOURS
Refills: 0 | Status: DISCONTINUED | OUTPATIENT
Start: 2020-01-01 | End: 2020-01-01

## 2020-01-01 RX ORDER — LOTEPREDNOL ETABONATE 2 MG/ML
1 SUSPENSION/ DROPS OPHTHALMIC
Qty: 0 | Refills: 0 | DISCHARGE

## 2020-01-01 RX ORDER — CEFTRIAXONE 500 MG/1
1000 INJECTION, POWDER, FOR SOLUTION INTRAMUSCULAR; INTRAVENOUS ONCE
Refills: 0 | Status: COMPLETED | OUTPATIENT
Start: 2020-01-01 | End: 2020-01-01

## 2020-01-01 RX ORDER — POTASSIUM CHLORIDE 20 MEQ
1 PACKET (EA) ORAL
Qty: 0 | Refills: 0 | DISCHARGE

## 2020-01-01 RX ORDER — RANOLAZINE 500 MG/1
1 TABLET, FILM COATED, EXTENDED RELEASE ORAL
Qty: 0 | Refills: 0 | DISCHARGE

## 2020-01-01 RX ORDER — FLUTICASONE PROPIONATE 50 MCG
1 SPRAY, SUSPENSION NASAL
Qty: 0 | Refills: 0 | DISCHARGE

## 2020-01-01 RX ORDER — TRAMADOL HYDROCHLORIDE 50 MG/1
50 TABLET ORAL EVERY 6 HOURS
Refills: 0 | Status: DISCONTINUED | OUTPATIENT
Start: 2020-01-01 | End: 2020-01-01

## 2020-01-01 RX ORDER — FUROSEMIDE 40 MG
40 TABLET ORAL ONCE
Refills: 0 | Status: COMPLETED | OUTPATIENT
Start: 2020-01-01 | End: 2020-01-01

## 2020-01-01 RX ORDER — TAMSULOSIN HYDROCHLORIDE 0.4 MG/1
1 CAPSULE ORAL
Qty: 0 | Refills: 0 | DISCHARGE

## 2020-01-01 RX ORDER — TRAMADOL HYDROCHLORIDE 50 MG/1
0 TABLET ORAL
Qty: 0 | Refills: 0 | DISCHARGE

## 2020-01-01 RX ORDER — TIOTROPIUM BROMIDE 18 UG/1
1 CAPSULE ORAL; RESPIRATORY (INHALATION) DAILY
Refills: 0 | Status: DISCONTINUED | OUTPATIENT
Start: 2020-01-01 | End: 2020-01-01

## 2020-01-01 RX ORDER — IPRATROPIUM/ALBUTEROL SULFATE 18-103MCG
3 AEROSOL WITH ADAPTER (GRAM) INHALATION EVERY 6 HOURS
Refills: 0 | Status: DISCONTINUED | OUTPATIENT
Start: 2020-01-01 | End: 2020-01-01

## 2020-01-01 RX ORDER — APIXABAN 2.5 MG/1
1 TABLET, FILM COATED ORAL
Qty: 0 | Refills: 0 | DISCHARGE

## 2020-01-01 RX ORDER — AMLODIPINE BESYLATE 2.5 MG/1
1 TABLET ORAL
Qty: 0 | Refills: 0 | DISCHARGE

## 2020-01-01 RX ORDER — AZELASTINE 137 UG/1
2 SPRAY, METERED NASAL
Qty: 0 | Refills: 0 | DISCHARGE

## 2020-01-01 RX ORDER — DEXTROSE 50 % IN WATER 50 %
15 SYRINGE (ML) INTRAVENOUS ONCE
Refills: 0 | Status: DISCONTINUED | OUTPATIENT
Start: 2020-01-01 | End: 2020-01-01

## 2020-01-01 RX ORDER — RANOLAZINE 500 MG/1
500 TABLET, FILM COATED, EXTENDED RELEASE ORAL
Refills: 0 | Status: DISCONTINUED | OUTPATIENT
Start: 2020-01-01 | End: 2020-01-01

## 2020-01-01 RX ORDER — HYDROMORPHONE HYDROCHLORIDE 2 MG/ML
0.3 INJECTION INTRAMUSCULAR; INTRAVENOUS; SUBCUTANEOUS
Refills: 0 | Status: DISCONTINUED | OUTPATIENT
Start: 2020-01-01 | End: 2020-01-01

## 2020-01-01 RX ORDER — ALBUTEROL 90 UG/1
2 AEROSOL, METERED ORAL EVERY 6 HOURS
Refills: 0 | Status: DISCONTINUED | OUTPATIENT
Start: 2020-01-01 | End: 2020-01-01

## 2020-01-01 RX ORDER — FUROSEMIDE 40 MG
40 TABLET ORAL DAILY
Refills: 0 | Status: DISCONTINUED | OUTPATIENT
Start: 2020-01-01 | End: 2020-01-01

## 2020-01-01 RX ORDER — CLOPIDOGREL BISULFATE 75 MG/1
1 TABLET, FILM COATED ORAL
Qty: 0 | Refills: 0 | DISCHARGE

## 2020-01-01 RX ORDER — QUETIAPINE FUMARATE 200 MG/1
12.5 TABLET, FILM COATED ORAL DAILY
Refills: 0 | Status: DISCONTINUED | OUTPATIENT
Start: 2020-01-01 | End: 2020-01-01

## 2020-01-01 RX ORDER — SODIUM CHLORIDE 9 MG/ML
1000 INJECTION, SOLUTION INTRAVENOUS
Refills: 0 | Status: DISCONTINUED | OUTPATIENT
Start: 2020-01-01 | End: 2020-01-01

## 2020-01-01 RX ORDER — QUETIAPINE FUMARATE 200 MG/1
12.5 TABLET, FILM COATED ORAL ONCE
Refills: 0 | Status: COMPLETED | OUTPATIENT
Start: 2020-01-01 | End: 2020-01-01

## 2020-01-01 RX ORDER — MORPHINE SULFATE 50 MG/1
0.5 CAPSULE, EXTENDED RELEASE ORAL ONCE
Refills: 0 | Status: DISCONTINUED | OUTPATIENT
Start: 2020-01-01 | End: 2020-01-01

## 2020-01-01 RX ORDER — TIZANIDINE 4 MG/1
1 TABLET ORAL
Qty: 0 | Refills: 0 | DISCHARGE

## 2020-01-01 RX ORDER — FINASTERIDE 5 MG/1
5 TABLET, FILM COATED ORAL DAILY
Refills: 0 | Status: DISCONTINUED | OUTPATIENT
Start: 2020-01-01 | End: 2020-01-01

## 2020-01-01 RX ORDER — FLUTICASONE PROPIONATE AND SALMETEROL 50; 250 UG/1; UG/1
1 POWDER ORAL; RESPIRATORY (INHALATION)
Qty: 0 | Refills: 0 | DISCHARGE

## 2020-01-01 RX ORDER — DEXTROSE 50 % IN WATER 50 %
12.5 SYRINGE (ML) INTRAVENOUS ONCE
Refills: 0 | Status: DISCONTINUED | OUTPATIENT
Start: 2020-01-01 | End: 2020-01-01

## 2020-01-01 RX ORDER — GLUCAGON INJECTION, SOLUTION 0.5 MG/.1ML
1 INJECTION, SOLUTION SUBCUTANEOUS ONCE
Refills: 0 | Status: DISCONTINUED | OUTPATIENT
Start: 2020-01-01 | End: 2020-01-01

## 2020-01-01 RX ORDER — CLOPIDOGREL BISULFATE 75 MG/1
75 TABLET, FILM COATED ORAL DAILY
Refills: 0 | Status: DISCONTINUED | OUTPATIENT
Start: 2020-01-01 | End: 2020-01-01

## 2020-01-01 RX ORDER — TIZANIDINE 4 MG/1
0 TABLET ORAL
Qty: 0 | Refills: 0 | DISCHARGE

## 2020-01-01 RX ORDER — ROBINUL 0.2 MG/ML
0.2 INJECTION INTRAMUSCULAR; INTRAVENOUS EVERY 6 HOURS
Refills: 0 | Status: DISCONTINUED | OUTPATIENT
Start: 2020-01-01 | End: 2020-01-01

## 2020-01-01 RX ORDER — METOPROLOL TARTRATE 50 MG
50 TABLET ORAL DAILY
Refills: 0 | Status: DISCONTINUED | OUTPATIENT
Start: 2020-01-01 | End: 2020-01-01

## 2020-01-01 RX ORDER — DEXMEDETOMIDINE HYDROCHLORIDE IN 0.9% SODIUM CHLORIDE 4 UG/ML
0.3 INJECTION INTRAVENOUS
Qty: 400 | Refills: 0 | Status: DISCONTINUED | OUTPATIENT
Start: 2020-01-01 | End: 2020-01-01

## 2020-01-01 RX ORDER — CEFTRIAXONE 500 MG/1
1000 INJECTION, POWDER, FOR SOLUTION INTRAMUSCULAR; INTRAVENOUS EVERY 24 HOURS
Refills: 0 | Status: DISCONTINUED | OUTPATIENT
Start: 2020-01-01 | End: 2020-01-01

## 2020-01-01 RX ORDER — TAMSULOSIN HYDROCHLORIDE 0.4 MG/1
0.4 CAPSULE ORAL AT BEDTIME
Refills: 0 | Status: DISCONTINUED | OUTPATIENT
Start: 2020-01-01 | End: 2020-01-01

## 2020-01-01 RX ORDER — DIGOXIN 250 MCG
0.12 TABLET ORAL DAILY
Refills: 0 | Status: DISCONTINUED | OUTPATIENT
Start: 2020-01-01 | End: 2020-01-01

## 2020-01-01 RX ORDER — INSULIN LISPRO 100/ML
VIAL (ML) SUBCUTANEOUS AT BEDTIME
Refills: 0 | Status: DISCONTINUED | OUTPATIENT
Start: 2020-01-01 | End: 2020-01-01

## 2020-01-01 RX ORDER — FUROSEMIDE 40 MG
20 TABLET ORAL
Refills: 0 | Status: DISCONTINUED | OUTPATIENT
Start: 2020-01-01 | End: 2020-01-01

## 2020-01-01 RX ORDER — METOPROLOL TARTRATE 50 MG
1 TABLET ORAL
Qty: 0 | Refills: 0 | DISCHARGE

## 2020-01-01 RX ORDER — CEFTRIAXONE 500 MG/1
1000 INJECTION, POWDER, FOR SOLUTION INTRAMUSCULAR; INTRAVENOUS EVERY 24 HOURS
Refills: 0 | Status: COMPLETED | OUTPATIENT
Start: 2020-01-01 | End: 2020-01-01

## 2020-01-01 RX ORDER — CHLORHEXIDINE GLUCONATE 213 G/1000ML
1 SOLUTION TOPICAL
Refills: 0 | Status: DISCONTINUED | OUTPATIENT
Start: 2020-01-01 | End: 2020-01-01

## 2020-01-01 RX ORDER — ATORVASTATIN CALCIUM 80 MG/1
1 TABLET, FILM COATED ORAL
Qty: 0 | Refills: 0 | DISCHARGE

## 2020-01-01 RX ORDER — ALBUTEROL 90 UG/1
2 AEROSOL, METERED ORAL ONCE
Refills: 0 | Status: COMPLETED | OUTPATIENT
Start: 2020-01-01 | End: 2020-01-01

## 2020-01-01 RX ORDER — BESIFLOXACIN 6 MG/ML
1 SUSPENSION OPHTHALMIC
Qty: 0 | Refills: 0 | DISCHARGE
Start: 2020-01-01 | End: 2020-12-18

## 2020-01-01 RX ORDER — ISOSORBIDE MONONITRATE 60 MG/1
60 TABLET, EXTENDED RELEASE ORAL DAILY
Refills: 0 | Status: DISCONTINUED | OUTPATIENT
Start: 2020-01-01 | End: 2020-01-01

## 2020-01-01 RX ORDER — TIOTROPIUM BROMIDE 18 UG/1
1 CAPSULE ORAL; RESPIRATORY (INHALATION)
Qty: 0 | Refills: 0 | DISCHARGE

## 2020-01-01 RX ORDER — QUETIAPINE FUMARATE 200 MG/1
25 TABLET, FILM COATED ORAL AT BEDTIME
Refills: 0 | Status: DISCONTINUED | OUTPATIENT
Start: 2020-01-01 | End: 2020-01-01

## 2020-01-01 RX ORDER — INSULIN GLARGINE 100 [IU]/ML
5 INJECTION, SOLUTION SUBCUTANEOUS
Refills: 0 | Status: DISCONTINUED | OUTPATIENT
Start: 2020-01-01 | End: 2020-01-01

## 2020-01-01 RX ORDER — FINASTERIDE 5 MG/1
1 TABLET, FILM COATED ORAL
Qty: 0 | Refills: 0 | DISCHARGE

## 2020-01-01 RX ORDER — INSULIN LISPRO 100/ML
VIAL (ML) SUBCUTANEOUS
Refills: 0 | Status: DISCONTINUED | OUTPATIENT
Start: 2020-01-01 | End: 2020-01-01

## 2020-01-01 RX ORDER — TRAMADOL HYDROCHLORIDE 50 MG/1
1 TABLET ORAL
Qty: 0 | Refills: 0 | DISCHARGE

## 2020-01-01 RX ORDER — BUDESONIDE AND FORMOTEROL FUMARATE DIHYDRATE 160; 4.5 UG/1; UG/1
2 AEROSOL RESPIRATORY (INHALATION)
Refills: 0 | Status: DISCONTINUED | OUTPATIENT
Start: 2020-01-01 | End: 2020-01-01

## 2020-01-01 RX ORDER — FUROSEMIDE 40 MG
20 TABLET ORAL ONCE
Refills: 0 | Status: COMPLETED | OUTPATIENT
Start: 2020-01-01 | End: 2020-01-01

## 2020-01-01 RX ORDER — DEXTROSE 50 % IN WATER 50 %
25 SYRINGE (ML) INTRAVENOUS ONCE
Refills: 0 | Status: DISCONTINUED | OUTPATIENT
Start: 2020-01-01 | End: 2020-01-01

## 2020-01-01 RX ORDER — ACETAZOLAMIDE 250 MG/1
250 TABLET ORAL
Refills: 0 | Status: DISCONTINUED | OUTPATIENT
Start: 2020-01-01 | End: 2020-01-01

## 2020-01-01 RX ORDER — MORPHINE SULFATE 50 MG/1
1 CAPSULE, EXTENDED RELEASE ORAL ONCE
Refills: 0 | Status: DISCONTINUED | OUTPATIENT
Start: 2020-01-01 | End: 2020-01-01

## 2020-01-01 RX ORDER — ALPRAZOLAM 0.25 MG
0.25 TABLET ORAL EVERY 8 HOURS
Refills: 0 | Status: DISCONTINUED | OUTPATIENT
Start: 2020-01-01 | End: 2020-01-01

## 2020-01-01 RX ORDER — AZITHROMYCIN 500 MG/1
500 TABLET, FILM COATED ORAL ONCE
Refills: 0 | Status: COMPLETED | OUTPATIENT
Start: 2020-01-01 | End: 2020-01-01

## 2020-01-01 RX ORDER — SODIUM CHLORIDE 0.65 %
1 AEROSOL, SPRAY (ML) NASAL
Refills: 0 | Status: DISCONTINUED | OUTPATIENT
Start: 2020-01-01 | End: 2020-01-01

## 2020-01-01 RX ORDER — ATORVASTATIN CALCIUM 80 MG/1
20 TABLET, FILM COATED ORAL AT BEDTIME
Refills: 0 | Status: DISCONTINUED | OUTPATIENT
Start: 2020-01-01 | End: 2020-01-01

## 2020-01-01 RX ORDER — DEXMEDETOMIDINE HYDROCHLORIDE IN 0.9% SODIUM CHLORIDE 4 UG/ML
0.4 INJECTION INTRAVENOUS
Qty: 400 | Refills: 0 | Status: DISCONTINUED | OUTPATIENT
Start: 2020-01-01 | End: 2020-01-01

## 2020-01-01 RX ORDER — AMLODIPINE BESYLATE 2.5 MG/1
5 TABLET ORAL DAILY
Refills: 0 | Status: DISCONTINUED | OUTPATIENT
Start: 2020-01-01 | End: 2020-01-01

## 2020-01-01 RX ADMIN — Medication 0.12 MILLIGRAM(S): at 10:11

## 2020-01-01 RX ADMIN — Medication 40 MILLIGRAM(S): at 06:02

## 2020-01-01 RX ADMIN — CHLORHEXIDINE GLUCONATE 1 APPLICATION(S): 213 SOLUTION TOPICAL at 04:00

## 2020-01-01 RX ADMIN — APIXABAN 5 MILLIGRAM(S): 2.5 TABLET, FILM COATED ORAL at 21:30

## 2020-01-01 RX ADMIN — Medication 40 MILLIGRAM(S): at 14:20

## 2020-01-01 RX ADMIN — ISOSORBIDE MONONITRATE 60 MILLIGRAM(S): 60 TABLET, EXTENDED RELEASE ORAL at 09:56

## 2020-01-01 RX ADMIN — Medication 2: at 09:21

## 2020-01-01 RX ADMIN — Medication 2: at 07:54

## 2020-01-01 RX ADMIN — Medication 1 TABLET(S): at 09:10

## 2020-01-01 RX ADMIN — RANOLAZINE 500 MILLIGRAM(S): 500 TABLET, FILM COATED, EXTENDED RELEASE ORAL at 09:56

## 2020-01-01 RX ADMIN — Medication 0.25 MILLIGRAM(S): at 21:40

## 2020-01-01 RX ADMIN — APIXABAN 5 MILLIGRAM(S): 2.5 TABLET, FILM COATED ORAL at 09:40

## 2020-01-01 RX ADMIN — FINASTERIDE 5 MILLIGRAM(S): 5 TABLET, FILM COATED ORAL at 09:10

## 2020-01-01 RX ADMIN — RANOLAZINE 500 MILLIGRAM(S): 500 TABLET, FILM COATED, EXTENDED RELEASE ORAL at 09:04

## 2020-01-01 RX ADMIN — INSULIN GLARGINE 5 UNIT(S): 100 INJECTION, SOLUTION SUBCUTANEOUS at 07:56

## 2020-01-01 RX ADMIN — Medication 40 MILLIGRAM(S): at 03:22

## 2020-01-01 RX ADMIN — CHLORHEXIDINE GLUCONATE 1 APPLICATION(S): 213 SOLUTION TOPICAL at 13:50

## 2020-01-01 RX ADMIN — Medication 1 TABLET(S): at 09:38

## 2020-01-01 RX ADMIN — Medication 20 MILLIGRAM(S): at 21:42

## 2020-01-01 RX ADMIN — Medication 2: at 11:36

## 2020-01-01 RX ADMIN — Medication 1 MILLIGRAM(S): at 17:57

## 2020-01-01 RX ADMIN — Medication 0.25 MILLIGRAM(S): at 08:46

## 2020-01-01 RX ADMIN — Medication 50 MILLIGRAM(S): at 10:14

## 2020-01-01 RX ADMIN — Medication 0.5 MILLIGRAM(S): at 15:05

## 2020-01-01 RX ADMIN — Medication 0.25 MILLIGRAM(S): at 16:56

## 2020-01-01 RX ADMIN — QUETIAPINE FUMARATE 12.5 MILLIGRAM(S): 200 TABLET, FILM COATED ORAL at 10:18

## 2020-01-01 RX ADMIN — CLOPIDOGREL BISULFATE 75 MILLIGRAM(S): 75 TABLET, FILM COATED ORAL at 11:24

## 2020-01-01 RX ADMIN — DEXMEDETOMIDINE HYDROCHLORIDE IN 0.9% SODIUM CHLORIDE 11.6 MICROGRAM(S)/KG/HR: 4 INJECTION INTRAVENOUS at 04:44

## 2020-01-01 RX ADMIN — AMLODIPINE BESYLATE 5 MILLIGRAM(S): 2.5 TABLET ORAL at 09:10

## 2020-01-01 RX ADMIN — Medication 40 MILLIGRAM(S): at 00:00

## 2020-01-01 RX ADMIN — Medication 1 MILLIGRAM(S): at 14:20

## 2020-01-01 RX ADMIN — BUDESONIDE AND FORMOTEROL FUMARATE DIHYDRATE 2 PUFF(S): 160; 4.5 AEROSOL RESPIRATORY (INHALATION) at 02:49

## 2020-01-01 RX ADMIN — Medication 40 MILLIGRAM(S): at 21:42

## 2020-01-01 RX ADMIN — ATORVASTATIN CALCIUM 20 MILLIGRAM(S): 80 TABLET, FILM COATED ORAL at 22:09

## 2020-01-01 RX ADMIN — TIOTROPIUM BROMIDE 1 CAPSULE(S): 18 CAPSULE ORAL; RESPIRATORY (INHALATION) at 14:01

## 2020-01-01 RX ADMIN — Medication 40 MILLIGRAM(S): at 13:12

## 2020-01-01 RX ADMIN — Medication 20 MILLIGRAM(S): at 17:38

## 2020-01-01 RX ADMIN — ALBUTEROL 2 PUFF(S): 90 AEROSOL, METERED ORAL at 08:37

## 2020-01-01 RX ADMIN — AMLODIPINE BESYLATE 5 MILLIGRAM(S): 2.5 TABLET ORAL at 10:16

## 2020-01-01 RX ADMIN — Medication 20 MILLIGRAM(S): at 05:31

## 2020-01-01 RX ADMIN — CHLORHEXIDINE GLUCONATE 1 APPLICATION(S): 213 SOLUTION TOPICAL at 07:12

## 2020-01-01 RX ADMIN — INSULIN GLARGINE 5 UNIT(S): 100 INJECTION, SOLUTION SUBCUTANEOUS at 08:49

## 2020-01-01 RX ADMIN — BUDESONIDE AND FORMOTEROL FUMARATE DIHYDRATE 2 PUFF(S): 160; 4.5 AEROSOL RESPIRATORY (INHALATION) at 21:01

## 2020-01-01 RX ADMIN — RANOLAZINE 500 MILLIGRAM(S): 500 TABLET, FILM COATED, EXTENDED RELEASE ORAL at 22:09

## 2020-01-01 RX ADMIN — BUDESONIDE AND FORMOTEROL FUMARATE DIHYDRATE 2 PUFF(S): 160; 4.5 AEROSOL RESPIRATORY (INHALATION) at 08:37

## 2020-01-01 RX ADMIN — RANOLAZINE 500 MILLIGRAM(S): 500 TABLET, FILM COATED, EXTENDED RELEASE ORAL at 21:42

## 2020-01-01 RX ADMIN — FINASTERIDE 5 MILLIGRAM(S): 5 TABLET, FILM COATED ORAL at 09:56

## 2020-01-01 RX ADMIN — CLOPIDOGREL BISULFATE 75 MILLIGRAM(S): 75 TABLET, FILM COATED ORAL at 14:03

## 2020-01-01 RX ADMIN — Medication 75 MILLIGRAM(S): at 09:56

## 2020-01-01 RX ADMIN — Medication 0.12 MILLIGRAM(S): at 09:56

## 2020-01-01 RX ADMIN — HYDROMORPHONE HYDROCHLORIDE 0.3 MILLIGRAM(S): 2 INJECTION INTRAMUSCULAR; INTRAVENOUS; SUBCUTANEOUS at 12:09

## 2020-01-01 RX ADMIN — Medication 75 MILLIGRAM(S): at 10:11

## 2020-01-01 RX ADMIN — Medication 75 MILLIGRAM(S): at 09:38

## 2020-01-01 RX ADMIN — AZITHROMYCIN 255 MILLIGRAM(S): 500 TABLET, FILM COATED ORAL at 10:38

## 2020-01-01 RX ADMIN — Medication 20 MILLIGRAM(S): at 22:09

## 2020-01-01 RX ADMIN — Medication 50 MILLIGRAM(S): at 09:57

## 2020-01-01 RX ADMIN — Medication 40 MILLIGRAM(S): at 14:40

## 2020-01-01 RX ADMIN — ISOSORBIDE MONONITRATE 60 MILLIGRAM(S): 60 TABLET, EXTENDED RELEASE ORAL at 09:42

## 2020-01-01 RX ADMIN — HYDROMORPHONE HYDROCHLORIDE 0.3 MILLIGRAM(S): 2 INJECTION INTRAMUSCULAR; INTRAVENOUS; SUBCUTANEOUS at 15:05

## 2020-01-01 RX ADMIN — ALBUTEROL 2 PUFF(S): 90 AEROSOL, METERED ORAL at 09:48

## 2020-01-01 RX ADMIN — TAMSULOSIN HYDROCHLORIDE 0.4 MILLIGRAM(S): 0.4 CAPSULE ORAL at 21:41

## 2020-01-01 RX ADMIN — ATORVASTATIN CALCIUM 20 MILLIGRAM(S): 80 TABLET, FILM COATED ORAL at 21:41

## 2020-01-01 RX ADMIN — Medication 0.12 MILLIGRAM(S): at 09:01

## 2020-01-01 RX ADMIN — Medication 40 MILLIGRAM(S): at 17:30

## 2020-01-01 RX ADMIN — Medication 125 MILLIGRAM(S): at 10:10

## 2020-01-01 RX ADMIN — Medication 40 MILLIGRAM(S): at 05:40

## 2020-01-01 RX ADMIN — ISOSORBIDE MONONITRATE 60 MILLIGRAM(S): 60 TABLET, EXTENDED RELEASE ORAL at 10:14

## 2020-01-01 RX ADMIN — Medication 2: at 16:49

## 2020-01-01 RX ADMIN — Medication 2: at 08:46

## 2020-01-01 RX ADMIN — QUETIAPINE FUMARATE 25 MILLIGRAM(S): 200 TABLET, FILM COATED ORAL at 22:09

## 2020-01-01 RX ADMIN — APIXABAN 5 MILLIGRAM(S): 2.5 TABLET, FILM COATED ORAL at 21:41

## 2020-01-01 RX ADMIN — ISOSORBIDE MONONITRATE 60 MILLIGRAM(S): 60 TABLET, EXTENDED RELEASE ORAL at 09:10

## 2020-01-01 RX ADMIN — ALBUTEROL 2 PUFF(S): 90 AEROSOL, METERED ORAL at 08:30

## 2020-01-01 RX ADMIN — INSULIN GLARGINE 5 UNIT(S): 100 INJECTION, SOLUTION SUBCUTANEOUS at 21:44

## 2020-01-01 RX ADMIN — CHLORHEXIDINE GLUCONATE 1 APPLICATION(S): 213 SOLUTION TOPICAL at 06:02

## 2020-01-01 RX ADMIN — RANOLAZINE 500 MILLIGRAM(S): 500 TABLET, FILM COATED, EXTENDED RELEASE ORAL at 21:30

## 2020-01-01 RX ADMIN — INSULIN GLARGINE 5 UNIT(S): 100 INJECTION, SOLUTION SUBCUTANEOUS at 22:10

## 2020-01-01 RX ADMIN — QUETIAPINE FUMARATE 25 MILLIGRAM(S): 200 TABLET, FILM COATED ORAL at 21:47

## 2020-01-01 RX ADMIN — INSULIN GLARGINE 5 UNIT(S): 100 INJECTION, SOLUTION SUBCUTANEOUS at 09:01

## 2020-01-01 RX ADMIN — Medication 20 MILLIGRAM(S): at 10:08

## 2020-01-01 RX ADMIN — INSULIN GLARGINE 5 UNIT(S): 100 INJECTION, SOLUTION SUBCUTANEOUS at 23:39

## 2020-01-01 RX ADMIN — Medication 50 MILLIGRAM(S): at 09:10

## 2020-01-01 RX ADMIN — RANOLAZINE 500 MILLIGRAM(S): 500 TABLET, FILM COATED, EXTENDED RELEASE ORAL at 21:41

## 2020-01-01 RX ADMIN — Medication 0.25 MILLIGRAM(S): at 09:51

## 2020-01-01 RX ADMIN — Medication 1 SPRAY(S): at 05:32

## 2020-01-01 RX ADMIN — Medication 4: at 17:09

## 2020-01-01 RX ADMIN — Medication 1 TABLET(S): at 10:11

## 2020-01-01 RX ADMIN — Medication 20 MILLIGRAM(S): at 13:28

## 2020-01-01 RX ADMIN — AMLODIPINE BESYLATE 5 MILLIGRAM(S): 2.5 TABLET ORAL at 09:38

## 2020-01-01 RX ADMIN — FINASTERIDE 5 MILLIGRAM(S): 5 TABLET, FILM COATED ORAL at 09:39

## 2020-01-01 RX ADMIN — Medication 20 MILLIGRAM(S): at 17:22

## 2020-01-01 RX ADMIN — Medication 2: at 14:01

## 2020-01-01 RX ADMIN — Medication 40 MILLIGRAM(S): at 07:14

## 2020-01-01 RX ADMIN — AMLODIPINE BESYLATE 5 MILLIGRAM(S): 2.5 TABLET ORAL at 09:57

## 2020-01-01 RX ADMIN — Medication 2: at 17:39

## 2020-01-01 RX ADMIN — CEFTRIAXONE 1000 MILLIGRAM(S): 500 INJECTION, POWDER, FOR SOLUTION INTRAMUSCULAR; INTRAVENOUS at 09:57

## 2020-01-01 RX ADMIN — Medication 40 MILLIGRAM(S): at 09:10

## 2020-01-01 RX ADMIN — ALBUTEROL 2 PUFF(S): 90 AEROSOL, METERED ORAL at 18:45

## 2020-01-01 RX ADMIN — APIXABAN 5 MILLIGRAM(S): 2.5 TABLET, FILM COATED ORAL at 22:09

## 2020-01-01 RX ADMIN — ATORVASTATIN CALCIUM 20 MILLIGRAM(S): 80 TABLET, FILM COATED ORAL at 21:30

## 2020-01-01 RX ADMIN — Medication 20 MILLIGRAM(S): at 18:00

## 2020-01-01 RX ADMIN — TAMSULOSIN HYDROCHLORIDE 0.4 MILLIGRAM(S): 0.4 CAPSULE ORAL at 21:30

## 2020-01-01 RX ADMIN — CLOPIDOGREL BISULFATE 75 MILLIGRAM(S): 75 TABLET, FILM COATED ORAL at 11:28

## 2020-01-01 RX ADMIN — Medication 20 MILLIGRAM(S): at 12:09

## 2020-01-01 RX ADMIN — TIOTROPIUM BROMIDE 1 CAPSULE(S): 18 CAPSULE ORAL; RESPIRATORY (INHALATION) at 08:37

## 2020-01-01 RX ADMIN — CEFTRIAXONE 1000 MILLIGRAM(S): 500 INJECTION, POWDER, FOR SOLUTION INTRAMUSCULAR; INTRAVENOUS at 10:38

## 2020-01-01 RX ADMIN — DEXMEDETOMIDINE HYDROCHLORIDE IN 0.9% SODIUM CHLORIDE 11.6 MICROGRAM(S)/KG/HR: 4 INJECTION INTRAVENOUS at 23:08

## 2020-01-01 RX ADMIN — Medication 20 MILLIGRAM(S): at 05:37

## 2020-01-01 RX ADMIN — Medication 0.25 MILLIGRAM(S): at 05:48

## 2020-01-01 RX ADMIN — TAMSULOSIN HYDROCHLORIDE 0.4 MILLIGRAM(S): 0.4 CAPSULE ORAL at 22:09

## 2020-01-01 RX ADMIN — Medication 2: at 07:57

## 2020-01-01 RX ADMIN — Medication 0.25 MILLIGRAM(S): at 21:48

## 2020-01-01 RX ADMIN — FINASTERIDE 5 MILLIGRAM(S): 5 TABLET, FILM COATED ORAL at 10:12

## 2020-01-01 RX ADMIN — APIXABAN 5 MILLIGRAM(S): 2.5 TABLET, FILM COATED ORAL at 09:56

## 2020-01-01 RX ADMIN — DEXMEDETOMIDINE HYDROCHLORIDE IN 0.9% SODIUM CHLORIDE 8.69 MICROGRAM(S)/KG/HR: 4 INJECTION INTRAVENOUS at 17:57

## 2020-01-01 RX ADMIN — APIXABAN 5 MILLIGRAM(S): 2.5 TABLET, FILM COATED ORAL at 09:01

## 2020-01-01 RX ADMIN — APIXABAN 5 MILLIGRAM(S): 2.5 TABLET, FILM COATED ORAL at 10:13

## 2020-01-01 RX ADMIN — Medication 0.25 MILLIGRAM(S): at 14:43

## 2020-01-01 RX ADMIN — Medication 50 MILLIGRAM(S): at 09:40

## 2020-01-01 RX ADMIN — Medication 40 MILLIGRAM(S): at 21:30

## 2020-01-01 RX ADMIN — Medication 20 MILLIGRAM(S): at 23:40

## 2020-01-01 RX ADMIN — RANOLAZINE 500 MILLIGRAM(S): 500 TABLET, FILM COATED, EXTENDED RELEASE ORAL at 09:40

## 2020-01-01 RX ADMIN — RANOLAZINE 500 MILLIGRAM(S): 500 TABLET, FILM COATED, EXTENDED RELEASE ORAL at 10:14

## 2020-01-01 RX ADMIN — CEFTRIAXONE 1000 MILLIGRAM(S): 500 INJECTION, POWDER, FOR SOLUTION INTRAMUSCULAR; INTRAVENOUS at 10:07

## 2020-01-01 RX ADMIN — MORPHINE SULFATE 1 MILLIGRAM(S): 50 CAPSULE, EXTENDED RELEASE ORAL at 14:57

## 2020-01-01 RX ADMIN — DEXMEDETOMIDINE HYDROCHLORIDE IN 0.9% SODIUM CHLORIDE 11.6 MICROGRAM(S)/KG/HR: 4 INJECTION INTRAVENOUS at 03:23

## 2020-01-01 RX ADMIN — INSULIN GLARGINE 5 UNIT(S): 100 INJECTION, SOLUTION SUBCUTANEOUS at 09:51

## 2020-01-01 RX ADMIN — Medication 20 MILLIGRAM(S): at 09:30

## 2020-01-01 RX ADMIN — Medication 0.12 MILLIGRAM(S): at 09:39

## 2020-01-01 RX ADMIN — APIXABAN 5 MILLIGRAM(S): 2.5 TABLET, FILM COATED ORAL at 21:42

## 2020-01-01 RX ADMIN — Medication 1 TABLET(S): at 09:56

## 2020-01-01 RX ADMIN — CEFTRIAXONE 1000 MILLIGRAM(S): 500 INJECTION, POWDER, FOR SOLUTION INTRAMUSCULAR; INTRAVENOUS at 11:05

## 2020-01-01 RX ADMIN — CEFTRIAXONE 1000 MILLIGRAM(S): 500 INJECTION, POWDER, FOR SOLUTION INTRAMUSCULAR; INTRAVENOUS at 09:38

## 2020-01-01 RX ADMIN — CLOPIDOGREL BISULFATE 75 MILLIGRAM(S): 75 TABLET, FILM COATED ORAL at 12:03

## 2020-01-01 RX ADMIN — Medication 20 MILLIGRAM(S): at 13:10

## 2020-11-28 NOTE — H&P ADULT - ATTENDING COMMENTS
Patient seen with PA , Patient with copd exascerbation, possible component chf willl diuresis  steroids, bipap bronchodilators,will admit to icu

## 2020-11-28 NOTE — H&P ADULT - ASSESSMENT
A:    75yMale  HD # 1  FULL CODE    Here for:    1. Acute on chronic resp failure, hypercarbic, 2/2  2. Acute COPD exacerbation, and  3. Acute CHF exacerbation  4. PNA    This patient requires critical care for support of one or more vital organ systems with a high probability of imminent or life threatening deterioration in his/her condition    P:    Acute on chronic respiratory failure requiring bipap.  CXR appears volume overloaded.    May require anxiolysis to facilitate NIV; will try to calm and handle non pharmacologically as much as possible. Start home tramadol, lyrixa.  HD monitoring. Vasopressors PRN to maintain MAP > 65. Toprol 50mg XL PO qd, norvasc 5mg PO qd, digoxin 0.125 mcg PO qd. Rate controlled. Imdur 60mg PO qd, ranexa 500mg PO BID.  Acute on chronic respiratory failure, bipap in place via NIV, increase IPAP from 12 to 16 to increase driving pressure and ventilation. ABG 7.23/95/85; suspect baseline CO2 around 60-70. CO2 40 on BMP, was in mid to high 30's 2 years prior per labs. CXR consistent with volume overload, pro BNP ~ 2500.  Lasix 40mg IV x 1 now, second dose this afternoon. Takes 40mg PO qd.  Broad spectrum abx with zosyn 3.375g IV TID extended infusion, empiric coverage for PNA+ zithromax 500mg IV qd.. Pending COVID. s/p azithromycin and CTX in ER. WBC 11k, afebrile.  VTE ppx with NOAC. GI ppx with protonix.   Solumedrol 40mg IV TID.  Flomax, f/u UOP, May require indwelling silva to monitor I/O's.  f/u AM labs, ABG, lytes.    Dispo: Admit to ICU.     Plan, care and disposition discussed with covering physician Dr Em.  Dispo and plan discussed with Dr Whaley in ER.  Admission orders placed.    TOTAL CRITICAL CARE TIME: 50 minutes   (EXCLUSIVE of any non bundled procedures)    Note: This time spent INCLUDES time spent directly as this patient's bedside with evaluation, review of chart including review of laboratory and imaging studies, interpretation of vital signs and cardiac output measurements, any necessary ventilator management, and time spent discussing plan of care with patient and family, including goals of care discussion.     A:    75yMale  HD # 1  FULL CODE    Here for:    1. Acute on chronic resp failure, hypercarbic, 2/2  2. Acute COPD exacerbation, and  3. Acute CHF exacerbation  4. PNA    This patient requires critical care for support of one or more vital organ systems with a high probability of imminent or life threatening deterioration in his/her condition    P:    Acute on chronic respiratory failure requiring bipap.  CXR appears volume overloaded.    May require anxiolysis to facilitate NIV; will try to calm and handle non pharmacologically as much as possible. Start home tramadol, lyrixa.  HD monitoring. Vasopressors PRN to maintain MAP > 65. Toprol 50mg XL PO qd, norvasc 5mg PO qd, digoxin 0.125 mcg PO qd. Rate controlled. Imdur 60mg PO qd, ranexa 500mg PO BID.  Acute on chronic respiratory failure, bipap in place via NIV, increase IPAP from 12 to 16 to increase driving pressure and ventilation. ABG 7.23/95/85; suspect baseline CO2 around 60-70. CO2 40 on BMP, was in mid to high 30's 2 years prior per labs. CXR consistent with volume overload, pro BNP ~ 2500.  Lasix 40mg IV x 1 now, second dose this afternoon. Takes 40mg PO qd.  Broad spectrum abx with zosyn 3.375g IV TID extended infusion, empiric coverage for PNA+ zithromax 500mg IV qd.. Pending COVID. s/p azithromycin and CTX in ER. WBC 11k, afebrile.  VTE ppx with NOAC. GI ppx with protonix.   Solumedrol 40mg IV TID.  Flomax, f/u UOP, May require indwelling silva to monitor I/O's.  f/u AM labs, ABG, lytes.    Dispo: Admit to ICU.     Plan, care and disposition discussed with covering physician Dr Em.  Dispo and plan discussed with Dr Whaley in ER.  Admission orders placed.    Phone call placed to HCP Debora Schreiber at . Diagnosis, disposition and plan of care discussed, all questions answered.     TOTAL CRITICAL CARE TIME: 50 minutes   (EXCLUSIVE of any non bundled procedures)    Note: This time spent INCLUDES time spent directly as this patient's bedside with evaluation, review of chart including review of laboratory and imaging studies, interpretation of vital signs and cardiac output measurements, any necessary ventilator management, and time spent discussing plan of care with patient and family, including goals of care discussion.     A:    75yMale  HD # 1  FULL CODE    Here for:    1. Acute on chronic resp failure, hypercarbic, 2/2  2. Acute COPD exacerbation, and  3. Acute CHF exacerbation  4. PNA    This patient requires critical care for support of one or more vital organ systems with a high probability of imminent or life threatening deterioration in his/her condition    P:    Acute on chronic respiratory failure requiring bipap.  CXR appears volume overloaded.    May require anxiolysis to facilitate NIV; will try to calm and handle non pharmacologically as much as possible. Start home tramadol, lyrixa.  HD monitoring. Vasopressors PRN to maintain MAP > 65. Toprol 50mg XL PO qd, norvasc 5mg PO qd, digoxin 0.125 mcg PO qd. Rate controlled. Imdur 60mg PO qd, ranexa 500mg PO BID.  Acute on chronic respiratory failure, bipap in place via NIV, increase IPAP from 12 to 16 to increase driving pressure and ventilation. ABG 7.23/95/85; suspect baseline CO2 around 60-70. CO2 40 on BMP, was in mid to high 30's 2 years prior per labs. CXR consistent with volume overload, pro BNP ~ 2500.  Lasix 40mg IV x 1 now, second dose this afternoon. Takes 40mg PO qd.  Broad spectrum abx with zosyn 3.375g IV TID extended infusion, empiric coverage for PNA+ zithromax 500mg IV qd.. Pending COVID. s/p azithromycin and CTX in ER. WBC 11k, afebrile.  VTE ppx with NOAC. GI ppx with protonix.   Solumedrol 40mg IV TID.  Flomax, f/u UOP, May require indwelling silva to monitor I/O's.  f/u AM labs, ABG, lytes.    Dispo: Admit to ICU.     Plan, care and disposition discussed with covering physician Dr Em.  Dispo and plan discussed with Dr Whaley in ER.  Admission orders placed.    Phone call placed to HCP Debora Schreiber at . Diagnosis, disposition and plan of care discussed, all questions answered.     ADDENDUM #1: Pt gets anxious and has a panic attack 2/2 claustrophobia from bipap mask; this is improved with anxiolysis. Given ativan 1mg IV x 1, started low dose precedex drip to facilitate ventilation to help prevent intubation and mechanical ventilation. 1830 ABG noted; settings changed to 20/5/.50; f/u in AM. Remains high risk for intubation and mechanical ventilation.     TOTAL CRITICAL CARE TIME: 50 minutes   (EXCLUSIVE of any non bundled procedures)    Note: This time spent INCLUDES time spent directly as this patient's bedside with evaluation, review of chart including review of laboratory and imaging studies, interpretation of vital signs and cardiac output measurements, any necessary ventilator management, and time spent discussing plan of care with patient and family, including goals of care discussion.

## 2020-11-28 NOTE — H&P ADULT - NSHPPHYSICALEXAM_GEN_ALL_CORE
Elderly, obese M lying in bed  +  NIV mask in place  Some dyspnea noted  Anxious  S1S2+  irregular  Expiratory wheeze B/L LE  Abd obese, soft NTND  1-2 edema B/L LE  Awake and alert, anxious

## 2020-11-28 NOTE — ED PROVIDER NOTE - PROGRESS NOTE DETAILS
Jovana URIOSTEGUI: concern for b/l infiltrates on cxr; patient with severe anxiety at this time; patient originally unwilling to trial nrb or bipap; vbg resulted; patient agreeable to trial bipap at this time. Jovana DO: patient less tachypnic; able to sit back on stretcher, feeling better with bipap; will continue to re-eval; abg to be done at this time. Jovana URIOSTEGUI: although abg not improved; patient feeling better; looks better; less tachypnic; ICU consulted at this time. Jovana DO: accepted to ICU for admission.

## 2020-11-28 NOTE — ED PROVIDER NOTE - CONSTITUTIONAL, MLM
normal... Well appearing, awake, alert, oriented to person, place, time/situation and in mild/moderate apparent resp. distress.

## 2020-11-28 NOTE — H&P ADULT - NSICDXPASTMEDICALHX_GEN_ALL_CORE_FT
PAST MEDICAL HISTORY:  CHF (congestive heart failure)     COPD (chronic obstructive pulmonary disease)     Emphysema     Hyperlipidemia     Hypertension, unspecified type     KENNY (obstructive sleep apnea)     PVD (peripheral vascular disease)     Spinal stenosis

## 2020-11-28 NOTE — ED PROVIDER NOTE - CARE PLAN
Principal Discharge DX:	COPD (chronic obstructive pulmonary disease)  Secondary Diagnosis:	CHF (congestive heart failure)

## 2020-11-28 NOTE — ED PROVIDER NOTE - SKIN, MLM
Skin normal color for race, warm, dry and intact. bilateral lower extremity pitting edema- left>right;

## 2020-11-28 NOTE — ED ADULT TRIAGE NOTE - CHIEF COMPLAINT QUOTE
Patient presents to ED complaining of worsening SOB x 1 week. Pt at baseline uses 4L O2 via nasal cannula. Hx of COPD. Denies fever/chills, chest pain.

## 2020-11-28 NOTE — H&P ADULT - HISTORY OF PRESENT ILLNESS
Requesting: Dr Whaley (ER)  Reason: Acute respiratory failure requiring NIV    S    Pt seen and examined  HD # 1  FULL CODE  PMHx Diastolic Heart Failure, Oxygen-Dependant COPD on 4L Home Oxygen, HTN, HLD, CAD s/p stent in LCx, Spinal Stenosis, Obesity, KENNY, and PAD, AF on eliquis  Pt here for SOB  SpO2 in 70's this AM prompting ER visit  ABG with acute respiratory failure    11/28 AM: On bipap. Anxious. States feels this is more indicative of his HF.

## 2020-11-28 NOTE — ED ADULT NURSE NOTE - NSIMPLEMENTINTERV_GEN_ALL_ED
Implemented All Fall Risk Interventions:  [place] to call system. Call bell, personal items and telephone within reach. Instruct patient to call for assistance. Room bathroom lighting operational. Non-slip footwear when patient is off stretcher. Physically safe environment: no spills, clutter or unnecessary equipment. Stretcher in lowest position, wheels locked, appropriate side rails in place. Provide visual cue, wrist band, yellow gown, etc. Monitor gait and stability. Monitor for mental status changes and reorient to person, place, and time. Review medications for side effects contributing to fall risk. Reinforce activity limits and safety measures with patient and family.

## 2020-11-28 NOTE — ED PROVIDER NOTE - CLINICAL SUMMARY MEDICAL DECISION MAKING FREE TEXT BOX
76 yo male with significant medical hx- copd, cad/stents, chf with sob, tachypnic/hypoxic upon arrival; ekg, cxr, labs, b/l le dopplers, admit

## 2020-11-28 NOTE — ED PROVIDER NOTE - OBJECTIVE STATEMENT
Patient is a 74 yo male with hx of copd/emphysema, cad/stents, chf reports sob x 2 weeks; has been monitoring his breathing at home but last night o2 sat was in 70s prompting visit to ED this AM; no new cough; no fever or chills; reports left arm numbness and chest pain intermittent for a few days; reports that he has been taking levaquin for cellulitis in his legs as prescribed by his pmd; patient normally uses 4L nc at baseline. pmd: kylie, cards: sepideh, pulm: zee; never been intubated for his hx of copd. patient also reports worsening of lower extremity edema over last few weeks- left>right.

## 2020-11-28 NOTE — PROCEDURE NOTE - NSPROCDETAILS_GEN_ALL_CORE
positive blood return obtained via catheter/hemostasis with direct pressure, dressing applied/ultrasound guidance

## 2020-11-29 NOTE — CONSULT NOTE ADULT - SUBJECTIVE AND OBJECTIVE BOX
Patient is a 75y old  Male who presents with a chief complaint of SOB, COPD exac, CHF (29 Nov 2020 08:02)    ________________________________  JENARO GORDILLO is a 75y year old Male with a past medical history of atrial fibrillation on anticoagulation with Eliquis, history of DVT, history of CVA, coronary disease status post PCI to circumflex in 2013, peripheral arterial disease, hypertension, hyperlipidemia, chronic diastolic heart failure, moderate mitral valve regurgitation, COPD on oxygen at home, sleep apnea and obesity.    The patient presented for evaluation of shortness of breath with lower extremity edema, and was admitted to the ICU for hypercarbic and hypoxic respiratory failure.  Cardiology has been consulted for management of heart failure.    Previous cardiac work-up included echocardiogram from 2 months ago showing low normal LVEF, with moderate mitral valve regurgitation, and mild pulmonary hypertension.  His SPECT stress test was abnormal, and the fact that he only underwent resting imaging showing a medium size defect involving the basal, inferior, inferior lateral and apical walls.        PREVIOUS CARDIAC WORKUP: September 2020  --There is severe left atrial dilatation (LA volume index 59 ml/m²).  --The interventricular septum is mildly hypertrophied.  --The inferolateral (posterior) wall is mild hypertrophied.  --There is no asymmetric septal hypertrophy.  --There is mild left ventricular hypertrophy.  --LV ejection fraction (52 %) is normal.  --The left ventricular filling pattern is consistent with abnormal relaxation.  --The aortic root is normal in size (3.70 cm).  --The aortic valve is mildly calcified. There is trace aortic regurgitation.  --There is mild mitral annular calcification. There is mild mitral valve thickening. There is  moderate mitral regurgitation.  --There is moderate tricuspid regurgitation.  --There is mild pulmonic regurgitation.  --The right atrial pressure is 6 - 10 mm Hg. There is mild pulmonary hypertension.      Stress September 2020 · There is a medium size defect, of moderate to severe severity, in the basal inferior, basal inferolateral, mid inferior, mid inferolateral and apical inferior wall(s),  · Rest gated analysis showed overall normal left ventricular function with LV enlargement.  · Overall impression: Abnormal.  · Summary: Left ventricular perfusion is abnormal.  · Indeterminate ECG evidence of ischemia after IV administration of regadenoson due to the presence of a conduction abnormality.  · SPECT results are abnormal. Only resting studies available. Patient did not want to stay for stress images. Left without getting stress images.  ________________________________  Review of systems: A 10 point review of system has been performed, and is negative except for what has been mentioned in the above history of present illness.    PAST MEDICAL & SURGICAL HISTORY:  PVD (peripheral vascular disease)    KENNY (obstructive sleep apnea)    Hyperlipidemia    Hypertension, unspecified type    Spinal stenosis    CHF (congestive heart failure)    Emphysema    COPD (chronic obstructive pulmonary disease)    No significant past surgical history      FAMILY HISTORY:  No pertinent family history in first degree relatives    SOCIAL HISTORY: History of tobacco abuse    Home Medications:  Advair Diskus 250 mcg-50 mcg inhalation powder: 1 puff(s) inhaled 2 times a day (21 Nov 2019 15:36)  apixaban 5 mg oral tablet: 1 tab(s) orally 2 times a day (21 Nov 2019 15:36)  clopidogrel 75 mg oral tablet: 1 tab(s) orally once a day (21 Nov 2019 15:36)  Flomax 0.4 mg oral capsule: 1 cap(s) orally once a day (21 Nov 2019 15:36)  furosemide 40 mg oral tablet: 1 tab(s) orally once a day (21 Nov 2019 15:36)  Imdur 60 mg oral tablet, extended release: 1 tab(s) orally once a day (in the morning) (21 Nov 2019 15:36)  Lipitor 20 mg oral tablet: 1 tab(s) orally once a day (21 Nov 2019 15:36)  Lyrica 75 mg oral capsule: orally once a day (21 Nov 2019 15:36)  Multiple Vitamins oral tablet: 1 tab(s) orally once a day (21 Nov 2019 15:36)  Norvasc 5 mg oral tablet: 1 tab(s) orally once a day (21 Nov 2019 15:36)  potassium chloride 20 mEq oral tablet, extended release: 1 tab(s) orally 2 times a day (21 Nov 2019 15:36)  Proscar 5 mg oral tablet: 1 tab(s) orally once a day (21 Nov 2019 15:36)  Proventil 2.5 mg/3 mL (0.083%) inhalation solution: inhaled 3 times a day, As Needed (21 Nov 2019 15:36)  Ranexa 500 mg oral tablet, extended release: 1 tab(s) orally 2 times a day (21 Nov 2019 15:36)  Spiriva 18 mcg inhalation capsule: 1 cap(s) inhaled once a day (21 Nov 2019 15:36)  tiZANidine 4 mg oral tablet:  (21 Nov 2019 15:36)  traMADol 50 mg oral tablet:  (21 Nov 2019 15:36)  Vitamin B Complex:  (21 Nov 2019 15:36)    MEDICATIONS  (STANDING):  ALBUTerol    90 MICROgram(s) HFA Inhaler 2 Puff(s) Inhalation every 6 hours  amLODIPine   Tablet 5 milliGRAM(s) Oral daily  apixaban 5 milliGRAM(s) Oral every 12 hours  atorvastatin 20 milliGRAM(s) Oral at bedtime  chlorhexidine 4% Liquid 1 Application(s) Topical <User Schedule>  clopidogrel Tablet 75 milliGRAM(s) Oral daily  digoxin     Tablet 0.125 milliGRAM(s) Oral daily  finasteride 5 milliGRAM(s) Oral daily  furosemide   Injectable 20 milliGRAM(s) IV Push once  isosorbide   mononitrate ER Tablet (IMDUR) 60 milliGRAM(s) Oral daily  methylPREDNISolone sodium succinate Injectable 40 milliGRAM(s) IV Push every 8 hours  metoprolol succinate ER 50 milliGRAM(s) Oral daily  multivitamin 1 Tablet(s) Oral daily  pregabalin 75 milliGRAM(s) Oral daily  ranolazine 500 milliGRAM(s) Oral two times a day  tamsulosin 0.4 milliGRAM(s) Oral at bedtime  tiotropium 18 MICROgram(s) Capsule 1 Capsule(s) Inhalation daily    MEDICATIONS  (PRN):  traMADol 50 milliGRAM(s) Oral every 6 hours PRN Severe Pain (7 - 10)    Vital Signs Last 24 Hrs  T(C): 36.5 (29 Nov 2020 04:00), Max: 37.1 (29 Nov 2020 00:00)  T(F): 97.7 (29 Nov 2020 04:00), Max: 98.7 (29 Nov 2020 00:00)  HR: 84 (29 Nov 2020 07:00) (64 - 91)  BP: 146/60 (29 Nov 2020 06:00) (120/100 - 156/61)  BP(mean): 83 (29 Nov 2020 06:00) (76 - 105)  RR: 19 (29 Nov 2020 07:00) (12 - 22)  SpO2: 99% (29 Nov 2020 07:00) (90% - 100%)  I&O's Summary    28 Nov 2020 07:01  -  29 Nov 2020 07:00  --------------------------------------------------------  IN: 16 mL / OUT: 550 mL / NET: -534 mL      ________________________________  GENERAL APPEARANCE:  No acute distress  HEAD: normocephalic, atraumatic  NECK: supple, no jugular venous distention, no carotid bruit    HEART: irreg, S1, S2 normal, 2/6 regurgitant murmur    CHEST:  No anterior chest wall tenderness    LUNGS:  dec breath sounds, crackles    ABDOMEN: soft, nontender, nondistended, with positive bowel sounds appreciated  EXTREMITIES: no clubbing, cyanosis, or edema.  NEURO: Alert and oriented x3  PSYC:  Normal affect  SKIN:  Dry  ________________________________  TELEMETRY: atrial fibrillation, NSVT      ECG: atrial fibrillation, PVCs    LABS:                        11.6  8.64  )-----------( 296      ( 29 Nov 2020 06:56 )             38.6            11-29    140  |  98  |  25<H>  ----------------------------<  168<H>  4.5   |  42<H>  |  1.02    Ca    8.9      29 Nov 2020 06:56  Mg     2.4     11-29    TPro  7.3  /  Alb  3.4  /  TBili  0.4  /  DBili  x   /  AST  12<L>  /  ALT  17  /  AlkPhos  113  11-28     LIVER FUNCTIONS - ( 28 Nov 2020 08:51 )  Alb: 3.4 g/dL / Pro: 7.3 gm/dL / ALK PHOS: 113 U/L / ALT: 17 U/L / AST: 12 U/L / GGT: x        PT/INR - ( 28 Nov 2020 08:51 )   PT: 15.3 sec;   INR: 1.33 ratio         PTT - ( 28 Nov 2020 08:51 )  PTT:39.3 sec    11-28 @ 08:51  Trop-I  <0.015  CK      54  CK-MB   --    Pro BNP  2884 11-28 @ 08:51  D Dimer  -- 11-28 @ 08:51    PT/INR - ( 28 Nov 2020 08:51 )   PT: 15.3 sec;   INR: 1.33 ratio         PTT - ( 28 Nov 2020 08:51 )  PTT:39.3 sec    ABG - ( 29 Nov 2020 05:45 )  pH, Arterial: 7.30  pH, Blood: x     /  pCO2: 81    /  pO2: 156   / HCO3: 39    / Base Excess: 9.8   /  SaO2: 99                ________________________________    RADIOLOGY & ADDITIONAL STUDIES:  IMPRESSION: There is bilateral airspace disease that may be from pulmonary edema. However infection cannot be excluded. The heart is normal in size. The bones are intact.      ________________________________    ASSESSMENT:  Shortness of breath with acute diastolic heart failure and COPD  Chronic atrial fibrillation on anticoagulation with Eliquis  CAD status post PCI to circumflex in 2013  Peripheral arterial disease  History of DVT  History of CVA  Sleep apnea and obesity  Spinal stenosis  Recent abnormal SPECT stress test-see above  NSVT - patient to complete stress as out patient    PLAN:    In summary, this is a 75 male with a past medical history as above who presented for evaluation of shortness of breath and lower extremity edema and noted to be hypoxic/hypercarbic respiratory failure.  Chest x-ray noted.  Continue with diuretics but switch to twice daily.  Continue anticoagulation.  Continue metoprolol.  Continue Imdur and Ranexa for antianginal therapy.  Continue Plavix.  Monitor hemoglobin.  Abx per primary

## 2020-11-29 NOTE — GOALS OF CARE CONVERSATION - ADVANCED CARE PLANNING - CONVERSATION DETAILS
Discussed current condition, diagnosis and plan of care    Pt decided that he would WANT a trial of intubation and CPR  His nephew Guanakito would then be his HCP to made decisions on his behalf    NUNO and HCP filled out

## 2020-11-29 NOTE — DIETITIAN INITIAL EVALUATION ADULT. - CALCULATED FROM (CAL/KG)
Detail Level: Simple Strength: [facility] plus Include Z78.9 (Other Specified Conditions Influencing Health Status) As An Associated Diagnosis?: No Medical Necessity Clause: This procedure was medically necessary because the lesions that were treated were: Consent: The patient's consent was obtained including but not limited to risks of crusting, scabbing, scarring, blistering, darker or lighter pigmentary change, recurrence, incomplete removal and infection. Medical Necessity Information: It is in your best interest to select a reason for this procedure from the list below. All of these items fulfill various CMS LCD requirements except the new and changing color options. 2409 Curette Text: Pared with a 15 blade prior to Cantharone application extensively. Post-Care Instructions: I reviewed post-care instructions. The patient understands that the tape should be removed at the first sign of pain or discomfort, or after 24 hours of application. Applied at 5:15 pm.

## 2020-11-29 NOTE — DIETITIAN INITIAL EVALUATION ADULT. - PERTINENT MEDS FT
MEDICATIONS  (STANDING):  ALBUTerol    90 MICROgram(s) HFA Inhaler 2 Puff(s) Inhalation every 6 hours  amLODIPine   Tablet 5 milliGRAM(s) Oral daily  apixaban 5 milliGRAM(s) Oral every 12 hours  atorvastatin 20 milliGRAM(s) Oral at bedtime  cefTRIAXone Injectable. 1000 milliGRAM(s) IV Push every 24 hours  chlorhexidine 4% Liquid 1 Application(s) Topical <User Schedule>  clopidogrel Tablet 75 milliGRAM(s) Oral daily  digoxin     Tablet 0.125 milliGRAM(s) Oral daily  finasteride 5 milliGRAM(s) Oral daily  furosemide   Injectable 20 milliGRAM(s) IV Push two times a day  isosorbide   mononitrate ER Tablet (IMDUR) 60 milliGRAM(s) Oral daily  methylPREDNISolone sodium succinate Injectable 40 milliGRAM(s) IV Push every 8 hours  metoprolol succinate ER 50 milliGRAM(s) Oral daily  multivitamin 1 Tablet(s) Oral daily  pregabalin 75 milliGRAM(s) Oral daily  ranolazine 500 milliGRAM(s) Oral two times a day  tamsulosin 0.4 milliGRAM(s) Oral at bedtime  tiotropium 18 MICROgram(s) Capsule 1 Capsule(s) Inhalation daily    MEDICATIONS  (PRN):  traMADol 50 milliGRAM(s) Oral every 6 hours PRN Severe Pain (7 - 10)

## 2020-11-29 NOTE — DIETITIAN INITIAL EVALUATION ADULT. - NAME AND PHONE
Larissa Duarte MA, RDN, CDN, [facility]   [phone] (office number)  [phone] (pager number)

## 2020-11-29 NOTE — DIETITIAN INITIAL EVALUATION ADULT. - PERTINENT LABORATORY DATA
11-29    140  |  98  |  25<H>  ----------------------------<  168<H>  4.5   |  42<H>  |  1.02    Ca    8.9      29 Nov 2020 06:56  Mg     2.4     11-29    TPro  7.3  /  Alb  3.4  /  TBili  0.4  /  DBili  x   /  AST  12<L>  /  ALT  17  /  AlkPhos  113  11-28

## 2020-11-29 NOTE — CONSULT NOTE ADULT - SUBJECTIVE AND OBJECTIVE BOX
HPI: SEVEN GRODILLO is a 75y old Male with past medical history of Diastolic Heart Failure, Oxygen-Dependent COPD on Home Oxygen, HTN, HLD, CAD s/p stent in LCx, Spinal Stenosis, Obesity, KENNY, and PAD who presented with SOB and found to have pulse ox in the 70s.  He felt like he was unable breathe yesterday, but he feels much better today after given steroids and lasix. He does not like using bipap    Past Surgical History:   No significant past surgical history    PMH : Diastolic Heart Failure, Oxygen-Dependent COPD on Home Oxygen, HTN, HLD, CAD s/p stent in LCx, Spinal Stenosis, Obesity, KENNY, and PAD   Family History:    No pertinent family history in first degree relatives       Social History:     Review of Systems:  All 10 systems reviewed in detailed and found to be negative with the exception of what has already been described above    Allergies:  No Known Allergies    Meds  MEDICATIONS  (STANDING):  ALBUTerol    90 MICROgram(s) HFA Inhaler 2 Puff(s) Inhalation every 6 hours  amLODIPine   Tablet 5 milliGRAM(s) Oral daily  apixaban 5 milliGRAM(s) Oral every 12 hours  atorvastatin 20 milliGRAM(s) Oral at bedtime  cefTRIAXone Injectable. 1000 milliGRAM(s) IV Push every 24 hours  chlorhexidine 4% Liquid 1 Application(s) Topical <User Schedule>  clopidogrel Tablet 75 milliGRAM(s) Oral daily  digoxin     Tablet 0.125 milliGRAM(s) Oral daily  finasteride 5 milliGRAM(s) Oral daily  furosemide   Injectable 20 milliGRAM(s) IV Push two times a day  isosorbide   mononitrate ER Tablet (IMDUR) 60 milliGRAM(s) Oral daily  methylPREDNISolone sodium succinate Injectable 40 milliGRAM(s) IV Push every 8 hours  metoprolol succinate ER 50 milliGRAM(s) Oral daily  multivitamin 1 Tablet(s) Oral daily  pregabalin 75 milliGRAM(s) Oral daily  ranolazine 500 milliGRAM(s) Oral two times a day  tamsulosin 0.4 milliGRAM(s) Oral at bedtime  tiotropium 18 MICROgram(s) Capsule 1 Capsule(s) Inhalation daily    MEDICATIONS  (PRN):  ALBUTerol    90 MICROgram(s) HFA Inhaler 2 Puff(s) Inhalation every 6 hours PRN Shortness of Breath  ALPRAZolam 0.25 milliGRAM(s) Oral every 8 hours PRN anxiety  traMADol 50 milliGRAM(s) Oral every 6 hours PRN Severe Pain (7 - 10)    Physical Exam  T(C): 36.6 (11-29-20 @ 08:00), Max: 37.1 (11-29-20 @ 00:00)  HR: 78 (11-29-20 @ 17:00) (64 - 90)  BP: 135/59 (11-29-20 @ 17:00) (128/52 - 156/61)  RR: 16 (11-29-20 @ 17:00) (12 - 23)  SpO2: 96% (11-29-20 @ 17:00) (89% - 99%)  Gen: Alert, oriented, no distress  HEENT: Anicteric sclera, moist mucous membranes  Cardio: irregular, + murmur  Resp: Crackles at bases  GI: Nontender, nondistended, normoactive bowel sounds  Ext: 2+ edema bilaterally, erythema/bandage on left  Neuro: Nonfocal    Labs:                        11.6   8.64  )-----------( 296      ( 29 Nov 2020 06:56 )             38.6     11-29    140  |  98  |  25<H>  ----------------------------<  168<H>  4.5   |  42<H>  |  1.02    Ca    8.9      29 Nov 2020 06:56  Mg     2.4     11-29    TPro  7.3  /  Alb  3.4  /  TBili  0.4  /  DBili  x   /  AST  12<L>  /  ALT  17  /  AlkPhos  113  11-28    PT/INR - ( 28 Nov 2020 08:51 )   PT: 15.3 sec;   INR: 1.33 ratio         PTT - ( 28 Nov 2020 08:51 )  PTT:39.3 sec    < from: Xray Chest 1 View- PORTABLE-Routine (Xray Chest 1 View- PORTABLE-Routine .) (11.29.20 @ 09:11) >  PROCEDURE DATE:  11/29/2020          INTERPRETATION:  TIME OF EXAM: November 29, 2020 at 8:44 AM.    CLINICAL INFORMATION: Hypercarbic respiratory failure. Congestive heart failure versus COPD.    COMPARISON:  November 28, 2020.    TECHNIQUE:   AP Portable chest x-ray. Limited by motion and underpenetration. Part of left base excluded from image.    INTERPRETATION:    Heart size and the mediastinum cannot be accurately evaluated on this projection. The thoracic aorta is calcified.  Pulmonary vascular redistribution/congestion is unchanged.  There are small bilateral pleural effusions with likely associated passive atelectasis.  No pneumothorax is seen.  No acute bony abnormalitynoted.      IMPRESSION:  Continued pulmonary vascular redistribution/congestion.    Small bilateral pleural effusions with likely associated passive atelectasis again seen.      < from: US Duplex Venous Lower Ext Complete, Bilateral (11.28.20 @ 15:10) >        INTERPRETATION:  CLINICAL INFORMATION: Lower extremity redness and swelling    COMPARISON: Prior study from 2/3/2018    TECHNIQUE: Duplex sonography of the BILATERAL LOWER extremity veins with color and spectral Doppler, with and without compression.    Technologist note: Technically difficult exam.    FINDINGS:    There is normal compressibility of the bilateral common femoral, femoral and popliteal veins.  Doppler examination shows normal spontaneous and phasic flow.    No calf vein thrombosis is detected. Bilateral calf edema is noted.    IMPRESSION:  Note, the study was technically difficult.  Within this limitation, no evidence of deepvenous thrombosis in either lower extremity.    Bilateral calf edema.    Given limitations, if clinical concern persists, reevaluation can be performed after 5 to 7 days to evaluate for propagation of clot.      < from: Xray Chest 1 View- PORTABLE-Urgent (Xray Chest 1 View- PORTABLE-Urgent .) (11.28.20 @ 09:17) >  PROCEDURE DATE:  11/28/2020          INTERPRETATION:  AP chest.    Clinical indications: Shortness of breath.    IMPRESSION: There is bilateral airspace disease that may be from pulmonary edema. However infection cannot be excluded. The heart is normal in size. The bones are intact.   September 2020  --There is severe left atrial dilatation (LA volume index 59 ml/m²).  --The interventricular septum is mildly hypertrophied.  --The inferolateral (posterior) wall is mild hypertrophied.  --There is no asymmetric septal hypertrophy.  --There is mild left ventricular hypertrophy.  --LV ejection fraction (52 %) is normal.  --The left ventricular filling pattern is consistent with abnormal relaxation.  --The aortic root is normal in size (3.70 cm).  --The aortic valve is mildly calcified. There is trace aortic regurgitation.  --There is mild mitral annular calcification. There is mild mitral valve thickening. There is  moderate mitral regurgitation.  --There is moderate tricuspid regurgitation.  --There is mild pulmonic regurgitation.  --The right atrial pressure is 6 - 10 mm Hg. There is mild pulmonary hypertension.      Stress September 2020 · There is a medium size defect, of moderate to severe severity, in the basal inferior, basal inferolateral, mid inferior, mid inferolateral and apical inferior wall(s),  · Rest gated analysis showed overall normal left ventricular function with LV enlargement.  · Overall impression: Abnormal.  · Summary: Left ventricular perfusion is abnormal.  · Indeterminate ECG evidence of ischemia after IV administration of regadenoson due to the presence of a conduction abnormality.  · SPECT results are abnormal. Only resting studies available. Patient did not want to stay for stress images. Left without getting stress images.    Blood Gas Profile - Arterial (11.29.20 @ 05:45)   pH, Arterial: 7.30   pCO2, Arterial: 81: Test Name:pco2 Called by:talon Called to:alex bellrn   Read back 2 Pt IDs:y Read back values:y Date/Tm:11/29/20 05:54 mmHg   pO2, Arterial: 156 mmHg   HCO3, Arterial: 39 mmoL/L   Base Excess, Arterial: 9.8 mmol/L   Oxygen Saturation, Arterial: 99 %

## 2020-11-29 NOTE — PROGRESS NOTE ADULT - ASSESSMENT
Patient with acute on chronic hypercarbix and hypoxic respiratory failure  Likely chf and or copd exaxcerbation  at risk of intubation  baseline co2 of 42 signifies chronic co2 retention and paitent was on 4 liters at home  if needed will use NIppv patient has been poorly compliant  given addition dose lasix, continue steroids, bronchodilaotrs  will consult his pulmonolgist and his cardiologist  will order echo  diet now he is more awake,  will titrate down oxygen Patient with acute on chronic hypercarbix and hypoxic respiratory failure  Likely chf and or copd exaxcerbation  at risk of intubation  baseline co2 of 42 signifies chronic co2 retention and paitent was on 4 liters at home  if needed will use NIppv patient has been poorly compliant  given addition dose lasix, continue steroids, bronchodilaotrs  will consult his pulmonolgist and his cardiologist  will order echo  rocephin for cellulitis in left leg  diet now he is more awake,  will titrate down oxygen

## 2020-11-29 NOTE — PROGRESS NOTE ADULT - SUBJECTIVE AND OBJECTIVE BOX
Patient is a 75y old  Male who presents with a chief complaint of SOB, COPD exac, CHF (28 Nov 2020 11:16)    PAST MEDICAL & SURGICAL HISTORY:  PVD (peripheral vascular disease)    KENNY (obstructive sleep apnea)    Hyperlipidemia    Hypertension, unspecified type    Spinal stenosis    CHF (congestive heart failure)    Emphysema    COPD (chronic obstructive pulmonary disease)    No significant past surgical history      SEVEN GORDILLO   75y    Male    BRIEF HOSPITAL COURSE:    Review of Systems:    UATO                   All other ROS are negative.    Allergies    No Known Allergies    Intolerances      Weight (kg): 115.9 (11-28-20 @ 13:30)  BMI (kg/m2): 33.7 (11-28-20 @ 13:30)    ICU Vital Signs Last 24 Hrs  T(C): 37.1 (29 Nov 2020 00:00), Max: 37.1 (29 Nov 2020 00:00)  T(F): 98.7 (29 Nov 2020 00:00), Max: 98.7 (29 Nov 2020 00:00)  HR: 68 (29 Nov 2020 00:00) (64 - 91)  BP: 152/64 (29 Nov 2020 00:00) (120/100 - 179/84)  BP(mean): 86 (29 Nov 2020 00:00) (76 - 105)  ABP: --  ABP(mean): --  RR: 12 (29 Nov 2020 00:00) (12 - 22)  SpO2: 96% (29 Nov 2020 00:00) (77% - 100%)    Physical Examination:    General:     HEENT:  no JVD    PULM:  bilateral BS no wheeze     CVS: s1 s2  irreg    ABD: obese soft    EXT: + edema     SKIN: warm    Neuro: becoming more arousable     ABG - ( 28 Nov 2020 21:25 )  pH, Arterial: 7.29  pH, Blood: x     /  pCO2: 79    /  pO2: 63    / HCO3: 36    / Base Excess: 7.7   /  SaO2: 90            LABS:                        12.4   11.64 )-----------( 281      ( 28 Nov 2020 08:51 )             41.6     11-28    137  |  97  |  20  ----------------------------<  154<H>  4.4   |  40<H>  |  1.17    Ca    8.9      28 Nov 2020 08:51  Mg     2.3     11-28    TPro  7.3  /  Alb  3.4  /  TBili  0.4  /  DBili  x   /  AST  12<L>  /  ALT  17  /  AlkPhos  113  11-28      CARDIAC MARKERS ( 28 Nov 2020 08:51 )  <0.015 ng/mL / x     / 54 U/L / x     / x          CAPILLARY BLOOD GLUCOSE        PT/INR - ( 28 Nov 2020 08:51 )   PT: 15.3 sec;   INR: 1.33 ratio         PTT - ( 28 Nov 2020 08:51 )  PTT:39.3 sec    CULTURES:  Rapid RVP Result: NotDetec (11-28 @ 09:18)      Medications:  MEDICATIONS  (STANDING):  ALBUTerol    90 MICROgram(s) HFA Inhaler 2 Puff(s) Inhalation every 6 hours  amLODIPine   Tablet 5 milliGRAM(s) Oral daily  apixaban 5 milliGRAM(s) Oral every 12 hours  atorvastatin 20 milliGRAM(s) Oral at bedtime  chlorhexidine 4% Liquid 1 Application(s) Topical <User Schedule>  clopidogrel Tablet 75 milliGRAM(s) Oral daily  dexMEDEtomidine Infusion 0.3 MICROgram(s)/kG/Hr (8.69 mL/Hr) IV Continuous <Continuous>  digoxin     Tablet 0.125 milliGRAM(s) Oral daily  finasteride 5 milliGRAM(s) Oral daily  isosorbide   mononitrate ER Tablet (IMDUR) 60 milliGRAM(s) Oral daily  methylPREDNISolone sodium succinate Injectable 40 milliGRAM(s) IV Push every 8 hours  metoprolol succinate ER 50 milliGRAM(s) Oral daily  multivitamin 1 Tablet(s) Oral daily  pregabalin 75 milliGRAM(s) Oral daily  ranolazine 500 milliGRAM(s) Oral two times a day  tamsulosin 0.4 milliGRAM(s) Oral at bedtime  tiotropium 18 MICROgram(s) Capsule 1 Capsule(s) Inhalation daily    MEDICATIONS  (PRN):  traMADol 50 milliGRAM(s) Oral every 6 hours PRN Severe Pain (7 - 10)          RADIOLOGY/IMAGING/ECHO      < from: Xray Chest 1 View- PORTABLE-Urgent (Xray Chest 1 View- PORTABLE-Urgent .) (11.28.20 @ 09:17) >  IMPRESSION: There is bilateral airspace disease that may be from pulmonary edema. However infection cannot be excluded. The heart is normal in size. The bones are intact.    < end of copied text >    Assessment/Plan:    75Mhx obesity HFpEF, o2 dependant COPD on 4L Home Oxygen, HTN, HLD, CAD s/p stent in LCx, Spinal Stenosis, Obesity, KENNY, and PAD, AF on eliquis      P/W SOB hypoxemia  11/28  Type 1 and  2 resp failure   AECOPD/DHF    Progressive hypercapnia despite NIPPV  ST mode.  ST mode > AVAPs with improvement in ABG.  Still somewhat difficult to arouse after ativan  was given earlier.  Hold precedex let ativan wear off.   Should be able to avoid intubation     Steroids  Diuretic given   BD therapy   Abx d/c low  suspicion of infection   Fulll ac with apixaban       CRITICAL CARE TIME SPENT: 32 minutes assessing presenting problems of acute illness, which pose high probability of life threatening deterioration or end organ damage/dysfunction, as well as medical decision making including initiating plan of care, reviewing data, reviewing radiologic exams, discussing with multidisciplinary team,  discussing goals of care with patient/family, and writing this note.  Non-inclusive of procedures performed,

## 2020-11-29 NOTE — DIETITIAN INITIAL EVALUATION ADULT. - OTHER INFO
76yo male with PMH significant for diastolic heart failure, COPD on 4L O2, HTN, HLD, CAD s/p stent, spinal stenosis, obesity.  Pt admitted with acute on chronic resp failure, hypercarbic 2/2 acute COPD exacerbation and acute CHF exacerbation, PNA.

## 2020-11-29 NOTE — PROGRESS NOTE ADULT - SUBJECTIVE AND OBJECTIVE BOX
Events Overnight: patient poorly compliant with bipap, had inc co2 now improved, got dose of ativan overnight    HPI:    75 year old male with hx. severe COPD on 4 liters and  Diastolic Heart Failure,  HTN, HLD, CAD s/p stent in LCx, Spinal Stenosis, Obesity, KENNY, and PAD, AF on eliquis     Pt here for SOB and dec o2 for few days, Patient severely hopoxic and hypercarbic in ED. inc bnp     given lasix, steroids, bronchodilators,      Patient states this feels more like his heart failure, than copd     on eliquis for afib     venous dopplers negative     troponin negatve     covid negative    PMH:  as above      ROS - positive sob, dyspnea,            no increased leg swelling, no chest pain, no abdominal pain, ne fevers, no chilols            no calf pain            ROS otherwise negative    MEDICATIONS  (STANDING):  ALBUTerol    90 MICROgram(s) HFA Inhaler 2 Puff(s) Inhalation every 6 hours  amLODIPine   Tablet 5 milliGRAM(s) Oral daily  apixaban 5 milliGRAM(s) Oral every 12 hours  atorvastatin 20 milliGRAM(s) Oral at bedtime  chlorhexidine 4% Liquid 1 Application(s) Topical <User Schedule>  clopidogrel Tablet 75 milliGRAM(s) Oral daily  digoxin     Tablet 0.125 milliGRAM(s) Oral daily  finasteride 5 milliGRAM(s) Oral daily  furosemide   Injectable 20 milliGRAM(s) IV Push once  isosorbide   mononitrate ER Tablet (IMDUR) 60 milliGRAM(s) Oral daily  methylPREDNISolone sodium succinate Injectable 40 milliGRAM(s) IV Push every 8 hours  metoprolol succinate ER 50 milliGRAM(s) Oral daily  multivitamin 1 Tablet(s) Oral daily  pregabalin 75 milliGRAM(s) Oral daily  ranolazine 500 milliGRAM(s) Oral two times a day  tamsulosin 0.4 milliGRAM(s) Oral at bedtime  tiotropium 18 MICROgram(s) Capsule 1 Capsule(s) Inhalation daily    MEDICATIONS  (PRN):  traMADol 50 milliGRAM(s) Oral every 6 hours PRN Severe Pain (7 - 10)       Height (cm): 185.4 (11-28 @ 08:09)  Weight (kg): 115.9 (11-28 @ 13:30)  BMI (kg/m2): 33.7 (11-28 @ 13:30)    ICU Vital Signs Last 24 Hrs  T(C): 36.5 (29 Nov 2020 04:00), Max: 37.1 (29 Nov 2020 00:00)  T(F): 97.7 (29 Nov 2020 04:00), Max: 98.7 (29 Nov 2020 00:00)  HR: 84 (29 Nov 2020 07:00) (64 - 91)  BP: 146/60 (29 Nov 2020 06:00) (120/100 - 179/84)  BP(mean): 83 (29 Nov 2020 06:00) (76 - 105)  ABP: --  ABP(mean): --  RR: 19 (29 Nov 2020 07:00) (12 - 22)  SpO2: 99% (29 Nov 2020 07:00) (77% - 100%)    I&O's Summary    28 Nov 2020 07:01  -  29 Nov 2020 07:00  --------------------------------------------------------  IN: 16 mL / OUT: 550 mL / NET: -534 mL                          11.6   8.64  )-----------( 296      ( 29 Nov 2020 06:56 )             38.6       11-29    140  |  98  |  25<H>  ----------------------------<  168<H>  4.5   |  42<H>  |  1.02    Ca    8.9      29 Nov 2020 06:56  Mg     2.4     11-29    TPro  7.3  /  Alb  3.4  /  TBili  0.4  /  DBili  x   /  AST  12<L>  /  ALT  17  /  AlkPhos  113  11-28      CARDIAC MARKERS ( 28 Nov 2020 08:51 )  <0.015 ng/mL / x     / 54 U/L / x     / x          ABG - ( 29 Nov 2020 05:45 )  pH, Arterial: 7.30  pH, Blood: x     /  pCO2: 81    /  pO2: 156   / HCO3: 39    / Base Excess: 9.8   /  SaO2: 99          DVT Prophylaxis:   apixiban                                                            Advanced Directives: Full Code

## 2020-11-29 NOTE — CONSULT NOTE ADULT - ASSESSMENT
75y old Male with past medical history of Diastolic Heart Failure, Oxygen-Dependent COPD on Home Oxygen, HTN, HLD, CAD s/p stent in LCx, Spinal Stenosis, Obesity, KENYN, and PAD who presented with SOB and found to have pulse ox in the 70s likely secondary to COPD and pulmonary edema  1. COPD: continue albuterol, spiriva  -continue methylprednisolone, can titrate to BID tomorrow if not wheezing  -also getting ceftriaxone for LLE cellulitis  2. Pulmonary edema: continue diuresis with lasix.  -monitor K, Cr  3. LLE cellulitis: on ceftriaxone  4. CAD, PAD, afib: seen by cardiology  -continue plavix, digoxin, apixaban

## 2020-11-30 NOTE — PROGRESS NOTE ADULT - ASSESSMENT
-  hypercarbic and hypoxemic respiratory failure currently on BiPAP  -  CHF with pulmonary edema pattern with pleural effusions with history of diastolic dysfunction rule out new ischemic event  -  pleural effusions mild likely secondary to CHF  -   sleep apnea  never use of the CPAP in the past.  -    severe baseline obstructive airway disease O2 dependent  -    known patient with coronary artery disease with history of stent placement  -    AFib on anticoagulation..  -    other issues include peripheral vascular disease suspected cellulitis on IV antibiotics    PLAN     -   continue with the BiPAP as tolerated with anxiolytics and close monitoring of the blood gas  -    would recommend  decrease the Solu-Medrol to twice daily for the COPD   -    diuresis while monitoring the renal function with CHF and pleural effusions  -    continue with Symbicort during the hospital stay along with Spiriva daily for COPD with symptoms of exacerbation worsen by CHF  -    continue with  Rocephin with stage IV COPD.  -    monitor for bleeding  with steroids and anticoagulation.

## 2020-11-30 NOTE — PROGRESS NOTE ADULT - ASSESSMENT
IMP:    76 y/o male pt of size, Oxygen-Dependant COPD on 4L Home Oxygen, HTN, HLD, CAD s/p stent in LCx, Spinal Stenosis, KENNY on home NIV, PAD, and chronic AFib on apixaban admitted to ICU with likely acute on chronic decompensated HFpEF and AECOPD.   Acute on chronic type 1 and 2 resp failure  L leg cellulitis   Hyperglycemia     High risk for acute decompensation and deterioration including death and intubation    Plan:    NIV on Px gtt  Cont with current solu dose and BD  Diuresis  CTX (2)  Start FS with insulin coverage--keep FS < 180  DOAC   HOB > 30  PO  DVT prophy--DOAC    ICU care-- d/w ICU staff on multi disciplinary rounds and pt-- All concerns addressed including but not limited to diagnosis, treatment plan and overall prognosis   Also d/w Dr Jackman at bedside

## 2020-11-30 NOTE — PROGRESS NOTE ADULT - SUBJECTIVE AND OBJECTIVE BOX
The patient was seen and examined. Overnight events noted.  No chest pain.  No shortness of breath.  No palpitations.    Initial Consult HPI  ________________________________  JENARO GORDILLO is a 75y year old Male with a past medical history of atrial fibrillation on anticoagulation with Eliquis, history of DVT, history of CVA, coronary disease status post PCI to circumflex in 2013, peripheral arterial disease, hypertension, hyperlipidemia, chronic diastolic heart failure, moderate mitral valve regurgitation, COPD on oxygen at home, sleep apnea and obesity.    The patient presented for evaluation of shortness of breath with lower extremity edema, and was admitted to the ICU for hypercarbic and hypoxic respiratory failure.  Cardiology has been consulted for management of heart failure.    Previous cardiac work-up included echocardiogram from 2 months ago showing low normal LVEF, with moderate mitral valve regurgitation, and mild pulmonary hypertension.  His SPECT stress test was abnormal, and the fact that he only underwent resting imaging showing a medium size defect involving the basal, inferior, inferior lateral and apical walls.    PREVIOUS CARDIAC WORKUP: September 2020  --There is severe left atrial dilatation (LA volume index 59 ml/m²).  --The interventricular septum is mildly hypertrophied.  --The inferolateral (posterior) wall is mild hypertrophied.  --There is no asymmetric septal hypertrophy.  --There is mild left ventricular hypertrophy.  --LV ejection fraction (52 %) is normal.  --The left ventricular filling pattern is consistent with abnormal relaxation.  --The aortic root is normal in size (3.70 cm).  --The aortic valve is mildly calcified. There is trace aortic regurgitation.  --There is mild mitral annular calcification. There is mild mitral valve thickening. There is  moderate mitral regurgitation.  --There is moderate tricuspid regurgitation.  --There is mild pulmonic regurgitation.  --The right atrial pressure is 6 - 10 mm Hg. There is mild pulmonary hypertension.    Stress September 2020 · There is a medium size defect, of moderate to severe severity, in the basal inferior, basal inferolateral, mid inferior, mid inferolateral and apical inferior wall(s),  · Rest gated analysis showed overall normal left ventricular function with LV enlargement.  · Overall impression: Abnormal.  · Summary: Left ventricular perfusion is abnormal.  · Indeterminate ECG evidence of ischemia after IV administration of regadenoson due to the presence of a conduction abnormality.  · SPECT results are abnormal. Only resting studies available. Patient did not want to stay for stress images. Left without getting stress images.  ________________________________  Review of systems: A 10 point review of system has been performed, and is negative except for what has been mentioned in the above history of present illness.    PAST MEDICAL & SURGICAL HISTORY:  PVD (peripheral vascular disease)  KENNY (obstructive sleep apnea)  Hyperlipidemia  Hypertension, unspecified type  Spinal stenosis  CHF (congestive heart failure)  Emphysema  COPD (chronic obstructive pulmonary disease)    FAMILY HISTORY:  No pertinent family history in first degree relatives    SOCIAL HISTORY: History of tobacco abuse    Home Medications:  Advair Diskus 250 mcg-50 mcg inhalation powder: 1 puff(s) inhaled 2 times a day (21 Nov 2019 15:36)  apixaban 5 mg oral tablet: 1 tab(s) orally 2 times a day (21 Nov 2019 15:36)  clopidogrel 75 mg oral tablet: 1 tab(s) orally once a day (21 Nov 2019 15:36)  Flomax 0.4 mg oral capsule: 1 cap(s) orally once a day (21 Nov 2019 15:36)  furosemide 40 mg oral tablet: 1 tab(s) orally once a day (21 Nov 2019 15:36)  Imdur 60 mg oral tablet, extended release: 1 tab(s) orally once a day (in the morning) (21 Nov 2019 15:36)  Lipitor 20 mg oral tablet: 1 tab(s) orally once a day (21 Nov 2019 15:36)  Lyrica 75 mg oral capsule: orally once a day (21 Nov 2019 15:36)  Multiple Vitamins oral tablet: 1 tab(s) orally once a day (21 Nov 2019 15:36)  Norvasc 5 mg oral tablet: 1 tab(s) orally once a day (21 Nov 2019 15:36)  potassium chloride 20 mEq oral tablet, extended release: 1 tab(s) orally 2 times a day (21 Nov 2019 15:36)  Proscar 5 mg oral tablet: 1 tab(s) orally once a day (21 Nov 2019 15:36)  Proventil 2.5 mg/3 mL (0.083%) inhalation solution: inhaled 3 times a day, As Needed (21 Nov 2019 15:36)  Ranexa 500 mg oral tablet, extended release: 1 tab(s) orally 2 times a day (21 Nov 2019 15:36)  Spiriva 18 mcg inhalation capsule: 1 cap(s) inhaled once a day (21 Nov 2019 15:36)  tiZANidine 4 mg oral tablet:  (21 Nov 2019 15:36)  traMADol 50 mg oral tablet:  (21 Nov 2019 15:36)  Vitamin B Complex:  (21 Nov 2019 15:36)      MEDICATIONS  (STANDING):  ALBUTerol    90 MICROgram(s) HFA Inhaler 2 Puff(s) Inhalation every 6 hours  amLODIPine   Tablet 5 milliGRAM(s) Oral daily  apixaban 5 milliGRAM(s) Oral every 12 hours  atorvastatin 20 milliGRAM(s) Oral at bedtime  cefTRIAXone Injectable. 1000 milliGRAM(s) IV Push every 24 hours  chlorhexidine 4% Liquid 1 Application(s) Topical <User Schedule>  clopidogrel Tablet 75 milliGRAM(s) Oral daily  dexMEDEtomidine Infusion 0.4 MICROgram(s)/kG/Hr (11.6 mL/Hr) IV Continuous <Continuous>  dextrose 40% Gel 15 Gram(s) Oral once  dextrose 5%. 1000 milliLiter(s) (50 mL/Hr) IV Continuous <Continuous>  dextrose 5%. 1000 milliLiter(s) (100 mL/Hr) IV Continuous <Continuous>  dextrose 50% Injectable 25 Gram(s) IV Push once  dextrose 50% Injectable 12.5 Gram(s) IV Push once  dextrose 50% Injectable 25 Gram(s) IV Push once  digoxin     Tablet 0.125 milliGRAM(s) Oral daily  finasteride 5 milliGRAM(s) Oral daily  furosemide   Injectable 20 milliGRAM(s) IV Push two times a day  glucagon  Injectable 1 milliGRAM(s) IntraMuscular once  insulin lispro (ADMELOG) corrective regimen sliding scale   SubCutaneous three times a day before meals  insulin lispro (ADMELOG) corrective regimen sliding scale   SubCutaneous at bedtime  isosorbide   mononitrate ER Tablet (IMDUR) 60 milliGRAM(s) Oral daily  methylPREDNISolone sodium succinate Injectable 40 milliGRAM(s) IV Push every 8 hours  metoprolol succinate ER 50 milliGRAM(s) Oral daily  multivitamin 1 Tablet(s) Oral daily  pregabalin 75 milliGRAM(s) Oral daily  ranolazine 500 milliGRAM(s) Oral two times a day  tamsulosin 0.4 milliGRAM(s) Oral at bedtime  tiotropium 18 MICROgram(s) Capsule 1 Capsule(s) Inhalation daily    MEDICATIONS  (PRN):  ALBUTerol    90 MICROgram(s) HFA Inhaler 2 Puff(s) Inhalation every 6 hours PRN Shortness of Breath  ALPRAZolam 0.25 milliGRAM(s) Oral every 8 hours PRN anxiety  traMADol 50 milliGRAM(s) Oral every 6 hours PRN Severe Pain (7 - 10)      Vital Signs Last 24 Hrs  T(C): 36.7 (30 Nov 2020 05:00), Max: 36.7 (29 Nov 2020 17:00)  T(F): 98.1 (30 Nov 2020 05:00), Max: 98.1 (30 Nov 2020 05:00)  HR: 70 (30 Nov 2020 10:00) (69 - 91)  BP: 122/59 (30 Nov 2020 10:00) (113/57 - 156/61)  BP(mean): 76 (30 Nov 2020 10:00) (67 - 98)  RR: 16 (30 Nov 2020 10:00) (16 - 23)  SpO2: 87% (30 Nov 2020 10:00) (85% - 96%)  I&O's Summary    29 Nov 2020 07:01  -  30 Nov 2020 07:00  --------------------------------------------------------  IN: 45 mL / OUT: 600 mL / NET: -555 mL    ________________________________  GENERAL APPEARANCE:  No acute distress  HEAD: normocephalic, atraumatic  NECK: supple, no jugular venous distention, no carotid bruit    HEART: irreg, S1, S2 normal, 2/6 regurgitant murmur    CHEST:  No anterior chest wall tenderness    LUNGS:  dec breath sounds, crackles    ABDOMEN: soft, nontender, nondistended, with positive bowel sounds appreciated  EXTREMITIES: no clubbing, cyanosis, or edema.  NEURO: Alert and oriented x3  PSYC:  Normal affect  SKIN:  Dry  ________________________________  TELEMETRY: atrial fibrillation, NSVT      ECG: atrial fibrillation, PVCs    LABS:                         11.6   13.25 )-----------( 262      ( 30 Nov 2020 06:24 )             38.1          11-30    141  |  97  |  42<H>  ----------------------------<  215<H>  4.1   |  43<H>  |  1.09    Ca    9.0      30 Nov 2020 06:24  Mg     2.4     11-29      CARDIAC MARKERS ( 30 Nov 2020 06:24 )  <0.015 ng/mL / x     / x     / x     / x      CARDIAC MARKERS ( 29 Nov 2020 06:56 )  <0.015 ng/mL / x     / x     / x     / x        ABG - ( 30 Nov 2020 05:27 )  pH, Arterial: 7.33  pH, Blood: x     /  pCO2: 79    /  pO2: 64    / HCO3: 41    / Base Excess: 12.2  /  SaO2: 90        ________________________________    RADIOLOGY & ADDITIONAL STUDIES:  IMPRESSION: There is bilateral airspace disease that may be from pulmonary edema. However infection cannot be excluded. The heart is normal in size. The bones are intact.      ________________________________    ASSESSMENT:  Shortness of breath with acute diastolic heart failure and COPD  Chronic atrial fibrillation on anticoagulation with Eliquis  CAD status post PCI to circumflex in 2013  Peripheral arterial disease  History of DVT  History of CVA  Sleep apnea and obesity  Spinal stenosis  Recent abnormal SPECT stress test-see above  NSVT - patient to complete stress as out patient    PLAN:    In summary, this is a 75 male with a past medical history as above who presented for evaluation of shortness of breath and lower extremity edema and noted to be hypoxic/hypercarbic respiratory failure.     Bipap per pulm/crit care  Continue with diuretics BID and check Cr in AM.  Continue anticoagulation.  Continue metoprolol.  Continue Imdur and Ranexa for antianginal therapy.  Continue Plavix.  Monitor hemoglobin.  Abx per primary  Dr Amador to see in AM.

## 2020-11-30 NOTE — PROGRESS NOTE ADULT - SUBJECTIVE AND OBJECTIVE BOX
Patient is a 75y old  Male who presents with a chief complaint of SOB, COPD exac, CHF (29 Nov 2020 18:12)    PAST MEDICAL & SURGICAL HISTORY:  PVD (peripheral vascular disease)    KENNY (obstructive sleep apnea)    Hyperlipidemia    Hypertension, unspecified type    Spinal stenosis    CHF (congestive heart failure)    Emphysema    COPD (chronic obstructive pulmonary disease)    No significant past surgical history      SEVEN GORDILLO   75y    Male    BRIEF HOSPITAL COURSE:    Review of Systems: anxious does not want to use BIPAP                       All other ROS are negative.    Allergies    No Known Allergies    Intolerances          ICU Vital Signs Last 24 Hrs  T(C): 36.6 (29 Nov 2020 21:00), Max: 36.7 (29 Nov 2020 17:00)  T(F): 97.9 (29 Nov 2020 21:00), Max: 98 (29 Nov 2020 17:00)  HR: 83 (30 Nov 2020 01:40) (71 - 90)  BP: 133/64 (30 Nov 2020 00:00) (113/93 - 156/61)  BP(mean): 79 (30 Nov 2020 00:00) (67 - 105)  ABP: --  ABP(mean): --  RR: 17 (30 Nov 2020 00:00) (15 - 23)  SpO2: 91% (30 Nov 2020 01:40) (89% - 99%)    Physical Examination:    General: anxious    HEENT:  no JVD    PULM: bilateral BS no wheeze    CVS: s1 s2 irreg    ABD: obese soft NT     EXT: + edema     SKIN: warm    Neuro: alert awake     ABG - ( 29 Nov 2020 05:45 )  pH, Arterial: 7.30  pH, Blood: x     /  pCO2: 81    /  pO2: 156   / HCO3: 39    / Base Excess: 9.8   /  SaO2: 99                    LABS:                        11.6   8.64  )-----------( 296      ( 29 Nov 2020 06:56 )             38.6     11-29    140  |  98  |  25<H>  ----------------------------<  168<H>  4.5   |  42<H>  |  1.02    Ca    8.9      29 Nov 2020 06:56  Mg     2.4     11-29    TPro  7.3  /  Alb  3.4  /  TBili  0.4  /  DBili  x   /  AST  12<L>  /  ALT  17  /  AlkPhos  113  11-28      CARDIAC MARKERS ( 29 Nov 2020 06:56 )  <0.015 ng/mL / x     / x     / x     / x      CARDIAC MARKERS ( 28 Nov 2020 08:51 )  <0.015 ng/mL / x     / 54 U/L / x     / x          CAPILLARY BLOOD GLUCOSE        PT/INR - ( 28 Nov 2020 08:51 )   PT: 15.3 sec;   INR: 1.33 ratio         PTT - ( 28 Nov 2020 08:51 )  PTT:39.3 sec    CULTURES:  Culture Results:   No growth to date. (11-28 @ 09:37)  Rapid RVP Result: NotDetec (11-28 @ 09:18)  Culture Results:   No growth to date. (11-28 @ 08:51)      Medications:  MEDICATIONS  (STANDING):  ALBUTerol    90 MICROgram(s) HFA Inhaler 2 Puff(s) Inhalation every 6 hours  amLODIPine   Tablet 5 milliGRAM(s) Oral daily  apixaban 5 milliGRAM(s) Oral every 12 hours  atorvastatin 20 milliGRAM(s) Oral at bedtime  cefTRIAXone Injectable. 1000 milliGRAM(s) IV Push every 24 hours  chlorhexidine 4% Liquid 1 Application(s) Topical <User Schedule>  clopidogrel Tablet 75 milliGRAM(s) Oral daily  digoxin     Tablet 0.125 milliGRAM(s) Oral daily  finasteride 5 milliGRAM(s) Oral daily  furosemide   Injectable 20 milliGRAM(s) IV Push two times a day  isosorbide   mononitrate ER Tablet (IMDUR) 60 milliGRAM(s) Oral daily  methylPREDNISolone sodium succinate Injectable 40 milliGRAM(s) IV Push every 8 hours  metoprolol succinate ER 50 milliGRAM(s) Oral daily  multivitamin 1 Tablet(s) Oral daily  pregabalin 75 milliGRAM(s) Oral daily  ranolazine 500 milliGRAM(s) Oral two times a day  tamsulosin 0.4 milliGRAM(s) Oral at bedtime  tiotropium 18 MICROgram(s) Capsule 1 Capsule(s) Inhalation daily    MEDICATIONS  (PRN):  ALBUTerol    90 MICROgram(s) HFA Inhaler 2 Puff(s) Inhalation every 6 hours PRN Shortness of Breath  ALPRAZolam 0.25 milliGRAM(s) Oral every 8 hours PRN anxiety  traMADol 50 milliGRAM(s) Oral every 6 hours PRN Severe Pain (7 - 10)        11-28 @ 07:01  -  11-29 @ 07:00  --------------------------------------------------------  IN: 16 mL / OUT: 550 mL / NET: -534 mL        RADIOLOGY/IMAGING/ECHO      < from: Xray Chest 1 View- PORTABLE-Routine (Xray Chest 1 View- PORTABLE-Routine .) (11.29.20 @ 09:11) >      IMPRESSION:  Continued pulmonary vascular redistribution/congestion.    Small bilateral pleural effusions with likely associated passive atelectasis again seen.          Assessment/Plan:    75Mhx obesity HFpEF, o2 dependant COPD on 4L Home Oxygen, HTN, HLD, CAD s/p stent in LCx, Spinal Stenosis, Obesity, KENNY, and PAD, AF on eliquis      P/W SOB hypoxemia  11/28  Type 1 and  2 resp failure   AECOPD/DHF  managed with NIPPV    Narrowly avoided intubation    Diuretics given   godd response   BD therapy   Abx ctx  11/29  Fulll ac with apixaban     Pooly compliant with BIPAP    Transition to NC o2

## 2020-11-30 NOTE — PROGRESS NOTE ADULT - SUBJECTIVE AND OBJECTIVE BOX
[facility]  ICU # 2    CC:  Respiratory Failure     HPI:    74 y/o male pt of size, Oxygen-Dependant COPD on 4L Home Oxygen, HTN, HLD, CAD s/p stent in LCx, Spinal Stenosis, KENNY on home NIV, PAD, and chronic AFib on apixaban admitted to ICU with likely acute on chronic decompensated HFpEF and AECOPD.  Placed on NIV.  COVID Negative     11/30:  Caes d/w DR Em.  Pt seen and examined in ICU--On nasal BIPAP--very anxious on Px gtt.  No fevers.  CXR-- Personally reviewed--PVC and B/L effusion R>L.      PMH:  As above.     PSH:  As above.     FH: Non Contributory other than those listed in HPI    Social History:  Quit smoking 17 yrs ago    MEDICATIONS  (STANDING):  ALBUTerol    90 MICROgram(s) HFA Inhaler 2 Puff(s) Inhalation every 6 hours  amLODIPine   Tablet 5 milliGRAM(s) Oral daily  apixaban 5 milliGRAM(s) Oral every 12 hours  atorvastatin 20 milliGRAM(s) Oral at bedtime  cefTRIAXone Injectable. 1000 milliGRAM(s) IV Push every 24 hours  chlorhexidine 4% Liquid 1 Application(s) Topical <User Schedule>  clopidogrel Tablet 75 milliGRAM(s) Oral daily  dexMEDEtomidine Infusion 0.4 MICROgram(s)/kG/Hr (11.6 mL/Hr) IV Continuous <Continuous>  dextrose 40% Gel 15 Gram(s) Oral once  dextrose 5%. 1000 milliLiter(s) (50 mL/Hr) IV Continuous <Continuous>  dextrose 5%. 1000 milliLiter(s) (100 mL/Hr) IV Continuous <Continuous>  dextrose 50% Injectable 25 Gram(s) IV Push once  dextrose 50% Injectable 12.5 Gram(s) IV Push once  dextrose 50% Injectable 25 Gram(s) IV Push once  digoxin     Tablet 0.125 milliGRAM(s) Oral daily  finasteride 5 milliGRAM(s) Oral daily  furosemide   Injectable 20 milliGRAM(s) IV Push two times a day  glucagon  Injectable 1 milliGRAM(s) IntraMuscular once  insulin lispro (ADMELOG) corrective regimen sliding scale   SubCutaneous three times a day before meals  insulin lispro (ADMELOG) corrective regimen sliding scale   SubCutaneous at bedtime  isosorbide   mononitrate ER Tablet (IMDUR) 60 milliGRAM(s) Oral daily  methylPREDNISolone sodium succinate Injectable 40 milliGRAM(s) IV Push every 8 hours  metoprolol succinate ER 50 milliGRAM(s) Oral daily  multivitamin 1 Tablet(s) Oral daily  pregabalin 75 milliGRAM(s) Oral daily  ranolazine 500 milliGRAM(s) Oral two times a day  tamsulosin 0.4 milliGRAM(s) Oral at bedtime  tiotropium 18 MICROgram(s) Capsule 1 Capsule(s) Inhalation daily    MEDICATIONS  (PRN):  ALBUTerol    90 MICROgram(s) HFA Inhaler 2 Puff(s) Inhalation every 6 hours PRN Shortness of Breath  ALPRAZolam 0.25 milliGRAM(s) Oral every 8 hours PRN anxiety  traMADol 50 milliGRAM(s) Oral every 6 hours PRN Severe Pain (7 - 10)      Allergies: NKDA    ROS:  SEE BELOW        ICU Vital Signs Last 24 Hrs  T(C): 36.7 (30 Nov 2020 05:00), Max: 36.7 (29 Nov 2020 17:00)  T(F): 98.1 (30 Nov 2020 05:00), Max: 98.1 (30 Nov 2020 05:00)  HR: 70 (30 Nov 2020 10:00) (69 - 91)  BP: 119/67 (30 Nov 2020 09:00) (113/57 - 156/61)  BP(mean): 80 (30 Nov 2020 09:00) (67 - 105)  ABP: --  ABP(mean): --  RR: 16 (30 Nov 2020 10:00) (16 - 23)  SpO2: 87% (30 Nov 2020 10:00) (85% - 96%)          I&O's Summary    29 Nov 2020 07:01  -  30 Nov 2020 07:00  --------------------------------------------------------  IN: 45 mL / OUT: 600 mL / NET: -555 mL        Physical Exam:  SEE BELOW                          11.6   13.25 )-----------( 262      ( 30 Nov 2020 06:24 )             38.1       11-30    141  |  97  |  42<H>  ----------------------------<  215<H>  4.1   |  43<H>  |  1.09    Ca    9.0      30 Nov 2020 06:24  Mg     2.4     11-29        CARDIAC MARKERS ( 30 Nov 2020 06:24 )  <0.015 ng/mL / x     / x     / x     / x      CARDIAC MARKERS ( 29 Nov 2020 06:56 )  <0.015 ng/mL / x     / x     / x     / x            ABG - ( 30 Nov 2020 05:27 )  pH, Arterial: 7.33  pH, Blood: x     /  pCO2: 79    /  pO2: 64    / HCO3: 41    / Base Excess: 12.2  /  SaO2: 90                      DVT Prophylaxis:                                                            Contraindication:     Advanced Directives:    Discussed with:    Visit Information:  Time spent excluding procedure:  45 cc mins    ** Time is exclusive of billed procedures and/or teaching and/or routine family updates.

## 2020-12-01 NOTE — PROGRESS NOTE ADULT - SUBJECTIVE AND OBJECTIVE BOX
The patient was seen and examined.   Slight confusion/agitation this AM  Cr stable.  No chest pain.  No shortness of breath.  No palpitations.    Initial Consult HPI  ________________________________  JENARO GORDILLO is a 75y year old Male with a past medical history of atrial fibrillation on anticoagulation with Eliquis, history of DVT, history of CVA, coronary disease status post PCI to circumflex in 2013, peripheral arterial disease, hypertension, hyperlipidemia, chronic diastolic heart failure, moderate mitral valve regurgitation, COPD on oxygen at home, sleep apnea and obesity.    The patient presented for evaluation of shortness of breath with lower extremity edema, and was admitted to the ICU for hypercarbic and hypoxic respiratory failure.  Cardiology has been consulted for management of heart failure.    Previous cardiac work-up included echocardiogram from 2 months ago showing low normal LVEF, with moderate mitral valve regurgitation, and mild pulmonary hypertension.  His SPECT stress test was abnormal, and the fact that he only underwent resting imaging showing a medium size defect involving the basal, inferior, inferior lateral and apical walls.    PREVIOUS CARDIAC WORKUP: September 2020  --There is severe left atrial dilatation (LA volume index 59 ml/m²).  --The interventricular septum is mildly hypertrophied.  --The inferolateral (posterior) wall is mild hypertrophied.  --There is no asymmetric septal hypertrophy.  --There is mild left ventricular hypertrophy.  --LV ejection fraction (52 %) is normal.  --The left ventricular filling pattern is consistent with abnormal relaxation.  --The aortic root is normal in size (3.70 cm).  --The aortic valve is mildly calcified. There is trace aortic regurgitation.  --There is mild mitral annular calcification. There is mild mitral valve thickening. There is  moderate mitral regurgitation.  --There is moderate tricuspid regurgitation.  --There is mild pulmonic regurgitation.  --The right atrial pressure is 6 - 10 mm Hg. There is mild pulmonary hypertension.    Stress September 2020 · There is a medium size defect, of moderate to severe severity, in the basal inferior, basal inferolateral, mid inferior, mid inferolateral and apical inferior wall(s),  · Rest gated analysis showed overall normal left ventricular function with LV enlargement.  · Overall impression: Abnormal.  · Summary: Left ventricular perfusion is abnormal.  · Indeterminate ECG evidence of ischemia after IV administration of regadenoson due to the presence of a conduction abnormality.  · SPECT results are abnormal. Only resting studies available. Patient did not want to stay for stress images. Left without getting stress images.  ________________________________  Review of systems: A 10 point review of system has been performed, and is negative except for what has been mentioned in the above history of present illness.    PAST MEDICAL & SURGICAL HISTORY:  PVD (peripheral vascular disease)  KENNY (obstructive sleep apnea)  Hyperlipidemia  Hypertension, unspecified type  Spinal stenosis  CHF (congestive heart failure)  Emphysema  COPD (chronic obstructive pulmonary disease)    FAMILY HISTORY:  No pertinent family history in first degree relatives    SOCIAL HISTORY: History of tobacco abuse    Home Medications:  Advair Diskus 250 mcg-50 mcg inhalation powder: 1 puff(s) inhaled 2 times a day (21 Nov 2019 15:36)  apixaban 5 mg oral tablet: 1 tab(s) orally 2 times a day (21 Nov 2019 15:36)  clopidogrel 75 mg oral tablet: 1 tab(s) orally once a day (21 Nov 2019 15:36)  Flomax 0.4 mg oral capsule: 1 cap(s) orally once a day (21 Nov 2019 15:36)  furosemide 40 mg oral tablet: 1 tab(s) orally once a day (21 Nov 2019 15:36)  Imdur 60 mg oral tablet, extended release: 1 tab(s) orally once a day (in the morning) (21 Nov 2019 15:36)  Lipitor 20 mg oral tablet: 1 tab(s) orally once a day (21 Nov 2019 15:36)  Lyrica 75 mg oral capsule: orally once a day (21 Nov 2019 15:36)  Multiple Vitamins oral tablet: 1 tab(s) orally once a day (21 Nov 2019 15:36)  Norvasc 5 mg oral tablet: 1 tab(s) orally once a day (21 Nov 2019 15:36)  potassium chloride 20 mEq oral tablet, extended release: 1 tab(s) orally 2 times a day (21 Nov 2019 15:36)  Proscar 5 mg oral tablet: 1 tab(s) orally once a day (21 Nov 2019 15:36)  Proventil 2.5 mg/3 mL (0.083%) inhalation solution: inhaled 3 times a day, As Needed (21 Nov 2019 15:36)  Ranexa 500 mg oral tablet, extended release: 1 tab(s) orally 2 times a day (21 Nov 2019 15:36)  Spiriva 18 mcg inhalation capsule: 1 cap(s) inhaled once a day (21 Nov 2019 15:36)  tiZANidine 4 mg oral tablet:  (21 Nov 2019 15:36)  traMADol 50 mg oral tablet:  (21 Nov 2019 15:36)  Vitamin B Complex:  (21 Nov 2019 15:36)      MEDICATIONS  (STANDING):  ALBUTerol    90 MICROgram(s) HFA Inhaler 2 Puff(s) Inhalation every 6 hours  amLODIPine   Tablet 5 milliGRAM(s) Oral daily  apixaban 5 milliGRAM(s) Oral every 12 hours  atorvastatin 20 milliGRAM(s) Oral at bedtime  budesonide 160 MICROgram(s)/formoterol 4.5 MICROgram(s) Inhaler 2 Puff(s) Inhalation two times a day  cefTRIAXone Injectable. 1000 milliGRAM(s) IV Push every 24 hours  chlorhexidine 4% Liquid 1 Application(s) Topical <User Schedule>  clopidogrel Tablet 75 milliGRAM(s) Oral daily  dextrose 40% Gel 15 Gram(s) Oral once  dextrose 5%. 1000 milliLiter(s) (50 mL/Hr) IV Continuous <Continuous>  dextrose 5%. 1000 milliLiter(s) (100 mL/Hr) IV Continuous <Continuous>  dextrose 50% Injectable 25 Gram(s) IV Push once  dextrose 50% Injectable 12.5 Gram(s) IV Push once  dextrose 50% Injectable 25 Gram(s) IV Push once  digoxin     Tablet 0.125 milliGRAM(s) Oral daily  finasteride 5 milliGRAM(s) Oral daily  furosemide   Injectable 20 milliGRAM(s) IV Push two times a day  glucagon  Injectable 1 milliGRAM(s) IntraMuscular once  insulin glargine Injectable (LANTUS) 5 Unit(s) SubCutaneous two times a day  insulin lispro (ADMELOG) corrective regimen sliding scale   SubCutaneous three times a day before meals  insulin lispro (ADMELOG) corrective regimen sliding scale   SubCutaneous at bedtime  isosorbide   mononitrate ER Tablet (IMDUR) 60 milliGRAM(s) Oral daily  methylPREDNISolone sodium succinate Injectable 20 milliGRAM(s) IV Push every 8 hours  metoprolol succinate ER 50 milliGRAM(s) Oral daily  multivitamin 1 Tablet(s) Oral daily  pregabalin 75 milliGRAM(s) Oral daily  QUEtiapine 25 milliGRAM(s) Oral at bedtime  ranolazine 500 milliGRAM(s) Oral two times a day  tamsulosin 0.4 milliGRAM(s) Oral at bedtime  tiotropium 18 MICROgram(s) Capsule 1 Capsule(s) Inhalation daily  tiotropium 18 MICROgram(s) Capsule 1 Capsule(s) Inhalation daily    MEDICATIONS  (PRN):  ALBUTerol    90 MICROgram(s) HFA Inhaler 2 Puff(s) Inhalation every 6 hours PRN Shortness of Breath  ALPRAZolam 0.25 milliGRAM(s) Oral every 8 hours PRN anxiety  traMADol 50 milliGRAM(s) Oral every 6 hours PRN Severe Pain (7 - 10)      Vital Signs Last 24 Hrs  T(C): 37.2 (01 Dec 2020 10:00), Max: 37.2 (01 Dec 2020 10:00)  T(F): 98.9 (01 Dec 2020 10:00), Max: 98.9 (01 Dec 2020 10:00)  HR: 85 (01 Dec 2020 10:00) (62 - 91)  BP: 136/60 (01 Dec 2020 10:00) (102/50 - 160/68)  BP(mean): 78 (01 Dec 2020 10:00) (64 - 93)  RR: 20 (01 Dec 2020 10:00) (12 - 31)  SpO2: 94% (01 Dec 2020 10:00) (84% - 95%)  I&O's Summary    30 Nov 2020 07:01  -  01 Dec 2020 07:00  --------------------------------------------------------  IN: 530.5 mL / OUT: 2275 mL / NET: -1744.5 mL        ________________________________  GENERAL APPEARANCE:  No acute distress  HEAD: normocephalic, atraumatic  NECK: supple, no jugular venous distention, no carotid bruit    HEART: irreg, S1, S2 normal, 2/6 regurgitant murmur    CHEST:  No anterior chest wall tenderness    LUNGS:  dec breath sounds, crackles    ABDOMEN: soft, nontender, nondistended, with positive bowel sounds appreciated  EXTREMITIES: no clubbing, cyanosis, or edema.  NEURO: Alert and oriented x3  PSYC:  Normal affect  SKIN:  Dry  ________________________________  TELEMETRY: atrial fibrillation, NSVT      ECG: atrial fibrillation, PVCs    LABS:                         11.6   13.25 )-----------( 262      ( 30 Nov 2020 06:24 )             38.1          11-30    141  |  97  |  42<H>  ----------------------------<  215<H>  4.1   |  43<H>  |  1.09    Ca    9.0      30 Nov 2020 06:24  Mg     2.4     11-29      CARDIAC MARKERS ( 30 Nov 2020 06:24 )  <0.015 ng/mL / x     / x     / x     / x      CARDIAC MARKERS ( 29 Nov 2020 06:56 )  <0.015 ng/mL / x     / x     / x     / x        ABG - ( 30 Nov 2020 05:27 )  pH, Arterial: 7.33  pH, Blood: x     /  pCO2: 79    /  pO2: 64    / HCO3: 41    / Base Excess: 12.2  /  SaO2: 90        ________________________________    RADIOLOGY & ADDITIONAL STUDIES:  IMPRESSION: There is bilateral airspace disease that may be from pulmonary edema. However infection cannot be excluded. The heart is normal in size. The bones are intact.      ________________________________    ASSESSMENT:  Shortness of breath with acute diastolic heart failure and COPD  Chronic atrial fibrillation on anticoagulation with Eliquis  CAD status post PCI to circumflex in 2013  Peripheral arterial disease  History of DVT  History of CVA  Sleep apnea and obesity  Spinal stenosis  Recent abnormal SPECT stress test-see above  Monomorphic NSVT - patient to complete stress as out patient    PLAN:    In summary, this is a 75 male with a past medical history as above who presented for evaluation of shortness of breath and lower extremity edema and noted to be hypoxic/hypercarbic respiratory failure.     Continue with IV diuretics and repeat chest x-ray in the morning.  Monitor creatinine which has been stable thus far.  ABG noted.  Agitation management per critical care.  Started on Seroquel-QTc normal.  Steroids per pulmonary/critical care.  Continue anticoagulation.  Continue metoprolol.  Continue Imdur and Ranexa for antianginal therapy.  Continue Plavix.  Monitor hemoglobin.  Abx per primary  Dr Amador to see in AM.

## 2020-12-01 NOTE — PROGRESS NOTE ADULT - SUBJECTIVE AND OBJECTIVE BOX
[facility]  ICU # 3    CC:  Respiratory Failure     HPI:    74 y/o male pt of size, Oxygen-Dependant COPD on 4L Home Oxygen, HTN, HLD, CAD s/p stent in LCx, Spinal Stenosis, KENNY on home NIV, PAD, and chronic AFib on apixaban admitted to ICU with likely acute on chronic decompensated HFpEF and AECOPD.  Placed on NIV.  COVID Negative     11/30:  Caes d/w DR Em.  Pt seen and examined in ICU--On nasal BIPAP--very anxious on Px gtt.  No fevers.  CXR-- Personally reviewed--PVC and B/L effusion R>L.   12/1:  Agitated overnight.  Did not wear BIPAP.  Confused this morning.  On NC O2 sat 88-90%.  On Px gtt.      PMH:  As above.     PSH:  As above.     FH: Non Contributory other than those listed in HPI    Social History:  Unobtainable due to clinical condition     MEDICATIONS  (STANDING):  ALBUTerol    90 MICROgram(s) HFA Inhaler 2 Puff(s) Inhalation every 6 hours  amLODIPine   Tablet 5 milliGRAM(s) Oral daily  apixaban 5 milliGRAM(s) Oral every 12 hours  atorvastatin 20 milliGRAM(s) Oral at bedtime  budesonide 160 MICROgram(s)/formoterol 4.5 MICROgram(s) Inhaler 2 Puff(s) Inhalation two times a day  cefTRIAXone Injectable. 1000 milliGRAM(s) IV Push every 24 hours  chlorhexidine 4% Liquid 1 Application(s) Topical <User Schedule>  clopidogrel Tablet 75 milliGRAM(s) Oral daily  dextrose 40% Gel 15 Gram(s) Oral once  dextrose 5%. 1000 milliLiter(s) (50 mL/Hr) IV Continuous <Continuous>  dextrose 5%. 1000 milliLiter(s) (100 mL/Hr) IV Continuous <Continuous>  dextrose 50% Injectable 25 Gram(s) IV Push once  dextrose 50% Injectable 12.5 Gram(s) IV Push once  dextrose 50% Injectable 25 Gram(s) IV Push once  digoxin     Tablet 0.125 milliGRAM(s) Oral daily  finasteride 5 milliGRAM(s) Oral daily  furosemide   Injectable 20 milliGRAM(s) IV Push two times a day  glucagon  Injectable 1 milliGRAM(s) IntraMuscular once  insulin glargine Injectable (LANTUS) 5 Unit(s) SubCutaneous two times a day  insulin lispro (ADMELOG) corrective regimen sliding scale   SubCutaneous three times a day before meals  insulin lispro (ADMELOG) corrective regimen sliding scale   SubCutaneous at bedtime  isosorbide   mononitrate ER Tablet (IMDUR) 60 milliGRAM(s) Oral daily  methylPREDNISolone sodium succinate Injectable 20 milliGRAM(s) IV Push every 8 hours  metoprolol succinate ER 50 milliGRAM(s) Oral daily  multivitamin 1 Tablet(s) Oral daily  pregabalin 75 milliGRAM(s) Oral daily  ranolazine 500 milliGRAM(s) Oral two times a day  tamsulosin 0.4 milliGRAM(s) Oral at bedtime  tiotropium 18 MICROgram(s) Capsule 1 Capsule(s) Inhalation daily  tiotropium 18 MICROgram(s) Capsule 1 Capsule(s) Inhalation daily    MEDICATIONS  (PRN):  ALBUTerol    90 MICROgram(s) HFA Inhaler 2 Puff(s) Inhalation every 6 hours PRN Shortness of Breath  ALPRAZolam 0.25 milliGRAM(s) Oral every 8 hours PRN anxiety  traMADol 50 milliGRAM(s) Oral every 6 hours PRN Severe Pain (7 - 10)      Allergies: NKDA    ROS:  SEE BELOW        ICU Vital Signs Last 24 Hrs  T(C): 36.6 (01 Dec 2020 04:00), Max: 36.7 (01 Dec 2020 00:00)  T(F): 97.9 (01 Dec 2020 04:00), Max: 98.1 (01 Dec 2020 00:00)  HR: 73 (01 Dec 2020 09:00) (62 - 91)  BP: 128/60 (01 Dec 2020 09:00) (102/50 - 160/68)  BP(mean): 77 (01 Dec 2020 09:00) (64 - 93)  ABP: --  ABP(mean): --  RR: 17 (01 Dec 2020 09:00) (12 - 31)  SpO2: 90% (01 Dec 2020 09:00) (84% - 95%)          I&O's Summary    30 Nov 2020 07:01  -  01 Dec 2020 07:00  --------------------------------------------------------  IN: 530.5 mL / OUT: 2275 mL / NET: -1744.5 mL        Physical Exam:  SEE BELOW                          12.6   14.81 )-----------( 307      ( 01 Dec 2020 07:26 )             42.6       12-01    145  |  99  |  45<H>  ----------------------------<  204<H>  4.4   |  42<H>  |  1.07    Ca    9.4      01 Dec 2020 07:26  Phos  4.4     12-01  Mg     2.8     12-01        CARDIAC MARKERS ( 30 Nov 2020 06:24 )  <0.015 ng/mL / x     / x     / x     / x            ABG - ( 01 Dec 2020 05:36 )  pH, Arterial: 7.36  pH, Blood: x     /  pCO2: 77    /  pO2: 64    / HCO3: 43    / Base Excess: 14.3  /  SaO2: 90                      DVT Prophylaxis:                                                            Contraindication:     Advanced Directives:    Discussed with:    Visit Information:  Time spent excluding procedure:      ** Time is exclusive of billed procedures and/or teaching and/or routine family updates.

## 2020-12-01 NOTE — PROGRESS NOTE ADULT - ASSESSMENT
-  hypercarbic and hypoxemic respiratory failure  on BiPAP using currently nasal cannula with overall compensated pH with component of metabolic alkalosis  -  CHF with pulmonary edema pattern with pleural effusions with history of diastolic dysfunction rule out new ischemic event  -  pleural effusions mild likely secondary to CHF  -   sleep apnea  never use of the CPAP in the past.  -    severe baseline obstructive airway disease O2 dependent  -    known patient with coronary artery disease with history of stent placement  -    AFib on anticoagulation..  -    other issues include peripheral vascular disease suspected cellulitis on IV antibiotics    PLAN     -   continue with the BiPAP as tolerated with anxiolytics and close monitoring of the blood gas   -    would recommend  decrease the Solu-Medrol to twice daily for the COPD  especially with increased confusion with no cross wheezing  -    diuresis while monitoring the renal function with CHF and pleural effusions  -    continue with Symbicort during the hospital stay along with Spiriva daily for COPD with symptoms of exacerbation worsen by CHF  -    continue with  Rocephin with stage IV COPD  and for cellulitis of the legs  -    monitor for bleeding  with steroids and anticoagulation.

## 2020-12-01 NOTE — PROGRESS NOTE ADULT - SUBJECTIVE AND OBJECTIVE BOX
SUBJECTIVE       PAST MEDICAL & SURGICAL HISTORY:  PVD (peripheral vascular disease)    KENNY (obstructive sleep apnea)    Hyperlipidemia    Hypertension, unspecified type    Spinal stenosis    CHF (congestive heart failure)    Emphysema    COPD (chronic obstructive pulmonary disease)    No significant past surgical history      OBJECTIVE   Vital Signs Last 24 Hrs  T(C): 36.6 (01 Dec 2020 04:00), Max: 36.7 (01 Dec 2020 00:00)  T(F): 97.9 (01 Dec 2020 04:00), Max: 98.1 (01 Dec 2020 00:00)  HR: 75 (01 Dec 2020 08:00) (62 - 91)  BP: 142/72 (01 Dec 2020 08:00) (102/50 - 160/68)  BP(mean): 90 (01 Dec 2020 08:00) (64 - 93)  RR: 20 (01 Dec 2020 08:00) (12 - 31)  SpO2: 90% (01 Dec 2020 08:00) (84% - 95%)    Review of systems   as dictated in the history of present illness with the review of other systems non contributory     PHYSICAL EXAM:  Constitutional: , awake and alert, not in distress.  HEENT: Normo cephalic atraumatic  Neck: Soft and supple, No J.V.D   Respiratory: vesicular breathing , No wheezing, rales or rhonchi.   Cardiovascular: S1 and S2, regular rate .   Gastrointestinal:  soft, nontender,   Extremities: No  edema or calf tenderness .  Neurological: No new  focal deficits.    MEDICATIONS  (STANDING):  ALBUTerol    90 MICROgram(s) HFA Inhaler 2 Puff(s) Inhalation every 6 hours  amLODIPine   Tablet 5 milliGRAM(s) Oral daily  apixaban 5 milliGRAM(s) Oral every 12 hours  atorvastatin 20 milliGRAM(s) Oral at bedtime  cefTRIAXone Injectable. 1000 milliGRAM(s) IV Push every 24 hours  chlorhexidine 4% Liquid 1 Application(s) Topical <User Schedule>  clopidogrel Tablet 75 milliGRAM(s) Oral daily  dextrose 40% Gel 15 Gram(s) Oral once  dextrose 5%. 1000 milliLiter(s) (50 mL/Hr) IV Continuous <Continuous>  dextrose 5%. 1000 milliLiter(s) (100 mL/Hr) IV Continuous <Continuous>  dextrose 50% Injectable 25 Gram(s) IV Push once  dextrose 50% Injectable 12.5 Gram(s) IV Push once  dextrose 50% Injectable 25 Gram(s) IV Push once  digoxin     Tablet 0.125 milliGRAM(s) Oral daily  finasteride 5 milliGRAM(s) Oral daily  furosemide   Injectable 20 milliGRAM(s) IV Push two times a day  glucagon  Injectable 1 milliGRAM(s) IntraMuscular once  insulin glargine Injectable (LANTUS) 5 Unit(s) SubCutaneous two times a day  insulin lispro (ADMELOG) corrective regimen sliding scale   SubCutaneous three times a day before meals  insulin lispro (ADMELOG) corrective regimen sliding scale   SubCutaneous at bedtime  isosorbide   mononitrate ER Tablet (IMDUR) 60 milliGRAM(s) Oral daily  methylPREDNISolone sodium succinate Injectable 40 milliGRAM(s) IV Push every 8 hours  metoprolol succinate ER 50 milliGRAM(s) Oral daily  multivitamin 1 Tablet(s) Oral daily  pregabalin 75 milliGRAM(s) Oral daily  ranolazine 500 milliGRAM(s) Oral two times a day  tamsulosin 0.4 milliGRAM(s) Oral at bedtime  tiotropium 18 MICROgram(s) Capsule 1 Capsule(s) Inhalation daily                            12.6   14.81 )-----------( 307      ( 01 Dec 2020 07:26 )             42.6     12-01    145  |  99  |  45<H>  ----------------------------<  204<H>  4.4   |  42<H>  |  1.07    Ca    9.4      01 Dec 2020 07:26  Phos  4.4     12-01  Mg     2.8     12-01        Culture - Blood (collected 28 Nov 2020 09:37)  Source: .Blood None  Preliminary Report (29 Nov 2020 17:01):    No growth to date.    Culture - Blood (collected 28 Nov 2020 08:51)  Source: .Blood Blood-Peripheral  Preliminary Report (29 Nov 2020 14:01):    No growth to date.       ABG - ( 01 Dec 2020 05:36 )  pH, Arterial: 7.36  pH, Blood: x     /  pCO2: 77    /  pO2: 64    / HCO3: 43    / Base Excess: 14.3  /  SaO2: 90                             SUBJECTIVE      patient seen in the morning appears confused off the BiPAP on the nasal cannula with sat of 88% on 4 L   says thirsty no gross wheezing noted   leg swelling seems to be decreased with IV diuretics   no fever spikes      PAST MEDICAL & SURGICAL HISTORY:  PVD (peripheral vascular disease)    KENNY (obstructive sleep apnea)    Hyperlipidemia    Hypertension, unspecified type    Spinal stenosis    CHF (congestive heart failure)    Emphysema    COPD (chronic obstructive pulmonary disease)    No significant past surgical history      OBJECTIVE   Vital Signs Last 24 Hrs  T(C): 36.6 (01 Dec 2020 04:00), Max: 36.7 (01 Dec 2020 00:00)  T(F): 97.9 (01 Dec 2020 04:00), Max: 98.1 (01 Dec 2020 00:00)  HR: 75 (01 Dec 2020 08:00) (62 - 91)  BP: 142/72 (01 Dec 2020 08:00) (102/50 - 160/68)  BP(mean): 90 (01 Dec 2020 08:00) (64 - 93)  RR: 20 (01 Dec 2020 08:00) (12 - 31)  SpO2: 90% (01 Dec 2020 08:00) (84% - 95%)    Review of systems   as dictated in the history of present illness with the review of other systems non contributory  and patient confused    PHYSICAL EXAM:  Constitutional: , awake and alert,  confused on nasal cannula  HEENT: Normo cephalic atraumatic  Neck: Soft and supple, No J.V.D   Respiratory: vesicular breathing ,  distant breath sounds and reduced breath sounds at the bases  Cardiovascular: S1 and S2, iregular rate .   Gastrointestinal:  soft, nontender,   Extremities:  decreasing edema of the lower legs  with no calf tenderness  Neurological: No new  focal deficits.    MEDICATIONS  (STANDING):  ALBUTerol    90 MICROgram(s) HFA Inhaler 2 Puff(s) Inhalation every 6 hours  amLODIPine   Tablet 5 milliGRAM(s) Oral daily  apixaban 5 milliGRAM(s) Oral every 12 hours  atorvastatin 20 milliGRAM(s) Oral at bedtime  cefTRIAXone Injectable. 1000 milliGRAM(s) IV Push every 24 hours  chlorhexidine 4% Liquid 1 Application(s) Topical <User Schedule>  clopidogrel Tablet 75 milliGRAM(s) Oral daily  dextrose 40% Gel 15 Gram(s) Oral once  dextrose 5%. 1000 milliLiter(s) (50 mL/Hr) IV Continuous <Continuous>  dextrose 5%. 1000 milliLiter(s) (100 mL/Hr) IV Continuous <Continuous>  dextrose 50% Injectable 25 Gram(s) IV Push once  dextrose 50% Injectable 12.5 Gram(s) IV Push once  dextrose 50% Injectable 25 Gram(s) IV Push once  digoxin     Tablet 0.125 milliGRAM(s) Oral daily  finasteride 5 milliGRAM(s) Oral daily  furosemide   Injectable 20 milliGRAM(s) IV Push two times a day  glucagon  Injectable 1 milliGRAM(s) IntraMuscular once  insulin glargine Injectable (LANTUS) 5 Unit(s) SubCutaneous two times a day  insulin lispro (ADMELOG) corrective regimen sliding scale   SubCutaneous three times a day before meals  insulin lispro (ADMELOG) corrective regimen sliding scale   SubCutaneous at bedtime  isosorbide   mononitrate ER Tablet (IMDUR) 60 milliGRAM(s) Oral daily  methylPREDNISolone sodium succinate Injectable 40 milliGRAM(s) IV Push every 8 hours  metoprolol succinate ER 50 milliGRAM(s) Oral daily  multivitamin 1 Tablet(s) Oral daily  pregabalin 75 milliGRAM(s) Oral daily  ranolazine 500 milliGRAM(s) Oral two times a day  tamsulosin 0.4 milliGRAM(s) Oral at bedtime  tiotropium 18 MICROgram(s) Capsule 1 Capsule(s) Inhalation daily                            12.6   14.81 )-----------( 307      ( 01 Dec 2020 07:26 )             42.6     12-01    145  |  99  |  45<H>  ----------------------------<  204<H>  4.4   |  42<H>  |  1.07    Ca    9.4      01 Dec 2020 07:26  Phos  4.4     12-01  Mg     2.8     12-01        Culture - Blood (collected 28 Nov 2020 09:37)  Source: .Blood None  Preliminary Report (29 Nov 2020 17:01):    No growth to date.    Culture - Blood (collected 28 Nov 2020 08:51)  Source: .Blood Blood-Peripheral  Preliminary Report (29 Nov 2020 14:01):    No growth to date.       ABG - ( 01 Dec 2020 05:36 )  pH, Arterial: 7.36  pH, Blood: x     /  pCO2: 77    /  pO2: 64    / HCO3: 43    / Base Excess: 14.3  /  SaO2: 90

## 2020-12-01 NOTE — PROGRESS NOTE ADULT - ASSESSMENT
IMP:    74 y/o male pt of size, Oxygen-Dependant COPD on 4L Home Oxygen, HTN, HLD, CAD s/p stent in LCx, Spinal Stenosis, KENNY on home NIV, PAD, and chronic AFib on apixaban admitted to ICU with likely acute on chronic decompensated HFpEF and AECOPD.   Acute on chronic type 1 and 2 resp failure  L leg cellulitis   Hyperglycemia   Acute delirium     High risk for acute decompensation and deterioration including death and intubation    Plan:    DC Px gtt  NC during the day  CO to prevent self harm  Add low dose seroquel tonight--25.  QTc 430s  Decrease solu to 20 q 8  Add ICS/LABA   Diuresis  CTX (3)  Start FS with insulin coverage--keep FS < 180.  Added lantus 5 bid  DOAC   HOB > 30  PO  DVT prophy--DOAC    ICU care-- d/w ICU staff on multi disciplinary rounds and Dr Jackman at bedside

## 2020-12-01 NOTE — PHARMACOTHERAPY INTERVENTION NOTE - COMMENTS
Pt at home on advair and tiotropium inhaler. Recommend to resume symbicort as advair is non-formulary

## 2020-12-02 NOTE — PHYSICAL THERAPY INITIAL EVALUATION ADULT - ACTIVE RANGE OF MOTION EXAMINATION, REHAB EVAL
B hip/knee flex ltd in supine, B DF ltd, B KE WFL/bilateral upper extremity Active ROM was WFL (within functional limits)

## 2020-12-02 NOTE — PROGRESS NOTE ADULT - ASSESSMENT
IMP:    76 y/o male pt of size, Oxygen-Dependant COPD on 4L Home Oxygen, HTN, HLD, CAD s/p stent in LCx, Spinal Stenosis, KENNY on home NIV, PAD, and chronic AFib on apixaban admitted to ICU with likely acute on chronic decompensated HFpEF than AECOPD.   Acute on chronic type 1 and 2 resp failure  L leg cellulitis   Hyperglycemia--better  Acute delirium--better     Still at risk for acute decompensation and deterioration     Plan:    NC during the day--BASELINE O2 sat is around 86-68%  NIV if he agrees  CO to prevent self harm  Cont low dose seroquel tonight--25.  QTc 430s  Cont solu to 20 q 8  Added ICS/LABA   Diuresis--change to PO furosemide 40 daily starting today  CTX (4)  Start FS with insulin coverage--keep FS < 180.  Added lantus 5 bid--FS better controlled   DOAC   HOB > 30  PO  DVT prophy--DOAC    Tx to continuos pulse Ox floor -- d/w ICU staff on multi disciplinary rounds.  Pt signed out and care tx to hospitalist service at 0940--d/w Dr Vieyra via phone

## 2020-12-02 NOTE — PROGRESS NOTE ADULT - SUBJECTIVE AND OBJECTIVE BOX
The patient was seen and examined.   +Agitation   Cr increased  No chest pain.     No palpitations.    Initial Consult HPI  ________________________________  JENARO GORDILLO is a 75y year old Male with a past medical history of atrial fibrillation on anticoagulation with Eliquis, history of DVT, history of CVA, coronary disease status post PCI to circumflex in 2013, peripheral arterial disease, hypertension, hyperlipidemia, chronic diastolic heart failure, moderate mitral valve regurgitation, COPD on oxygen at home, sleep apnea and obesity.    The patient presented for evaluation of shortness of breath with lower extremity edema, and was admitted to the ICU for hypercarbic and hypoxic respiratory failure.  Cardiology has been consulted for management of heart failure.    Previous cardiac work-up included echocardiogram from 2 months ago showing low normal LVEF, with moderate mitral valve regurgitation, and mild pulmonary hypertension.  His SPECT stress test was abnormal, and the fact that he only underwent resting imaging showing a medium size defect involving the basal, inferior, inferior lateral and apical walls.    PREVIOUS CARDIAC WORKUP: September 2020  --There is severe left atrial dilatation (LA volume index 59 ml/m²).  --The interventricular septum is mildly hypertrophied.  --The inferolateral (posterior) wall is mild hypertrophied.  --There is no asymmetric septal hypertrophy.  --There is mild left ventricular hypertrophy.  --LV ejection fraction (52 %) is normal.  --The left ventricular filling pattern is consistent with abnormal relaxation.  --The aortic root is normal in size (3.70 cm).  --The aortic valve is mildly calcified. There is trace aortic regurgitation.  --There is mild mitral annular calcification. There is mild mitral valve thickening. There is  moderate mitral regurgitation.  --There is moderate tricuspid regurgitation.  --There is mild pulmonic regurgitation.  --The right atrial pressure is 6 - 10 mm Hg. There is mild pulmonary hypertension.    Stress September 2020 · There is a medium size defect, of moderate to severe severity, in the basal inferior, basal inferolateral, mid inferior, mid inferolateral and apical inferior wall(s),  · Rest gated analysis showed overall normal left ventricular function with LV enlargement.  · Overall impression: Abnormal.  · Summary: Left ventricular perfusion is abnormal.  · Indeterminate ECG evidence of ischemia after IV administration of regadenoson due to the presence of a conduction abnormality.  · SPECT results are abnormal. Only resting studies available. Patient did not want to stay for stress images. Left without getting stress images.  ________________________________  Review of systems: A 10 point review of system has been performed, and is negative except for what has been mentioned in the above history of present illness.    PAST MEDICAL & SURGICAL HISTORY:  PVD (peripheral vascular disease)  KENNY (obstructive sleep apnea)  Hyperlipidemia  Hypertension, unspecified type  Spinal stenosis  CHF (congestive heart failure)  Emphysema  COPD (chronic obstructive pulmonary disease)    FAMILY HISTORY:  No pertinent family history in first degree relatives    SOCIAL HISTORY: History of tobacco abuse    Home Medications:  Advair Diskus 250 mcg-50 mcg inhalation powder: 1 puff(s) inhaled 2 times a day (21 Nov 2019 15:36)  apixaban 5 mg oral tablet: 1 tab(s) orally 2 times a day (21 Nov 2019 15:36)  clopidogrel 75 mg oral tablet: 1 tab(s) orally once a day (21 Nov 2019 15:36)  Flomax 0.4 mg oral capsule: 1 cap(s) orally once a day (21 Nov 2019 15:36)  furosemide 40 mg oral tablet: 1 tab(s) orally once a day (21 Nov 2019 15:36)  Imdur 60 mg oral tablet, extended release: 1 tab(s) orally once a day (in the morning) (21 Nov 2019 15:36)  Lipitor 20 mg oral tablet: 1 tab(s) orally once a day (21 Nov 2019 15:36)  Lyrica 75 mg oral capsule: orally once a day (21 Nov 2019 15:36)  Multiple Vitamins oral tablet: 1 tab(s) orally once a day (21 Nov 2019 15:36)  Norvasc 5 mg oral tablet: 1 tab(s) orally once a day (21 Nov 2019 15:36)  potassium chloride 20 mEq oral tablet, extended release: 1 tab(s) orally 2 times a day (21 Nov 2019 15:36)  Proscar 5 mg oral tablet: 1 tab(s) orally once a day (21 Nov 2019 15:36)  Proventil 2.5 mg/3 mL (0.083%) inhalation solution: inhaled 3 times a day, As Needed (21 Nov 2019 15:36)  Ranexa 500 mg oral tablet, extended release: 1 tab(s) orally 2 times a day (21 Nov 2019 15:36)  Spiriva 18 mcg inhalation capsule: 1 cap(s) inhaled once a day (21 Nov 2019 15:36)  tiZANidine 4 mg oral tablet:  (21 Nov 2019 15:36)  traMADol 50 mg oral tablet:  (21 Nov 2019 15:36)  Vitamin B Complex:  (21 Nov 2019 15:36)       MEDICATIONS  (STANDING):  ALBUTerol    90 MICROgram(s) HFA Inhaler 2 Puff(s) Inhalation every 6 hours  amLODIPine   Tablet 5 milliGRAM(s) Oral daily  apixaban 5 milliGRAM(s) Oral every 12 hours  atorvastatin 20 milliGRAM(s) Oral at bedtime  budesonide 160 MICROgram(s)/formoterol 4.5 MICROgram(s) Inhaler 2 Puff(s) Inhalation two times a day  cefTRIAXone Injectable. 1000 milliGRAM(s) IV Push every 24 hours  chlorhexidine 4% Liquid 1 Application(s) Topical <User Schedule>  clopidogrel Tablet 75 milliGRAM(s) Oral daily  dextrose 40% Gel 15 Gram(s) Oral once  dextrose 5%. 1000 milliLiter(s) (50 mL/Hr) IV Continuous <Continuous>  dextrose 5%. 1000 milliLiter(s) (100 mL/Hr) IV Continuous <Continuous>  dextrose 50% Injectable 25 Gram(s) IV Push once  dextrose 50% Injectable 12.5 Gram(s) IV Push once  dextrose 50% Injectable 25 Gram(s) IV Push once  digoxin     Tablet 0.125 milliGRAM(s) Oral daily  finasteride 5 milliGRAM(s) Oral daily  glucagon  Injectable 1 milliGRAM(s) IntraMuscular once  insulin glargine Injectable (LANTUS) 5 Unit(s) SubCutaneous two times a day  insulin lispro (ADMELOG) corrective regimen sliding scale   SubCutaneous three times a day before meals  insulin lispro (ADMELOG) corrective regimen sliding scale   SubCutaneous at bedtime  isosorbide   mononitrate ER Tablet (IMDUR) 60 milliGRAM(s) Oral daily  methylPREDNISolone sodium succinate Injectable 20 milliGRAM(s) IV Push every 8 hours  metoprolol succinate ER 50 milliGRAM(s) Oral daily  multivitamin 1 Tablet(s) Oral daily  pregabalin 75 milliGRAM(s) Oral daily  QUEtiapine 25 milliGRAM(s) Oral at bedtime  ranolazine 500 milliGRAM(s) Oral two times a day  tamsulosin 0.4 milliGRAM(s) Oral at bedtime  tiotropium 18 MICROgram(s) Capsule 1 Capsule(s) Inhalation daily  tiotropium 18 MICROgram(s) Capsule 1 Capsule(s) Inhalation daily    MEDICATIONS  (PRN):  ALBUTerol    90 MICROgram(s) HFA Inhaler 2 Puff(s) Inhalation every 6 hours PRN Shortness of Breath  ALPRAZolam 0.25 milliGRAM(s) Oral every 8 hours PRN anxiety  sodium chloride 0.65% Nasal 1 Spray(s) Both Nostrils every 2 hours PRN Congestion  traMADol 50 milliGRAM(s) Oral every 6 hours PRN Severe Pain (7 - 10)      Vital Signs Last 24 Hrs  T(C): 37.2 (02 Dec 2020 10:00), Max: 37.2 (01 Dec 2020 14:00)  T(F): 99 (02 Dec 2020 10:00), Max: 99 (01 Dec 2020 14:00)  HR: 100 (02 Dec 2020 11:00) (83 - 119)  BP: 142/57 (02 Dec 2020 11:00) (87/67 - 157/58)  BP(mean): 80 (02 Dec 2020 11:00) (66 - 102)  RR: 23 (02 Dec 2020 11:00) (16 - 30)  SpO2: 94% (02 Dec 2020 11:00) (83% - 100%)  I&O's Summary    01 Dec 2020 07:01  -  02 Dec 2020 07:00  --------------------------------------------------------  IN: 880 mL / OUT: 1400 mL / NET: -520 mL      ________________________________  GENERAL APPEARANCE:  No acute distress  HEAD: normocephalic, atraumatic  NECK: supple, no jugular venous distention, no carotid bruit    HEART: irreg, S1, S2 normal, 2/6 regurgitant murmur    CHEST:  No anterior chest wall tenderness    LUNGS:  dec breath sounds, resolving crackles    ABDOMEN: soft, nontender, nondistended, with positive bowel sounds appreciated  EXTREMITIES: no clubbing, cyanosis, or edema.  NEURO: Alert and oriented x3  PSYC:  Normal affect  SKIN:  Dry  ________________________________  TELEMETRY: atrial fibrillation, NSVT      ECG: atrial fibrillation, PVCs    LABS:            ________________________________    RADIOLOGY & ADDITIONAL STUDIES:  IMPRESSION: There is bilateral airspace disease that may be from pulmonary edema. However infection cannot be excluded. The heart is normal in size. The bones are intact.      ________________________________    ASSESSMENT:  Shortness of breath with acute diastolic heart failure and COPD  Chronic atrial fibrillation on anticoagulation with Eliquis  CAD status post PCI to circumflex in 2013  Peripheral arterial disease  History of DVT  History of CVA  Sleep apnea and obesity  Spinal stenosis  Recent abnormal SPECT stress test-see above  Monomorphic NSVT - patient to complete stress as out patient    PLAN:    In summary, this is a 75 male with a past medical history as above who presented for evaluation of shortness of breath and lower extremity edema and noted to be hypoxic/hypercarbic respiratory failure.     Continue with IV diuretics and repeat chest x-ray in the morning.  Monitor creatinine which has been stable thus far.  ABG noted.  Agitation management per critical care.  Started on Seroquel-QTc normal.  Steroids per pulmonary/critical care.  Continue anticoagulation.  Continue metoprolol.  Continue Imdur and Ranexa for antianginal therapy.  Continue Plavix.  Monitor hemoglobin.  Abx per primary  Dr Amador to see in AM. The patient was seen and examined.   +Agitation   Cr increased  No chest pain.     No palpitations.    Initial Consult HPI  ________________________________  JENARO GORDILLO is a 75y year old Male with a past medical history of atrial fibrillation on anticoagulation with Eliquis, history of DVT, history of CVA, coronary disease status post PCI to circumflex in 2013, peripheral arterial disease, hypertension, hyperlipidemia, chronic diastolic heart failure, moderate mitral valve regurgitation, COPD on oxygen at home, sleep apnea and obesity.    The patient presented for evaluation of shortness of breath with lower extremity edema, and was admitted to the ICU for hypercarbic and hypoxic respiratory failure.  Cardiology has been consulted for management of heart failure.    Previous cardiac work-up included echocardiogram from 2 months ago showing low normal LVEF, with moderate mitral valve regurgitation, and mild pulmonary hypertension.  His SPECT stress test was abnormal, and the fact that he only underwent resting imaging showing a medium size defect involving the basal, inferior, inferior lateral and apical walls.    PREVIOUS CARDIAC WORKUP: September 2020  --There is severe left atrial dilatation (LA volume index 59 ml/m²).  --The interventricular septum is mildly hypertrophied.  --The inferolateral (posterior) wall is mild hypertrophied.  --There is no asymmetric septal hypertrophy.  --There is mild left ventricular hypertrophy.  --LV ejection fraction (52 %) is normal.  --The left ventricular filling pattern is consistent with abnormal relaxation.  --The aortic root is normal in size (3.70 cm).  --The aortic valve is mildly calcified. There is trace aortic regurgitation.  --There is mild mitral annular calcification. There is mild mitral valve thickening. There is  moderate mitral regurgitation.  --There is moderate tricuspid regurgitation.  --There is mild pulmonic regurgitation.  --The right atrial pressure is 6 - 10 mm Hg. There is mild pulmonary hypertension.    Stress September 2020 · There is a medium size defect, of moderate to severe severity, in the basal inferior, basal inferolateral, mid inferior, mid inferolateral and apical inferior wall(s),  · Rest gated analysis showed overall normal left ventricular function with LV enlargement.  · Overall impression: Abnormal.  · Summary: Left ventricular perfusion is abnormal.  · Indeterminate ECG evidence of ischemia after IV administration of regadenoson due to the presence of a conduction abnormality.  · SPECT results are abnormal. Only resting studies available. Patient did not want to stay for stress images. Left without getting stress images.  ________________________________  Review of systems: A 10 point review of system has been performed, and is negative except for what has been mentioned in the above history of present illness.    PAST MEDICAL & SURGICAL HISTORY:  PVD (peripheral vascular disease)  KENNY (obstructive sleep apnea)  Hyperlipidemia  Hypertension, unspecified type  Spinal stenosis  CHF (congestive heart failure)  Emphysema  COPD (chronic obstructive pulmonary disease)    FAMILY HISTORY:  No pertinent family history in first degree relatives    SOCIAL HISTORY: History of tobacco abuse    Home Medications:  Advair Diskus 250 mcg-50 mcg inhalation powder: 1 puff(s) inhaled 2 times a day (21 Nov 2019 15:36)  apixaban 5 mg oral tablet: 1 tab(s) orally 2 times a day (21 Nov 2019 15:36)  clopidogrel 75 mg oral tablet: 1 tab(s) orally once a day (21 Nov 2019 15:36)  Flomax 0.4 mg oral capsule: 1 cap(s) orally once a day (21 Nov 2019 15:36)  furosemide 40 mg oral tablet: 1 tab(s) orally once a day (21 Nov 2019 15:36)  Imdur 60 mg oral tablet, extended release: 1 tab(s) orally once a day (in the morning) (21 Nov 2019 15:36)  Lipitor 20 mg oral tablet: 1 tab(s) orally once a day (21 Nov 2019 15:36)  Lyrica 75 mg oral capsule: orally once a day (21 Nov 2019 15:36)  Multiple Vitamins oral tablet: 1 tab(s) orally once a day (21 Nov 2019 15:36)  Norvasc 5 mg oral tablet: 1 tab(s) orally once a day (21 Nov 2019 15:36)  potassium chloride 20 mEq oral tablet, extended release: 1 tab(s) orally 2 times a day (21 Nov 2019 15:36)  Proscar 5 mg oral tablet: 1 tab(s) orally once a day (21 Nov 2019 15:36)  Proventil 2.5 mg/3 mL (0.083%) inhalation solution: inhaled 3 times a day, As Needed (21 Nov 2019 15:36)  Ranexa 500 mg oral tablet, extended release: 1 tab(s) orally 2 times a day (21 Nov 2019 15:36)  Spiriva 18 mcg inhalation capsule: 1 cap(s) inhaled once a day (21 Nov 2019 15:36)  tiZANidine 4 mg oral tablet:  (21 Nov 2019 15:36)  traMADol 50 mg oral tablet:  (21 Nov 2019 15:36)  Vitamin B Complex:  (21 Nov 2019 15:36)       MEDICATIONS  (STANDING):  ALBUTerol    90 MICROgram(s) HFA Inhaler 2 Puff(s) Inhalation every 6 hours  amLODIPine   Tablet 5 milliGRAM(s) Oral daily  apixaban 5 milliGRAM(s) Oral every 12 hours  atorvastatin 20 milliGRAM(s) Oral at bedtime  budesonide 160 MICROgram(s)/formoterol 4.5 MICROgram(s) Inhaler 2 Puff(s) Inhalation two times a day  cefTRIAXone Injectable. 1000 milliGRAM(s) IV Push every 24 hours  chlorhexidine 4% Liquid 1 Application(s) Topical <User Schedule>  clopidogrel Tablet 75 milliGRAM(s) Oral daily  dextrose 40% Gel 15 Gram(s) Oral once  dextrose 5%. 1000 milliLiter(s) (50 mL/Hr) IV Continuous <Continuous>  dextrose 5%. 1000 milliLiter(s) (100 mL/Hr) IV Continuous <Continuous>  dextrose 50% Injectable 25 Gram(s) IV Push once  dextrose 50% Injectable 12.5 Gram(s) IV Push once  dextrose 50% Injectable 25 Gram(s) IV Push once  digoxin     Tablet 0.125 milliGRAM(s) Oral daily  finasteride 5 milliGRAM(s) Oral daily  glucagon  Injectable 1 milliGRAM(s) IntraMuscular once  insulin glargine Injectable (LANTUS) 5 Unit(s) SubCutaneous two times a day  insulin lispro (ADMELOG) corrective regimen sliding scale   SubCutaneous three times a day before meals  insulin lispro (ADMELOG) corrective regimen sliding scale   SubCutaneous at bedtime  isosorbide   mononitrate ER Tablet (IMDUR) 60 milliGRAM(s) Oral daily  methylPREDNISolone sodium succinate Injectable 20 milliGRAM(s) IV Push every 8 hours  metoprolol succinate ER 50 milliGRAM(s) Oral daily  multivitamin 1 Tablet(s) Oral daily  pregabalin 75 milliGRAM(s) Oral daily  QUEtiapine 25 milliGRAM(s) Oral at bedtime  ranolazine 500 milliGRAM(s) Oral two times a day  tamsulosin 0.4 milliGRAM(s) Oral at bedtime  tiotropium 18 MICROgram(s) Capsule 1 Capsule(s) Inhalation daily  tiotropium 18 MICROgram(s) Capsule 1 Capsule(s) Inhalation daily    MEDICATIONS  (PRN):  ALBUTerol    90 MICROgram(s) HFA Inhaler 2 Puff(s) Inhalation every 6 hours PRN Shortness of Breath  ALPRAZolam 0.25 milliGRAM(s) Oral every 8 hours PRN anxiety  sodium chloride 0.65% Nasal 1 Spray(s) Both Nostrils every 2 hours PRN Congestion  traMADol 50 milliGRAM(s) Oral every 6 hours PRN Severe Pain (7 - 10)      Vital Signs Last 24 Hrs  T(C): 37.2 (02 Dec 2020 10:00), Max: 37.2 (01 Dec 2020 14:00)  T(F): 99 (02 Dec 2020 10:00), Max: 99 (01 Dec 2020 14:00)  HR: 100 (02 Dec 2020 11:00) (83 - 119)  BP: 142/57 (02 Dec 2020 11:00) (87/67 - 157/58)  BP(mean): 80 (02 Dec 2020 11:00) (66 - 102)  RR: 23 (02 Dec 2020 11:00) (16 - 30)  SpO2: 94% (02 Dec 2020 11:00) (83% - 100%)  I&O's Summary    01 Dec 2020 07:01  -  02 Dec 2020 07:00  --------------------------------------------------------  IN: 880 mL / OUT: 1400 mL / NET: -520 mL      ________________________________  GENERAL APPEARANCE:  No acute distress  HEAD: normocephalic, atraumatic  NECK: supple, no jugular venous distention, no carotid bruit    HEART: irreg, S1, S2 normal, 2/6 regurgitant murmur    CHEST:  No anterior chest wall tenderness    LUNGS:  dec breath sounds, resolving crackles    ABDOMEN: soft, nontender, nondistended, with positive bowel sounds appreciated  EXTREMITIES: no clubbing, cyanosis, or edema.  NEURO: Alert and oriented x3  PSYC:  Normal affect  SKIN:  Dry  ________________________________  TELEMETRY: atrial fibrillation, NSVT      ECG: atrial fibrillation, PVCs    LABS:            ________________________________    RADIOLOGY & ADDITIONAL STUDIES:  IMPRESSION: There is bilateral airspace disease that may be from pulmonary edema. However infection cannot be excluded. The heart is normal in size. The bones are intact.     ________________________________    ASSESSMENT:  Shortness of breath with acute diastolic heart failure and COPD  Chronic atrial fibrillation on anticoagulation with Eliquis  CAD status post PCI to circumflex in 2013  Peripheral arterial disease  History of DVT  History of CVA  Sleep apnea and obesity  Spinal stenosis  Recent abnormal SPECT stress test-see above  Monomorphic NSVT - patient to complete stress as out patient    PLAN:    In summary, this is a 75 male with a past medical history as above who presented for evaluation of shortness of breath and lower extremity edema and noted to be hypoxic/hypercarbic respiratory failure.     Agree with oral diuretic, and initiate tomorrow.  Repeat creatinine in the morning.  Steroids per pulmonary/critical care.  Continue anticoagulation.  Continue metoprolol.  Continue Imdur and Ranexa for antianginal therapy.  Continue Plavix.  Monitor hemoglobin.  Abx per primary

## 2020-12-02 NOTE — PROGRESS NOTE ADULT - ASSESSMENT
-  hypercarbic and hypoxemic respiratory failure  and patient is poorly compliant with BiPAP overall PH seems to be compensated with component of metabolic alkalosis  -  CHF with pulmonary edema pattern with pleural effusions with history of diastolic dysfunction   -  pleural effusions mild likely secondary to CHF  -   sleep apnea  never use of the CPAP in the past.  -    severe baseline obstructive airway disease O2 dependent  -    known patient with coronary artery disease with history of stent placement  -    AFib on anticoagulation..  -    other issues include peripheral vascular disease suspected cellulitis on IV antibiotics    PLAN     -   continue with the BiPAP as tolerated with anxiolytics and close monitoring of the blood gas   -     taper of Solu-Medrol to p.o. prednisone as there is no gross wheeze  -    diuresis while monitoring the renal function with CHF and pleural effusions  -     follow-up chest x-ray for improvement in CHF  -    continue with Symbicort during the hospital stay along with Spiriva daily for COPD with symptoms of exacerbation worsen by CHF  -    continue with  Rocephin with stage IV COPD  and for cellulitis of the legs  -    monitor for bleeding  with steroids and anticoagulation.  -    might consider adding Diamox if the bicarb continued to be elevated.

## 2020-12-02 NOTE — PROGRESS NOTE ADULT - SUBJECTIVE AND OBJECTIVE BOX
SUBJECTIVE       PAST MEDICAL & SURGICAL HISTORY:  PVD (peripheral vascular disease)    KENNY (obstructive sleep apnea)    Hyperlipidemia    Hypertension, unspecified type    Spinal stenosis    CHF (congestive heart failure)    Emphysema    COPD (chronic obstructive pulmonary disease)    No significant past surgical history      OBJECTIVE   Vital Signs Last 24 Hrs  T(C): 36.9 (02 Dec 2020 05:00), Max: 37.2 (01 Dec 2020 10:00)  T(F): 98.4 (02 Dec 2020 05:00), Max: 99 (01 Dec 2020 14:00)  HR: 117 (02 Dec 2020 08:00) (69 - 119)  BP: 125/53 (02 Dec 2020 08:00) (87/67 - 157/58)  BP(mean): 72 (02 Dec 2020 08:00) (64 - 102)  RR: 21 (02 Dec 2020 08:00) (14 - 30)  SpO2: 86% (02 Dec 2020 08:00) (83% - 100%)    Review of systems   as dictated in the history of present illness with the review of other systems non contributory     PHYSICAL EXAM:  Constitutional: , awake and alert, not in distress.  HEENT: Normo cephalic atraumatic  Neck: Soft and supple, No J.V.D   Respiratory: vesicular breathing , No wheezing, rales or rhonchi.   Cardiovascular: S1 and S2, regular rate .   Gastrointestinal:  soft, nontender,   Extremities: No  edema or calf tenderness .  Neurological: No new  focal deficits.    MEDICATIONS  (STANDING):  ALBUTerol    90 MICROgram(s) HFA Inhaler 2 Puff(s) Inhalation every 6 hours  amLODIPine   Tablet 5 milliGRAM(s) Oral daily  apixaban 5 milliGRAM(s) Oral every 12 hours  atorvastatin 20 milliGRAM(s) Oral at bedtime  budesonide 160 MICROgram(s)/formoterol 4.5 MICROgram(s) Inhaler 2 Puff(s) Inhalation two times a day  cefTRIAXone Injectable. 1000 milliGRAM(s) IV Push every 24 hours  chlorhexidine 4% Liquid 1 Application(s) Topical <User Schedule>  clopidogrel Tablet 75 milliGRAM(s) Oral daily  dextrose 40% Gel 15 Gram(s) Oral once  dextrose 5%. 1000 milliLiter(s) (50 mL/Hr) IV Continuous <Continuous>  dextrose 5%. 1000 milliLiter(s) (100 mL/Hr) IV Continuous <Continuous>  dextrose 50% Injectable 25 Gram(s) IV Push once  dextrose 50% Injectable 12.5 Gram(s) IV Push once  dextrose 50% Injectable 25 Gram(s) IV Push once  digoxin     Tablet 0.125 milliGRAM(s) Oral daily  finasteride 5 milliGRAM(s) Oral daily  furosemide    Tablet 40 milliGRAM(s) Oral daily  glucagon  Injectable 1 milliGRAM(s) IntraMuscular once  insulin glargine Injectable (LANTUS) 5 Unit(s) SubCutaneous two times a day  insulin lispro (ADMELOG) corrective regimen sliding scale   SubCutaneous three times a day before meals  insulin lispro (ADMELOG) corrective regimen sliding scale   SubCutaneous at bedtime  isosorbide   mononitrate ER Tablet (IMDUR) 60 milliGRAM(s) Oral daily  methylPREDNISolone sodium succinate Injectable 20 milliGRAM(s) IV Push every 8 hours  metoprolol succinate ER 50 milliGRAM(s) Oral daily  multivitamin 1 Tablet(s) Oral daily  pregabalin 75 milliGRAM(s) Oral daily  QUEtiapine 25 milliGRAM(s) Oral at bedtime  ranolazine 500 milliGRAM(s) Oral two times a day  tamsulosin 0.4 milliGRAM(s) Oral at bedtime  tiotropium 18 MICROgram(s) Capsule 1 Capsule(s) Inhalation daily  tiotropium 18 MICROgram(s) Capsule 1 Capsule(s) Inhalation daily                            13.3   18.03 )-----------( 323      ( 02 Dec 2020 06:56 )             44.0     12-02    144  |  97  |  55<H>  ----------------------------<  164<H>  3.9   |  45<HH>  |  1.30    Ca    9.1      02 Dec 2020 06:56  Phos  4.6     12-02  Mg     3.0     12-02         ABG - ( 01 Dec 2020 05:36 )  pH, Arterial: 7.36  pH, Blood: x     /  pCO2: 77    /  pO2: 64    / HCO3: 43    / Base Excess: 14.3  /  SaO2: 90                             SUBJECTIVE     Patient seen in the morning more calm while on Seroquel   one-to-one observation   on 3 L nasal cannula saturation of 80-90%   did not use the BiPAP  last night   day 4 of Rocephin.   no acute cough or wheeze   Solu-Medrol dose reduced to 20 Q 8 hourly  with less agitation    PAST MEDICAL & SURGICAL HISTORY:  PVD (peripheral vascular disease)    KENNY (obstructive sleep apnea)    Hyperlipidemia    Hypertension, unspecified type    Spinal stenosis    CHF (congestive heart failure)    Emphysema    COPD (chronic obstructive pulmonary disease)    No significant past surgical history      OBJECTIVE   Vital Signs Last 24 Hrs  T(C): 36.9 (02 Dec 2020 05:00), Max: 37.2 (01 Dec 2020 10:00)  T(F): 98.4 (02 Dec 2020 05:00), Max: 99 (01 Dec 2020 14:00)  HR: 117 (02 Dec 2020 08:00) (69 - 119)  BP: 125/53 (02 Dec 2020 08:00) (87/67 - 157/58)  BP(mean): 72 (02 Dec 2020 08:00) (64 - 102)  RR: 21 (02 Dec 2020 08:00) (14 - 30)  SpO2: 86% (02 Dec 2020 08:00) (83% - 100%)    Review of systems   as dictated in the history of present illness with the review of other systems non contributory     PHYSICAL EXAM:  Constitutional: , awake and alert, not in distress  resting comfortably on 3 L by nasal cannula  HEENT: Normo cephalic atraumatic  Neck: Soft and supple, No J.V.D   Respiratory: vesicular breathing ,  distant breath sounds  with no wheeze today  Cardiovascular: S1 and S2, regular rate .   Gastrointestinal:  soft, nontender,   Extremities:  improved leg edema does have 1+ feet edema  Neurological: No new  focal deficits.    MEDICATIONS  (STANDING):  ALBUTerol    90 MICROgram(s) HFA Inhaler 2 Puff(s) Inhalation every 6 hours  amLODIPine   Tablet 5 milliGRAM(s) Oral daily  apixaban 5 milliGRAM(s) Oral every 12 hours  atorvastatin 20 milliGRAM(s) Oral at bedtime  budesonide 160 MICROgram(s)/formoterol 4.5 MICROgram(s) Inhaler 2 Puff(s) Inhalation two times a day  cefTRIAXone Injectable. 1000 milliGRAM(s) IV Push every 24 hours  chlorhexidine 4% Liquid 1 Application(s) Topical <User Schedule>  clopidogrel Tablet 75 milliGRAM(s) Oral daily  dextrose 40% Gel 15 Gram(s) Oral once  dextrose 5%. 1000 milliLiter(s) (50 mL/Hr) IV Continuous <Continuous>  dextrose 5%. 1000 milliLiter(s) (100 mL/Hr) IV Continuous <Continuous>  dextrose 50% Injectable 25 Gram(s) IV Push once  dextrose 50% Injectable 12.5 Gram(s) IV Push once  dextrose 50% Injectable 25 Gram(s) IV Push once  digoxin     Tablet 0.125 milliGRAM(s) Oral daily  finasteride 5 milliGRAM(s) Oral daily  furosemide    Tablet 40 milliGRAM(s) Oral daily  glucagon  Injectable 1 milliGRAM(s) IntraMuscular once  insulin glargine Injectable (LANTUS) 5 Unit(s) SubCutaneous two times a day  insulin lispro (ADMELOG) corrective regimen sliding scale   SubCutaneous three times a day before meals  insulin lispro (ADMELOG) corrective regimen sliding scale   SubCutaneous at bedtime  isosorbide   mononitrate ER Tablet (IMDUR) 60 milliGRAM(s) Oral daily  methylPREDNISolone sodium succinate Injectable 20 milliGRAM(s) IV Push every 8 hours  metoprolol succinate ER 50 milliGRAM(s) Oral daily  multivitamin 1 Tablet(s) Oral daily  pregabalin 75 milliGRAM(s) Oral daily  QUEtiapine 25 milliGRAM(s) Oral at bedtime  ranolazine 500 milliGRAM(s) Oral two times a day  tamsulosin 0.4 milliGRAM(s) Oral at bedtime  tiotropium 18 MICROgram(s) Capsule 1 Capsule(s) Inhalation daily  tiotropium 18 MICROgram(s) Capsule 1 Capsule(s) Inhalation daily                            13.3   18.03 )-----------( 323      ( 02 Dec 2020 06:56 )             44.0     12-02    144  |  97  |  55<H>  ----------------------------<  164<H>  3.9   |  45<HH>  |  1.30    Ca    9.1      02 Dec 2020 06:56  Phos  4.6     12-02  Mg     3.0     12-02         ABG - ( 01 Dec 2020 05:36 )  pH, Arterial: 7.36  pH, Blood: x     /  pCO2: 77    /  pO2: 64    / HCO3: 43    / Base Excess: 14.3  /  SaO2: 90

## 2020-12-02 NOTE — PHYSICAL THERAPY INITIAL EVALUATION ADULT - GENERAL OBSERVATIONS, REHAB EVAL
pt rec'd supine in bed in ICU, monitors, O2 via NC at 8LPM., SCDs, condom cath, CO present. pt agreeable, cooperative

## 2020-12-02 NOTE — PHYSICAL THERAPY INITIAL EVALUATION ADULT - PERTINENT HX OF CURRENT PROBLEM, REHAB EVAL
patient presented for evaluation of shortness of breath with lower extremity edema, and was admitted to the ICU for hypercarbic and hypoxic respiratory failure. Non compliant with NIV O/N. pt w/ Acute delirium--better. agitated at times, CO in place.

## 2020-12-02 NOTE — PROGRESS NOTE ADULT - SUBJECTIVE AND OBJECTIVE BOX
Hospital # 4  ICU # 4    CC:  Respiratory Failure     HPI:    74 y/o male mainly non complaint with medical Rx, pt of size, Oxygen-Dependant COPD on 4L Home Oxygen, HTN, HLD, CAD s/p stent in LCx, Spinal Stenosis, KENNY on home NIV, PAD, and chronic AFib on apixaban admitted to ICU with likely acute on chronic decompensated HFpEF and AECOPD.  Placed on NIV.  COVID Negative     11/30:  Caes d/w DR Em.  Pt seen and examined in ICU--On nasal BIPAP--very anxious on Px gtt.  No fevers.  CXR-- Personally reviewed--PVC and B/L effusion R>L.   12/1:  Agitated overnight.  Did not wear BIPAP.  Confused this morning.  On NC O2 sat 88-90%.  On Px gtt  12/2:  Did better with seroquel O/N.  Awake.  Non compliant with NIV O/N.  CO at bedside     PMH:  As above.     PSH:  As above.     FH: Non Contributory other than those listed in HPI    Social History:  Quit smoking 17 yrs ago     MEDICATIONS  (STANDING):  ALBUTerol    90 MICROgram(s) HFA Inhaler 2 Puff(s) Inhalation every 6 hours  amLODIPine   Tablet 5 milliGRAM(s) Oral daily  apixaban 5 milliGRAM(s) Oral every 12 hours  atorvastatin 20 milliGRAM(s) Oral at bedtime  budesonide 160 MICROgram(s)/formoterol 4.5 MICROgram(s) Inhaler 2 Puff(s) Inhalation two times a day  cefTRIAXone Injectable. 1000 milliGRAM(s) IV Push every 24 hours  chlorhexidine 4% Liquid 1 Application(s) Topical <User Schedule>  clopidogrel Tablet 75 milliGRAM(s) Oral daily  dextrose 40% Gel 15 Gram(s) Oral once  dextrose 5%. 1000 milliLiter(s) (50 mL/Hr) IV Continuous <Continuous>  dextrose 5%. 1000 milliLiter(s) (100 mL/Hr) IV Continuous <Continuous>  dextrose 50% Injectable 25 Gram(s) IV Push once  dextrose 50% Injectable 12.5 Gram(s) IV Push once  dextrose 50% Injectable 25 Gram(s) IV Push once  digoxin     Tablet 0.125 milliGRAM(s) Oral daily  finasteride 5 milliGRAM(s) Oral daily  glucagon  Injectable 1 milliGRAM(s) IntraMuscular once  insulin glargine Injectable (LANTUS) 5 Unit(s) SubCutaneous two times a day  insulin lispro (ADMELOG) corrective regimen sliding scale   SubCutaneous three times a day before meals  insulin lispro (ADMELOG) corrective regimen sliding scale   SubCutaneous at bedtime  isosorbide   mononitrate ER Tablet (IMDUR) 60 milliGRAM(s) Oral daily  methylPREDNISolone sodium succinate Injectable 20 milliGRAM(s) IV Push every 8 hours  metoprolol succinate ER 50 milliGRAM(s) Oral daily  multivitamin 1 Tablet(s) Oral daily  pregabalin 75 milliGRAM(s) Oral daily  QUEtiapine 25 milliGRAM(s) Oral at bedtime  ranolazine 500 milliGRAM(s) Oral two times a day  tamsulosin 0.4 milliGRAM(s) Oral at bedtime  tiotropium 18 MICROgram(s) Capsule 1 Capsule(s) Inhalation daily  tiotropium 18 MICROgram(s) Capsule 1 Capsule(s) Inhalation daily    MEDICATIONS  (PRN):  ALBUTerol    90 MICROgram(s) HFA Inhaler 2 Puff(s) Inhalation every 6 hours PRN Shortness of Breath  ALPRAZolam 0.25 milliGRAM(s) Oral every 8 hours PRN anxiety  sodium chloride 0.65% Nasal 1 Spray(s) Both Nostrils every 2 hours PRN Congestion  traMADol 50 milliGRAM(s) Oral every 6 hours PRN Severe Pain (7 - 10)      Allergies: NKDA    ROS:  SEE BELOW        ICU Vital Signs Last 24 Hrs  T(C): 36.9 (02 Dec 2020 05:00), Max: 37.2 (01 Dec 2020 10:00)  T(F): 98.4 (02 Dec 2020 05:00), Max: 99 (01 Dec 2020 14:00)  HR: 103 (02 Dec 2020 09:00) (69 - 119)  BP: 135/60 (02 Dec 2020 09:00) (87/67 - 157/58)  BP(mean): 79 (02 Dec 2020 09:00) (64 - 102)  ABP: --  ABP(mean): --  RR: 19 (02 Dec 2020 09:00) (14 - 30)  SpO2: 95% (02 Dec 2020 09:00) (83% - 100%)          I&O's Summary    01 Dec 2020 07:01  -  02 Dec 2020 07:00  --------------------------------------------------------  IN: 880 mL / OUT: 1400 mL / NET: -520 mL        Physical Exam:  SEE BELOW                          13.3   18.03 )-----------( 323      ( 02 Dec 2020 06:56 )             44.0       12-02    144  |  97  |  55<H>  ----------------------------<  164<H>  3.9   |  45<HH>  |  1.30    Ca    9.1      02 Dec 2020 06:56  Phos  4.6     12-02  Mg     3.0     12-02              ABG - ( 01 Dec 2020 05:36 )  pH, Arterial: 7.36  pH, Blood: x     /  pCO2: 77    /  pO2: 64    / HCO3: 43    / Base Excess: 14.3  /  SaO2: 90                      DVT Prophylaxis:                                                            Contraindication:     Advanced Directives:    Discussed with:    Visit Information:  Time spent excluding procedure:      ** Time is exclusive of billed procedures and/or teaching and/or routine family updates.

## 2020-12-02 NOTE — PHYSICAL THERAPY INITIAL EVALUATION ADULT - ADDITIONAL COMMENTS
Oxygen-Dependent on 4L at home, home NIV. pt lives in 2 level house w/ 3 RUSSEL w/ rail, stays ground floor. has HHA 3 hrs TIW.

## 2020-12-02 NOTE — PHYSICAL THERAPY INITIAL EVALUATION ADULT - PRECAUTIONS/LIMITATIONS, REHAB EVAL
obesity precautions/cardiac precautions/fall precautions/oxygen therapy device and L/min/HOB > 30. recent abn SPECT. O2 dep.

## 2020-12-03 NOTE — PROGRESS NOTE ADULT - NEUROLOGICAL
detailed exam
detailed exam
Alert & oriented; no sensory, motor or coordination deficits, normal reflexes
detailed exam
detailed exam

## 2020-12-03 NOTE — PROGRESS NOTE ADULT - MS EXT PE MLT D E PC
no cyanosis/no pedal edema
no pedal edema/no cyanosis
no cyanosis/no pedal edema
no pedal edema/no cyanosis

## 2020-12-03 NOTE — PROGRESS NOTE ADULT - ASSESSMENT
-  hypercarbic and hypoxemic respiratory failure with the woresening respiratory acidosis today and patient is some what poorly compliant   -  CHF with pulmonary edema pattern with pleural effusions with history of diastolic dysfunction   -  pleural effusions mild likely secondary to CHF  -   sleep apnea  never use of the CPAP in the past.  -    severe baseline obstructive airway disease O2 dependent  -    known patient with coronary artery disease with history of stent placement  -    AFib on anticoagulation..  -    other issues include peripheral vascular disease suspected cellulitis on IV antibiotics    PLAN     -   continue with the BiPAP and increase the IPAP to 15 and maintain sat around 92   -    decrease the solumedrol to twice daily   -    hold diuretics today with the worsening renal function   -     follow-up chest x-ray for improvement in CHF and requested   -    continue with Symbicort  Spiriva daily for COPD with symptoms of exacerbation worsen by CHF  -    continue with  Rocephin with stage IV COPD  and for cellulitis of the legs and complete 5 days   -    monitor for bleeding  with steroids and anticoagulation.  -    would add diamox with the raising HCO3

## 2020-12-03 NOTE — PROGRESS NOTE ADULT - ASSESSMENT
IMP:    76 y/o male pt of size, Oxygen-Dependant COPD on 4L Home Oxygen, HTN, HLD, CAD s/p stent in LCx, Spinal Stenosis, KENNY on home NIV, PAD, and chronic AFib on apixaban admitted to ICU with likely acute on chronic decompensated HFpEF than AECOPD.  Due to his non compliance he is more hypercapneic with worsening resp acidosi but continues to defer on medical treatment.  He has made himself DNI/DNR which was confirmed but HCP  Acute on chronic type 1 and 2 resp failure    Plan:    NC during the day--BASELINE O2 sat is around 86-68%  DC acetazolamide and day time seroquel.  Cont with QHS   NIV if he is willing  CO to prevent self harm  solu to 20 q 8  Added ICS/LABA   PO furosemide 40 daily starting today  CTX   Start FS with insulin coverage--keep FS < 180.  Added lantus 5 bid--FS better controlled   DOAC   HOB > 30  PO  DVT prophy--DOAC    Above d/w 2SW staff and FMTS  Not candidate for ICU

## 2020-12-03 NOTE — PROGRESS NOTE ADULT - ASSESSMENT
74 y/o M w PMH of Oxygen-Dependant COPD on 4L Home Oxygen, HTN, HLD, CAD s/p stent in LCx, Spinal Stenosis, KENNY on home non invasive ventilation, PAD, and chronic AFib on apixaban downgrade from ICU for AECOPD.     #Hypercapnic on hypoxemic respiratory failure  -Pt was initially desaturating, placed on BiPAP, refused. Placed on Ventimask for comfort, however, saturating well on 6LNC  -Titrate oxygen as necessary  -GOC to be had w family  -Keep head of bed elevated >30  -Appreciate Pulm recommendations       -Solumedrol decreased to BID       -Cont w Symbicort, Spiriva daily       -Cont w Rocephin  -F/U AM ABG    #BHARTI  -Worsening renal function likely attributable to furosemide  -D/C furosemide  -F/U AM BMP    #CHF  -Sx attributable to COPD, HF less likely   -D/C Furosemide due to worsening renal function  -Continue metoprolol    #HTN  -Continue on amlodipine, metoprolol, & isosorbide mononitrate  -Continue to monitor vitals    #HLD  -Continue on atorvastatin    #CAD/sp stent  -Continue on clopidogrel & apixaban    #AFib  -Continue on apixaban & digoxin  -Digoxin levels resulted low, consider adjusting dose    #DMII  -HbA1C 6.5  -ISS  -Lantus 5U BID     #Cellulitis  -Continue on Rocephin  -Improvement of left lower extremity cellulitis  -Trend WBC  -Continue to monitor    #BPH  -Continue finasteride & tamsulosin    #DVT PPX  -Apixaban 5mg q12H     #D/C Planning  -GOC conversation w family to determine aggressiveness of interventions, D/C if saturating improves and patient stable. PT recommends DC to rehab.     *Pt discussed w Dr. Pearson

## 2020-12-03 NOTE — PROGRESS NOTE ADULT - SUBJECTIVE AND OBJECTIVE BOX
CURRENT CARDIAC WORKUP:       Echo:  Stress Test:  Cardiac Cath:    Allergies:   No Known Allergies      MEDICATIONS  (STANDING):  acetaZOLAMIDE    Tablet 250 milliGRAM(s) Oral two times a day  ALBUTerol    90 MICROgram(s) HFA Inhaler 2 Puff(s) Inhalation every 6 hours  amLODIPine   Tablet 5 milliGRAM(s) Oral daily  apixaban 5 milliGRAM(s) Oral every 12 hours  atorvastatin 20 milliGRAM(s) Oral at bedtime  budesonide 160 MICROgram(s)/formoterol 4.5 MICROgram(s) Inhaler 2 Puff(s) Inhalation two times a day  chlorhexidine 4% Liquid 1 Application(s) Topical <User Schedule>  clopidogrel Tablet 75 milliGRAM(s) Oral daily  dextrose 40% Gel 15 Gram(s) Oral once  dextrose 5%. 1000 milliLiter(s) (50 mL/Hr) IV Continuous <Continuous>  dextrose 5%. 1000 milliLiter(s) (100 mL/Hr) IV Continuous <Continuous>  dextrose 50% Injectable 25 Gram(s) IV Push once  dextrose 50% Injectable 12.5 Gram(s) IV Push once  dextrose 50% Injectable 25 Gram(s) IV Push once  digoxin     Tablet 0.125 milliGRAM(s) Oral daily  finasteride 5 milliGRAM(s) Oral daily  glucagon  Injectable 1 milliGRAM(s) IntraMuscular once  insulin glargine Injectable (LANTUS) 5 Unit(s) SubCutaneous two times a day  insulin lispro (ADMELOG) corrective regimen sliding scale   SubCutaneous three times a day before meals  insulin lispro (ADMELOG) corrective regimen sliding scale   SubCutaneous at bedtime  isosorbide   mononitrate ER Tablet (IMDUR) 60 milliGRAM(s) Oral daily  methylPREDNISolone sodium succinate Injectable 20 milliGRAM(s) IV Push every 12 hours  metoprolol succinate ER 50 milliGRAM(s) Oral daily  multivitamin 1 Tablet(s) Oral daily  pregabalin 75 milliGRAM(s) Oral daily  QUEtiapine 25 milliGRAM(s) Oral at bedtime  QUEtiapine 12.5 milliGRAM(s) Oral daily  ranolazine 500 milliGRAM(s) Oral two times a day  tamsulosin 0.4 milliGRAM(s) Oral at bedtime  tiotropium 18 MICROgram(s) Capsule 1 Capsule(s) Inhalation daily  tiotropium 18 MICROgram(s) Capsule 1 Capsule(s) Inhalation daily    MEDICATIONS  (PRN):  ALBUTerol    90 MICROgram(s) HFA Inhaler 2 Puff(s) Inhalation every 6 hours PRN Shortness of Breath  ALPRAZolam 0.25 milliGRAM(s) Oral every 8 hours PRN anxiety  sodium chloride 0.65% Nasal 1 Spray(s) Both Nostrils every 2 hours PRN Congestion  traMADol 50 milliGRAM(s) Oral every 6 hours PRN Severe Pain (7 - 10)      ROS:     .ros      Vital Signs Last 24 Hrs  T(C): 36.8 (03 Dec 2020 08:00), Max: 37.3 (02 Dec 2020 14:00)  T(F): 98.3 (03 Dec 2020 08:00), Max: 99.2 (02 Dec 2020 14:00)  HR: 85 (03 Dec 2020 08:30) (83 - 99)  BP: 149/56 (03 Dec 2020 08:00) (103/58 - 157/67)  BP(mean): 68 (02 Dec 2020 18:00) (68 - 92)  RR: 17 (02 Dec 2020 23:08) (17 - 24)  SpO2: 92% (03 Dec 2020 08:49) (88% - 100%)    I&O's Summary    02 Dec 2020 07:01  -  03 Dec 2020 07:00  --------------------------------------------------------  IN: 600 mL / OUT: 825 mL / NET: -225 mL        PHYSICAL EXAM:    .phy      TELEMETRY:    ECG:    LABS:                        13.8   19.51 )-----------( 286      ( 03 Dec 2020 08:46 )             46.6     12-03    144  |  101  |  63<H>  ----------------------------<  182<H>  5.2   |  42<H>  |  1.55<H>    Ca    8.8      03 Dec 2020 08:46  Phos  4.6     12-02  Mg     3.0     12-02 11-30 @ 06:24  Trop-I <0.015  CK     --  CK MB  --    11-29 @ 06:56  Trop-I <0.015  CK     --  CK MB  --    11-28 @ 08:51  Trop-I <0.015  CK     54  CK MB  --    Pro BNP  5041 11-29 @ 06:56  D Dimer  -- 11-29 @ 06:56  Pro BNP  2884 11-28 @ 08:51  D Dimer  -- 11-28 @ 08:51        ABG - ( 03 Dec 2020 11:53 )  pH, Arterial: 7.26  pH, Blood: x     /  pCO2: 106   /  pO2: 60    / HCO3: 46    / Base Excess: 14.2  /  SaO2: 88                    RADIOLOGY & ADDITIONAL STUDIES:

## 2020-12-03 NOTE — CHART NOTE - NSCHARTNOTEFT_GEN_A_CORE
Pt desat to the 70s overnight, was not tolerating BiPAP (took it off). This AM with desat to the low 80s. STAT ABG with PCO2 of 109, pH 7.24, HCO3, 45, pO2 64. Pt placed on BiPAP again, occasionally desats to the mid 80s, increase to 90-91%. Episode of desat to the 70s while on BiPAP.    - Repeat ABG after BiPAP  - ICU contacted and made aware of pt's current condition, pt may need ICU transfer, possible intubation if still no improvement on BiPAP  - Will closely continue monitoring pt; on IV solumedrol, symbicort, albuterol-ipratropium, lasix, BiPAP

## 2020-12-03 NOTE — CHART NOTE - NSCHARTNOTEFT_GEN_A_CORE
Was called to bedside by RN as pt was taking off BiPAP mask, with subsequent [id number] to down to the 70s-80s. Pt does not want BiPAP on.  ICU later at bedside with plan to transfer to ICU likely intubation. Pt however refused transfer, refused intubation, also refusing BiPAP.  Pt states that he does not want further intervention.  It was extensively discussed with pt his current state with oxygen desaturation, increasing CO2 on Was called to bedside by RN as pt was taking off BiPAP mask, with subsequent [id number] to down to the 70s-80s. Pt does not want BiPAP on.  ICU later at bedside with plan to transfer to ICU likely intubation. Pt however refused transfer, refused intubation, also refusing BiPAP.  Pt states that he does not want further intervention.  It was extensively discussed with pt his current state with oxygen desaturation, increasing CO2 on ABG, and the need for BiPAP or intubation given inability to tolerate BiPAP. Risks associated with refusal of therapy (intubation and/or BIPAP) which includes continuing to deteriorate, and death were discussed with pt. With me, along with Dr. Reyes and nurse staff at bedside, pt stated having good understanding of his situation, stated that he understands that he can die without intubation, without BiPAP. Pt is awake, alert, oriented to time, place and person. Pt shows capacity, has understanding of his medical problem and understands the consequence of refusing treatment which includes death.   We discussed with pt that we will speak with his HCP, Guanakito (Nephew), concerning his decision. Pt mentioned to us to relay his wishes to his Nephew, Guanakito, that he does not want further intervention.    I called and spoke with HCP, Guanakito, via telephone and discussed with him pt's current condition and pt's wishes at the moment. HCP, Guanakito, agrees with pt's wishes. He advised that he will also discuss with pt's niece, who later also called and spoke with me, as well as with pt over the phone. After her conversation with the pt, I spoke again with the niece who also reported to go with pt's wishes as mentioned above.    MOLST form was completed, DNR order placed in chart. Pt placed on venti mask for comfort. IV morphine x1 dose given as pt weakly voicing to be in pain. Above discussed with my attending, Dr. Pearson and Dr. Reyes.

## 2020-12-03 NOTE — PROGRESS NOTE ADULT - SUBJECTIVE AND OBJECTIVE BOX
[facility] 5  ICU # 4    CC:  Respiratory Failure     HPI:    76 y/o male mainly non complaint with medical Rx, pt of size, Oxygen-Dependant COPD on 4L Home Oxygen, HTN, HLD, CAD s/p stent in LCx, Spinal Stenosis, KENNY on home NIV, PAD, and chronic AFib on apixaban admitted to ICU with likely acute on chronic decompensated HFpEF and AECOPD.  Placed on NIV.  COVID Negative     11/30:  Caes d/w DR Em.  Pt seen and examined in ICU--On nasal BIPAP--very anxious on Px gtt.  No fevers.  CXR-- Personally reviewed--PVC and B/L effusion R>L.   12/1:  Agitated overnight.  Did not wear BIPAP.  Confused this morning.  On NC O2 sat 88-90%.  On Px gtt  12/2:  Did better with seroquel O/N.  Awake.  Non compliant with NIV O/N.  CO at bedside     12/3:  Asked by FMTS to see pt on 2SW.  He refused to wear BIPAP overnight (only 45 mins) and won't keep NRM on unless he is restraint.  He does want to wear BIPAP or NRM.  Mentation slightly off compared to when he was in ICU but coherent and has capacity for medical decision making.  Along with dr sauer in room, pt repeatedly stated he does not want intubation and understands this may lead to his demise--This topic was reviewed with him several times.  This decision was confirmed with HCP by Dr sauer     PMH:  As above.     PSH:  As above.     FH: Non Contributory other than those listed in HPI    Social History:  Former smoker    MEDICATIONS  (STANDING):  ALBUTerol    90 MICROgram(s) HFA Inhaler 2 Puff(s) Inhalation every 6 hours  amLODIPine   Tablet 5 milliGRAM(s) Oral daily  apixaban 5 milliGRAM(s) Oral every 12 hours  atorvastatin 20 milliGRAM(s) Oral at bedtime  budesonide 160 MICROgram(s)/formoterol 4.5 MICROgram(s) Inhaler 2 Puff(s) Inhalation two times a day  chlorhexidine 4% Liquid 1 Application(s) Topical <User Schedule>  clopidogrel Tablet 75 milliGRAM(s) Oral daily  dextrose 40% Gel 15 Gram(s) Oral once  dextrose 5%. 1000 milliLiter(s) (50 mL/Hr) IV Continuous <Continuous>  dextrose 5%. 1000 milliLiter(s) (100 mL/Hr) IV Continuous <Continuous>  dextrose 50% Injectable 25 Gram(s) IV Push once  dextrose 50% Injectable 12.5 Gram(s) IV Push once  dextrose 50% Injectable 25 Gram(s) IV Push once  digoxin     Tablet 0.125 milliGRAM(s) Oral daily  finasteride 5 milliGRAM(s) Oral daily  glucagon  Injectable 1 milliGRAM(s) IntraMuscular once  insulin glargine Injectable (LANTUS) 5 Unit(s) SubCutaneous two times a day  insulin lispro (ADMELOG) corrective regimen sliding scale   SubCutaneous three times a day before meals  insulin lispro (ADMELOG) corrective regimen sliding scale   SubCutaneous at bedtime  isosorbide   mononitrate ER Tablet (IMDUR) 60 milliGRAM(s) Oral daily  methylPREDNISolone sodium succinate Injectable 20 milliGRAM(s) IV Push every 12 hours  metoprolol succinate ER 50 milliGRAM(s) Oral daily  multivitamin 1 Tablet(s) Oral daily  pregabalin 75 milliGRAM(s) Oral daily  QUEtiapine 25 milliGRAM(s) Oral at bedtime  ranolazine 500 milliGRAM(s) Oral two times a day  tamsulosin 0.4 milliGRAM(s) Oral at bedtime  tiotropium 18 MICROgram(s) Capsule 1 Capsule(s) Inhalation daily  tiotropium 18 MICROgram(s) Capsule 1 Capsule(s) Inhalation daily    MEDICATIONS  (PRN):  ALBUTerol    90 MICROgram(s) HFA Inhaler 2 Puff(s) Inhalation every 6 hours PRN Shortness of Breath  ALPRAZolam 0.25 milliGRAM(s) Oral every 8 hours PRN anxiety  sodium chloride 0.65% Nasal 1 Spray(s) Both Nostrils every 2 hours PRN Congestion  traMADol 50 milliGRAM(s) Oral every 6 hours PRN Severe Pain (7 - 10)      Allergies: NKDA    ROS:  SEE BELOW        ICU Vital Signs Last 24 Hrs  T(C): 36.8 (03 Dec 2020 08:00), Max: 37.3 (02 Dec 2020 14:00)  T(F): 98.3 (03 Dec 2020 08:00), Max: 99.2 (02 Dec 2020 14:00)  HR: 85 (03 Dec 2020 08:30) (83 - 99)  BP: 149/56 (03 Dec 2020 08:00) (103/58 - 157/67)  BP(mean): 68 (02 Dec 2020 18:00) (68 - 92)  ABP: --  ABP(mean): --  RR: 17 (02 Dec 2020 23:08) (17 - 24)  SpO2: 92% (03 Dec 2020 08:49) (88% - 100%)          I&O's Summary    02 Dec 2020 07:01  -  03 Dec 2020 07:00  --------------------------------------------------------  IN: 600 mL / OUT: 825 mL / NET: -225 mL        Physical Exam:  SEE BELOW                          13.8   19.51 )-----------( 286      ( 03 Dec 2020 08:46 )             46.6       12-03    144  |  101  |  63<H>  ----------------------------<  182<H>  5.2   |  42<H>  |  1.55<H>    Ca    8.8      03 Dec 2020 08:46  Phos  4.6     12-02  Mg     3.0     12-02              ABG - ( 03 Dec 2020 11:53 )  pH, Arterial: 7.26  pH, Blood: x     /  pCO2: 106   /  pO2: 60    / HCO3: 46    / Base Excess: 14.2  /  SaO2: 88                      DVT Prophylaxis:                                                            Contraindication:     Advanced Directives:    Discussed with:    Visit Information:  Time spent excluding procedure:  30 cc mins    ** Time is exclusive of billed procedures and/or teaching and/or routine family updates.

## 2020-12-03 NOTE — PROGRESS NOTE ADULT - SUBJECTIVE AND OBJECTIVE BOX
74 y/o male mainly non complaint with medical Rx, Oxygen-Dependant COPD on 4L Home Oxygen, HTN, HLD, CAD s/p stent in LCx, Spinal Stenosis, KENNY on home non invasive ventilation, PAD, and chronic AFib on apixaban admitted to ICU on 11/28 with likely acute on chronic decompensated HFpEF and acute exacerbation COPD.  Placed on non invasive ventilation. CXR from 11/30, PVC and B/L effusion R>L.  On seroquel for agitation overnight for removing BiPAP. Pt downgrade from ICU on 12/03.     12/03: Pt seen at bedside. When arrived to floors, patient began desaturating, placed on BiPAP, however, became agitated and continuously tried to remove mask. Given morphine for pain. Patient refused intervention, risks and benefits discussed, patient changed to DNR/DNI. Was placed on a ventimask for comfort, however, was last seen saturating well on 6L NC.     Vitals  T(F): 98.7 (12-03-20 @ 15:00), Max: 98.7 (12-03-20 @ 15:00)  HR: 68 (12-03-20 @ 15:00) (68 - 99)  BP: 106/57 (12-03-20 @ 15:00) (103/58 - 153/84)  RR: 17 (12-02-20 @ 23:08) (17 - 18)  SpO2: 97% (12-03-20 @ 15:00) (88% - 100%)    Physical Exam   Gen: Pt seen w mask, agitated  CV: RRR, nl s1/s2, no M/R/G  Pulm: nl respiratory effort, CTAB, wheezes throughout lungfields  Abd: normoactive bowel sounds in all 4 quadrants, soft, nontender, nondistended, no rebound, no guarding, no masses  Extremities: no pedal edema, pedal pulses palpable   Skin: nl warm and dry, no wounds     MEDICATIONS  (STANDING):  ALBUTerol    90 MICROgram(s) HFA Inhaler 2 Puff(s) Inhalation every 6 hours  amLODIPine   Tablet 5 milliGRAM(s) Oral daily  apixaban 5 milliGRAM(s) Oral every 12 hours  atorvastatin 20 milliGRAM(s) Oral at bedtime  budesonide 160 MICROgram(s)/formoterol 4.5 MICROgram(s) Inhaler 2 Puff(s) Inhalation two times a day  chlorhexidine 4% Liquid 1 Application(s) Topical <User Schedule>  clopidogrel Tablet 75 milliGRAM(s) Oral daily  dextrose 40% Gel 15 Gram(s) Oral once  dextrose 5%. 1000 milliLiter(s) (50 mL/Hr) IV Continuous <Continuous>  dextrose 5%. 1000 milliLiter(s) (100 mL/Hr) IV Continuous <Continuous>  dextrose 50% Injectable 25 Gram(s) IV Push once  dextrose 50% Injectable 12.5 Gram(s) IV Push once  dextrose 50% Injectable 25 Gram(s) IV Push once  digoxin     Tablet 0.125 milliGRAM(s) Oral daily  finasteride 5 milliGRAM(s) Oral daily  glucagon  Injectable 1 milliGRAM(s) IntraMuscular once  insulin glargine Injectable (LANTUS) 5 Unit(s) SubCutaneous two times a day  insulin lispro (ADMELOG) corrective regimen sliding scale   SubCutaneous three times a day before meals  insulin lispro (ADMELOG) corrective regimen sliding scale   SubCutaneous at bedtime  isosorbide   mononitrate ER Tablet (IMDUR) 60 milliGRAM(s) Oral daily  methylPREDNISolone sodium succinate Injectable 20 milliGRAM(s) IV Push every 12 hours  metoprolol succinate ER 50 milliGRAM(s) Oral daily  multivitamin 1 Tablet(s) Oral daily  pregabalin 75 milliGRAM(s) Oral daily  QUEtiapine 25 milliGRAM(s) Oral at bedtime  ranolazine 500 milliGRAM(s) Oral two times a day  tamsulosin 0.4 milliGRAM(s) Oral at bedtime  tiotropium 18 MICROgram(s) Capsule 1 Capsule(s) Inhalation daily  tiotropium 18 MICROgram(s) Capsule 1 Capsule(s) Inhalation daily    MEDICATIONS  (PRN):  ALBUTerol    90 MICROgram(s) HFA Inhaler 2 Puff(s) Inhalation every 6 hours PRN Shortness of Breath  ALPRAZolam 0.25 milliGRAM(s) Oral every 8 hours PRN anxiety  sodium chloride 0.65% Nasal 1 Spray(s) Both Nostrils every 2 hours PRN Congestion  traMADol 50 milliGRAM(s) Oral every 6 hours PRN Severe Pain (7 - 10)      LABS:  cret                        13.8   19.51 )-----------( 286      ( 03 Dec 2020 08:46 )             46.6     12-03    144  |  101  |  63<H>  ----------------------------<  182<H>  5.2   |  42<H>  |  1.55<H>    Ca    8.8      03 Dec 2020 08:46  Phos  4.6     12-02  Mg     3.0     12-02      Blood Gas Profile - Arterial (12.03.20 @ 11:53)   pH, Arterial: 7.26   pCO2, Arterial: 106: Test Name:pco2a Called by:bari Called to:tenzin keller rn   Read back 2 Pt IDs:y Read back values:y Date/Tm:12/03/20 12:12 mmHg   pO2, Arterial: 60 mmHg   HCO3, Arterial: 46 mmoL/L   Base Excess, Arterial: 14.2 mmol/L   Oxygen Saturation, Arterial: 88 %   Blood Gas Comments Arterial: rate14       Blood Gas Source Arterial: Arterial Blood Gas Profile - Arterial (12.03.20 @ 08:54)   pH, Arterial: 7.24   pCO2, Arterial: 109: Test Name:pco2 Called by:bari Called to:sudha muniz rn   Read back 2 Pt IDs:y Read back values:y Date/Tm:12/03/20 09:17 mmHg   pO2, Arterial: 64 mmHg   HCO3, Arterial: 45 mmoL/L   Base Excess, Arterial: 12.4 mmol/L   Oxygen Saturation, Arterial: 89 %   Blood Gas Comments Arterial: FIO2:_ MODE:_ VT:_ RATE:_ PEEP:_ Comment:_6 LPM nc   Blood Gas Source Arterial: Arterial Digoxin Level, Serum (12.03.20 @ 08:46)     Digoxin Level, Serum: .76 ng/mL

## 2020-12-03 NOTE — PROGRESS NOTE ADULT - GASTROINTESTINAL DETAILS
no guarding/nontender/no rigidity/soft
no guarding/nontender/soft/no rigidity
nontender/soft
nontender/soft
soft/nontender

## 2020-12-03 NOTE — PROGRESS NOTE ADULT - SUBJECTIVE AND OBJECTIVE BOX
SUBJECTIVE     Patient was moved to medical floor   noted to have elevated PC02   with the worsening acidosis   kept on the bipap  awake and confused and trying to pull out face mask       PAST MEDICAL & SURGICAL HISTORY:  PVD (peripheral vascular disease)    KENNY (obstructive sleep apnea)    Hyperlipidemia    Hypertension, unspecified type    Spinal stenosis    CHF (congestive heart failure)    Emphysema    COPD (chronic obstructive pulmonary disease)    No significant past surgical history      OBJECTIVE   Vital Signs Last 24 Hrs  T(C): 36.8 (03 Dec 2020 08:00), Max: 37.3 (02 Dec 2020 14:00)  T(F): 98.3 (03 Dec 2020 08:00), Max: 99.2 (02 Dec 2020 14:00)  HR: 88 (03 Dec 2020 08:00) (83 - 105)  BP: 149/56 (03 Dec 2020 08:00) (103/58 - 157/67)  BP(mean): 68 (02 Dec 2020 18:00) (68 - 92)  RR: 17 (02 Dec 2020 23:08) (17 - 24)  SpO2: 92% (03 Dec 2020 08:49) (88% - 100%)    Review of systems   as dictated in the history of present illness with the review of other systems non contributory     PHYSICAL EXAM:  Constitutional: on the bipap   HEENT: Normo cephalic atraumatic  Neck: Soft and supple, No J.V.D   Respiratory: vesicular breathing ,distant breath sounds    Cardiovascular: S1 and S2, regular rate .   Gastrointestinal:  soft, nontender,   Extremities: significantly improved edema of the feet   Neurological: No new  focal deficits.    MEDICATIONS  (STANDING):  ALBUTerol    90 MICROgram(s) HFA Inhaler 2 Puff(s) Inhalation every 6 hours  amLODIPine   Tablet 5 milliGRAM(s) Oral daily  apixaban 5 milliGRAM(s) Oral every 12 hours  atorvastatin 20 milliGRAM(s) Oral at bedtime  budesonide 160 MICROgram(s)/formoterol 4.5 MICROgram(s) Inhaler 2 Puff(s) Inhalation two times a day  cefTRIAXone Injectable. 1000 milliGRAM(s) IV Push every 24 hours  chlorhexidine 4% Liquid 1 Application(s) Topical <User Schedule>  clopidogrel Tablet 75 milliGRAM(s) Oral daily  dextrose 40% Gel 15 Gram(s) Oral once  dextrose 5%. 1000 milliLiter(s) (50 mL/Hr) IV Continuous <Continuous>  dextrose 5%. 1000 milliLiter(s) (100 mL/Hr) IV Continuous <Continuous>  dextrose 50% Injectable 25 Gram(s) IV Push once  dextrose 50% Injectable 25 Gram(s) IV Push once  dextrose 50% Injectable 12.5 Gram(s) IV Push once  digoxin     Tablet 0.125 milliGRAM(s) Oral daily  finasteride 5 milliGRAM(s) Oral daily  furosemide    Tablet 40 milliGRAM(s) Oral daily  glucagon  Injectable 1 milliGRAM(s) IntraMuscular once  insulin glargine Injectable (LANTUS) 5 Unit(s) SubCutaneous two times a day  insulin lispro (ADMELOG) corrective regimen sliding scale   SubCutaneous three times a day before meals  insulin lispro (ADMELOG) corrective regimen sliding scale   SubCutaneous at bedtime  isosorbide   mononitrate ER Tablet (IMDUR) 60 milliGRAM(s) Oral daily  methylPREDNISolone sodium succinate Injectable 20 milliGRAM(s) IV Push every 8 hours  metoprolol succinate ER 50 milliGRAM(s) Oral daily  multivitamin 1 Tablet(s) Oral daily  pregabalin 75 milliGRAM(s) Oral daily  QUEtiapine 25 milliGRAM(s) Oral at bedtime  QUEtiapine 12.5 milliGRAM(s) Oral daily  ranolazine 500 milliGRAM(s) Oral two times a day  tamsulosin 0.4 milliGRAM(s) Oral at bedtime  tiotropium 18 MICROgram(s) Capsule 1 Capsule(s) Inhalation daily  tiotropium 18 MICROgram(s) Capsule 1 Capsule(s) Inhalation daily                            13.8   19.51 )-----------( 286      ( 03 Dec 2020 08:46 )             46.6     12-03    144  |  101  |  63<H>  ----------------------------<  182<H>  5.2   |  42<H>  |  1.55<H>    Ca    8.8      03 Dec 2020 08:46  Phos  4.6     12-02  Mg     3.0     12-02         ABG - ( 03 Dec 2020 08:54 )  pH, Arterial: 7.24  pH, Blood: x     /  pCO2: 109   /  pO2: 64    / HCO3: 45    / Base Excess: 12.4  /  SaO2: 89

## 2020-12-03 NOTE — PROGRESS NOTE ADULT - ASSESSMENT
Pt has been seen and examined with FP resident, resident supervised agree with a/p       Patient is a 75y old  Male who presents with a chief complaint of SOB. Pt managed in icu first and now transfer to medical floor for ongoing management. Here on medical floor pt is refusing bipap             PHYSICAL EXAM:  Vital Signs Last 24 Hrs  T(C): 36.8 (03 Dec 2020 08:00), Max: 37.2 (02 Dec 2020 17:00)  T(F): 98.3 (03 Dec 2020 08:00), Max: 99 (02 Dec 2020 17:00)  HR: 85 (03 Dec 2020 08:30) (83 - 99)  BP: 149/56 (03 Dec 2020 08:00) (103/58 - 157/67)  BP(mean): 68 (02 Dec 2020 18:00) (68 - 92)  RR: 17 (02 Dec 2020 23:08) (17 - 23)  SpO2: 92% (03 Dec 2020 08:49) (88% - 100%)  -rs-poor air entry, wheeze mild noted   -cvs-s1s2 normal   -p/a-soft,bs+      A/P    #Acute hypoxic and hypercapnics respiratory failure   -refused bipap   -after lengthy discussion pt wants to be DNR and DNI- discussed with pt that if he does not use bipap then he might die. He is alert, oriented to time, place and person. He understood verbalized back. Pt has been made DNR and DNI as per his request   -ct supportive care     #dvt pr

## 2020-12-04 NOTE — PROGRESS NOTE ADULT - ASSESSMENT
-  hypercarbic and hypoxemic respiratory failure with the woresening respiratory acidosis today and patient is some what poorly compliant   -  CHF with pulmonary edema pattern with pleural effusions with history of diastolic dysfunction   -  pleural effusions mild likely secondary to CHF  -   sleep apnea  never use of the CPAP in the past.  -    severe baseline obstructive airway disease O2 dependent  -    known patient with coronary artery disease with history of stent placement  -    AFib on anticoagulation..  -    other issues include peripheral vascular disease suspected cellulitis on IV antibiotics    PLAN     patient events noted and decided to be DNR and DNI and with out intubation chances of survival is poor   not wearing the bipap as the house staff   continue with the comfort measures . would sign off the case

## 2020-12-04 NOTE — CONSULT NOTE ADULT - ASSESSMENT
Pt is a 75y old Male with hx of diastolic CHF, Copd on 4L home O2, HTN, HLD, CAD s/p stent, spinal stenosis, obesity, KENNY, PAD, afib on AC admitted 11/28 for resp failure, desaturation at home.  Pts resp failure secondary to CHF and exacerbation of COPD and pt required Bipap on admission.  Pt was treated with diuretics and IV steroids in ICU and downgraded to med surg floor 12/3.  Course complicated by rapid response x 2 for hypoxia on 12/3, at which time pt expressed wishes to have DNR and DNI order in place.  Today pt on Bipap but noted to have worsening resp acidosis and hypercarbic resp failure despite Bipap.  Palliative care consulted for assistance with end of life care.    1)Restlessness  -Related to hypercarbic resp failure and Bipap  -Ativan 0.5mg IV q 4hr PRN    2)Dyspnea  -Related to hypercarbic resp failure secondary to CHF, COPD  -Symptomatic relief with Dilaudid 0.3mg IV q 2hr PRN  -Currently on IV steroids  -Cannot tolerate further diuresis due to BHARTI    3)Secretions  -Robinul 0.2mg IV q 6hr PRN    4)Resp Failure  -Hypercarbic  -Secondary to COPD, CHF  -Currently on Bipap but not facilitating gas exchange as we hope  -Pt approaching end of life due to lung failure  -Symptom management as noted above    5)Advance Care Planning  -Pt does not have capacity to make medical decisions due to hypercarbic resp failure  -Pt has HCP on file naming his nephew Guanakito Canas [phone]  -Pt has DNR and DNI order in place  -Pt with grave prognosis with or without Bipap  -Will contact pts HCP to discuss transition to comfort focused care    D/w Dr. Soha Schroeder and Dr. Pearson and bedside RN Larissa

## 2020-12-04 NOTE — PROGRESS NOTE ADULT - SUBJECTIVE AND OBJECTIVE BOX
SUBJECTIVE       PAST MEDICAL & SURGICAL HISTORY:  PVD (peripheral vascular disease)    KENNY (obstructive sleep apnea)    Hyperlipidemia    Hypertension, unspecified type    Spinal stenosis    CHF (congestive heart failure)    Emphysema    COPD (chronic obstructive pulmonary disease)    No significant past surgical history      OBJECTIVE   Vital Signs Last 24 Hrs  T(C): 35.8 (04 Dec 2020 08:15), Max: 37.1 (03 Dec 2020 15:00)  T(F): 96.4 (04 Dec 2020 08:15), Max: 98.7 (03 Dec 2020 15:00)  HR: 91 (04 Dec 2020 08:15) (68 - 91)  BP: 146/82 (04 Dec 2020 08:15) (106/57 - 154/82)  BP(mean): --  RR: 18 (04 Dec 2020 08:15) (17 - 20)  SpO2: 100% (04 Dec 2020 08:15) (91% - 100%)    Review of systems   as dictated in the history of present illness with the review of other systems non contributory     PHYSICAL EXAM:  Constitutional: , awake and alert, not in distress.  HEENT: Normo cephalic atraumatic  Neck: Soft and supple, No J.V.D   Respiratory: vesicular breathing , No wheezing, rales or rhonchi.   Cardiovascular: S1 and S2, regular rate .   Gastrointestinal:  soft, nontender,   Extremities: No  edema or calf tenderness .  Neurological: No new  focal deficits.    MEDICATIONS  (STANDING):  ALBUTerol    90 MICROgram(s) HFA Inhaler 2 Puff(s) Inhalation every 6 hours  amLODIPine   Tablet 5 milliGRAM(s) Oral daily  apixaban 5 milliGRAM(s) Oral every 12 hours  atorvastatin 20 milliGRAM(s) Oral at bedtime  budesonide 160 MICROgram(s)/formoterol 4.5 MICROgram(s) Inhaler 2 Puff(s) Inhalation two times a day  chlorhexidine 4% Liquid 1 Application(s) Topical <User Schedule>  clopidogrel Tablet 75 milliGRAM(s) Oral daily  dextrose 40% Gel 15 Gram(s) Oral once  dextrose 5%. 1000 milliLiter(s) (50 mL/Hr) IV Continuous <Continuous>  dextrose 5%. 1000 milliLiter(s) (100 mL/Hr) IV Continuous <Continuous>  dextrose 50% Injectable 25 Gram(s) IV Push once  dextrose 50% Injectable 12.5 Gram(s) IV Push once  dextrose 50% Injectable 25 Gram(s) IV Push once  digoxin     Tablet 0.125 milliGRAM(s) Oral daily  finasteride 5 milliGRAM(s) Oral daily  glucagon  Injectable 1 milliGRAM(s) IntraMuscular once  insulin glargine Injectable (LANTUS) 5 Unit(s) SubCutaneous two times a day  insulin lispro (ADMELOG) corrective regimen sliding scale   SubCutaneous three times a day before meals  insulin lispro (ADMELOG) corrective regimen sliding scale   SubCutaneous at bedtime  isosorbide   mononitrate ER Tablet (IMDUR) 60 milliGRAM(s) Oral daily  methylPREDNISolone sodium succinate Injectable 20 milliGRAM(s) IV Push every 12 hours  metoprolol succinate ER 50 milliGRAM(s) Oral daily  multivitamin 1 Tablet(s) Oral daily  pregabalin 75 milliGRAM(s) Oral daily  QUEtiapine 25 milliGRAM(s) Oral at bedtime  ranolazine 500 milliGRAM(s) Oral two times a day  tamsulosin 0.4 milliGRAM(s) Oral at bedtime  tiotropium 18 MICROgram(s) Capsule 1 Capsule(s) Inhalation daily  tiotropium 18 MICROgram(s) Capsule 1 Capsule(s) Inhalation daily                            13.8   19.51 )-----------( 286      ( 03 Dec 2020 08:46 )             46.6     12-03    144  |  101  |  63<H>  ----------------------------<  182<H>  5.2   |  42<H>  |  1.55<H>    Ca    8.8      03 Dec 2020 08:46         ABG - ( 03 Dec 2020 11:53 )  pH, Arterial: 7.26  pH, Blood: x     /  pCO2: 106   /  pO2: 60    / HCO3: 46    / Base Excess: 14.2  /  SaO2: 88                             SUBJECTIVE     patient events noted and decided to be DNR and DNI   refused to be intubated kept on the non rebreathing mask   currently un responsive on the non rebreathing mask .  not taking the oral medications       PAST MEDICAL & SURGICAL HISTORY:  PVD (peripheral vascular disease)    KENNY (obstructive sleep apnea)    Hyperlipidemia    Hypertension, unspecified type    Spinal stenosis    CHF (congestive heart failure)    Emphysema    COPD (chronic obstructive pulmonary disease)    No significant past surgical history      OBJECTIVE   Vital Signs Last 24 Hrs  T(C): 35.8 (04 Dec 2020 08:15), Max: 37.1 (03 Dec 2020 15:00)  T(F): 96.4 (04 Dec 2020 08:15), Max: 98.7 (03 Dec 2020 15:00)  HR: 91 (04 Dec 2020 08:15) (68 - 91)  BP: 146/82 (04 Dec 2020 08:15) (106/57 - 154/82)  BP(mean): --  RR: 18 (04 Dec 2020 08:15) (17 - 20)  SpO2: 100% (04 Dec 2020 08:15) (91% - 100%).    Review of systems   as dictated in the history of present illness with the review of other systems non contributory     PHYSICAL EXAM:  Constitutional: un responsive on the non rebreathing mask   HEENT: Normo cephalic atraumatic  Neck: Soft and supple, No J.V.D   Respiratory: vesicular breathing ,un able to evaluate    Cardiovascular: S1 and S2, regular rate .   Gastrointestinal:  soft, nontender,   Extremities: No  edema or calf tenderness .  Neurological: un responsive     MEDICATIONS  (STANDING):  ALBUTerol    90 MICROgram(s) HFA Inhaler 2 Puff(s) Inhalation every 6 hours  amLODIPine   Tablet 5 milliGRAM(s) Oral daily  apixaban 5 milliGRAM(s) Oral every 12 hours  atorvastatin 20 milliGRAM(s) Oral at bedtime  budesonide 160 MICROgram(s)/formoterol 4.5 MICROgram(s) Inhaler 2 Puff(s) Inhalation two times a day  chlorhexidine 4% Liquid 1 Application(s) Topical <User Schedule>  clopidogrel Tablet 75 milliGRAM(s) Oral daily  dextrose 40% Gel 15 Gram(s) Oral once  dextrose 5%. 1000 milliLiter(s) (50 mL/Hr) IV Continuous <Continuous>  dextrose 5%. 1000 milliLiter(s) (100 mL/Hr) IV Continuous <Continuous>  dextrose 50% Injectable 25 Gram(s) IV Push once  dextrose 50% Injectable 12.5 Gram(s) IV Push once  dextrose 50% Injectable 25 Gram(s) IV Push once  digoxin     Tablet 0.125 milliGRAM(s) Oral daily  finasteride 5 milliGRAM(s) Oral daily  glucagon  Injectable 1 milliGRAM(s) IntraMuscular once  insulin glargine Injectable (LANTUS) 5 Unit(s) SubCutaneous two times a day  insulin lispro (ADMELOG) corrective regimen sliding scale   SubCutaneous three times a day before meals  insulin lispro (ADMELOG) corrective regimen sliding scale   SubCutaneous at bedtime  isosorbide   mononitrate ER Tablet (IMDUR) 60 milliGRAM(s) Oral daily  methylPREDNISolone sodium succinate Injectable 20 milliGRAM(s) IV Push every 12 hours  metoprolol succinate ER 50 milliGRAM(s) Oral daily  multivitamin 1 Tablet(s) Oral daily  pregabalin 75 milliGRAM(s) Oral daily  QUEtiapine 25 milliGRAM(s) Oral at bedtime  ranolazine 500 milliGRAM(s) Oral two times a day  tamsulosin 0.4 milliGRAM(s) Oral at bedtime  tiotropium 18 MICROgram(s) Capsule 1 Capsule(s) Inhalation daily  tiotropium 18 MICROgram(s) Capsule 1 Capsule(s) Inhalation daily                            13.8   19.51 )-----------( 286      ( 03 Dec 2020 08:46 )             46.6     12-03    144  |  101  |  63<H>  ----------------------------<  182<H>  5.2   |  42<H>  |  1.55<H>    Ca    8.8      03 Dec 2020 08:46         ABG - ( 03 Dec 2020 11:53 )  pH, Arterial: 7.26  pH, Blood: x     /  pCO2: 106   /  pO2: 60    / HCO3: 46    / Base Excess: 14.2  /  SaO2: 88

## 2020-12-04 NOTE — GOALS OF CARE CONVERSATION - ADVANCED CARE PLANNING - CONVERSATION DETAILS
Team contacted pts HCP/friend Debora to discuss goals of care, assist with planning or provide supportive counseling. She reports that her and pt. have been friends for over 10 years and she has been involved in his care. The medical team has been speaking with pts robert Calabrese as it seems everyone was under the impression Debora deferred to him. When team spoke with Debora she reports that she is HCP and she never deferred decision making to [place] and has not been given any information reports that she was informed by staff at the hospital that she was taken off of the HCP and it is now [place]. Team explained that we have no other HCP forms on file except the one that has Debora as the HCP therefore, she would be the decision maker (since pt. does not have capacity to do so at this time) if she wanted to.  She reports that she is ok with [facility] getting medical information but she wishes to be the decision maker and receive all updates.    Pt. was living alone prior to admission with a private hire nurse (Mary)  3 to 4 times a week. She reports that the private hire was assisting pt. with bathing and some ADLS. Debora reports that pt. has declined both  physical and cognitively. She also reports that both her and the private nurse felt pt. may have also been depressed with his situation and felt he was getting to the point he needed more care.     Pts current medical condition and goals of care discussed. We explained the severity of pts condition and that we are concerned that pt. is nearing the end of his life. We explained how pt. is on the Bipap machine and gave the option to remove the Bipap and focus on keeping him comfortable. Pts HCP Debora was in agreement with this and would like the BiPap to be removed and to focus on keeping pt. comfortable. She expressed understanding that pt. will likely die in the hospital.     Plan at this time is to remove BiPap and focus on comfort. Pt. likely will not be stable enough to move to a hospice therefore, plan is to keep him comfortable at the hospital. Emotional support provided. Team contacted pts HCP/friend Debora to discuss goals of care, assist with planning or provide supportive counseling. She reports that her and pt. have been friends for over 10 years and she has been involved in his care. The medical team has been speaking with pts robert Calabrese and he reports he is the HCP. When team spoke with Debora she reports that she thought she was HCP but was told by staff here that she was taken off of the HCP and it is now [place]. Team explained that we have no other HCP forms on file except the one that has Debora as the HCP from 2018  therefore, she would be the decision maker (since pt. does not have capacity to do so at this time) if she wanted to, unless Bunny has a more recent HCP.  She reports that she is ok with [facility] getting medical information but she wishes to be the decision maker and receive all updates if she is the HCP.    Pt. was living alone prior to admission with a private hire nurse (Mary)  3 to 4 times a week. She reports that the private hire was assisting pt. with bathing and some ADLS. Debora reports that pt. has declined both  physical and cognitively. She also reports that both her and the private nurse felt pt. may have also been depressed with his situation and felt he was getting to the point he needed more care.     Pts current medical condition and goals of care discussed. We explained the severity of pts condition and that we are concerned that pt. is nearing the end of his life. We explained how pt. is on the Bipap machine and gave the option to remove the Bipap and focus on keeping him comfortable. Pts HCP Debora was in agreement with this and would like the BiPap to be removed and to focus on keeping pt. comfortable. She expressed understanding that pt. will likely die in the hospital. We informed Debora that we would be contacting [place] as well, which she agreed with.     Team then spoke with [place]ito via phone and he states he has a updated HCP from 2019 that assigns him as the HCP and that he will bring this form up today. He is aware that until we have the actual paperwork we have to go to Debora for decision making as the only HCP form we have currently names her. He expressed understanding of this and will bring the copy of the HCP naming him from 2019 today. We explained the above medical information to Guanakito and the concern that pt. is approaching the end of his life. Guan[place]ito is in agreement with removing the BiPap and focusing on his comfort as well. He plans to come see pt. today but understands its possible pt. could die before he arrives.     Plan at this time is to remove BiPap and focus on comfort. Pt. likely will not be stable enough to move to a hospice therefore, plan is to keep him comfortable at the hospital. Emotional support provided. Team contacted pts HCP/friend Debora to discuss goals of care, assist with planning or provide supportive counseling. She reports that her and pt. have been friends for over 10 years and she has been involved in his care. The medical team has been speaking with pts nephmilad Calabrese and he reports he is the HCP. Debora reports that she thought she was HCP but was told by staff here that she was taken off of the HCP and it is now [facility]. Team explained that we have no other HCP forms on file except the one that has Debora as the HCP from 2018  therefore, she would be the decision maker (since pt. does not have capacity to do so at this time) if she wanted to, unless Guanakito has a more recent HCP. She reports that she is ok with [facility] getting medical information but she wishes to be the decision maker and receive all updates if she is the HCP.    Pt. was living alone prior to admission with a private hire nurse (Mary)  3 to 4 times a week. She reports that the private hire was assisting pt. with bathing and some ADLS. Debora reports that pt. has declined both  physical and cognitively. She also reports that both her and the private nurse felt pt. may have also been depressed with his situation and felt he was getting to the point he needed more care.     Pts current medical condition and goals of care discussed. We explained the severity of pts condition and that we are concerned that pt. is nearing the end of his life. We explained how pt. is on the Bipap machine and gave the option to remove the Bipap and focus on keeping him comfortable. Pts HCP Debora was in agreement with this and would like the BiPap to be removed and to focus on keeping pt. comfortable. She expressed understanding that pt. will likely die in the hospital. We informed Debora that we would be contacting [place] as well, which she agreed with.     Team then spoke with [place]ito via phone and he states he has a updated HCP from 2019 that assigns him as the HCP and that he will bring this form up today. He is aware that until we have the actual paperwork we have to go to Debora for decision making as the only HCP form we have currently names her. He expressed understanding of this and will bring the copy of the HCP naming him from 2019 today. We explained the above medical information to Guanakito and the concern that pt. is approaching the end of his life. Gu[place]ito is in agreement with removing the BiPap and focusing on his comfort as well. He plans to come see pt. today but understands its possible pt. could die before he arrives.     Plan at this time is to remove BiPap and focus on comfort. Pt. likely will not be stable enough to move to a hospice therefore, plan is to keep him comfortable at the hospital. Emotional support provided. Team contacted pts HCP/friend Debora to discuss goals of care, assist with planning and provide supportive counseling. She reports that her and pt. have been friends for over 10 years and she has been involved in his care. The medical team has been speaking with pts nephmilad Calabrese and he reports he is the HCP. Debora reports that she thought she was HCP but was told by staff here that she was taken off of the HCP and it is now [facility]. Team explained that we have no other HCP forms on file except the one that has Debora as the HCP from 2018  therefore, she would be the decision maker (since pt. does not have capacity to do so at this time) if she wanted to, unless Guanakito has a more recent HCP. She reports that she is ok with [place] getting medical information but she wishes to be the decision maker and receive all updates if she is the HCP.    Pt. was living alone prior to admission with a private hire nurse (Mary) 3 to 4 times a week. She reports that the private hire was assisting pt. with bathing and some ADLS. Debora reports that pt. has declined both physically and cognitively. She also reports that both her and the private nurse felt pt. may have also been depressed with his situation and felt he was getting to the point he needed more care.     Pts current medical condition and goals of care discussed. We explained the severity of pts condition and that we are concerned that pt. is nearing the end of his life. We explained how pt. is on the Bipap machine and gave the option to remove the Bipap and focus on keeping him comfortable. Debora was in agreement with this and would like the Bipap to be removed and to focus on keeping pt. comfortable. She expressed understanding that pt. will likely die in the hospital. We informed Debora that we would be contacting [place] as well, which she agreed with.     Team then spoke with [place] via phone and he states he has a updated HCP from 2019 that assigns him as the HCP and that he will bring this form up today. He is aware that until we have the actual paperwork we have to go to Debora for decision making as the only HCP form we have currently names her. He expressed understanding of this and will bring the copy of the HCP naming him from 2019 today. We explained the above medical information to Gu[place] and the concern that pt. is approaching the end of his life. Gu[place]ito is in agreement with removing the BiPap and focusing on pts comfort as well. He plans to come see pt. today but understands its possible pt. could die before he arrives.     Plan at this time is to remove BiPap and focus on comfort. Pt. likely will not be stable enough to move to a hospice therefore, plan is to keep him comfortable at the hospital. Emotional support provided. Our team will continue to follow.

## 2020-12-04 NOTE — PROGRESS NOTE ADULT - ASSESSMENT
pt was seen and examined in AM during round     on exam- pt was on bipap   -rs-poor air entry, lethargic but arousable   -[id number] was present     A/P    #plan was to continue to treat but also we contacted his family to discuss further goal of care. With the help of palliative team pt was made comfortable care only and he  later on. please see expiration note for detail     #Emotional and spiritual support was provided to family member Need for prophylactic measure

## 2020-12-04 NOTE — CONSULT NOTE ADULT - SUBJECTIVE AND OBJECTIVE BOX
HPI: Pt is a 75y old Male with hx of diastolic CHF, Copd on 4L home O2, HTN, HLD, CAD s/p stent, spinal stenosis, obesity, KENNY, PAD, afib on AC admitted  for resp failure, desaturation at home.  Pts resp failure secondary to CHF and exacerbation of COPD and pt required Bipap on admission.  Pt was treated with diuretics and IV steroids in ICU and downgraded to med surg floor 12/3.  Course complicated by rapid response x 2 for hypoxia on 12/3, at which time pt expressed wishes to have DNR and DNI order in place.  Today pt on Bipap but noted to have worsening resp acidosis and hypercarbic resp failure despite Bipap.  Palliative care consulted for assistance with end of life care.    Pt seen and examined by me for assistance with end of life care.  Pt was on Bipap at time of my visit.  He mumbled in response to my greeting but no clear words.  He continued to mumble when asked about how he was feeling so unable to obtain hx, ROS.  He appeared slightly restless at time of my visit.      PAIN: ( )Yes   (x )No  No nonverbal signs of pain    DYSPNEA: ( x) Yes  ( ) No  Appears uncomfortable on Bipap    PAST MEDICAL & SURGICAL HISTORY:  PVD (peripheral vascular disease)  KENNY (obstructive sleep apnea)  Hyperlipidemia  Hypertension, unspecified type  Spinal stenosis  CHF (congestive heart failure)  Emphysema  COPD (chronic obstructive pulmonary disease)  No significant past surgical history    SOCIAL HX: PTA pt was living home alone with assistance of aide 2-3x week, social Etoh    Hx opiate tolerance ( )YES  (x )NO  Per chart review as pt unable to provide due to poor mental status    Baseline ADLs  (Prior to Admission)  ( ) Independent   ( )Dependent  Some assistance    FAMILY HISTORY:  Unable to obtain due to pts poor mental status        Review of Systems:    Anxiety-  Depression-  Physical Discomfort-  Dyspnea-  Constipation-  Diarrhea-  Nausea-  Vomiting-  Anorexia-  Weight Loss-   Cough-  Secretions-  Fatigue-  Weakness-  Delirium-    Unable to obtain due to: pts poor mental status      PHYSICAL EXAM:    Vital Signs Last 24 Hrs  T(C): 35.8 (04 Dec 2020 08:15), Max: 37.1 (03 Dec 2020 15:00)  T(F): 96.4 (04 Dec 2020 08:15), Max: 98.7 (03 Dec 2020 15:00)  HR: 91 (04 Dec 2020 08:15) (68 - 91)  BP: 146/82 (04 Dec 2020 08:15) (106/57 - 154/82)  RR: 18 (04 Dec 2020 08:15) (17 - 20)  SpO2: 100% (04 Dec 2020 08:15) (91% - 100%)  Daily Weight in k.8 (04 Dec 2020 05:43)    PPSV2: 10  %    General: obese male, lying in bed with Bipap in place uncomfortable  Mental Status: mumbles in response to my voice  HEENT: eyes closed, Bipap in place  Lungs: decreased breath sounds b/l  Cardiac: + S1S2 irreg  GI: obese, NT/palp  : Driver in place with urine output  Ext: + stasis changes LE b/l  Neuro: altered mental status secondary to hypercarbia      LABS:                        14.5   26.12 )-----------( 265      ( 04 Dec 2020 08:40 )             51.1     12-    145  |  100  |  92<H>  ----------------------------<  160<H>  5.1   |  >45<HH>  |  1.98<H>    Ca    9.1      04 Dec 2020 08:40        Albumin: Albumin, Serum: 3.4 g/dL ( @ 08:51)      Allergies  No Known Allergies      MEDICATIONS  (STANDING):  ALBUTerol    90 MICROgram(s) HFA Inhaler 2 Puff(s) Inhalation every 6 hours  amLODIPine   Tablet 5 milliGRAM(s) Oral daily  apixaban 5 milliGRAM(s) Oral every 12 hours  atorvastatin 20 milliGRAM(s) Oral at bedtime  budesonide 160 MICROgram(s)/formoterol 4.5 MICROgram(s) Inhaler 2 Puff(s) Inhalation two times a day  chlorhexidine 4% Liquid 1 Application(s) Topical <User Schedule>  clopidogrel Tablet 75 milliGRAM(s) Oral daily  dextrose 40% Gel 15 Gram(s) Oral once  dextrose 5%. 1000 milliLiter(s) (50 mL/Hr) IV Continuous <Continuous>  dextrose 5%. 1000 milliLiter(s) (100 mL/Hr) IV Continuous <Continuous>  dextrose 50% Injectable 25 Gram(s) IV Push once  dextrose 50% Injectable 12.5 Gram(s) IV Push once  dextrose 50% Injectable 25 Gram(s) IV Push once  digoxin     Tablet 0.125 milliGRAM(s) Oral daily  finasteride 5 milliGRAM(s) Oral daily  glucagon  Injectable 1 milliGRAM(s) IntraMuscular once  insulin glargine Injectable (LANTUS) 5 Unit(s) SubCutaneous two times a day  insulin lispro (ADMELOG) corrective regimen sliding scale   SubCutaneous three times a day before meals  insulin lispro (ADMELOG) corrective regimen sliding scale   SubCutaneous at bedtime  isosorbide   mononitrate ER Tablet (IMDUR) 60 milliGRAM(s) Oral daily  methylPREDNISolone sodium succinate Injectable 20 milliGRAM(s) IV Push every 12 hours  metoprolol succinate ER 50 milliGRAM(s) Oral daily  multivitamin 1 Tablet(s) Oral daily  pregabalin 75 milliGRAM(s) Oral daily  QUEtiapine 25 milliGRAM(s) Oral at bedtime  ranolazine 500 milliGRAM(s) Oral two times a day  tamsulosin 0.4 milliGRAM(s) Oral at bedtime  tiotropium 18 MICROgram(s) Capsule 1 Capsule(s) Inhalation daily  tiotropium 18 MICROgram(s) Capsule 1 Capsule(s) Inhalation daily    MEDICATIONS  (PRN):  ALBUTerol    90 MICROgram(s) HFA Inhaler 2 Puff(s) Inhalation every 6 hours PRN Shortness of Breath  glycopyrrolate Injectable 0.2 milliGRAM(s) IV Push every 6 hours PRN secretions  HYDROmorphone  Injectable 0.3 milliGRAM(s) IV Push every 2 hours PRN signs of resp distress or pain  LORazepam   Injectable 0.5 milliGRAM(s) IV Push every 4 hours PRN restlessness, anxiety  sodium chloride 0.65% Nasal 1 Spray(s) Both Nostrils every 2 hours PRN Congestion      RADIOLOGY/ADDITIONAL STUDIES:    EXAM:  XR CHEST PORTABLE ROUTINE 1V                        PROCEDURE DATE:  2020    COMPARISON:  2020.  INTERPRETATION:    Heart size and the mediastinum cannot be accurately evaluated on this projection. The thoracic aorta is calcified.  Pulmonary vascular redistribution/congestion is unchanged.  There are small bilateral pleural effusions with likely associated passive atelectasis.  No pneumothorax is seen.  No acute bony abnormalitynoted.      IMPRESSION:  Continued pulmonary vascular redistribution/congestion.  Small bilateral pleural effusions with likely associated passive atelectasis again seen.      EXAM:  US DPLX LWR EXT VEINS COMPL BI                        PROCEDURE DATE:  2020    COMPARISON: Prior study from 2/3/2018  FINDINGS:    There is normal compressibility of the bilateral common femoral, femoral and popliteal veins.  Doppler examination shows normal spontaneous and phasic flow.    No calf vein thrombosis is detected. Bilateral calf edema is noted.    IMPRESSION:  Note, the study was technically difficult.  Within this limitation, no evidence of deepvenous thrombosis in either lower extremity.    Bilateral calf edema.    Given limitations, if clinical concern persists, reevaluation can be performed after 5 to 7 days to evaluate for propagation of clot.      EXAM:  XR CHEST PORTABLE URGENT 1V                        PROCEDURE DATE:  2020    INTERPRETATION:  AP chest.    Clinical indications: Shortness of breath.    IMPRESSION: There is bilateral airspace disease that may be from pulmonary edema. However infection cannot be excluded. The heart is normal in size. The bones are intact.

## 2020-12-04 NOTE — DISCHARGE NOTE FOR THE EXPIRED PATIENT - OTHER SIGNIFICANT FINDINGS
On physical exam, patient did not respond to verbal or noxious stimuli.  No spontaneous respirations.  Absent heart and breath sounds.  Absent radial and carotid pulses.   Pupils are fixed and dilated, no corneal reflex. Time of death at 13:13 .  Attending notified.  Family ____ autopsy.

## 2020-12-04 NOTE — CHART NOTE - NSCHARTNOTEFT_GEN_A_CORE
I was evaluating patient for monitoring respiratory status. Patient had received 1mg of Morphine at 15:00 on 12/2. He was somnolent throughout the night. Nursing staff attempted to give him his PO meds, including Plavix and Eliquis, and patient was slightly arousable. Patient tried to have BIPAP put on him but was unsuccessful. Given CHADS2 Score <5, no CVA, patient is unable to take PO Meds overnight due to AMS, suspect due to Hypercarbia. Patient is breathing 100% O2, on %, Quickly desaturates when on NC.    PHYSICAL EXAM:  Vital Signs Last 24 Hrs  T(C): 35.6 (03 Dec 2020 23:18), Max: 37.1 (03 Dec 2020 15:00)  T(F): 96 (03 Dec 2020 23:18), Max: 98.7 (03 Dec 2020 15:00)  HR: 85 (03 Dec 2020 23:18) (68 - 88)  BP: 142/67 (03 Dec 2020 23:18) (106/57 - 149/56)  BP(mean): --  RR: 18 (03 Dec 2020 23:18) (18 - 20)  SpO2: 100% (04 Dec 2020 00:21) (88% - 100%)    Constitutional: Pt lying in bed, somnolent, difficult to arouse. NAD  Respiratory: No wheezing, rales or rhonchi  Cardiovascular: S1S2+, RRR, Systolic murmur noted.   Gastrointestinal: BS+, soft, NT/ND, no guarding, no rebound  Extremities: No peripheral edema  Vascular: 2+ peripheral pulses  Neurological: AXOX0  Skin: No rashes    74 YO M, Difficult to arouse, on Supplemental O2 NRB,  - Cannot take PO meds, including eliquis/Plavix  - CHADS2 <5, hold meds tonight  - AMS d/t Hypercarbia   - Continue to monitor

## 2020-12-04 NOTE — PROGRESS NOTE ADULT - SUBJECTIVE AND OBJECTIVE BOX
CURRENT CARDIAC WORKUP:       Echo:  Stress Test:  Cardiac Cath:    Allergies:   No Known Allergies      MEDICATIONS  (STANDING):  ALBUTerol    90 MICROgram(s) HFA Inhaler 2 Puff(s) Inhalation every 6 hours  amLODIPine   Tablet 5 milliGRAM(s) Oral daily  apixaban 5 milliGRAM(s) Oral every 12 hours  atorvastatin 20 milliGRAM(s) Oral at bedtime  budesonide 160 MICROgram(s)/formoterol 4.5 MICROgram(s) Inhaler 2 Puff(s) Inhalation two times a day  chlorhexidine 4% Liquid 1 Application(s) Topical <User Schedule>  clopidogrel Tablet 75 milliGRAM(s) Oral daily  dextrose 40% Gel 15 Gram(s) Oral once  dextrose 5%. 1000 milliLiter(s) (50 mL/Hr) IV Continuous <Continuous>  dextrose 5%. 1000 milliLiter(s) (100 mL/Hr) IV Continuous <Continuous>  dextrose 50% Injectable 25 Gram(s) IV Push once  dextrose 50% Injectable 12.5 Gram(s) IV Push once  dextrose 50% Injectable 25 Gram(s) IV Push once  digoxin     Tablet 0.125 milliGRAM(s) Oral daily  finasteride 5 milliGRAM(s) Oral daily  glucagon  Injectable 1 milliGRAM(s) IntraMuscular once  insulin glargine Injectable (LANTUS) 5 Unit(s) SubCutaneous two times a day  insulin lispro (ADMELOG) corrective regimen sliding scale   SubCutaneous three times a day before meals  insulin lispro (ADMELOG) corrective regimen sliding scale   SubCutaneous at bedtime  isosorbide   mononitrate ER Tablet (IMDUR) 60 milliGRAM(s) Oral daily  methylPREDNISolone sodium succinate Injectable 20 milliGRAM(s) IV Push every 12 hours  metoprolol succinate ER 50 milliGRAM(s) Oral daily  multivitamin 1 Tablet(s) Oral daily  pregabalin 75 milliGRAM(s) Oral daily  QUEtiapine 25 milliGRAM(s) Oral at bedtime  ranolazine 500 milliGRAM(s) Oral two times a day  tamsulosin 0.4 milliGRAM(s) Oral at bedtime  tiotropium 18 MICROgram(s) Capsule 1 Capsule(s) Inhalation daily  tiotropium 18 MICROgram(s) Capsule 1 Capsule(s) Inhalation daily    MEDICATIONS  (PRN):  ALBUTerol    90 MICROgram(s) HFA Inhaler 2 Puff(s) Inhalation every 6 hours PRN Shortness of Breath  ALPRAZolam 0.25 milliGRAM(s) Oral every 8 hours PRN anxiety  sodium chloride 0.65% Nasal 1 Spray(s) Both Nostrils every 2 hours PRN Congestion  traMADol 50 milliGRAM(s) Oral every 6 hours PRN Severe Pain (7 - 10)      ROS:     .ros      Vital Signs Last 24 Hrs  T(C): 35.6 (04 Dec 2020 05:43), Max: 37.1 (03 Dec 2020 15:00)  T(F): 96.1 (04 Dec 2020 05:43), Max: 98.7 (03 Dec 2020 15:00)  HR: 82 (04 Dec 2020 05:43) (68 - 85)  BP: 154/82 (04 Dec 2020 05:43) (106/57 - 154/82)  BP(mean): --  RR: 17 (04 Dec 2020 06:39) (17 - 20)  SpO2: 100% (04 Dec 2020 06:39) (91% - 100%)    I&O's Summary    03 Dec 2020 07:01  -  04 Dec 2020 07:00  --------------------------------------------------------  IN: 0 mL / OUT: 200 mL / NET: -200 mL        PHYSICAL EXAM:    .phy      TELEMETRY:    ECG:    LABS:                        13.8   19.51 )-----------( 286      ( 03 Dec 2020 08:46 )             46.6     12-03    144  |  101  |  63<H>  ----------------------------<  182<H>  5.2   |  42<H>  |  1.55<H>    Ca    8.8      03 Dec 2020 08:46            11-30 @ 06:24  Trop-I <0.015  CK     --  CK MB  --    11-29 @ 06:56  Trop-I <0.015  CK     --  CK MB  --    11-28 @ 08:51  Trop-I <0.015  CK     54  CK MB  --    Pro BNP  5041 11-29 @ 06:56  D Dimer  -- 11-29 @ 06:56  Pro BNP  2884 11-28 @ 08:51  D Dimer  -- 11-28 @ 08:51        ABG - ( 03 Dec 2020 11:53 )  pH, Arterial: 7.26  pH, Blood: x     /  pCO2: 106   /  pO2: 60    / HCO3: 46    / Base Excess: 14.2  /  SaO2: 88                    RADIOLOGY & ADDITIONAL STUDIES:

## 2020-12-04 NOTE — PROVIDER CONTACT NOTE (CRITICAL VALUE NOTIFICATION) - TEST AND RESULT REPORTED:
PCO2 106
CO2 77
Co2 79
PCO2 arterial =109
Ph.7.2, [id number], O2=38
Ph=7.13  FGl3=150  OE0=390  HCO3=47  O2 sat=99%
Sheeba Erazo
abg, pco2- 104

## 2020-12-04 NOTE — DISCHARGE NOTE FOR THE EXPIRED PATIENT - HOSPITAL COURSE
74 y/o male w PMH of Oxygen-Dependant COPD on 4L Home Oxygen, HTN, HLD, CAD s/p stent in LCx, Spinal Stenosis, KENNY on home non invasive ventilation, PAD, and chronic AFib on apixaban admitted to ICU on 11/28 with likely acute on chronic decompensated HFpEF and acute exacerbation COPD.  Placed on non invasive ventilation. CXR from 11/30, PVC and B/L effusion R>L.  On seroquel for agitation overnight for removing BiPAP. Pt downgrade from ICU on 12/03. When arrived to floors, patient began desaturating, placed on BiPAP, however, became agitated and continuously tried to remove mask. Given morphine for pain. Patient refused intervention, risks and benefits discussed with patient and family. Decision made for DNR/DNI made with family. Was placed on a ventimask for comfort. ABG's showed increasing hypercapnia despite BiPAP. Palliative consulted and GOC conversation discussed with family. Patient placed on comfort measures and BiPAP was removed. At 13:13 patient stopped spontaneous breathing. Time of death 13:21 secondary to respiratory failure, family at bedside. Nephew and Health Care Proxy called and informed.

## 2020-12-04 NOTE — PROGRESS NOTE ADULT - REASON FOR ADMISSION
SOB, COPD exac, CHF

## 2021-11-02 NOTE — DIETITIAN INITIAL EVALUATION ADULT. - ADD RECOMMEND
Ace inhibitors, amoxicillin. irbesartan, Norvasc/done 1) monitor PO intake, if suboptimal, add glucerna once daily 2) consider checking vitamin D daily 3) weekly wt checks to track/trend changes

## 2022-05-17 NOTE — DIETITIAN INITIAL EVALUATION ADULT. - FEEDING SKILL
Narcisa is seen today for a 6 month hearing aid check of her RT Steffanie hearing aid. She states that she hears really well in the loop program, but not very well otherwise anymore.    Otoscopic examination revealed clear canals, bilaterally.    Her instrument was cleaned and checked.  The tube and dome was replaced.  The hearing aid was found to be in good working order.  I updated Narcisa's hearing test and put her new thresholds into [facility]. The hearing aid was re-programmed accordingly.    Narcisa will return in 6 months or sooner should any problems or concerns arise. We did discuss how her hearing aid is 6 years old now and if things are not working well, it may be time for an upgrade.   independent

## 2022-09-21 NOTE — PROGRESS NOTE ADULT - CONSTITUTIONAL COMMENTS
Patient is calling and states he tested positive for covid on 9-14 and patient is still testing positive. Patient states his employer needs a update for his employer as he has not gone back to work yet. Patient is no longer having symptoms.    comfortable, slightly dyspneia

## 2022-11-08 NOTE — BRIEF OPERATIVE NOTE - ESTIMATED BLOOD LOSS
Patient admitted 10/31 for acute pancreatitis. Patient admitted for acute pancreatitis pt admitted 10/31 for epigastric pain dx pancreatitis  pt had an episode of same BM during the day yesterday   pt's most recent H&H 9.7&28.0 150

## 2022-12-09 NOTE — ASU PATIENT PROFILE, ADULT - HAS THE PATIENT USED TOBACCO IN THE PAST 30 DAYS?
No PAST MEDICAL HISTORY:  Anemia     Atrial fibrillation on ELiquis    CAD (coronary artery disease)     DM2 (diabetes mellitus, type 2)     Edema of both legs     GERD (gastroesophageal reflux disease)     Gout     History of macular degeneration     Pacemaker since 2010, replaced in 6/2021    Rotator cuff tear, right

## 2023-01-27 NOTE — DISCHARGE NOTE ADULT - NS TRANSFER PATIENT BELONGINGS
Sent medrol dose pack to [facility]   Electronic Device (specify)/watch/Jewelry/Money (specify)/Clothing/Cell Phone/PDA (specify)

## 2023-07-30 NOTE — PROGRESS NOTE ADULT - RESPIRATORY AND THORAX
Pt ambulated from the lobby to [facility] with a steady gait. Agree with triage note. [facility] Chart up for [facility].   
negative
negative
details…

## 2024-03-14 NOTE — PROVIDER CONTACT NOTE (CHANGE IN STATUS NOTIFICATION) - ASSESSMENT
-- DO NOT REPLY / DO NOT REPLY ALL --  -- Message is from Engagement Center Operations (ECO) --    Referral Request  Name of Specialist: CLARITZA  Provider's specialty: Dermatology    Medical condition for referral:  R21 (ICD-10-CM) - Rash  Patient had a referral with Dr. Sai Hernandez. However, due to missed appointments, patient is not able to schedule with this provider. Patient needing a new referral. Please contact patient once referral has been placed.     Is this a NEW request?: no      Referral ordered by: Dr. Feliberto Robert      Insurance type:       Payor: [employer] PLANS / Plan: [place] MEDICAID HMO / Product Type: T19 HMO      Preferred Delivery Method   Input in Epic    Caller Information         Type Contact Phone/Fax    03/14/2024 05:19 PM CDT Phone (Incoming) Coral Vaca (Self) [phone] (M)            Alternative phone number: No    Can a detailed message be left? Yes       hypoactive bowel sounds;  no nausea, vomiting.

## 2025-06-02 NOTE — DISCHARGE NOTE ADULT - LOW SALT DIET. ACTIVITY AS TOLERATED. CALL YOUR DOCTOR FOR FOLLOW UP APPOINTMENT
Problem: At Risk for Falls  Goal: Patient does not fall  Outcome: Monitoring/Evaluating progress     Problem: At Risk for Injury Due to Fall  Goal: Patient does not fall  Outcome: Monitoring/Evaluating progress     Problem: Pain  Goal: Acceptable pain level achieved/maintained at rest using appropriate pain scale for the patient  Outcome: Monitoring/Evaluating progress      Statement Selected

## 2025-06-19 NOTE — BRIEF OPERATIVE NOTE - PRE-OP
Full consult pending, however, will plan for EGD on Friday, ok for diet today, NPO at MN  I am told patient feels better on Protonix, can have outpatient f/u as well, either is fine with GI.   Off floor for VQ scan, discussed with charge RN   <<-----Click on this checkbox to enter Pre-Op Dx